# Patient Record
Sex: FEMALE | Race: WHITE | NOT HISPANIC OR LATINO | Employment: OTHER | URBAN - NONMETROPOLITAN AREA
[De-identification: names, ages, dates, MRNs, and addresses within clinical notes are randomized per-mention and may not be internally consistent; named-entity substitution may affect disease eponyms.]

---

## 2017-01-08 ENCOUNTER — APPOINTMENT (OUTPATIENT)
Dept: GENERAL RADIOLOGY | Facility: HOSPITAL | Age: 42
End: 2017-01-08

## 2017-01-08 ENCOUNTER — HOSPITAL ENCOUNTER (EMERGENCY)
Facility: HOSPITAL | Age: 42
Discharge: HOME | End: 2017-01-08
Attending: EMERGENCY MEDICINE | Admitting: EMERGENCY MEDICINE

## 2017-01-08 VITALS
SYSTOLIC BLOOD PRESSURE: 97 MMHG | DIASTOLIC BLOOD PRESSURE: 67 MMHG | RESPIRATION RATE: 16 BRPM | HEIGHT: 67 IN | TEMPERATURE: 98.6 F | HEART RATE: 77 BPM | BODY MASS INDEX: 27.47 KG/M2 | OXYGEN SATURATION: 99 % | WEIGHT: 175 LBS

## 2017-01-08 DIAGNOSIS — E11.621 DIABETIC ULCER OF LEFT FIFTH TOE (HCC): Primary | ICD-10-CM

## 2017-01-08 DIAGNOSIS — L97.529 DIABETIC ULCER OF LEFT FIFTH TOE (HCC): Primary | ICD-10-CM

## 2017-01-08 LAB
ALBUMIN SERPL-MCNC: 3.6 G/DL (ref 3.5–5)
ALBUMIN/GLOB SERPL: 0.9 G/DL (ref 1.5–2.5)
ALP SERPL-CCNC: 100 U/L (ref 46–116)
ALT SERPL W P-5'-P-CCNC: 90 U/L (ref 10–36)
ANION GAP SERPL CALCULATED.3IONS-SCNC: 5.1 MMOL/L (ref 3.6–11.2)
AST SERPL-CCNC: 105 U/L (ref 10–30)
BASOPHILS # BLD AUTO: 0.03 10*3/MM3 (ref 0–0.3)
BASOPHILS NFR BLD AUTO: 0.4 % (ref 0–2)
BILIRUB SERPL-MCNC: 0.2 MG/DL (ref 0.2–1.8)
BUN BLD-MCNC: 8 MG/DL (ref 7–21)
BUN/CREAT SERPL: 8.2 (ref 7–25)
CALCIUM SPEC-SCNC: 9.2 MG/DL (ref 7.7–10)
CHLORIDE SERPL-SCNC: 112 MMOL/L (ref 99–112)
CO2 SERPL-SCNC: 21.9 MMOL/L (ref 24.3–31.9)
CREAT BLD-MCNC: 0.98 MG/DL (ref 0.43–1.29)
CRP SERPL-MCNC: 0.64 MG/DL (ref 0–0.99)
DEPRECATED RDW RBC AUTO: 47 FL (ref 37–54)
EOSINOPHIL # BLD AUTO: 0.6 10*3/MM3 (ref 0–0.7)
EOSINOPHIL NFR BLD AUTO: 8.6 % (ref 0–5)
ERYTHROCYTE [DISTWIDTH] IN BLOOD BY AUTOMATED COUNT: 14.6 % (ref 11.5–14.5)
ERYTHROCYTE [SEDIMENTATION RATE] IN BLOOD: 25 MM/HR (ref 0–20)
GFR SERPL CREATININE-BSD FRML MDRD: 63 ML/MIN/1.73
GLOBULIN UR ELPH-MCNC: 4 GM/DL
GLUCOSE BLD-MCNC: 94 MG/DL (ref 70–110)
HCT VFR BLD AUTO: 38.5 % (ref 37–47)
HGB BLD-MCNC: 12.4 G/DL (ref 12–16)
IMM GRANULOCYTES # BLD: 0.07 10*3/MM3 (ref 0–0.03)
IMM GRANULOCYTES NFR BLD: 1 % (ref 0–0.5)
LYMPHOCYTES # BLD AUTO: 1.97 10*3/MM3 (ref 1–3)
LYMPHOCYTES NFR BLD AUTO: 28.1 % (ref 21–51)
MCH RBC QN AUTO: 29 PG (ref 27–33)
MCHC RBC AUTO-ENTMCNC: 32.2 G/DL (ref 33–37)
MCV RBC AUTO: 90.2 FL (ref 80–94)
MONOCYTES # BLD AUTO: 0.7 10*3/MM3 (ref 0.1–0.9)
MONOCYTES NFR BLD AUTO: 10 % (ref 0–10)
NEUTROPHILS # BLD AUTO: 3.64 10*3/MM3 (ref 1.4–6.5)
NEUTROPHILS NFR BLD AUTO: 51.9 % (ref 30–70)
OSMOLALITY SERPL CALC.SUM OF ELEC: 275.6 MOSM/KG (ref 273–305)
PLATELET # BLD AUTO: 197 10*3/MM3 (ref 130–400)
PMV BLD AUTO: 10.4 FL (ref 6–10)
POTASSIUM BLD-SCNC: 4.1 MMOL/L (ref 3.5–5.3)
PROT SERPL-MCNC: 7.6 G/DL (ref 6–8)
RBC # BLD AUTO: 4.27 10*6/MM3 (ref 4.2–5.4)
SODIUM BLD-SCNC: 139 MMOL/L (ref 135–153)
WBC NRBC COR # BLD: 7.01 10*3/MM3 (ref 4.5–12.5)

## 2017-01-08 PROCEDURE — 63710000001 PROMETHAZINE PER 25 MG: Performed by: EMERGENCY MEDICINE

## 2017-01-08 PROCEDURE — 73620 X-RAY EXAM OF FOOT: CPT

## 2017-01-08 PROCEDURE — 85652 RBC SED RATE AUTOMATED: CPT | Performed by: EMERGENCY MEDICINE

## 2017-01-08 PROCEDURE — 86140 C-REACTIVE PROTEIN: CPT | Performed by: EMERGENCY MEDICINE

## 2017-01-08 PROCEDURE — 73620 X-RAY EXAM OF FOOT: CPT | Performed by: RADIOLOGY

## 2017-01-08 PROCEDURE — 99283 EMERGENCY DEPT VISIT LOW MDM: CPT

## 2017-01-08 PROCEDURE — 85025 COMPLETE CBC W/AUTO DIFF WBC: CPT | Performed by: EMERGENCY MEDICINE

## 2017-01-08 PROCEDURE — 80053 COMPREHEN METABOLIC PANEL: CPT | Performed by: EMERGENCY MEDICINE

## 2017-01-08 RX ORDER — AMOXICILLIN AND CLAVULANATE POTASSIUM 875; 125 MG/1; MG/1
1 TABLET, FILM COATED ORAL 2 TIMES DAILY
Qty: 28 TABLET | Refills: 0 | Status: ON HOLD | OUTPATIENT
Start: 2017-01-08 | End: 2017-01-18

## 2017-01-08 RX ORDER — PROMETHAZINE HYDROCHLORIDE 25 MG/1
25 TABLET ORAL ONCE
Status: COMPLETED | OUTPATIENT
Start: 2017-01-08 | End: 2017-01-08

## 2017-01-08 RX ADMIN — PROMETHAZINE HYDROCHLORIDE 25 MG: 25 TABLET ORAL at 21:20

## 2017-01-09 NOTE — DISCHARGE INSTRUCTIONS

## 2017-01-11 ENCOUNTER — HOSPITAL ENCOUNTER (EMERGENCY)
Facility: HOSPITAL | Age: 42
Discharge: LEFT BEFORE DISCHARGE | End: 2017-01-11
Admitting: EMERGENCY MEDICINE

## 2017-01-11 DIAGNOSIS — L97.529 FOOT ULCER, LEFT, WITH UNSPECIFIED SEVERITY (HCC): Primary | ICD-10-CM

## 2017-01-11 LAB
ALBUMIN SERPL-MCNC: 3.6 G/DL (ref 3.5–5)
ALBUMIN/GLOB SERPL: 1.1 G/DL (ref 1.5–2.5)
ALP SERPL-CCNC: 90 U/L (ref 46–116)
ALT SERPL W P-5'-P-CCNC: 87 U/L (ref 10–36)
ANION GAP SERPL CALCULATED.3IONS-SCNC: 3.4 MMOL/L (ref 3.6–11.2)
AST SERPL-CCNC: 67 U/L (ref 10–30)
BACTERIA UR QL AUTO: ABNORMAL /HPF
BASOPHILS # BLD AUTO: 0.04 10*3/MM3 (ref 0–0.3)
BASOPHILS NFR BLD AUTO: 0.5 % (ref 0–2)
BILIRUB SERPL-MCNC: 0.3 MG/DL (ref 0.2–1.8)
BILIRUB UR QL STRIP: NEGATIVE
BUN BLD-MCNC: 9 MG/DL (ref 7–21)
BUN/CREAT SERPL: 11.4 (ref 7–25)
CALCIUM SPEC-SCNC: 8.5 MG/DL (ref 7.7–10)
CHLORIDE SERPL-SCNC: 111 MMOL/L (ref 99–112)
CLARITY UR: ABNORMAL
CO2 SERPL-SCNC: 25.6 MMOL/L (ref 24.3–31.9)
COLOR UR: YELLOW
CREAT BLD-MCNC: 0.79 MG/DL (ref 0.43–1.29)
DEPRECATED RDW RBC AUTO: 48.9 FL (ref 37–54)
EOSINOPHIL # BLD AUTO: 0.55 10*3/MM3 (ref 0–0.7)
EOSINOPHIL NFR BLD AUTO: 7.3 % (ref 0–5)
ERYTHROCYTE [DISTWIDTH] IN BLOOD BY AUTOMATED COUNT: 15.1 % (ref 11.5–14.5)
ERYTHROCYTE [SEDIMENTATION RATE] IN BLOOD: 26 MM/HR (ref 0–20)
GFR SERPL CREATININE-BSD FRML MDRD: 81 ML/MIN/1.73
GLOBULIN UR ELPH-MCNC: 3.4 GM/DL
GLUCOSE BLD-MCNC: 101 MG/DL (ref 70–110)
GLUCOSE UR STRIP-MCNC: NEGATIVE MG/DL
HCT VFR BLD AUTO: 35.8 % (ref 37–47)
HGB BLD-MCNC: 11.2 G/DL (ref 12–16)
HGB UR QL STRIP.AUTO: ABNORMAL
HYALINE CASTS UR QL AUTO: ABNORMAL /LPF
IMM GRANULOCYTES # BLD: 0.01 10*3/MM3 (ref 0–0.03)
IMM GRANULOCYTES NFR BLD: 0.1 % (ref 0–0.5)
KETONES UR QL STRIP: NEGATIVE
LEUKOCYTE ESTERASE UR QL STRIP.AUTO: ABNORMAL
LYMPHOCYTES # BLD AUTO: 2.45 10*3/MM3 (ref 1–3)
LYMPHOCYTES NFR BLD AUTO: 32.7 % (ref 21–51)
MCH RBC QN AUTO: 28.5 PG (ref 27–33)
MCHC RBC AUTO-ENTMCNC: 31.3 G/DL (ref 33–37)
MCV RBC AUTO: 91.1 FL (ref 80–94)
MONOCYTES # BLD AUTO: 0.58 10*3/MM3 (ref 0.1–0.9)
MONOCYTES NFR BLD AUTO: 7.7 % (ref 0–10)
NEUTROPHILS # BLD AUTO: 3.87 10*3/MM3 (ref 1.4–6.5)
NEUTROPHILS NFR BLD AUTO: 51.7 % (ref 30–70)
NITRITE UR QL STRIP: POSITIVE
OSMOLALITY SERPL CALC.SUM OF ELEC: 278.2 MOSM/KG (ref 273–305)
PH UR STRIP.AUTO: 6 [PH] (ref 5–8)
PLATELET # BLD AUTO: 239 10*3/MM3 (ref 130–400)
PMV BLD AUTO: 10.1 FL (ref 6–10)
POTASSIUM BLD-SCNC: 3.9 MMOL/L (ref 3.5–5.3)
PROT SERPL-MCNC: 7 G/DL (ref 6–8)
PROT UR QL STRIP: NEGATIVE
RBC # BLD AUTO: 3.93 10*6/MM3 (ref 4.2–5.4)
RBC # UR: ABNORMAL /HPF
REF LAB TEST METHOD: ABNORMAL
SODIUM BLD-SCNC: 140 MMOL/L (ref 135–153)
SP GR UR STRIP: 1.02 (ref 1–1.03)
SQUAMOUS #/AREA URNS HPF: ABNORMAL /HPF
UROBILINOGEN UR QL STRIP: ABNORMAL
WBC NRBC COR # BLD: 7.5 10*3/MM3 (ref 4.5–12.5)
WBC UR QL AUTO: ABNORMAL /HPF

## 2017-01-11 PROCEDURE — 81001 URINALYSIS AUTO W/SCOPE: CPT | Performed by: PHYSICIAN ASSISTANT

## 2017-01-11 PROCEDURE — 87186 SC STD MICRODIL/AGAR DIL: CPT | Performed by: PHYSICIAN ASSISTANT

## 2017-01-11 PROCEDURE — 85652 RBC SED RATE AUTOMATED: CPT | Performed by: PHYSICIAN ASSISTANT

## 2017-01-11 PROCEDURE — 87077 CULTURE AEROBIC IDENTIFY: CPT | Performed by: PHYSICIAN ASSISTANT

## 2017-01-11 PROCEDURE — 87088 URINE BACTERIA CULTURE: CPT | Performed by: PHYSICIAN ASSISTANT

## 2017-01-11 PROCEDURE — 80053 COMPREHEN METABOLIC PANEL: CPT | Performed by: PHYSICIAN ASSISTANT

## 2017-01-11 PROCEDURE — 99282 EMERGENCY DEPT VISIT SF MDM: CPT

## 2017-01-11 PROCEDURE — 87086 URINE CULTURE/COLONY COUNT: CPT | Performed by: PHYSICIAN ASSISTANT

## 2017-01-11 PROCEDURE — 36415 COLL VENOUS BLD VENIPUNCTURE: CPT

## 2017-01-11 PROCEDURE — 87040 BLOOD CULTURE FOR BACTERIA: CPT | Performed by: PHYSICIAN ASSISTANT

## 2017-01-11 PROCEDURE — 86140 C-REACTIVE PROTEIN: CPT | Performed by: PHYSICIAN ASSISTANT

## 2017-01-11 PROCEDURE — 85025 COMPLETE CBC W/AUTO DIFF WBC: CPT | Performed by: PHYSICIAN ASSISTANT

## 2017-01-12 VITALS
RESPIRATION RATE: 18 BRPM | HEART RATE: 76 BPM | SYSTOLIC BLOOD PRESSURE: 109 MMHG | DIASTOLIC BLOOD PRESSURE: 66 MMHG | OXYGEN SATURATION: 99 % | TEMPERATURE: 100.4 F | WEIGHT: 180 LBS | BODY MASS INDEX: 27.28 KG/M2 | HEIGHT: 68 IN

## 2017-01-12 LAB — CRP SERPL-MCNC: <0.5 MG/DL (ref 0–0.99)

## 2017-01-12 NOTE — ED PROVIDER NOTES
"Subjective   HPI Comments: Patient presents to the ED with complaints of abscess to left foot. Patient states this has been there for months.  Reports she has lost 3 toes regarding this abscess. Patient states that urgent care sent her up here.  She reports fever, diarrhea, vomiting and states that she is currently taking Augmentin and Bactrim. Patient reports she was seen in the ED 3 days ago and discharged home.     Patient is a 41 y.o. female presenting with lower extremity pain.   History provided by:  Patient   used: No    Lower Extremity Issue   Location:  Foot  Time since incident: \"months\"  Injury: no    Foot location:  L foot  Chronicity:  Chronic  Foreign body present:  No foreign bodies  Tetanus status:  Up to date  Relieved by:  Nothing  Worsened by:  Nothing  Ineffective treatments:  None tried  Associated symptoms: decreased ROM and swelling        Review of Systems   Constitutional: Negative.    HENT: Negative.    Eyes: Negative.    Respiratory: Negative.    Cardiovascular: Negative.    Gastrointestinal: Negative.    Endocrine: Negative.    Genitourinary: Negative.    Musculoskeletal: Negative.    Skin: Positive for wound.   Allergic/Immunologic: Negative.    Neurological: Negative.    Hematological: Negative.    Psychiatric/Behavioral: Negative.    All other systems reviewed and are negative.      Past Medical History   Diagnosis Date   • Seizures        Allergies   Allergen Reactions   • Contrast Dye    • Toradol [Ketorolac Tromethamine]    • Ultram [Tramadol Hcl]    • Zofran [Ondansetron Hcl]        Past Surgical History   Procedure Laterality Date   • Arm amputation at shoulder Left        History reviewed. No pertinent family history.    Social History     Social History   • Marital status:      Spouse name: N/A   • Number of children: N/A   • Years of education: N/A     Social History Main Topics   • Smoking status: Heavy Tobacco Smoker     Packs/day: 0.50     Types: " Cigarettes   • Smokeless tobacco: None   • Alcohol use No   • Drug use: No   • Sexual activity: Not Asked     Other Topics Concern   • None     Social History Narrative           Objective   Physical Exam   Constitutional: She is oriented to person, place, and time. She appears well-developed and well-nourished.   HENT:   Head: Normocephalic and atraumatic.   Right Ear: External ear normal.   Left Ear: External ear normal.   Nose: Nose normal.   Mouth/Throat: Oropharynx is clear and moist. No oropharyngeal exudate.   Eyes: EOM are normal. Pupils are equal, round, and reactive to light. Right eye exhibits no discharge. Left eye exhibits no discharge. No scleral icterus.   Neck: Normal range of motion. Neck supple. No JVD present. No tracheal deviation present. No thyromegaly present.   Cardiovascular: Normal rate, regular rhythm, normal heart sounds and intact distal pulses.  Exam reveals no gallop and no friction rub.    No murmur heard.  Pulmonary/Chest: Effort normal and breath sounds normal. No stridor. No respiratory distress. She has no wheezes. She has no rales. She exhibits no tenderness.   Abdominal: Soft. Bowel sounds are normal. She exhibits no distension and no mass. There is no tenderness. There is no guarding. No hernia.   Musculoskeletal: Normal range of motion.   Patient has a 2 cm ulceration to the lateral foot.  Patient has amputation of 3rd, 4th, and 5th toes.  There is no drainage noted from the ulceration.  Minimal bleeding noted on bandage, ulceration not actively bleeding. There was no warmth or swelling not        Lymphadenopathy:     She has no cervical adenopathy.   Neurological: She is alert and oriented to person, place, and time. No cranial nerve deficit. Coordination normal.   Skin: Skin is warm and dry. No rash noted. No erythema. No pallor.   Psychiatric: She has a normal mood and affect. Her behavior is normal. Judgment and thought content normal.   Nursing note and vitals  reviewed.      Procedures         ED Course  ED Course   Comment By Time   Patient very rude and impatient on initial exam. Patient telling me multiple times that she needs to be admitted for IV antibiotics. I explained to patient that we needed to get blood work and evaluate that before contacting the hospitalist for possible admission if warranted at this time.  NIRAV Mallory 01/12 0019   Patient upset as she was not being directly admitted and left AMA.  Patient alert and oriented x 4.  She voiced understanding with AMA discharge.  NIRAV Mallory 01/12 0020                  [facility]    Final diagnoses:   Foot ulcer, left, with unspecified severity            NIRAV Mallory  01/12/17 0020

## 2017-01-14 LAB — BACTERIA SPEC AEROBE CULT: ABNORMAL

## 2017-01-17 LAB — BACTERIA SPEC AEROBE CULT: NORMAL

## 2017-01-17 NOTE — ED PROVIDER NOTES
Subjective   Patient is a 41 y.o. female presenting with lower extremity pain.   Lower Extremity Issue   Location:  Foot  Injury: no    Foot location:  L foot  Pain details:     Quality:  Aching    Severity:  Moderate    Onset quality:  Gradual    Timing:  Constant    Progression:  Worsening  Chronicity:  Chronic  Dislocation: no    Foreign body present:  No foreign bodies  Tetanus status:  Up to date  Prior injury to area:  No  Relieved by:  Nothing  Worsened by:  Activity and bearing weight  Ineffective treatments: Pateint currently being followed and treated by Dr. Reynoso.  Associated symptoms: no back pain, no fatigue, no fever and no neck pain        Review of Systems   Constitutional: Negative for activity change, appetite change, chills, diaphoresis, fatigue and fever.   HENT: Negative for congestion, ear pain and sore throat.    Eyes: Negative for redness.   Respiratory: Negative for cough, chest tightness, shortness of breath and wheezing.    Cardiovascular: Negative for chest pain, palpitations and leg swelling.   Gastrointestinal: Negative for abdominal pain, diarrhea, nausea and vomiting.   Genitourinary: Negative for dysuria and urgency.   Musculoskeletal: Negative for arthralgias, back pain, myalgias and neck pain.   Skin: Negative for pallor, rash and wound.   Neurological: Negative for dizziness, speech difficulty, weakness and headaches.   Psychiatric/Behavioral: Negative for agitation, behavioral problems, confusion and decreased concentration.       Past Medical History   Diagnosis Date   • Seizures        Allergies   Allergen Reactions   • Contrast Dye    • Toradol [Ketorolac Tromethamine]    • Ultram [Tramadol Hcl]    • Zofran [Ondansetron Hcl]        Past Surgical History   Procedure Laterality Date   • Arm amputation at shoulder Left        History reviewed. No pertinent family history.    Social History     Social History   • Marital status:      Spouse name: N/A   • Number of children:  N/A   • Years of education: N/A     Social History Main Topics   • Smoking status: Heavy Tobacco Smoker     Packs/day: 0.50     Types: Cigarettes   • Smokeless tobacco: None   • Alcohol use No   • Drug use: No   • Sexual activity: Not Asked     Other Topics Concern   • None     Social History Narrative           Objective   Physical Exam   Constitutional: She is oriented to person, place, and time. She appears well-developed and well-nourished.  Non-toxic appearance. No distress.   HENT:   Head: Normocephalic and atraumatic.   Right Ear: External ear normal.   Left Ear: External ear normal.   Nose: Nose normal.   Mouth/Throat: Oropharynx is clear and moist and mucous membranes are normal. No oropharyngeal exudate. No tonsillar exudate.   Eyes: Conjunctivae, EOM and lids are normal. Pupils are equal, round, and reactive to light.   Neck: Normal range of motion and full passive range of motion without pain. Neck supple. No thyromegaly present.   Cardiovascular: Normal rate, regular rhythm, S1 normal, S2 normal, normal heart sounds, intact distal pulses and normal pulses.    Pulmonary/Chest: Effort normal and breath sounds normal. No tachypnea. No respiratory distress. She has no decreased breath sounds. She has no wheezes. She has no rales. She exhibits no tenderness.   Abdominal: Soft. Normal appearance and bowel sounds are normal. She exhibits no distension. There is no tenderness. There is no rebound and no guarding.   Musculoskeletal: Normal range of motion. She exhibits edema and tenderness. She exhibits no deformity.        Left foot: There is tenderness and swelling.   Lymphadenopathy:     She has no cervical adenopathy.   Neurological: She is alert and oriented to person, place, and time. She has normal strength. No cranial nerve deficit or sensory deficit. GCS eye subscore is 4. GCS verbal subscore is 5. GCS motor subscore is 6.   Skin: Skin is warm, dry and intact. No rash noted. She is not diaphoretic. No  erythema. No pallor.   Psychiatric: She has a normal mood and affect. Her speech is normal and behavior is normal. Judgment and thought content normal. Cognition and memory are normal.   Nursing note and vitals reviewed.      Procedures         ED Course  ED Course   Value Comment By Time   XR Foot 2 View Left IMPRESSION:   Little change from the prior study.  Nathan Velazquez MD 01/17 0301    Patient will continue Abx prescribed by Dr. Reynoso, and has follow up tomorrow.  Nathan Velazquez MD 01/17 0307                  MDM  Number of Diagnoses or Management Options  Diabetic ulcer of left fifth toe: established and worsening     Amount and/or Complexity of Data Reviewed  Clinical lab tests: ordered and reviewed  Tests in the radiology section of CPT®: ordered and reviewed  Tests in the medicine section of CPT®: ordered and reviewed  Discussion of test results with the performing providers: yes  Independent visualization of images, tracings, or specimens: yes    Risk of Complications, Morbidity, and/or Mortality  Presenting problems: moderate  Diagnostic procedures: moderate  Management options: moderate    Patient Progress  Patient progress: stable      Final diagnoses:   Diabetic ulcer of left fifth toe            Nathan Velazquez MD  01/17/17 4002

## 2017-01-18 ENCOUNTER — HOSPITAL ENCOUNTER (INPATIENT)
Facility: HOSPITAL | Age: 42
LOS: 2 days | Discharge: HOME | End: 2017-01-20
Attending: PODIATRIST | Admitting: PODIATRIST

## 2017-01-18 DIAGNOSIS — M86.672 CHRONIC OSTEOMYELITIS OF LEFT FOOT (HCC): Primary | ICD-10-CM

## 2017-01-18 PROBLEM — M86.9 OSTEOMYELITIS OF FOOT (HCC): Status: ACTIVE | Noted: 2017-01-18

## 2017-01-18 LAB
ANION GAP SERPL CALCULATED.3IONS-SCNC: 5.3 MMOL/L (ref 3.6–11.2)
ANION GAP SERPL CALCULATED.3IONS-SCNC: 6.5 MMOL/L (ref 3.6–11.2)
BASOPHILS # BLD AUTO: 0.04 10*3/MM3 (ref 0–0.3)
BASOPHILS NFR BLD AUTO: 0.6 % (ref 0–2)
BUN BLD-MCNC: 12 MG/DL (ref 7–21)
BUN BLD-MCNC: 13 MG/DL (ref 7–21)
BUN/CREAT SERPL: 12.1 (ref 7–25)
BUN/CREAT SERPL: 14.1 (ref 7–25)
CALCIUM SPEC-SCNC: 8.9 MG/DL (ref 7.7–10)
CALCIUM SPEC-SCNC: 9.5 MG/DL (ref 7.7–10)
CHLORIDE SERPL-SCNC: 107 MMOL/L (ref 99–112)
CHLORIDE SERPL-SCNC: 107 MMOL/L (ref 99–112)
CO2 SERPL-SCNC: 30.5 MMOL/L (ref 24.3–31.9)
CO2 SERPL-SCNC: 31.7 MMOL/L (ref 24.3–31.9)
CREAT BLD-MCNC: 0.92 MG/DL (ref 0.43–1.29)
CREAT BLD-MCNC: 0.99 MG/DL (ref 0.43–1.29)
CRP SERPL-MCNC: <0.5 MG/DL (ref 0–0.99)
DEPRECATED RDW RBC AUTO: 50.5 FL (ref 37–54)
EOSINOPHIL # BLD AUTO: 0.35 10*3/MM3 (ref 0–0.7)
EOSINOPHIL NFR BLD AUTO: 5.5 % (ref 0–5)
ERYTHROCYTE [DISTWIDTH] IN BLOOD BY AUTOMATED COUNT: 15.1 % (ref 11.5–14.5)
GFR SERPL CREATININE-BSD FRML MDRD: 62 ML/MIN/1.73
GFR SERPL CREATININE-BSD FRML MDRD: 68 ML/MIN/1.73
GLUCOSE BLD-MCNC: 87 MG/DL (ref 70–110)
GLUCOSE BLD-MCNC: 98 MG/DL (ref 70–110)
HCT VFR BLD AUTO: 36.9 % (ref 37–47)
HGB BLD-MCNC: 12.2 G/DL (ref 12–16)
IMM GRANULOCYTES # BLD: 0.01 10*3/MM3 (ref 0–0.03)
IMM GRANULOCYTES NFR BLD: 0.2 % (ref 0–0.5)
LYMPHOCYTES # BLD AUTO: 1.7 10*3/MM3 (ref 1–3)
LYMPHOCYTES NFR BLD AUTO: 26.5 % (ref 21–51)
MCH RBC QN AUTO: 30.4 PG (ref 27–33)
MCHC RBC AUTO-ENTMCNC: 33.1 G/DL (ref 33–37)
MCV RBC AUTO: 92 FL (ref 80–94)
MONOCYTES # BLD AUTO: 0.49 10*3/MM3 (ref 0.1–0.9)
MONOCYTES NFR BLD AUTO: 7.6 % (ref 0–10)
MRSA DNA SPEC QL NAA+PROBE: NEGATIVE
NEUTROPHILS # BLD AUTO: 3.83 10*3/MM3 (ref 1.4–6.5)
NEUTROPHILS NFR BLD AUTO: 59.6 % (ref 30–70)
OSMOLALITY SERPL CALC.SUM OF ELEC: 286.3 MOSM/KG (ref 273–305)
OSMOLALITY SERPL CALC.SUM OF ELEC: 286.6 MOSM/KG (ref 273–305)
PLATELET # BLD AUTO: 254 10*3/MM3 (ref 130–400)
PMV BLD AUTO: 10.4 FL (ref 6–10)
POTASSIUM BLD-SCNC: 3.7 MMOL/L (ref 3.5–5.3)
POTASSIUM BLD-SCNC: 4.3 MMOL/L (ref 3.5–5.3)
RBC # BLD AUTO: 4.01 10*6/MM3 (ref 4.2–5.4)
S AUREUS DNA SPEC QL NAA+PROBE: NEGATIVE
SODIUM BLD-SCNC: 144 MMOL/L (ref 135–153)
SODIUM BLD-SCNC: 144 MMOL/L (ref 135–153)
WBC NRBC COR # BLD: 6.42 10*3/MM3 (ref 4.5–12.5)

## 2017-01-18 PROCEDURE — 63710000001 PROMETHAZINE PER 25 MG: Performed by: PODIATRIST

## 2017-01-18 PROCEDURE — 80048 BASIC METABOLIC PNL TOTAL CA: CPT | Performed by: PODIATRIST

## 2017-01-18 PROCEDURE — 25010000002 VANCOMYCIN PER 500 MG

## 2017-01-18 PROCEDURE — 87641 MR-STAPH DNA AMP PROBE: CPT | Performed by: PODIATRIST

## 2017-01-18 PROCEDURE — C1751 CATH, INF, PER/CENT/MIDLINE: HCPCS

## 2017-01-18 PROCEDURE — 87040 BLOOD CULTURE FOR BACTERIA: CPT | Performed by: PODIATRIST

## 2017-01-18 PROCEDURE — 25010000002 MORPHINE SULFATE (PF) 2 MG/ML SOLUTION: Performed by: PODIATRIST

## 2017-01-18 PROCEDURE — 87640 STAPH A DNA AMP PROBE: CPT | Performed by: PODIATRIST

## 2017-01-18 PROCEDURE — 86140 C-REACTIVE PROTEIN: CPT | Performed by: PODIATRIST

## 2017-01-18 PROCEDURE — 85025 COMPLETE CBC W/AUTO DIFF WBC: CPT | Performed by: PODIATRIST

## 2017-01-18 RX ORDER — ALPRAZOLAM 0.5 MG/1
0.5 TABLET ORAL 3 TIMES DAILY PRN
Status: DISCONTINUED | OUTPATIENT
Start: 2017-01-18 | End: 2017-01-19

## 2017-01-18 RX ORDER — GABAPENTIN 400 MG/1
800 CAPSULE ORAL EVERY 8 HOURS SCHEDULED
Status: DISCONTINUED | OUTPATIENT
Start: 2017-01-18 | End: 2017-01-19

## 2017-01-18 RX ORDER — MORPHINE SULFATE 2 MG/ML
1 INJECTION, SOLUTION INTRAMUSCULAR; INTRAVENOUS
Status: DISCONTINUED | OUTPATIENT
Start: 2017-01-18 | End: 2017-01-19

## 2017-01-18 RX ORDER — SODIUM CHLORIDE 0.9 % (FLUSH) 0.9 %
10 SYRINGE (ML) INJECTION AS NEEDED
Status: DISCONTINUED | OUTPATIENT
Start: 2017-01-18 | End: 2017-01-19

## 2017-01-18 RX ORDER — PREGABALIN 100 MG/1
300 CAPSULE ORAL 2 TIMES DAILY
COMMUNITY
End: 2017-05-13

## 2017-01-18 RX ORDER — DEXTROSE, SODIUM CHLORIDE, SODIUM LACTATE, POTASSIUM CHLORIDE, AND CALCIUM CHLORIDE 5; .6; .31; .03; .02 G/100ML; G/100ML; G/100ML; G/100ML; G/100ML
125 INJECTION, SOLUTION INTRAVENOUS CONTINUOUS
Status: DISCONTINUED | OUTPATIENT
Start: 2017-01-18 | End: 2017-01-19

## 2017-01-18 RX ORDER — LIDOCAINE HYDROCHLORIDE 10 MG/ML
20 INJECTION, SOLUTION INFILTRATION; PERINEURAL ONCE
Status: COMPLETED | OUTPATIENT
Start: 2017-01-18 | End: 2017-01-18

## 2017-01-18 RX ORDER — SODIUM CHLORIDE 0.9 % (FLUSH) 0.9 %
10 SYRINGE (ML) INJECTION EVERY 12 HOURS SCHEDULED
Status: DISCONTINUED | OUTPATIENT
Start: 2017-01-18 | End: 2017-01-19

## 2017-01-18 RX ORDER — PREGABALIN 100 MG/1
300 CAPSULE ORAL EVERY 12 HOURS SCHEDULED
Status: DISCONTINUED | OUTPATIENT
Start: 2017-01-18 | End: 2017-01-19

## 2017-01-18 RX ORDER — HYDROCODONE BITARTRATE AND ACETAMINOPHEN 5; 325 MG/1; MG/1
1 TABLET ORAL EVERY 8 HOURS PRN
Status: DISCONTINUED | OUTPATIENT
Start: 2017-01-18 | End: 2017-01-19

## 2017-01-18 RX ORDER — PROMETHAZINE HYDROCHLORIDE 25 MG/1
25 TABLET ORAL EVERY 6 HOURS PRN
Status: DISCONTINUED | OUTPATIENT
Start: 2017-01-18 | End: 2017-01-19

## 2017-01-18 RX ORDER — PROMETHAZINE HYDROCHLORIDE 25 MG/1
25 TABLET ORAL EVERY 6 HOURS PRN
COMMUNITY
End: 2019-05-05

## 2017-01-18 RX ORDER — HYDROCODONE BITARTRATE AND ACETAMINOPHEN 5; 325 MG/1; MG/1
1 TABLET ORAL EVERY 8 HOURS PRN
COMMUNITY
End: 2017-05-13

## 2017-01-18 RX ORDER — LEVETIRACETAM 500 MG/1
1000 TABLET ORAL EVERY 12 HOURS SCHEDULED
Status: DISCONTINUED | OUTPATIENT
Start: 2017-01-18 | End: 2017-01-19

## 2017-01-18 RX ADMIN — PREGABALIN 300 MG: 100 CAPSULE ORAL at 16:18

## 2017-01-18 RX ADMIN — GABAPENTIN 800 MG: 400 CAPSULE ORAL at 16:17

## 2017-01-18 RX ADMIN — MORPHINE SULFATE 1 MG: 2 INJECTION, SOLUTION INTRAMUSCULAR; INTRAVENOUS at 20:18

## 2017-01-18 RX ADMIN — ALPRAZOLAM 0.5 MG: 0.5 TABLET ORAL at 18:56

## 2017-01-18 RX ADMIN — GABAPENTIN 800 MG: 400 CAPSULE ORAL at 21:27

## 2017-01-18 RX ADMIN — PROMETHAZINE HYDROCHLORIDE 25 MG: 25 TABLET ORAL at 22:57

## 2017-01-18 RX ADMIN — VANCOMYCIN HYDROCHLORIDE 1750 MG: 5 INJECTION, POWDER, LYOPHILIZED, FOR SOLUTION INTRAVENOUS at 16:18

## 2017-01-18 RX ADMIN — HYDROCODONE BITARTRATE AND ACETAMINOPHEN 1 TABLET: 5; 325 TABLET ORAL at 22:56

## 2017-01-18 RX ADMIN — PROMETHAZINE HYDROCHLORIDE 25 MG: 25 TABLET ORAL at 17:16

## 2017-01-18 RX ADMIN — HYDROCODONE BITARTRATE AND ACETAMINOPHEN 1 TABLET: 5; 325 TABLET ORAL at 18:56

## 2017-01-18 RX ADMIN — SODIUM CHLORIDE, SODIUM LACTATE, POTASSIUM CHLORIDE, CALCIUM CHLORIDE AND DEXTROSE MONOHYDRATE 125 ML/HR: 5; 600; 310; 30; 20 INJECTION, SOLUTION INTRAVENOUS at 18:17

## 2017-01-18 RX ADMIN — MORPHINE SULFATE 1 MG: 2 INJECTION, SOLUTION INTRAMUSCULAR; INTRAVENOUS at 17:16

## 2017-01-18 RX ADMIN — SODIUM CHLORIDE, PRESERVATIVE FREE 10 ML: 5 INJECTION INTRAVENOUS at 20:18

## 2017-01-18 RX ADMIN — LEVETIRACETAM 1000 MG: 500 TABLET, FILM COATED ORAL at 20:36

## 2017-01-18 RX ADMIN — LIDOCAINE HYDROCHLORIDE 20 ML: 10 INJECTION, SOLUTION INFILTRATION; PERINEURAL at 16:24

## 2017-01-18 NOTE — H&P
History and Physical    Principal problem: Osteomyelitis Left foot with Neuropathic Ulcer, Failure Long Term Antibiotic Therapy, wound care.     Subjective .     History of present illness:     Ms. Alisa Holland is a 41 y.o. years old female with past medical history significant for Peripheral neuropathy, bilateral extremity deformities with history of surgical correction. She has a history of recurrent abscess/ulcer formations bilateral over the years.She had left foot ulcer formation on 10/25/17 shortly after her right total hip replacement on 8/26/16. She had been followed by me with aseptic wound care, offloading, antibiotics including clindamycin and bactrim with progressive depth of wound on follow up with order of bone scan on 12/14/17 showing findings suspicious for osteomyelitis fifth metatarsal adjacent to neuropathic ulcer. The patient over past two weeks has had progressive seropurulent drainage from the left foot ulcer with necrotic tissue formation, local redness, swelling and pain. She was scheduled at the [facility] this morning for debridement osteomyelitis/wound but was unable to gain IV access after multiple attempts (She has history of severely difficult IV access requiring Port/Central line placement in past) She was recommended for hospital evaluation due to failure of antibiotic therapy with progression of redness, swelling, drainage infection.She admits chills, diarrhea. No vomiting.  History taken from: patient. Case was discussed with patient    Review of Systems    Constitutional: Admits chills and fatigue. No night sweats.   Eyes: no eye drainage, itching or redness.  HEENT: no mouth sores, dysphagia or nose bleed.  Respiratory: no for shortness of breath, cough or production of sputum.  Cardiovascular: no chest pain, no palpitations, no orthopnea.  Gastrointestinal: no nausea, vomiting or diarrhea. No abdominal pain, hematemesis or rectal bleeding.  Genitourinary: no dysuria or  "polyuria.  Hematologic/lymphatic: no lymph node abnormalities, no easy bruising or easy bleeding.  Musculoskeletal:  Left foot pain   Skin: Neuropathic Ulcer left foot.   Neurological: Peripheral neuropathy. History Seizure last 2014  Behavioral/Psych: no depression or suicidal ideation.  Endocrine: no hot flashes.  Immunologic: negative.    Past Medical History    Past Medical History   Diagnosis Date   • Amputation of fifth toe, left, traumatic      third, fourth, fifth toes   • Arthritis    • History of transfusion      2007, 2016   • Seizures      last seizure 2014       Past Surgical History    Past Surgical History   Procedure Laterality Date   • Arm amputation at shoulder Left    • Ankle fusion Bilateral    • Appendectomy     • Total hip arthroplasty Right 08/2016       Family History    Family History   Problem Relation Age of Onset   • Arthritis Mother    • Asthma Mother    • COPD Mother    • Diabetes Mother    • Heart disease Mother    • Hypertension Mother    • Hyperlipidemia Mother    • Cancer Paternal Grandfather        Social History    Social History   Substance Use Topics   • Smoking status: Heavy Tobacco Smoker     Packs/day: 0.50     Years: 2.00     Types: Cigarettes   • Smokeless tobacco: Not on file   • Alcohol use No       Allergies    Contrast dye; Toradol [ketorolac tromethamine]; Ultram [tramadol hcl]; and Zofran [ondansetron hcl]    Objective     Visit Vitals   • /63 (BP Location: Right arm, Patient Position: Lying)   • Pulse 79   • Temp 98.8 °F (37.1 °C) (Oral)   • Resp 18   • Ht 67\" (170.2 cm)   • Wt 186 lb (84.4 kg)   • LMP 12/19/2016   • SpO2 94%   • BMI 29.13 kg/m2       Temp:  [98.8 °F (37.1 °C)] 98.8 °F (37.1 °C)        Intake/Output Summary (Last 24 hours) at 01/18/17 1714  Last data filed at 01/18/17 1643   Gross per 24 hour   Intake    360 ml   Output      0 ml   Net    360 ml         Physical Exam:     General Appearance:    Alert, cooperative, in no acute distress   Head:   "  Normocephalic, without obvious abnormality, atraumatic   Eyes:            Lids and lashes normal, conjunctivae and sclerae normal, no   icterus, no pallor, corneas clear, PERRLA   Ears:    Ears appear intact with no abnormalities noted   Throat:   No oral lesions, no thrush, oral mucosa moist   Neck:   No adenopathy, supple, trachea midline, no thyromegaly, no   carotid bruit, no JVD   Back:     No tenderness to percussion or palpation, range of motion   normal   Lungs:     Clear to auscultation,respirations regular, even and unlabored.     Heart:    Regular rhythm and normal rate   Chest Wall:    No abnormalities observed   Abdomen:     Normal bowel sounds, no masses, no organomegaly, soft        non-tender, non-distended   Rectal:     Deferred   Extremities: Left Arm Amputated.    Pulses:   Pulses Difficult palpable left secondary edema, mildly palpable right.   Skin:   Necrotic ulceration left foot with erythema, edema, pain palpation. Serous exudate.    Lymph nodes:   No palpable adenopathy popliteal exam.    Neurologic:   Awake, alert and oriented x 3. Following commands.       Results from last 7 days  Lab Units 01/18/17  1514 01/11/17  2317   WBC 10*3/mm3 6.42 7.50     Lab Results   Component Value Date    NEUTROABS 3.83 01/18/2017         Results from last 7 days  Lab Units 01/18/17  1514   CREATININE mg/dL 0.92         Results from last 7 days  Lab Units 01/18/17  1514 01/11/17  2317   CRP mg/dL <0.50 <0.50       Imaging Results (last 24 hours)     ** No results found for the last 24 hours. **        **Bone Scan left foot 12./14/16 [facility] showing probable osteomyelitis distal fifth metatarsal.     Results Review:    I have personally reviewed laboratory data, culture results, radiology studies and antimicrobial therapy.    Hospital Medications (active)       Dose Frequency Start End    ALPRAZolam (XANAX) tablet 0.5 mg 0.5 mg 3 Times Daily PRN 1/18/2017     Sig - Route: Take 1 tablet by mouth 3  (Three) Times a Day As Needed for anxiety. - Oral    gabapentin (NEURONTIN) capsule 800 mg 800 mg Every 8 Hours Scheduled 1/18/2017     Sig - Route: Take 2 capsules by mouth Every 8 (Eight) Hours. - Oral    HYDROcodone-acetaminophen (NORCO) 5-325 MG per tablet 1 tablet 1 tablet Every 8 Hours PRN 1/18/2017     Sig - Route: Take 1 tablet by mouth Every 8 (Eight) Hours As Needed for moderate pain (4-6). - Oral    levETIRAcetam (KEPPRA) tablet 1,000 mg 1,000 mg Every 12 Hours Scheduled 1/18/2017     Sig - Route: Take 2 tablets by mouth Every 12 (Twelve) Hours. - Oral    lidocaine (XYLOCAINE) 1 % injection 20 mL 20 mL Once 1/18/2017 1/18/2017    Sig - Route: Inject 20 mL into the skin 1 (One) Time. - Intradermal    Cosign for Ordering: Required by Simón Reynoso MD    Morphine sulfate (PF) injection 1 mg 1 mg Every 3 Hours PRN 1/18/2017 1/28/2017    Sig - Route: Infuse 0.5 mL into a venous catheter Every 3 (Three) Hours As Needed for severe pain (7-10). - Intravenous    pregabalin (LYRICA) capsule 300 mg 300 mg Every 12 Hours Scheduled 1/18/2017     Sig - Route: Take 3 capsules by mouth Every 12 (Twelve) Hours. - Oral    promethazine (PHENERGAN) tablet 25 mg 25 mg Every 6 Hours PRN 1/18/2017     Sig - Route: Take 1 tablet by mouth Every 6 (Six) Hours As Needed for nausea or vomiting. - Oral    sodium chloride 0.9 % flush 10 mL 10 mL Every 12 Hours Scheduled 1/18/2017     Sig - Route: 10 mL by Intracatheter route Every 12 (Twelve) Hours. - Intracatheter    Cosign for Ordering: Required by Simón Reynoso MD    sodium chloride 0.9 % flush 10 mL 10 mL As Needed 1/18/2017     Sig - Route: 10 mL by Intracatheter route As Needed for line care (After Medication Administration). - Intracatheter    Cosign for Ordering: Required by Simón Reynoso MD    vancomycin (VANCOCIN) 1,500 mg in sodium chloride 0.9 % 500 mL IVPB 1,500 mg Every 12 Hours 1/19/2017     Sig - Route: Infuse 1,500 mg into a venous catheter Every 12 (Twelve)  "Hours. - Intravenous    vancomycin (VANCOCIN) 1,750 mg in sodium chloride 0.9 % 500 mL IVPB 1,750 mg Once 1/18/2017     Sig - Route: Infuse 1,750 mg into a venous catheter 1 (One) Time. - Intravenous    Pharmacy to dose vancomycin (Discontinued)  Continuous PRN 1/18/2017 1/18/2017    Sig - Route: Continuous As Needed for consult. - Does not apply    Reason for Discontinue: Duplicate order            PROBLEM LIST:    Patient Active Problem List   Diagnosis   • Osteomyelitis of foot       Assessment/Plan   Neuropathy Bilateral  Neuropathic Ulcer, Left Foot.   Chronic \"Burnt Out\" Osteomyelitis, Fifth [place] Residual Segment.  History Multiresistant Infections/MRSA  Pain Syndrome.     PLAN:  1. Consult for IV Line Access, type based on assessment. (History Noted)   2. Vancomycin IV per pharmacy.  History + MRSA. Consult ID.   3. Plan for Excisional Debridement Phalangeal base/Metatarsal Osteomyelitis  contiguous to chronic open wound with deep culture.   3. Rx: Arterial Doppler for non healing status and chronic tobacco abuse.  4. Discussed with Dr. Boyer (Hospitalist), advised only need for Medical consult if medical issues arise during admission based on medical profile.   5. Nasal Screen PCR, CBC,BMP, Blood cultures, CRP noted.   6. DVT Prophylaxis.     Patients findings and recommendations were discussed with patient    Code Status: No Order    Simón Reynoso MD  01/18/17  5:14 PM      "

## 2017-01-18 NOTE — CONSULTS
Pharmacy was consulted for vancomycin dosing for osteo of the foot.  Based on pt history will initiate vancomycin at 1750 mg x 1 loading dose followed by 1500 mg iv q12hrs to target trough levels of 15-20 mg/L.  Pharmacy will continue to follow and obtain trough level once steady state is achieved.  Thank you.

## 2017-01-18 NOTE — PLAN OF CARE
Problem: Patient Care Overview (Adult)  Goal: Plan of Care Review  Outcome: Ongoing (interventions implemented as appropriate)    Problem: Cellulitis (Adult)  Goal: Signs and Symptoms of Listed Potential Problems Will be Absent or Manageable (Cellulitis)  Outcome: Ongoing (interventions implemented as appropriate)    Problem: Fall Risk (Adult)  Goal: Identify Related Risk Factors and Signs and Symptoms  Outcome: Ongoing (interventions implemented as appropriate)  Goal: Absence of Falls  Outcome: Ongoing (interventions implemented as appropriate)    Problem: Infection, Risk/Actual (Adult)  Goal: Identify Related Risk Factors and Signs and Symptoms  Outcome: Ongoing (interventions implemented as appropriate)  Goal: Infection Prevention/Resolution  Outcome: Ongoing (interventions implemented as appropriate)    Problem: Skin Integrity Impairment, Risk/Actual (Adult)  Goal: Identify Related Risk Factors and Signs and Symptoms  Outcome: Ongoing (interventions implemented as appropriate)  Goal: Skin Integrity/Wound Healing  Outcome: Ongoing (interventions implemented as appropriate)

## 2017-01-18 NOTE — IP AVS SNAPSHOT
AFTER VISIT SUMMARY             Alisa Holland           About your hospitalization     You were admitted on:  January 18, 2017 You last received care in the:  [address]       Procedures & Surgeries      Procedure(s) (LRB):  LOWER EXTREMITY DEBRIDEMENT OSTEOMYELITIS, WOUND REPAIR FLAP (Left)     1/18/2017 - 1/19/2017     Surgeon(s):  Simón Reynoso MD  -------------------      Medications    If you or your caregiver advised us that you are currently taking a medication and that medication is marked below as “Resume”, this simply indicates that we have reviewed those medications to make sure our new therapy recommendations do not interfere.  If you have concerns about medications other than those new ones which we are prescribing today, please consult the physician who prescribed them (or your primary physician).  Our review of your home medications is not meant to indicate that we are directing their use.             Your Medications      START taking these medications     ASCRIPTIN 325 MG tablet   Take 1 tablet by mouth Daily.           clindamycin 300 MG capsule   Take 1 capsule by mouth 3 (Three) Times a Day.   Commonly known as:  CLEOCIN           clindamycin 300 MG capsule   Take 1 capsule by mouth 3 (Three) Times a Day.   Commonly known as:  CLEOCIN           ibuprofen 800 MG tablet   Take 1 tablet by mouth Every 6 (Six) Hours As Needed for mild pain (1-3).   Commonly known as:  ADVIL,MOTRIN           sulfamethoxazole-trimethoprim 800-160 MG per tablet   Take 1 tablet by mouth 2 (Two) Times a Day.   Commonly known as:  BACTRIM DS           sulfamethoxazole-trimethoprim 800-160 MG per tablet   Take 1 tablet by mouth 2 (Two) Times a Day.   Commonly known as:  BACTRIM DS           vitamin C 500 MG tablet   Take 1 tablet by mouth Daily.   Commonly known as:  ASCORBIC ACID           vitamin C 500 MG tablet   Take 1 tablet by mouth Daily.   Commonly known as:  ASCORBIC ACID             CHANGE  how you take these medications     HYDROcodone-acetaminophen  MG per tablet   Take 1 tablet by mouth Every 6 (Six) Hours As Needed for moderate pain (4-6).   Commonly known as:  LUCA   What changed:  You were already taking a medication with the same name, and this prescription was added. Make sure you understand how and when to take each.           HYDROcodone-acetaminophen  MG per tablet   Take 1 tablet by mouth Every 6 (Six) Hours As Needed for moderate pain (4-6).   Commonly known as:  NORCO   What changed:  You were already taking a medication with the same name, and this prescription was added. Make sure you understand how and when to take each.           HYDROcodone-acetaminophen  MG per tablet   Take 1 tablet by mouth Every 6 (Six) Hours As Needed for moderate pain (4-6).   Commonly known as:  NORCO   What changed:  You were already taking a medication with the same name, and this prescription was added. Make sure you understand how and when to take each.           HYDROcodone-acetaminophen 5-325 MG per tablet   Take 1 tablet by mouth Every 8 (Eight) Hours As Needed for moderate pain (4-6).   Last time this was given:  1/18/2017 10:56 PM   Commonly known as:  LUCA   What changed:  Another medication with the same name was added. Make sure you understand how and when to take each.           promethazine 12.5 MG tablet   Take 1 tablet by mouth Every 6 (Six) Hours As Needed for nausea or vomiting (before pain medication) for up to 30 days.   Last time this was given:  1/19/2017  9:34 AM   Commonly known as:  PHENERGAN   What changed:  You were already taking a medication with the same name, and this prescription was added. Make sure you understand how and when to take each.           promethazine 12.5 MG tablet   Take 1 tablet by mouth Every 6 (Six) Hours As Needed for nausea or vomiting (before pain medication) for up to 30 days.   Last time this was given:  1/19/2017  9:34 AM   Commonly  known as:  PHENERGAN   What changed:  You were already taking a medication with the same name, and this prescription was added. Make sure you understand how and when to take each.           promethazine 25 MG tablet   Take 25 mg by mouth Every 6 (Six) Hours As Needed for nausea or vomiting.   Last time this was given:  1/19/2017  9:34 AM   Commonly known as:  PHENERGAN   What changed:  Another medication with the same name was added. Make sure you understand how and when to take each.             CONTINUE taking these medications     ALPRAZolam 0.5 MG tablet   Take 0.5 mg by mouth 3 (Three) Times a Day As Needed for anxiety.   Last time this was given:  1/20/2017 11:53 AM   Commonly known as:  XANAX           gabapentin 800 MG tablet   Take 800 mg by mouth Every 8 (Eight) Hours.   Commonly known as:  NEURONTIN           levETIRAcetam 500 MG tablet   Take 1,000 mg by mouth 2 (Two) Times a Day.   Last time this was given:  1/20/2017  8:20 AM   Commonly known as:  KEPPRA           pregabalin 100 MG capsule   Take 300 mg by mouth 2 (Two) Times a Day.   Last time this was given:  1/20/2017  8:20 AM   Commonly known as:  LYRICA                Where to Get Your Medications      You can get these medications from any pharmacy     Bring a paper prescription for each of these medications     ASCRIPTIN 325 MG tablet    clindamycin 300 MG capsule    clindamycin 300 MG capsule    HYDROcodone-acetaminophen  MG per tablet    HYDROcodone-acetaminophen  MG per tablet    HYDROcodone-acetaminophen  MG per tablet    ibuprofen 800 MG tablet    promethazine 12.5 MG tablet    promethazine 12.5 MG tablet    sulfamethoxazole-trimethoprim 800-160 MG per tablet    sulfamethoxazole-trimethoprim 800-160 MG per tablet    vitamin C 500 MG tablet    vitamin C 500 MG tablet                  Your Medications      Your Medication List           Morning Noon Evening Bedtime As Needed    ALPRAZolam 0.5 MG tablet   Take 0.5 mg by mouth  3 (Three) Times a Day As Needed for anxiety.   Commonly known as:  XANAX                                ASCRIPTIN 325 MG tablet   Take 1 tablet by mouth Daily.                                * clindamycin 300 MG capsule   Take 1 capsule by mouth 3 (Three) Times a Day.   Commonly known as:  CLEOCIN                                * clindamycin 300 MG capsule   Take 1 capsule by mouth 3 (Three) Times a Day.   Commonly known as:  CLEOCIN                                gabapentin 800 MG tablet   Take 800 mg by mouth Every 8 (Eight) Hours.   Commonly known as:  NEURONTIN                                * HYDROcodone-acetaminophen  MG per tablet   Take 1 tablet by mouth Every 6 (Six) Hours As Needed for moderate pain (4-6).   Commonly known as:  NORCO                                * HYDROcodone-acetaminophen  MG per tablet   Take 1 tablet by mouth Every 6 (Six) Hours As Needed for moderate pain (4-6).   Commonly known as:  NORCO                                * HYDROcodone-acetaminophen  MG per tablet   Take 1 tablet by mouth Every 6 (Six) Hours As Needed for moderate pain (4-6).   Commonly known as:  NORCO                                * HYDROcodone-acetaminophen 5-325 MG per tablet   Take 1 tablet by mouth Every 8 (Eight) Hours As Needed for moderate pain (4-6).   Commonly known as:  NORCO                                ibuprofen 800 MG tablet   Take 1 tablet by mouth Every 6 (Six) Hours As Needed for mild pain (1-3).   Commonly known as:  ADVIL,MOTRIN                                levETIRAcetam 500 MG tablet   Take 1,000 mg by mouth 2 (Two) Times a Day.   Commonly known as:  KEPPRA                                pregabalin 100 MG capsule   Take 300 mg by mouth 2 (Two) Times a Day.   Commonly known as:  LYRICA                                * promethazine 12.5 MG tablet   Take 1 tablet by mouth Every 6 (Six) Hours As Needed for nausea or vomiting (before pain medication) for up to 30 days.   Commonly  known as:  PHENERGAN                                * promethazine 12.5 MG tablet   Take 1 tablet by mouth Every 6 (Six) Hours As Needed for nausea or vomiting (before pain medication) for up to 30 days.   Commonly known as:  PHENERGAN                                * promethazine 25 MG tablet   Take 25 mg by mouth Every 6 (Six) Hours As Needed for nausea or vomiting.   Commonly known as:  PHENERGAN                                * sulfamethoxazole-trimethoprim 800-160 MG per tablet   Take 1 tablet by mouth 2 (Two) Times a Day.   Commonly known as:  BACTRIM DS                                * sulfamethoxazole-trimethoprim 800-160 MG per tablet   Take 1 tablet by mouth 2 (Two) Times a Day.   Commonly known as:  BACTRIM DS                                * vitamin C 500 MG tablet   Take 1 tablet by mouth Daily.   Commonly known as:  ASCORBIC ACID                                * vitamin C 500 MG tablet   Take 1 tablet by mouth Daily.   Commonly known as:  ASCORBIC ACID                                * Notice:  This list has 13 medication(s) that are the same as other medications prescribed for you. Read the directions carefully, and ask your doctor or other care provider to review them with you.             Instructions for After Discharge        Activity Instructions     Heel weight bearing left foot with walker.           Diet Instructions     Diet as tolerated.           Other Instructions     Walker       Equipment:   Standard Walker Folding   Length of Need (99 Months = Lifetime):  6 Months       Walker       Equipment:   Standard Walker Folding   Length of Need (99 Months = Lifetime):  6 Months               Discharge Instructions       Heel wb with walker Left foot     Follow up Dr. Reynoso this Tuesday or Wednesday.     Discharge References/Attachments     BONE AND JOINT INFECTIONS (ENGLISH)    CELLULITIS, EASY-TO-READ (ENGLISH)       Follow-ups for After Discharge        Referrals and Follow-ups to  Schedule     Please call as soon as possible to schedule an appointment  to see Dr. Reynoso on 2016 or 2016.  You can reach the office at .    Heel weight bearing with walker, left foot.    Wound care as instructed by Dr. Reynoso - paint wound- left foot- with betadine daily and cover with dry dressing.           [employer] Signup     [facility] [employer] allows you to send messages to your doctor, view your test results, renew your prescriptions, schedule appointments, and more. To sign up, go to [employer] and click on the Sign Up Now link in the New User? box. Enter your [employer] Activation Code exactly as it appears below along with the last four digits of your Social Security Number and your Date of Birth () to complete the sign-up process. If you do not sign up before the expiration date, you must request a new code.    [employer] Activation Code: [email or web address]  Expires: 2017  8:55 PM    If you have questions, you can email [email or web address] or call [phone] to talk to our [employer] staff. Remember, [employer] is NOT to be used for urgent needs. For medical emergencies, dial 911.           Summary of Your Hospitalization        Reason for Hospitalization     Your primary diagnosis was:  Not on File    Your diagnoses also included:  Bone Infection, Leg      Care Providers     Provider Service Role Specialty    Simón Reynoso MD -- Attending Provider Podiatry      Your Allergies  Date Reviewed: 2017    Allergen Reactions    Contrast Dye Not Noted         Toradol (Ketorolac Tromethamine) Not Noted         Ultram (Tramadol Hcl) Not Noted         Zofran (Ondansetron Hcl) Not Noted      Pending Labs     Order Current Status    Tissue Exam In process    Blood Culture Preliminary result    Blood Culture Preliminary result    Culture, Routine Preliminary result    Wound Culture Preliminary result      Patient Belongings Returned      Document Return of Belongings Flowsheet     Were the patient bedside belongings sent home?   --   Belongings Retrieved from Security & Sent Home   --    Belongings Sent to Safe   --   Medications Retrieved from Pharmacy & Sent Home   --              More Information      Bone and Joint Infections, Adult  Bone infections (osteomyelitis) and joint infections (septic arthritis) occur when bacteria or other germs get inside a bone or a joint. This can happen if you have an infection in another part of your body that spreads through your blood. Germs from your skin or from outside of your body can also cause this type of infection if you have a wound or a broken bone (fracture) that breaks the skin.  Anyone can get a bone infection or joint infection. You may be more likely to get this type of infection if you have a condition, such as diabetes, that lowers your ability to fight infection or increases your chances of getting an infection. Bone and joint infections can cause damage, and they can spread to other areas of your body. They need to be treated quickly.  CAUSES  Most bone and joint infections are caused by bacteria. They can also be caused by other germs, such as viruses and funguses.  RISK FACTORS  This condition is more likely to develop in:  · People who recently had surgery, especially bone or joint surgery.  · People who have a long-term (chronic) disease, such as:    HIV (human immunodeficiency virus).    Diabetes.    Rheumatoid arthritis.    Sickle cell anemia.  · Elderly people.  · People who take medicines that block or weaken the body's defense system (immune system).  · People who have a condition that reduces their blood flow.  · People who are on kidney dialysis.  · People who have an artificial joint.  · People who have had a joint or bone repaired with plates or screws (surgical hardware).  · People who use or abuse IV drugs.  · People who have had trauma, such as stepping on a  nail.  SYMPTOMS  Symptoms vary depending on the type and location of your infection. Common symptoms of bone and joint infections include:  · Fever and chills.  · Redness and warmth.  · Swelling.  · Pain and stiffness.  · Drainage of fluid or pus near the infection.  · Weight loss and fatigue.  · Decreased ability to use a hand or foot.  DIAGNOSIS  This condition may be diagnosed based on symptoms, medical history, a physical exam, and diagnostic tests. Tests can help to identify the cause of the infection. You may have various tests, such as:  · A sample of tissue, fluid, or blood taken to be examined under a microscope.  · A procedure to remove fluid from the infected joint with a needle (joint aspiration) for testing in a lab.  · Pus or discharge swabbed from a wound for testing to identify germs and to determine what type of medicine will kill them (culture and sensitivity).  · Blood tests to look for evidence of infection and inflammation (biomarkers).  · Imaging studies to determine how severe the bone or joint infection is. These may include:    X-rays.    CT scan.    MRI.    Bone scan.  TREATMENT  Treatment depends on the cause and type of infection. Antibiotic medicines are usually the first treatment for a bone or joint infection. Treatment with antibiotics may include:  · Getting IV antibiotics. This may be done in a hospital at first. You may have to continue IV antibiotics at home for several weeks. You may also have to take antibiotics by mouth for several weeks after that.  · Taking more than one kind of antibiotic. Treatment may start with a type of antibiotic that works against many different bacteria (broad spectrum antibiotics). IV antibiotics may be changed if tests show that another type may work better.  Other treatments may include:  · Draining fluid from the joint by placing a needle into it (aspiration).  · Surgery to remove:    Dead or dying tissue from a bone or joint.    An infected  artificial joint.    Infected plates or screws that were used to repair a broken bone.  HOME CARE INSTRUCTIONS  · Take medicines only as directed by your health care provider.  · Take your antibiotic medicine as directed by your health care provider. Finish the antibiotic even if you start to feel better.  · Follow instructions from your health care provider about how to take IV antibiotics at home.  · Ask your health care provider if you have any restrictions on your activities.  · Keep all follow-up visits as directed by your health care provider. This is important.  SEEK MEDICAL CARE IF:  · You have a fever or chills.  · You have redness, warmth, pain, or swelling that returns after treatment.  SEEK IMMEDIATE MEDICAL CARE IF:  · You have rapid breathing or you have trouble breathing.  · You have chest pain.  · You cannot drink fluids or make urine.  · The affected arm or leg swells, changes color, or turns blue.     This information is not intended to replace advice given to you by your health care provider. Make sure you discuss any questions you have with your health care provider.     Document Released: 12/18/2006 Document Revised: 05/03/2016 Document Reviewed: 12/16/2015  [employer] Interactive Patient Education ©2016 [employer]          Cellulitis  Cellulitis is an infection of the skin and the tissue under the skin. The infected area is usually red and tender. This happens most often in the arms and lower legs.  HOME CARE   · Take your antibiotic medicine as told. Finish the medicine even if you start to feel better.  · Keep the infected arm or leg raised (elevated).  · Put a warm cloth on the area up to 4 times per day.  · Only take medicines as told by your doctor.  · Keep all doctor visits as told.  GET HELP IF:  · You see red streaks on the skin coming from the infected area.  · Your red area gets bigger or turns a dark color.  · Your bone or joint under the infected area is painful after the skin  heals.  · Your infection comes back in the same area or different area.  · You have a puffy (swollen) bump in the infected area.  · You have new symptoms.  · You have a fever.  GET HELP RIGHT AWAY IF:   · You feel very sleepy.  · You throw up (vomit) or have watery poop (diarrhea).  · You feel sick and have muscle aches and pains.     This information is not intended to replace advice given to you by your health care provider. Make sure you discuss any questions you have with your health care provider.     Document Released: 06/05/2009 Document Revised: 09/07/2016 Document Reviewed: 03/04/2013  [employer] Interactive Patient Education ©2016 [employer]         PREVENTING SURGICAL SITE INFECTIONS     Surgical Site Infections FAQs  What is a Surgical Site Infection (SSI)?  A surgical site infection is an infection that occurs after surgery in the part of the body where the surgery took place. Most patients who have surgery do not develop an infection. However, infections develop in about 1 to 3 out of every 100 patients who have surgery.  Some of the common symptoms of a surgical site infection are:  · Redness and pain around the area where you had surgery  · Drainage of cloudy fluid from your surgical wound  · Fever  Can SSIs be treated?  Yes. Most surgical site infections can be treated with antibiotics. The antibiotic given to you depends on the bacteria (germs) causing the infection. Sometimes patients with SSIs also need another surgery to treat the infection.  What are some of the things that hospitals are doing to prevent SSIs?  To prevent SSIs, doctors, nurses, and other healthcare providers:  · Clean their hands and arms up to their elbows with an antiseptic agent just before the surgery.  · Clean their hands with soap and water or an alcohol-based hand rub before and after caring for each patient.  · May remove some of your hair immediately before your surgery using electric clippers if the hair is in the  same area where the procedure will occur. They should not shave you with a razor.  · Wear special hair covers, masks, gowns, and gloves during surgery to keep the surgery area clean.  · Give you antibiotics before your surgery starts. In most cases, you should get antibiotics within 60 minutes before the surgery starts and the antibiotics should be stopped within 24 hours after surgery.  · Clean the skin at the site of your surgery with a special soap that kills germs.  What can I do to help prevent SSIs?  Before your surgery:  · Tell your doctor about other medical problems you may have. Health problems such as allergies, diabetes, and obesity could affect your surgery and your treatment.  · Quit smoking. Patients who smoke get more infections. Talk to your doctor about how you can quit before your surgery.  · Do not shave near where you will have surgery. Shaving with a razor can irritate your skin and make it easier to develop an infection.  At the time of your surgery:  · Speak up if someone tries to shave you with a razor before surgery. Ask why you need to be shaved and talk with your surgeon if you have any concerns.  · Ask if you will get antibiotics before surgery.  After your surgery:  · Make sure that your healthcare providers clean their hands before examining you, either with soap and water or an alcohol-based hand rub.    If you do not see your providers clean their hands, please ask them to do so.  · Family and friends who visit you should not touch the surgical wound or dressings.  · Family and friends should clean their hands with soap and water or an alcohol-based hand rub before and after visiting you. If you do not see them clean their hands, ask them to clean their hands.  What do I need to do when I go home from the hospital?  · Before you go home, your doctor or nurse should explain everything you need to know about taking care of your wound. Make sure you understand how to care for your wound  before you leave the hospital.  · Always clean your hands before and after caring for your wound.  · Before you go home, make sure you know who to contact if you have questions or problems after you get home.  · If you have any symptoms of an infection, such as redness and pain at the surgery site, drainage, or fever, call your doctor immediately.  If you have additional questions, please ask your doctor or nurse.  Developed and co-sponsored by The Society for Healthcare Epidemiology of [place] (SHEA); Infectious Diseases Society of [place] (IDSA); American Hospital Association; Association for Professionals in Infection Control and Epidemiology (APIC); Centers for Disease Control and Prevention (CDC); and The Joint Commission.     This information is not intended to replace advice given to you by your health care provider. Make sure you discuss any questions you have with your health care provider.     Document Released: 12/23/2014 Document Revised: 01/08/2016 Document Reviewed: 03/02/2016  [employer] Interactive Patient Education ©2016 [employer] Inc.             SYMPTOMS OF A STROKE    Call 911 or have someone take you to the Emergency Department if you have any of the following:    · Sudden numbness or weakness of your face, arm or leg especially on one side of the body  · Sudden confusion, diffiiculty speaking or trouble understanding   · Changes in your vision or loss of sight in one eye  · Sudden severe headache with no known cause  · sudden dizziness, trouble walking, loss of balance or coordination    It is important to seek emergency care right away if you have further stroke symptoms. If you get emergency help quickly, the powerful clot-dissolving medicines can reduce the disabilities caused by a stroke.     For more information:    American Stroke Association  4-396-1-STROKE  www.strokeassociation.org           IF YOU SMOKE OR USE TOBACCO PLEASE READ THE FOLLOWING:    Why is smoking bad for me?  Smoking  increases the risk of heart disease, lung disease, vascular disease, stroke, and cancer.     If you smoke, STOP!    If you would like more information on quitting smoking, please visit the [employer] website: [email or web address]   This link will provide additional resources including the QUIT line and the Beat the Pack support groups.     For more information:    American Cancer Society  (515) 452-9558    American Heart Association  1-600.837.8989               YOU ARE THE MOST IMPORTANT FACTOR IN YOUR RECOVERY.     Follow all instructions carefully.     I have reviewed my discharge instructions with my nurse, including the following information, if applicable:     Information about my illness and diagnosis   Follow up appointments (including lab draws)   Wound Care   Equipment Needs   Medications (new and continuing) along with side effects   Preventative information such as vaccines and smoking cessations   Diet   Pain   I know when to contact my Doctor's office or seek emergency care      I want my nurse to describe the side effects of my medications: YES NO   If the answer is no, I understand the side effects of my medications: YES NO   My nurse described the side effects of my medications in a way that I could understand: YES NO   I have taken my personal belongings and my own medications with me at discharge: YES NO            I have received this information and my questions have been answered. I have discussed any concerns I see with this plan with the nurse or physician. I understand these instructions.    Signature of Patient or Responsible Person: _____________________________________    Date: _________________  Time: __________________    Signature of Healthcare Provider: _______________________________________  Date: _________________  Time: __________________

## 2017-01-19 ENCOUNTER — APPOINTMENT (OUTPATIENT)
Dept: ULTRASOUND IMAGING | Facility: HOSPITAL | Age: 42
End: 2017-01-19
Attending: PODIATRIST

## 2017-01-19 ENCOUNTER — ANESTHESIA EVENT (OUTPATIENT)
Dept: PERIOP | Facility: HOSPITAL | Age: 42
End: 2017-01-19

## 2017-01-19 ENCOUNTER — APPOINTMENT (OUTPATIENT)
Dept: GENERAL RADIOLOGY | Facility: HOSPITAL | Age: 42
End: 2017-01-19

## 2017-01-19 ENCOUNTER — ANESTHESIA (OUTPATIENT)
Dept: PERIOP | Facility: HOSPITAL | Age: 42
End: 2017-01-19

## 2017-01-19 LAB
B-HCG UR QL: NEGATIVE
BASOPHILS # BLD AUTO: 0.02 10*3/MM3 (ref 0–0.3)
BASOPHILS NFR BLD AUTO: 0.3 % (ref 0–2)
DEPRECATED RDW RBC AUTO: 49.3 FL (ref 37–54)
EOSINOPHIL # BLD AUTO: 0.54 10*3/MM3 (ref 0–0.7)
EOSINOPHIL NFR BLD AUTO: 7.6 % (ref 0–5)
ERYTHROCYTE [DISTWIDTH] IN BLOOD BY AUTOMATED COUNT: 15.1 % (ref 11.5–14.5)
HBA1C MFR BLD: 5.2 % (ref 4.5–5.7)
HCT VFR BLD AUTO: 34.8 % (ref 37–47)
HGB BLD-MCNC: 10.7 G/DL (ref 12–16)
IMM GRANULOCYTES # BLD: 0.01 10*3/MM3 (ref 0–0.03)
IMM GRANULOCYTES NFR BLD: 0.1 % (ref 0–0.5)
LYMPHOCYTES # BLD AUTO: 1.8 10*3/MM3 (ref 1–3)
LYMPHOCYTES NFR BLD AUTO: 25.2 % (ref 21–51)
MCH RBC QN AUTO: 28.8 PG (ref 27–33)
MCHC RBC AUTO-ENTMCNC: 30.7 G/DL (ref 33–37)
MCV RBC AUTO: 93.8 FL (ref 80–94)
MONOCYTES # BLD AUTO: 0.64 10*3/MM3 (ref 0.1–0.9)
MONOCYTES NFR BLD AUTO: 9 % (ref 0–10)
NEUTROPHILS # BLD AUTO: 4.13 10*3/MM3 (ref 1.4–6.5)
NEUTROPHILS NFR BLD AUTO: 57.8 % (ref 30–70)
PLATELET # BLD AUTO: 256 10*3/MM3 (ref 130–400)
PMV BLD AUTO: 10.3 FL (ref 6–10)
RBC # BLD AUTO: 3.71 10*6/MM3 (ref 4.2–5.4)
WBC NRBC COR # BLD: 7.14 10*3/MM3 (ref 4.5–12.5)

## 2017-01-19 PROCEDURE — 25010000002 MORPHINE SULFATE (PF) 2 MG/ML SOLUTION: Performed by: PODIATRIST

## 2017-01-19 PROCEDURE — 87147 CULTURE TYPE IMMUNOLOGIC: CPT | Performed by: PODIATRIST

## 2017-01-19 PROCEDURE — 88311 DECALCIFY TISSUE: CPT | Performed by: PODIATRIST

## 2017-01-19 PROCEDURE — 87070 CULTURE OTHR SPECIMN AEROBIC: CPT | Performed by: PODIATRIST

## 2017-01-19 PROCEDURE — 0HXNXZZ TRANSFER LEFT FOOT SKIN, EXTERNAL APPROACH: ICD-10-PCS | Performed by: PODIATRIST

## 2017-01-19 PROCEDURE — 25010000002 PROPOFOL 10 MG/ML EMULSION: Performed by: NURSE ANESTHETIST, CERTIFIED REGISTERED

## 2017-01-19 PROCEDURE — 25010000002 MORPHINE SULFATE (PF) 2 MG/ML SOLUTION: Performed by: NURSE PRACTITIONER

## 2017-01-19 PROCEDURE — 87077 CULTURE AEROBIC IDENTIFY: CPT | Performed by: PODIATRIST

## 2017-01-19 PROCEDURE — 83036 HEMOGLOBIN GLYCOSYLATED A1C: CPT | Performed by: PODIATRIST

## 2017-01-19 PROCEDURE — 25010000002 VANCOMYCIN PER 500 MG: Performed by: NURSE PRACTITIONER

## 2017-01-19 PROCEDURE — 25010000002 VANCOMYCIN PER 500 MG: Performed by: PODIATRIST

## 2017-01-19 PROCEDURE — 25010000002 FENTANYL CITRATE (PF) 100 MCG/2ML SOLUTION: Performed by: ANESTHESIOLOGY

## 2017-01-19 PROCEDURE — 88307 TISSUE EXAM BY PATHOLOGIST: CPT | Performed by: PODIATRIST

## 2017-01-19 PROCEDURE — 87186 SC STD MICRODIL/AGAR DIL: CPT | Performed by: PODIATRIST

## 2017-01-19 PROCEDURE — 73630 X-RAY EXAM OF FOOT: CPT

## 2017-01-19 PROCEDURE — 85025 COMPLETE CBC W/AUTO DIFF WBC: CPT | Performed by: PODIATRIST

## 2017-01-19 PROCEDURE — 93925 LOWER EXTREMITY STUDY: CPT | Performed by: RADIOLOGY

## 2017-01-19 PROCEDURE — 81025 URINE PREGNANCY TEST: CPT | Performed by: PODIATRIST

## 2017-01-19 PROCEDURE — 0QBR0ZZ EXCISION OF LEFT TOE PHALANX, OPEN APPROACH: ICD-10-PCS | Performed by: PODIATRIST

## 2017-01-19 PROCEDURE — 63710000001 PROMETHAZINE PER 25 MG: Performed by: PODIATRIST

## 2017-01-19 PROCEDURE — 93925 LOWER EXTREMITY STUDY: CPT

## 2017-01-19 PROCEDURE — 25010000002 VANCOMYCIN PER 500 MG

## 2017-01-19 PROCEDURE — 25010000002 MIDAZOLAM PER 1 MG: Performed by: NURSE ANESTHETIST, CERTIFIED REGISTERED

## 2017-01-19 PROCEDURE — 25010000002 DEXAMETHASONE PER 1 MG: Performed by: NURSE ANESTHETIST, CERTIFIED REGISTERED

## 2017-01-19 PROCEDURE — 87205 SMEAR GRAM STAIN: CPT | Performed by: PODIATRIST

## 2017-01-19 PROCEDURE — 73630 X-RAY EXAM OF FOOT: CPT | Performed by: RADIOLOGY

## 2017-01-19 PROCEDURE — 94799 UNLISTED PULMONARY SVC/PX: CPT

## 2017-01-19 PROCEDURE — 25010000002 HYDROMORPHONE PER 4 MG: Performed by: NURSE ANESTHETIST, CERTIFIED REGISTERED

## 2017-01-19 RX ORDER — GLYCOPYRROLATE 0.2 MG/ML
INJECTION INTRAMUSCULAR; INTRAVENOUS AS NEEDED
Status: DISCONTINUED | OUTPATIENT
Start: 2017-01-19 | End: 2017-01-19 | Stop reason: SURG

## 2017-01-19 RX ORDER — FENTANYL CITRATE 50 UG/ML
100 INJECTION, SOLUTION INTRAMUSCULAR; INTRAVENOUS
Status: COMPLETED | OUTPATIENT
Start: 2017-01-19 | End: 2017-01-19

## 2017-01-19 RX ORDER — PROMETHAZINE HYDROCHLORIDE 25 MG/ML
12.5 INJECTION, SOLUTION INTRAMUSCULAR; INTRAVENOUS EVERY 4 HOURS PRN
Status: DISCONTINUED | OUTPATIENT
Start: 2017-01-19 | End: 2017-01-20 | Stop reason: HOSPADM

## 2017-01-19 RX ORDER — NALOXONE HCL 0.4 MG/ML
0.4 VIAL (ML) INJECTION
Status: DISCONTINUED | OUTPATIENT
Start: 2017-01-19 | End: 2017-01-20 | Stop reason: HOSPADM

## 2017-01-19 RX ORDER — SODIUM CHLORIDE 0.9 % (FLUSH) 0.9 %
10 SYRINGE (ML) INJECTION EVERY 12 HOURS SCHEDULED
Status: DISCONTINUED | OUTPATIENT
Start: 2017-01-19 | End: 2017-01-20 | Stop reason: HOSPADM

## 2017-01-19 RX ORDER — OXYCODONE HYDROCHLORIDE AND ACETAMINOPHEN 5; 325 MG/1; MG/1
1 TABLET ORAL ONCE AS NEEDED
Status: DISCONTINUED | OUTPATIENT
Start: 2017-01-19 | End: 2017-01-19 | Stop reason: HOSPADM

## 2017-01-19 RX ORDER — ALPRAZOLAM 0.5 MG/1
0.5 TABLET ORAL 3 TIMES DAILY PRN
Status: DISCONTINUED | OUTPATIENT
Start: 2017-01-19 | End: 2017-01-20 | Stop reason: HOSPADM

## 2017-01-19 RX ORDER — BUPIVACAINE HYDROCHLORIDE 5 MG/ML
INJECTION, SOLUTION PERINEURAL AS NEEDED
Status: DISCONTINUED | OUTPATIENT
Start: 2017-01-19 | End: 2017-01-19 | Stop reason: HOSPADM

## 2017-01-19 RX ORDER — LEVETIRACETAM 500 MG/1
1000 TABLET ORAL EVERY 12 HOURS SCHEDULED
Status: DISCONTINUED | OUTPATIENT
Start: 2017-01-19 | End: 2017-01-20 | Stop reason: HOSPADM

## 2017-01-19 RX ORDER — HYDROMORPHONE HCL 110MG/55ML
PATIENT CONTROLLED ANALGESIA SYRINGE INTRAVENOUS AS NEEDED
Status: DISCONTINUED | OUTPATIENT
Start: 2017-01-19 | End: 2017-01-19 | Stop reason: SURG

## 2017-01-19 RX ORDER — POVIDONE-IODINE 10 MG/G
OINTMENT TOPICAL AS NEEDED
Status: DISCONTINUED | OUTPATIENT
Start: 2017-01-19 | End: 2017-01-19 | Stop reason: HOSPADM

## 2017-01-19 RX ORDER — SODIUM CHLORIDE 0.9 % (FLUSH) 0.9 %
1-10 SYRINGE (ML) INJECTION AS NEEDED
Status: DISCONTINUED | OUTPATIENT
Start: 2017-01-19 | End: 2017-01-19 | Stop reason: HOSPADM

## 2017-01-19 RX ORDER — PROPOFOL 10 MG/ML
VIAL (ML) INTRAVENOUS AS NEEDED
Status: DISCONTINUED | OUTPATIENT
Start: 2017-01-19 | End: 2017-01-19 | Stop reason: SURG

## 2017-01-19 RX ORDER — LIDOCAINE HYDROCHLORIDE 20 MG/ML
INJECTION, SOLUTION INFILTRATION; PERINEURAL AS NEEDED
Status: DISCONTINUED | OUTPATIENT
Start: 2017-01-19 | End: 2017-01-19 | Stop reason: SURG

## 2017-01-19 RX ORDER — MORPHINE SULFATE 2 MG/ML
1 INJECTION, SOLUTION INTRAMUSCULAR; INTRAVENOUS
Status: DISCONTINUED | OUTPATIENT
Start: 2017-01-19 | End: 2017-01-20 | Stop reason: HOSPADM

## 2017-01-19 RX ORDER — PREGABALIN 100 MG/1
300 CAPSULE ORAL EVERY 12 HOURS SCHEDULED
Status: DISCONTINUED | OUTPATIENT
Start: 2017-01-19 | End: 2017-01-20 | Stop reason: HOSPADM

## 2017-01-19 RX ORDER — IBUPROFEN 800 MG/1
800 TABLET ORAL EVERY 6 HOURS PRN
Status: DISCONTINUED | OUTPATIENT
Start: 2017-01-19 | End: 2017-01-20 | Stop reason: HOSPADM

## 2017-01-19 RX ORDER — FAMOTIDINE 10 MG/ML
INJECTION, SOLUTION INTRAVENOUS AS NEEDED
Status: DISCONTINUED | OUTPATIENT
Start: 2017-01-19 | End: 2017-01-19 | Stop reason: SURG

## 2017-01-19 RX ORDER — HYDROCODONE BITARTRATE AND ACETAMINOPHEN 7.5; 325 MG/1; MG/1
1 TABLET ORAL EVERY 4 HOURS PRN
Status: DISCONTINUED | OUTPATIENT
Start: 2017-01-19 | End: 2017-01-20 | Stop reason: HOSPADM

## 2017-01-19 RX ORDER — SODIUM CHLORIDE, SODIUM LACTATE, POTASSIUM CHLORIDE, CALCIUM CHLORIDE 600; 310; 30; 20 MG/100ML; MG/100ML; MG/100ML; MG/100ML
125 INJECTION, SOLUTION INTRAVENOUS CONTINUOUS
Status: DISCONTINUED | OUTPATIENT
Start: 2017-01-19 | End: 2017-01-19

## 2017-01-19 RX ORDER — MEPERIDINE HYDROCHLORIDE 25 MG/ML
12.5 INJECTION INTRAMUSCULAR; INTRAVENOUS; SUBCUTANEOUS
Status: DISCONTINUED | OUTPATIENT
Start: 2017-01-19 | End: 2017-01-19 | Stop reason: HOSPADM

## 2017-01-19 RX ORDER — SODIUM CHLORIDE 0.9 % (FLUSH) 0.9 %
10 SYRINGE (ML) INJECTION AS NEEDED
Status: DISCONTINUED | OUTPATIENT
Start: 2017-01-19 | End: 2017-01-20 | Stop reason: HOSPADM

## 2017-01-19 RX ORDER — MIDAZOLAM HYDROCHLORIDE 1 MG/ML
INJECTION INTRAMUSCULAR; INTRAVENOUS AS NEEDED
Status: DISCONTINUED | OUTPATIENT
Start: 2017-01-19 | End: 2017-01-19 | Stop reason: SURG

## 2017-01-19 RX ORDER — SODIUM CHLORIDE 9 MG/ML
100 INJECTION, SOLUTION INTRAVENOUS CONTINUOUS
Status: DISCONTINUED | OUTPATIENT
Start: 2017-01-19 | End: 2017-01-20 | Stop reason: HOSPADM

## 2017-01-19 RX ORDER — MORPHINE SULFATE 2 MG/ML
1 INJECTION, SOLUTION INTRAMUSCULAR; INTRAVENOUS EVERY 4 HOURS PRN
Status: DISCONTINUED | OUTPATIENT
Start: 2017-01-19 | End: 2017-01-20 | Stop reason: HOSPADM

## 2017-01-19 RX ORDER — DEXAMETHASONE SODIUM PHOSPHATE 10 MG/ML
INJECTION INTRAMUSCULAR; INTRAVENOUS AS NEEDED
Status: DISCONTINUED | OUTPATIENT
Start: 2017-01-19 | End: 2017-01-19 | Stop reason: SURG

## 2017-01-19 RX ORDER — GABAPENTIN 400 MG/1
800 CAPSULE ORAL EVERY 8 HOURS SCHEDULED
Status: DISCONTINUED | OUTPATIENT
Start: 2017-01-19 | End: 2017-01-20 | Stop reason: HOSPADM

## 2017-01-19 RX ORDER — PROMETHAZINE HYDROCHLORIDE 25 MG/1
25 TABLET ORAL EVERY 6 HOURS PRN
Status: DISCONTINUED | OUTPATIENT
Start: 2017-01-19 | End: 2017-01-20 | Stop reason: HOSPADM

## 2017-01-19 RX ORDER — SODIUM CHLORIDE 9 MG/ML
INJECTION, SOLUTION INTRAVENOUS AS NEEDED
Status: DISCONTINUED | OUTPATIENT
Start: 2017-01-19 | End: 2017-01-19 | Stop reason: HOSPADM

## 2017-01-19 RX ORDER — FENTANYL CITRATE 50 UG/ML
50 INJECTION, SOLUTION INTRAMUSCULAR; INTRAVENOUS
Status: DISCONTINUED | OUTPATIENT
Start: 2017-01-19 | End: 2017-01-19 | Stop reason: HOSPADM

## 2017-01-19 RX ORDER — IPRATROPIUM BROMIDE AND ALBUTEROL SULFATE 2.5; .5 MG/3ML; MG/3ML
3 SOLUTION RESPIRATORY (INHALATION) ONCE AS NEEDED
Status: DISCONTINUED | OUTPATIENT
Start: 2017-01-19 | End: 2017-01-19 | Stop reason: HOSPADM

## 2017-01-19 RX ADMIN — GABAPENTIN 800 MG: 400 CAPSULE ORAL at 15:05

## 2017-01-19 RX ADMIN — HYDROMORPHONE HYDROCHLORIDE 1 MG: 2 INJECTION, SOLUTION INTRAMUSCULAR; INTRAVENOUS; SUBCUTANEOUS at 13:25

## 2017-01-19 RX ADMIN — SODIUM CHLORIDE, POTASSIUM CHLORIDE, SODIUM LACTATE AND CALCIUM CHLORIDE 125 ML/HR: 600; 310; 30; 20 INJECTION, SOLUTION INTRAVENOUS at 11:05

## 2017-01-19 RX ADMIN — FAMOTIDINE 20 MG: 10 INJECTION, SOLUTION INTRAVENOUS at 12:35

## 2017-01-19 RX ADMIN — MORPHINE SULFATE 1 MG: 2 INJECTION, SOLUTION INTRAMUSCULAR; INTRAVENOUS at 20:13

## 2017-01-19 RX ADMIN — HYDROMORPHONE HYDROCHLORIDE 1 MG: 2 INJECTION, SOLUTION INTRAMUSCULAR; INTRAVENOUS; SUBCUTANEOUS at 13:22

## 2017-01-19 RX ADMIN — PREGABALIN 300 MG: 100 CAPSULE ORAL at 21:18

## 2017-01-19 RX ADMIN — FENTANYL CITRATE 100 MCG: 50 INJECTION, SOLUTION INTRAMUSCULAR; INTRAVENOUS at 12:24

## 2017-01-19 RX ADMIN — MIDAZOLAM HYDROCHLORIDE 2 MG: 1 INJECTION, SOLUTION INTRAMUSCULAR; INTRAVENOUS at 12:18

## 2017-01-19 RX ADMIN — GABAPENTIN 800 MG: 400 CAPSULE ORAL at 05:24

## 2017-01-19 RX ADMIN — SODIUM CHLORIDE, SODIUM LACTATE, POTASSIUM CHLORIDE, CALCIUM CHLORIDE AND DEXTROSE MONOHYDRATE 125 ML/HR: 5; 600; 310; 30; 20 INJECTION, SOLUTION INTRAVENOUS at 05:24

## 2017-01-19 RX ADMIN — MORPHINE SULFATE 1 MG: 2 INJECTION, SOLUTION INTRAMUSCULAR; INTRAVENOUS at 09:34

## 2017-01-19 RX ADMIN — SODIUM CHLORIDE, PRESERVATIVE FREE 10 ML: 5 INJECTION INTRAVENOUS at 09:40

## 2017-01-19 RX ADMIN — GLYCOPYRROLATE 0.4 MG: 0.2 INJECTION, SOLUTION INTRAMUSCULAR; INTRAVENOUS at 12:24

## 2017-01-19 RX ADMIN — ALPRAZOLAM 0.5 MG: 0.5 TABLET ORAL at 15:51

## 2017-01-19 RX ADMIN — VANCOMYCIN HYDROCHLORIDE 1500 MG: 5 INJECTION, POWDER, LYOPHILIZED, FOR SOLUTION INTRAVENOUS at 17:34

## 2017-01-19 RX ADMIN — VANCOMYCIN HYDROCHLORIDE 1500 MG: 5 INJECTION, POWDER, LYOPHILIZED, FOR SOLUTION INTRAVENOUS at 05:25

## 2017-01-19 RX ADMIN — SODIUM CHLORIDE 100 ML/HR: 9 INJECTION, SOLUTION INTRAVENOUS at 15:56

## 2017-01-19 RX ADMIN — PROMETHAZINE HYDROCHLORIDE 25 MG: 25 TABLET ORAL at 09:34

## 2017-01-19 RX ADMIN — MORPHINE SULFATE 1 MG: 2 INJECTION, SOLUTION INTRAMUSCULAR; INTRAVENOUS at 03:15

## 2017-01-19 RX ADMIN — GABAPENTIN 800 MG: 400 CAPSULE ORAL at 21:18

## 2017-01-19 RX ADMIN — FENTANYL CITRATE 100 MCG: 50 INJECTION, SOLUTION INTRAMUSCULAR; INTRAVENOUS at 11:50

## 2017-01-19 RX ADMIN — MORPHINE SULFATE 1 MG: 2 INJECTION, SOLUTION INTRAMUSCULAR; INTRAVENOUS at 15:06

## 2017-01-19 RX ADMIN — LEVETIRACETAM 1000 MG: 500 TABLET, FILM COATED ORAL at 21:18

## 2017-01-19 RX ADMIN — DEXAMETHASONE SODIUM PHOSPHATE 4 MG: 10 INJECTION INTRAMUSCULAR; INTRAVENOUS at 12:35

## 2017-01-19 RX ADMIN — LIDOCAINE HYDROCHLORIDE 100 ML: 20 INJECTION, SOLUTION INFILTRATION; PERINEURAL at 12:24

## 2017-01-19 RX ADMIN — MORPHINE SULFATE 1 MG: 2 INJECTION, SOLUTION INTRAMUSCULAR; INTRAVENOUS at 20:11

## 2017-01-19 RX ADMIN — SODIUM CHLORIDE, PRESERVATIVE FREE 10 ML: 5 INJECTION INTRAVENOUS at 20:13

## 2017-01-19 RX ADMIN — HYDROCODONE BITARTRATE AND ACETAMINOPHEN 1 TABLET: 7.5; 325 TABLET ORAL at 17:33

## 2017-01-19 RX ADMIN — SODIUM CHLORIDE, PRESERVATIVE FREE 10 ML: 5 INJECTION INTRAVENOUS at 03:15

## 2017-01-19 RX ADMIN — LEVETIRACETAM 1000 MG: 500 TABLET, FILM COATED ORAL at 09:33

## 2017-01-19 RX ADMIN — SODIUM CHLORIDE, PRESERVATIVE FREE 10 ML: 5 INJECTION INTRAVENOUS at 21:20

## 2017-01-19 RX ADMIN — HYDROCODONE BITARTRATE AND ACETAMINOPHEN 1 TABLET: 7.5; 325 TABLET ORAL at 23:12

## 2017-01-19 RX ADMIN — MORPHINE SULFATE 1 MG: 2 INJECTION, SOLUTION INTRAMUSCULAR; INTRAVENOUS at 06:42

## 2017-01-19 RX ADMIN — PROPOFOL 150 MG: 10 INJECTION, EMULSION INTRAVENOUS at 12:24

## 2017-01-19 RX ADMIN — SODIUM CHLORIDE, PRESERVATIVE FREE 10 ML: 5 INJECTION INTRAVENOUS at 06:41

## 2017-01-19 NOTE — PLAN OF CARE
Problem: Patient Care Overview (Adult)  Goal: Plan of Care Review  Outcome: Ongoing (interventions implemented as appropriate)    01/18/17 1417 01/18/17 2018   Coping/Psychosocial Response Interventions   Plan Of Care Reviewed With --  patient   Patient Care Overview   Progress no change --        Goal: Adult Individualization and Mutuality  Outcome: Ongoing (interventions implemented as appropriate)    Problem: Cellulitis (Adult)  Goal: Signs and Symptoms of Listed Potential Problems Will be Absent or Manageable (Cellulitis)  Outcome: Ongoing (interventions implemented as appropriate)    01/18/17 1417   Cellulitis   Problems Assessed (Cellulitis) pain;infection   Problems Present (Cellulitis) pain;infection         Problem: Fall Risk (Adult)  Goal: Identify Related Risk Factors and Signs and Symptoms  Outcome: Ongoing (interventions implemented as appropriate)    01/18/17 1417   Fall Risk   Fall Risk: Related Risk Factors gait/mobility problems;sensory deficits   Fall Risk: Signs and Symptoms presence of risk factors       Goal: Absence of Falls  Outcome: Ongoing (interventions implemented as appropriate)    01/18/17 1417   Fall Risk (Adult)   Absence of Falls making progress toward outcome         Problem: Infection, Risk/Actual (Adult)  Goal: Identify Related Risk Factors and Signs and Symptoms  Outcome: Ongoing (interventions implemented as appropriate)    01/18/17 1417   Infection, Risk/Actual   Infection, Risk/Actual: Related Risk Factors skin integrity impairment   Signs and Symptoms (Infection, Risk/Actual) pain       Goal: Infection Prevention/Resolution  Outcome: Ongoing (interventions implemented as appropriate)    01/18/17 1417   Infection, Risk/Actual (Adult)   Infection Prevention/Resolution making progress toward outcome         Problem: Skin Integrity Impairment, Risk/Actual (Adult)  Goal: Identify Related Risk Factors and Signs and Symptoms  Outcome: Ongoing (interventions implemented as  appropriate)    01/18/17 1417   Skin Integrity Impairment, Risk/Actual   Skin Integrity Impairment, Risk/Actual: Related Risk Factors infection/disease process   Signs and Symptoms (Skin Integrity Impairment) necrotic tissue       Goal: Skin Integrity/Wound Healing  Outcome: Ongoing (interventions implemented as appropriate)    01/18/17 1417   Skin Integrity Impairment, Risk/Actual (Adult)   Skin Integrity/Wound Healing making progress toward outcome

## 2017-01-19 NOTE — PROGRESS NOTES
Discharge Planning Assessment   [place]     Patient Name: Alisa Holland  MRN: [id number]  Today's Date: 1/19/2017    Admit Date: 1/18/2017          Discharge Needs Assessment       01/19/17 2593    Living Environment    Lives With --   Pt lives with significant other.     Quality Of Family Relationships supportive    Able to Return to Prior Living Arrangements yes    Discharge Needs Assessment    Equipment Currently Used at Home walker, rolling;shower chair   Pt has a rolling walker from [facility] and shower chair from family.     Equipment Needed After Discharge none    Discharge Facility/Level Of Care Needs --   Pt does not utilize home health services. Pt has received [facility] and [facility] in the past.     Transportation Available family or friend will provide   Sign. other to provide transportation at discharge.    Discharge Disposition home or self-care            Discharge Plan       01/19/17 2068    Case Management/Social Work Plan    Plan Pt lives at home with sign. other and plans to return home at discharge. Pt does not utilize home health services. Pt has utilized [facility] and [facility]. Pt has a rolling walker and shower chair. Pt request a wheelchair and bedside commode. W/C and BSC to be arranged with MD orders. SS to follow and assist as needed with discharge planning.     Patient/Family In Agreement With Plan yes                  Demographic Summary       01/19/17 1532    Referral Information    Referral Source nursing    Reason For Consult --   SS received consult patient has equipment at home. SS spoke to pt and significant other.     Primary Care Physician Information    Name Pt doesn't have a PCP.             Legal       01/19/17 1530    Legal    Legal Comments Pt does not have a POA or advance directive.           Siri Donovan

## 2017-01-19 NOTE — PLAN OF CARE
Problem: Fall Risk (Adult)  Goal: Identify Related Risk Factors and Signs and Symptoms  Outcome: Ongoing (interventions implemented as appropriate)  Goal: Absence of Falls  Outcome: Ongoing (interventions implemented as appropriate)

## 2017-01-19 NOTE — ANESTHESIA PREPROCEDURE EVALUATION
Anesthesia Evaluation     Patient summary reviewed and Nursing notes reviewed    Airway   Mallampati: I  TM distance: <3 FB  Neck ROM: full  no difficulty expected  Dental - normal exam   (+) poor dentation    Pulmonary - negative pulmonary ROS and normal exam   Cardiovascular - negative cardio ROS and normal exam    Neuro/Psych  (+) seizures,    GI/Hepatic/Renal/Endo - negative ROS     Musculoskeletal     Abdominal  - normal exam    Bowel sounds: normal.   Substance History - negative use     OB/GYN negative ob/gyn ROS         Other   (+) arthritis        Phys Exam Other: Most Teeth broken off                    Anesthesia Plan    ASA 3     general     intravenous induction   Anesthetic plan and risks discussed with patient.  Use of blood products discussed with patient .   Plan discussed with CRNA.

## 2017-01-19 NOTE — ANESTHESIA POSTPROCEDURE EVALUATION
Patient: Alisa Holland    Procedure Summary     Date Anesthesia Start Anesthesia Stop Room / Location    01/19/17 [id number]  [facility] OR 03 / [facility]       Procedure Diagnosis Surgeon Provider    LOWER EXTREMITY DEBRIDEMENT OSTEOMYELITIS, WOUND REPAIR FLAP (Left ) Chronic osteomyelitis of left foot  (Chronic osteomyelitis of left foot [M86.672]) MD Zia Anglin MD          Anesthesia Type: general  Last vitals  BP 90/53 (01/19/17 1354)    Temp 97.4 °F (36.3 °C) (01/19/17 1354)    Pulse 50 (01/19/17 1354)   Resp 11 (01/19/17 1354)    SpO2 98 % (01/19/17 1354)      Post Anesthesia Care and Evaluation    Patient location during evaluation: bedside  Patient participation: complete - patient participated  Level of consciousness: awake and alert  Pain score: 1  Pain management: adequate  Airway patency: patent  Anesthetic complications: No anesthetic complications  PONV Status: none  Cardiovascular status: acceptable  Respiratory status: acceptable  Hydration status: acceptable

## 2017-01-19 NOTE — ANESTHESIA POSTPROCEDURE EVALUATION
Patient: Alisa Holland    Procedure Summary     Date Anesthesia Start Anesthesia Stop Room / Location    01/19/17 [id number]  [facility] 03 / [facility]       Procedure Diagnosis Surgeon Provider    LOWER EXTREMITY DEBRIDEMENT OSTEOMYELITIS, WOUND REPAIR FLAP (Left ) Chronic osteomyelitis of left foot  (Chronic osteomyelitis of left foot [M86.672]) MD Zia Anglin MD          Anesthesia Type: general  Last vitals  BP 90/53 (01/19/17 1354)    Temp 97.4 °F (36.3 °C) (01/19/17 1354)    Pulse 50 (01/19/17 1354)   Resp 11 (01/19/17 1354)    SpO2 98 % (01/19/17 1354)      Post Anesthesia Care and Evaluation    Patient location during evaluation: bedside  Patient participation: complete - patient participated  Level of consciousness: awake and alert  Pain score: 1  Pain management: adequate  Airway patency: patent  Anesthetic complications: No anesthetic complications  PONV Status: none  Cardiovascular status: acceptable  Respiratory status: acceptable  Hydration status: acceptable

## 2017-01-19 NOTE — SIGNIFICANT NOTE
NO FAMILY IN  WAITING AREA OF SURGERY. PUNEET RING IN SURGERY WALKED OUT THERE TO UPDATE. NO ONE AVAILABLE

## 2017-01-19 NOTE — PLAN OF CARE
Problem: Skin Integrity Impairment, Risk/Actual (Adult)  Goal: Identify Related Risk Factors and Signs and Symptoms  Outcome: Ongoing (interventions implemented as appropriate)  Goal: Skin Integrity/Wound Healing  Outcome: Ongoing (interventions implemented as appropriate)

## 2017-01-19 NOTE — PLAN OF CARE
Problem: Infection, Risk/Actual (Adult)  Goal: Identify Related Risk Factors and Signs and Symptoms  Outcome: Ongoing (interventions implemented as appropriate)  Goal: Infection Prevention/Resolution  Outcome: Ongoing (interventions implemented as appropriate)

## 2017-01-19 NOTE — ANESTHESIA PROCEDURE NOTES
Airway    General Information and Staff    Patient location during procedure: OR  CRNA: ROOPA GUAN    Indications and Patient Condition  Indications for airway management: airway protection    Preoxygenated: yes  MILS maintained throughout  Mask difficulty assessment: 0 - not attempted    Final Airway Details  Final airway type: mask        Number of attempts at approach: 1

## 2017-01-19 NOTE — PLAN OF CARE
Problem: Cellulitis (Adult)  Goal: Signs and Symptoms of Listed Potential Problems Will be Absent or Manageable (Cellulitis)  Outcome: Ongoing (interventions implemented as appropriate)

## 2017-01-20 VITALS
OXYGEN SATURATION: 95 % | HEART RATE: 66 BPM | WEIGHT: 186 LBS | DIASTOLIC BLOOD PRESSURE: 57 MMHG | RESPIRATION RATE: 18 BRPM | TEMPERATURE: 97.3 F | HEIGHT: 67 IN | SYSTOLIC BLOOD PRESSURE: 122 MMHG | BODY MASS INDEX: 29.19 KG/M2

## 2017-01-20 PROBLEM — M86.9 OSTEOMYELITIS OF FOOT (HCC): Status: RESOLVED | Noted: 2017-01-18 | Resolved: 2017-01-20

## 2017-01-20 LAB
ANION GAP SERPL CALCULATED.3IONS-SCNC: 5.1 MMOL/L (ref 3.6–11.2)
BASOPHILS # BLD AUTO: 0.01 10*3/MM3 (ref 0–0.3)
BASOPHILS NFR BLD AUTO: 0.1 % (ref 0–2)
BUN BLD-MCNC: 10 MG/DL (ref 7–21)
BUN/CREAT SERPL: 11.5 (ref 7–25)
CALCIUM SPEC-SCNC: 8.7 MG/DL (ref 7.7–10)
CHLORIDE SERPL-SCNC: 107 MMOL/L (ref 99–112)
CO2 SERPL-SCNC: 25.9 MMOL/L (ref 24.3–31.9)
CREAT BLD-MCNC: 0.87 MG/DL (ref 0.43–1.29)
DEPRECATED RDW RBC AUTO: 51.5 FL (ref 37–54)
EOSINOPHIL # BLD AUTO: 0.04 10*3/MM3 (ref 0–0.7)
EOSINOPHIL NFR BLD AUTO: 0.6 % (ref 0–5)
ERYTHROCYTE [DISTWIDTH] IN BLOOD BY AUTOMATED COUNT: 15.1 % (ref 11.5–14.5)
GFR SERPL CREATININE-BSD FRML MDRD: 72 ML/MIN/1.73
GLUCOSE BLD-MCNC: 135 MG/DL (ref 70–110)
HCT VFR BLD AUTO: 35.1 % (ref 37–47)
HGB BLD-MCNC: 11.3 G/DL (ref 12–16)
IMM GRANULOCYTES # BLD: 0.01 10*3/MM3 (ref 0–0.03)
IMM GRANULOCYTES NFR BLD: 0.1 % (ref 0–0.5)
LYMPHOCYTES # BLD AUTO: 1.05 10*3/MM3 (ref 1–3)
LYMPHOCYTES NFR BLD AUTO: 15.5 % (ref 21–51)
MCH RBC QN AUTO: 30.2 PG (ref 27–33)
MCHC RBC AUTO-ENTMCNC: 32.2 G/DL (ref 33–37)
MCV RBC AUTO: 93.9 FL (ref 80–94)
MONOCYTES # BLD AUTO: 0.38 10*3/MM3 (ref 0.1–0.9)
MONOCYTES NFR BLD AUTO: 5.6 % (ref 0–10)
NEUTROPHILS # BLD AUTO: 5.3 10*3/MM3 (ref 1.4–6.5)
NEUTROPHILS NFR BLD AUTO: 78.1 % (ref 30–70)
OSMOLALITY SERPL CALC.SUM OF ELEC: 276.8 MOSM/KG (ref 273–305)
PLATELET # BLD AUTO: 252 10*3/MM3 (ref 130–400)
PMV BLD AUTO: 10.6 FL (ref 6–10)
POTASSIUM BLD-SCNC: 3.6 MMOL/L (ref 3.5–5.3)
RBC # BLD AUTO: 3.74 10*6/MM3 (ref 4.2–5.4)
SODIUM BLD-SCNC: 138 MMOL/L (ref 135–153)
VANCOMYCIN TROUGH SERPL-MCNC: 23.8 MCG/ML (ref 5–15)
WBC NRBC COR # BLD: 6.79 10*3/MM3 (ref 4.5–12.5)

## 2017-01-20 PROCEDURE — 25010000002 MORPHINE SULFATE (PF) 2 MG/ML SOLUTION: Performed by: NURSE PRACTITIONER

## 2017-01-20 PROCEDURE — 80048 BASIC METABOLIC PNL TOTAL CA: CPT | Performed by: PODIATRIST

## 2017-01-20 PROCEDURE — 25010000002 VANCOMYCIN PER 500 MG: Performed by: PODIATRIST

## 2017-01-20 PROCEDURE — 80202 ASSAY OF VANCOMYCIN: CPT | Performed by: PODIATRIST

## 2017-01-20 PROCEDURE — 25010000002 VANCOMYCIN PER 500 MG: Performed by: NURSE PRACTITIONER

## 2017-01-20 PROCEDURE — 85025 COMPLETE CBC W/AUTO DIFF WBC: CPT | Performed by: PODIATRIST

## 2017-01-20 RX ORDER — ASCORBIC ACID 500 MG
500 TABLET ORAL DAILY
Qty: 60 TABLET | Refills: 1 | Status: SHIPPED | OUTPATIENT
Start: 2017-01-20 | End: 2017-05-13

## 2017-01-20 RX ORDER — HYDROCODONE BITARTRATE AND ACETAMINOPHEN 10; 325 MG/1; MG/1
1 TABLET ORAL EVERY 6 HOURS PRN
Qty: 50 TABLET | Refills: 0 | Status: SHIPPED | OUTPATIENT
Start: 2017-01-20 | End: 2017-05-13

## 2017-01-20 RX ORDER — SULFAMETHOXAZOLE AND TRIMETHOPRIM 800; 160 MG/1; MG/1
1 TABLET ORAL 2 TIMES DAILY
Qty: 20 TABLET | Refills: 0 | Status: SHIPPED | OUTPATIENT
Start: 2017-01-20 | End: 2017-05-13

## 2017-01-20 RX ORDER — IBUPROFEN 800 MG/1
800 TABLET ORAL EVERY 6 HOURS PRN
Qty: 60 TABLET | Refills: 0 | Status: SHIPPED | OUTPATIENT
Start: 2017-01-20 | End: 2017-05-13

## 2017-01-20 RX ORDER — ASPIRIN/CALCIUM/MAG/ALUMINUM 325 MG
1 TABLET ORAL DAILY
Qty: 360 EACH | Refills: 0 | Status: SHIPPED | OUTPATIENT
Start: 2017-01-20 | End: 2017-05-13

## 2017-01-20 RX ORDER — PROMETHAZINE HYDROCHLORIDE 12.5 MG/1
12.5 TABLET ORAL EVERY 6 HOURS PRN
Qty: 30 TABLET | Refills: 2 | Status: SHIPPED | OUTPATIENT
Start: 2017-01-20 | End: 2017-02-19

## 2017-01-20 RX ORDER — CLINDAMYCIN HYDROCHLORIDE 300 MG/1
300 CAPSULE ORAL 3 TIMES DAILY
Qty: 30 CAPSULE | Refills: 0 | Status: SHIPPED | OUTPATIENT
Start: 2017-01-20 | End: 2017-05-13

## 2017-01-20 RX ADMIN — LEVETIRACETAM 1000 MG: 500 TABLET, FILM COATED ORAL at 08:20

## 2017-01-20 RX ADMIN — MORPHINE SULFATE 1 MG: 2 INJECTION, SOLUTION INTRAMUSCULAR; INTRAVENOUS at 00:43

## 2017-01-20 RX ADMIN — MORPHINE SULFATE 1 MG: 2 INJECTION, SOLUTION INTRAMUSCULAR; INTRAVENOUS at 06:00

## 2017-01-20 RX ADMIN — MORPHINE SULFATE 1 MG: 2 INJECTION, SOLUTION INTRAMUSCULAR; INTRAVENOUS at 17:58

## 2017-01-20 RX ADMIN — ALPRAZOLAM 0.5 MG: 0.5 TABLET ORAL at 00:44

## 2017-01-20 RX ADMIN — SODIUM CHLORIDE 100 ML/HR: 9 INJECTION, SOLUTION INTRAVENOUS at 05:50

## 2017-01-20 RX ADMIN — PREGABALIN 300 MG: 100 CAPSULE ORAL at 08:20

## 2017-01-20 RX ADMIN — MORPHINE SULFATE 1 MG: 2 INJECTION, SOLUTION INTRAMUSCULAR; INTRAVENOUS at 19:58

## 2017-01-20 RX ADMIN — VANCOMYCIN HYDROCHLORIDE 1500 MG: 5 INJECTION, POWDER, LYOPHILIZED, FOR SOLUTION INTRAVENOUS at 05:50

## 2017-01-20 RX ADMIN — VANCOMYCIN HYDROCHLORIDE 1250 MG: 5 INJECTION, POWDER, LYOPHILIZED, FOR SOLUTION INTRAVENOUS at 17:58

## 2017-01-20 RX ADMIN — ALPRAZOLAM 0.5 MG: 0.5 TABLET ORAL at 11:53

## 2017-01-20 RX ADMIN — GABAPENTIN 800 MG: 400 CAPSULE ORAL at 05:50

## 2017-01-20 RX ADMIN — HYDROCODONE BITARTRATE AND ACETAMINOPHEN 1 TABLET: 7.5; 325 TABLET ORAL at 15:57

## 2017-01-20 RX ADMIN — MORPHINE SULFATE 1 MG: 2 INJECTION, SOLUTION INTRAMUSCULAR; INTRAVENOUS at 13:58

## 2017-01-20 RX ADMIN — MORPHINE SULFATE 1 MG: 2 INJECTION, SOLUTION INTRAMUSCULAR; INTRAVENOUS at 10:04

## 2017-01-20 RX ADMIN — SODIUM CHLORIDE, PRESERVATIVE FREE 10 ML: 5 INJECTION INTRAVENOUS at 08:21

## 2017-01-20 RX ADMIN — SODIUM CHLORIDE, PRESERVATIVE FREE 10 ML: 5 INJECTION INTRAVENOUS at 00:43

## 2017-01-20 NOTE — PROGRESS NOTES
PODIATRIC SURGERY PROGRESS NOTE     LOS: 2 days     Subjective     Interval History:     Patient Complaints: pain Left foot   History taken from: patient     Objective     Vital Signs  Temp:  [97.2 °F (36.2 °C)-98.7 °F (37.1 °C)] 98 °F (36.7 °C)  Heart Rate:  [49-80] 80  Resp:  [18-20] 18  BP: ()/(49-84) 121/71    Labs & Rads    Imaging Results (last 72 hours)     Procedure Component Value Units Date/Time    XR Foot 3+ View Left [[id number]] Collected:  01/19/17 1539     Updated:  01/19/17 1545    Narrative:       XR FOOT 3+ VW LEFT-     REASON FOR EXAM:  Postop; M86.672-Other chronic osteomyelitis, left  ankle and foot.     Today's study is compared with a previous study done on the 8th of January.     A cast has been applied. Extensive postoperative changes are noted in  the foot. The patient has undergone fusion of the ankle joint and  throughout the tarsal bones. The alignment is stable in comparing with  the earlier exam on the 8th. The third, fourth and fifth toes have been  amputated at the metatarsophalangeal joint level.       Impression:       No change in the alignment of the foot.     This report was finalized on 1/19/2017 3:43 PM by Dr. Alfred Christensen II, MD.             Results from last 7 days  Lab Units 01/20/17  0429 01/19/17  0112 01/18/17  1514   WBC 10*3/mm3 6.79 7.14 6.42   CRP mg/dL  --   --  <0.50     Lab Results   Component Value Date    NEUTROABS 5.30 01/20/2017       Results from last 7 days  Lab Units 01/20/17  0429   CREATININE mg/dL 0.87       BLOOD CULTURE   Date Value Ref Range Status   01/18/2017 No growth at 24 hours  Preliminary           Results Review:    I have personally reviewed laboratory data, culture results, radiology studies and antimicrobial therapy.    Physical Exam:   Physical Exam: Cast Clean, Dry. Window Dressing removed reveal sutures clean, dry and intact. No erythema or edema.      Results Review:     I reviewed the patient's new clinical  results.      Assessment:  Active Problems:    Contiguous Osteomyelitis Proximal Phalynx Base, Left    Non Healing Neuropathic Wound, Left foot     Periphreal Neuropathy     Tobacco Abuse.       Plan:Plan Discharge with oral broad spectrum Clindamycin and Bactrim po with history of MRSA. IV access will not be left in with history noted. Daily Iodine Wound care Left foot with offloading walker. Will follow final cultures and modify antibiotics as appropriate. No smoking advise.         Simón Reynoso MD  01/20/17  5:53 PM

## 2017-01-20 NOTE — PLAN OF CARE
Problem: Patient Care Overview (Adult)  Goal: Plan of Care Review  Outcome: Ongoing (interventions implemented as appropriate)    01/18/17 1417 01/19/17 2013   Coping/Psychosocial Response Interventions   Plan Of Care Reviewed With --  patient   Patient Care Overview   Progress no change --        Goal: Adult Individualization and Mutuality  Outcome: Ongoing (interventions implemented as appropriate)  Goal: Discharge Needs Assessment  Outcome: Ongoing (interventions implemented as appropriate)    01/19/17 1535   Discharge Needs Assessment   Equipment Needed After Discharge none   Discharge Facility/Level Of Care Needs (Pt does not utilize home health services. Pt has received [facility] and [facility] in the past. )   Discharge Disposition home or self-care   Living Environment   Transportation Available family or friend will provide  (Sign. other to provide transportation at discharge.)   Self-Care   Equipment Currently Used at Home walker, rolling;shower chair  (Pt has a rolling walker from [facility] and shower chair from family. )         Problem: Cellulitis (Adult)  Goal: Signs and Symptoms of Listed Potential Problems Will be Absent or Manageable (Cellulitis)  Outcome: Ongoing (interventions implemented as appropriate)    01/18/17 1417   Cellulitis   Problems Assessed (Cellulitis) pain;infection   Problems Present (Cellulitis) pain;infection         Problem: Fall Risk (Adult)  Goal: Identify Related Risk Factors and Signs and Symptoms  Outcome: Ongoing (interventions implemented as appropriate)    01/18/17 1417   Fall Risk   Fall Risk: Related Risk Factors gait/mobility problems;sensory deficits   Fall Risk: Signs and Symptoms presence of risk factors       Goal: Absence of Falls  Outcome: Ongoing (interventions implemented as appropriate)    01/18/17 1417   Fall Risk (Adult)   Absence of Falls making progress toward outcome         Problem: Infection, Risk/Actual (Adult)  Goal: Identify Related Risk  Factors and Signs and Symptoms  Outcome: Ongoing (interventions implemented as appropriate)    01/18/17 1417   Infection, Risk/Actual   Infection, Risk/Actual: Related Risk Factors skin integrity impairment   Signs and Symptoms (Infection, Risk/Actual) pain       Goal: Infection Prevention/Resolution  Outcome: Ongoing (interventions implemented as appropriate)    01/18/17 1417   Infection, Risk/Actual (Adult)   Infection Prevention/Resolution making progress toward outcome         Problem: Skin Integrity Impairment, Risk/Actual (Adult)  Goal: Identify Related Risk Factors and Signs and Symptoms  Outcome: Ongoing (interventions implemented as appropriate)    01/18/17 1417   Skin Integrity Impairment, Risk/Actual   Skin Integrity Impairment, Risk/Actual: Related Risk Factors infection/disease process   Signs and Symptoms (Skin Integrity Impairment) necrotic tissue       Goal: Skin Integrity/Wound Healing  Outcome: Ongoing (interventions implemented as appropriate)    01/18/17 1417   Skin Integrity Impairment, Risk/Actual (Adult)   Skin Integrity/Wound Healing making progress toward outcome

## 2017-01-20 NOTE — PROGRESS NOTES
Discharge Planning Assessment   [place]     Patient Name: Alisa Holland  MRN: [id number]  Today's Date: 1/20/2017    Admit Date: 1/18/2017       Discharge Plan       01/20/17 1208    Case Management/Social Work Plan    Plan SS spoke to pt and sign. other on Thursday, 1-19-17. Pt lives at home with sign. other and plans to return home at discharge. Pt has utilized [facility] and [facility] the past, however is on the do not accept back list at both companies (SS contacted both companies to comfirm). Pt has a rolling walker and shower chair. Pt request a wheelchair and bedside commode. W/C and BSC to be arranged with MD orders. SS to follow.             Siri Donovan

## 2017-01-20 NOTE — PROGRESS NOTES
Patient continues on day 3 vancomycin. Vancomycin trough level was reported as 23.8 mg/L today. Based on this level, will decrease vancomycin dose to 1250 mg q12 hrs. Will continue to follow and recheck a trough level when appropriate.    Thank you,    Sangita Jama RP

## 2017-01-20 NOTE — DISCHARGE SUMMARY
Date of Admission 1/18/2017  Date of Discharge:  1/20/2017    Discharge Diagnosis: S/P Debridement Base Proximal Phalynx Osteomyeltis Left    Presenting Problem/History of Present Illness  Osteomyelitis of foot [M86.9]  Chronic osteomyelitis of left foot [M86.672]     History of Present Illness    Hospital Course  Patient is a 41 y.o. female presented with worsening chronic osteomyelitis with neuropathic wound admitted for surgical evaluation and treatment.     Procedures Performed  Procedure(s):  LOWER EXTREMITY DEBRIDEMENT OSTEOMYELITIS, WOUND REPAIR FLAP       Consults:   Consults     No orders found from 12/20/2016 to 1/19/2017.          Pertinent Test Results:  Lab Results (last 72 hours)     Procedure Component Value Units Date/Time    CBC & Differential [[id number]] Collected:  01/18/17 1514    Specimen:  Blood Updated:  01/18/17 1524    Narrative:       The following orders were created for panel order CBC & Differential.  Procedure                               Abnormality         Status                     ---------                               -----------         ------                     CBC Auto Differential[[id number]]         Abnormal            Final result                 Please view results for these tests on the individual orders.    CBC Auto Differential [[id number]]  (Abnormal) Collected:  01/18/17 1514    Specimen:  Blood Updated:  01/18/17 1524     WBC 6.42 10*3/mm3      RBC 4.01 (L) 10*6/mm3      Hemoglobin 12.2 g/dL      Hematocrit 36.9 (L) %      MCV 92.0 fL      MCH 30.4 pg      MCHC 33.1 g/dL      RDW 15.1 (H) %      RDW-SD 50.5 fl      MPV 10.4 (H) fL      Platelets 254 10*3/mm3      Neutrophil % 59.6 %      Lymphocyte % 26.5 %      Monocyte % 7.6 %      Eosinophil % 5.5 (H) %      Basophil % 0.6 %      Immature Grans % 0.2 %      Neutrophils, Absolute 3.83 10*3/mm3      Lymphocytes, Absolute 1.70 10*3/mm3      Monocytes, Absolute 0.49 10*3/mm3      Eosinophils, Absolute 0.35 10*3/mm3       Basophils, Absolute 0.04 10*3/mm3      Immature Grans, Absolute 0.01 10*3/mm3     Basic Metabolic Panel [[id number]]  (Normal) Collected:  01/18/17 1514    Specimen:  Blood Updated:  01/18/17 1547     Glucose 87 mg/dL      BUN 13 mg/dL      Creatinine 0.92 mg/dL      Sodium 144 mmol/L      Potassium 4.3 mmol/L      Chloride 107 mmol/L      CO2 30.5 mmol/L      Calcium 8.9 mg/dL      eGFR Non African Amer 68 mL/min/1.73      BUN/Creatinine Ratio 14.1      Anion Gap 6.5 mmol/L     Narrative:       GFR Normal >60  Chronic Kidney Disease <60  Kidney Failure <15    Osmolality, Calculated [[id number]]  (Normal) Collected:  01/18/17 1514    Specimen:  Blood Updated:  01/18/17 1547     Osmolality Calc 286.3 mOsm/kg     C-reactive Protein [[id number]]  (Normal) Collected:  01/18/17 1514    Specimen:  Blood Updated:  01/18/17 1549     C-Reactive Protein <0.50 mg/dL     Basic Metabolic Panel [[id number]]  (Normal) Collected:  01/18/17 1817    Specimen:  Blood Updated:  01/18/17 1852     Glucose 98 mg/dL      BUN 12 mg/dL      Creatinine 0.99 mg/dL      Sodium 144 mmol/L      Potassium 3.7 mmol/L      Chloride 107 mmol/L      CO2 31.7 mmol/L      Calcium 9.5 mg/dL      eGFR Non African Amer 62 mL/min/1.73      BUN/Creatinine Ratio 12.1      Anion Gap 5.3 mmol/L     Narrative:       GFR Normal >60  Chronic Kidney Disease <60  Kidney Failure <15    Osmolality, Calculated [[id number]]  (Normal) Collected:  01/18/17 1817    Specimen:  Blood Updated:  01/18/17 1852     Osmolality Calc 286.6 mOsm/kg     MRSA Screen, PCR [[id number]]  (Normal) Collected:  01/18/17 1828    Specimen:  Swab from Nares Updated:  01/18/17 1955     MRSA, PCR Negative      Staph aureus by PCR Negative     CBC & Differential [[id number]] Collected:  01/19/17 0112    Specimen:  Blood Updated:  01/19/17 0158    Narrative:       The following orders were created for panel order CBC & Differential.  Procedure                               Abnormality         Status                      ---------                               -----------         ------                     CBC Auto Differential[[id number]]         Abnormal            Final result                 Please view results for these tests on the individual orders.    CBC Auto Differential [[id number]]  (Abnormal) Collected:  01/19/17 0112    Specimen:  Blood Updated:  01/19/17 0158     WBC 7.14 10*3/mm3      RBC 3.71 (L) 10*6/mm3      Hemoglobin 10.7 (L) g/dL      Hematocrit 34.8 (L) %      MCV 93.8 fL      MCH 28.8 pg      MCHC 30.7 (L) g/dL      RDW 15.1 (H) %      RDW-SD 49.3 fl      MPV 10.3 (H) fL      Platelets 256 10*3/mm3      Neutrophil % 57.8 %      Lymphocyte % 25.2 %      Monocyte % 9.0 %      Eosinophil % 7.6 (H) %      Basophil % 0.3 %      Immature Grans % 0.1 %      Neutrophils, Absolute 4.13 10*3/mm3      Lymphocytes, Absolute 1.80 10*3/mm3      Monocytes, Absolute 0.64 10*3/mm3      Eosinophils, Absolute 0.54 10*3/mm3      Basophils, Absolute 0.02 10*3/mm3      Immature Grans, Absolute 0.01 10*3/mm3     Hemoglobin A1c [[id number]]  (Normal) Collected:  01/19/17 0112    Specimen:  Blood Updated:  01/19/17 0241     Hemoglobin A1C 5.20 %     Pregnancy, Urine [[id number]]  (Normal) Collected:  01/19/17 1018    Specimen:  Urine Updated:  01/19/17 1036     HCG, Urine QL Negative     Narrative:       Diluted specimens may cause false negative results.    Tissue Exam [[id number]] Collected:  01/19/17 1247    Specimen:  Tissue from Foot, Left Updated:  01/19/17 1516    Blood Culture [[id number]]  (Normal) Collected:  01/18/17 1817    Specimen:  Blood from Arm, Right Updated:  01/19/17 1901     Blood Culture No growth at 24 hours     Blood Culture [[id number]]  (Normal) Collected:  01/18/17 1915    Specimen:  Blood from Hand, Right Updated:  01/19/17 2001     Blood Culture No growth at 24 hours     Wound Culture [[id number]] Collected:  01/19/17 1243    Specimen:  Wound from Foot, Left Updated:  01/19/17 2122     Gram Stain Result No  organisms seen     Culture, Routine [[id number]] Collected:  01/19/17 1246    Specimen:  Wound from Foot, Left Updated:  01/19/17 2342     Gram Stain Result No organisms seen     CBC & Differential [[id number]] Collected:  01/20/17 0429    Specimen:  Blood Updated:  01/20/17 0442    Narrative:       The following orders were created for panel order CBC & Differential.  Procedure                               Abnormality         Status                     ---------                               -----------         ------                     CBC Auto Differential[[id number]]         Abnormal            Final result                 Please view results for these tests on the individual orders.    CBC Auto Differential [[id number]]  (Abnormal) Collected:  01/20/17 0429    Specimen:  Blood Updated:  01/20/17 0442     WBC 6.79 10*3/mm3      RBC 3.74 (L) 10*6/mm3      Hemoglobin 11.3 (L) g/dL      Hematocrit 35.1 (L) %      MCV 93.9 fL      MCH 30.2 pg      MCHC 32.2 (L) g/dL      RDW 15.1 (H) %      RDW-SD 51.5 fl      MPV 10.6 (H) fL      Platelets 252 10*3/mm3      Neutrophil % 78.1 (H) %      Lymphocyte % 15.5 (L) %      Monocyte % 5.6 %      Eosinophil % 0.6 %      Basophil % 0.1 %      Immature Grans % 0.1 %      Neutrophils, Absolute 5.30 10*3/mm3      Lymphocytes, Absolute 1.05 10*3/mm3      Monocytes, Absolute 0.38 10*3/mm3      Eosinophils, Absolute 0.04 10*3/mm3      Basophils, Absolute 0.01 10*3/mm3      Immature Grans, Absolute 0.01 10*3/mm3     Basic Metabolic Panel [[id number]]  (Abnormal) Collected:  01/20/17 0429    Specimen:  Blood Updated:  01/20/17 0503     Glucose 135 (H) mg/dL      BUN 10 mg/dL      Creatinine 0.87 mg/dL      Sodium 138 mmol/L      Potassium 3.6 mmol/L      Chloride 107 mmol/L      CO2 25.9 mmol/L      Calcium 8.7 mg/dL      eGFR Non African Amer 72 mL/min/1.73      BUN/Creatinine Ratio 11.5      Anion Gap 5.1 mmol/L     Narrative:       GFR Normal >60  Chronic Kidney Disease <60  Kidney Failure  <15    Osmolality, Calculated [[id number]]  (Normal) Collected:  01/20/17 0429    Specimen:  Blood Updated:  01/20/17 0503     Osmolality Calc 276.8 mOsm/kg     Vancomycin, Trough [[id number]]  (Abnormal) Collected:  01/20/17 0429    Specimen:  Blood Updated:  01/20/17 0511     Vancomycin Trough 23.80 (H) mcg/mL         Imaging Results (last 72 hours)     Procedure Component Value Units Date/Time    XR Foot 3+ View Left [[id number]] Collected:  01/19/17 1539     Updated:  01/19/17 1545    Narrative:       XR FOOT 3+ VW LEFT-     REASON FOR EXAM:  Postop; M86.672-Other chronic osteomyelitis, left  ankle and foot.     Today's study is compared with a previous study done on the 8th of January.     A cast has been applied. Extensive postoperative changes are noted in  the foot. The patient has undergone fusion of the ankle joint and  throughout the tarsal bones. The alignment is stable in comparing with  the earlier exam on the 8th. The third, fourth and fifth toes have been  amputated at the metatarsophalangeal joint level.       Impression:       No change in the alignment of the foot.     This report was finalized on 1/19/2017 3:43 PM by Dr. Alfred Christensen II, MD.           Condition on Discharge:  Stable     Vital Signs  Temp:  [97.2 °F (36.2 °C)-98.7 °F (37.1 °C)] 98 °F (36.7 °C)  Heart Rate:  [49-80] 80  Resp:  [18-20] 18  BP: ()/(49-84) 121/71    Discharge Disposition      Discharge Medications   Alisa Holland   [place] Medication Instructions ANGELINA:[id number]    Printed on:01/20/17 0380   Medication Information                      ALPRAZolam (XANAX) 0.5 MG tablet  Take 0.5 mg by mouth 3 (Three) Times a Day As Needed for anxiety.             gabapentin (NEURONTIN) 800 MG tablet  Take 800 mg by mouth Every 8 (Eight) Hours.             HYDROcodone-acetaminophen (NORCO) 5-325 MG per tablet  Take 1 tablet by mouth Every 8 (Eight) Hours As Needed for moderate pain (4-6).             levETIRAcetam (KEPPRA) 500 MG  tablet  Take 1,000 mg by mouth 2 (Two) Times a Day.             pregabalin (LYRICA) 100 MG capsule  Take 300 mg by mouth 2 (Two) Times a Day.             promethazine (PHENERGAN) 25 MG tablet  Take 25 mg by mouth Every 6 (Six) Hours As Needed for nausea or vomiting.                   Activity at Discharge: Strict no wb with cast, walker, dressing changes daily through cast window.     Follow-up Appointments  No future appointments.      Test Results Pending at Discharge   Order Current Status    Tissue Exam In process    Blood Culture Preliminary result    Blood Culture Preliminary result    Culture, Routine Preliminary result    Wound Culture Preliminary result           Simón Reynoso MD  01/20/17

## 2017-01-21 NOTE — OP NOTE
Alisa Holland  1/18/2017 - 1/19/2017    Pre-op Diagnosis:   Chronic osteomyelitis of left foot [M86.672]  Non Healing Wound Left foot     Post-op Diagnosis:     Post-Op Diagnosis Codes:     * Chronic osteomyelitis of left foot [M86.672]      Procedure(s):  LOWER EXTREMITY DEBRIDEMENT OSTEOMYELITIS, WOUND REPAIR FLAP    Surgeon(s):  Simón Reynoso MD    Procedure: 1. Debridement Phalynx Osteomyeltis, Left Foot                       2. Rotational Skin Flap Closure, Left foot.       Anesthesia: General with Local Block   Staff:   Circulator: Anabelle Mathews RN  Scrub Person: Jodee SMITH [facility]  [place]: Dwain Nagy RN    HEMOSTASIS:Pneumatic Ankle Tourniquet left ankle     Estimated Blood Loss: Less 10cc   MATERIALS: None     Specimens:                  Order Name Source Comment Collection Info Order Time   WOUND CULTURE Foot, Left  Collected By: Simón Reynoso MD 1/19/2017 12:44 PM   CULTURE, ROUTINE Foot, Left  Collected By: Simón Reynoso MD 1/19/2017 12:46 PM   TISSUE EXAM Foot, Left  Collected By: Simón Reynoso MD 1/19/2017 12:48 PM       Injectables:15cc .5% Marcaine Plaine         Drains: None           Findings: OM proximal Phalynx Base.     Complications: None     Procedure:   1. Debridement Phalynx Osteomyeltis, Left Foot  Under IV sedation the patient was brought to the OR placed in operative table in the supine position.  The extremity was scrubbed prepped and draped in the usual sterile manner.  The limb was elevated disseminated pneumatic tourniquet for approximately 250 mmHg pressure ankle level.  Attention was then directed to the lateral aspect the left foot distally where the open wound was incised dorsally for dissection of the underlying osteotic bone fragment of the base of the proximal phalanx.  This portion of bone was debrided swab culture for C&S and Gram stain and local tissue bony segment was sent for culture.  Lavage irrigation with curette with 2 L of vancomycin  "filtered saline attention was then directed to the left foot wound  2. Rotational Skin Flap Closure, Left foot.   A semicircular incision was made dorsally at 10 o'clock in frontal plane, half [place] type incision full thickness. The Full thickness wound was excised of non viable tissue and excised wound edges to lightly bleeding. The Rotational skin flap was undermined at the subq level leaving vascular channels intact. The skin flap was rotated inferiorly on its pivot point. A back cut released tension for closure of the flap over the primary defect in an \"arch semi [place]\" fashion directly over the wound deficit for full coverage of the defect. Dog ear was excised/trimmed. The flap edges were repaired deep with 2.0 monocryl simple sutures. The skin edges repaired peripherally using 3 and 4.0 nylon sutures, simple sutures. Mastizol and steristips were applied, Bulky dressing and tourniquet released, vascular response noted to the extremity. The patient applied a short leg cast with cutout window over the flap dressing. The patient tolerated procedure well, transferred OR to PACU with Vital Signs Stable.               Simón Reynoso MD     Date: 1/20/2017  Time: 7:41 PM    "

## 2017-01-21 NOTE — DISCHARGE INSTR - LAB
Please call as soon as possible to schedule an appointment  to see Dr. Reynoso on Tuesday, January 24, 2016 or Wednesday, January 25, 2016.  You can reach the office at .    Heel weight bearing with walker, left foot.    Wound care as instructed by Dr. Reynoso - paint wound- left foot- with betadine daily and cover with dry dressing.

## 2017-01-23 LAB
BACTERIA SPEC AEROBE CULT: ABNORMAL
BACTERIA SPEC AEROBE CULT: NORMAL
BACTERIA SPEC AEROBE CULT: NORMAL
GRAM STN SPEC: ABNORMAL

## 2017-01-24 LAB
LAB AP CASE REPORT: NORMAL
Lab: NORMAL
PATH REPORT.FINAL DX SPEC: NORMAL

## 2017-01-27 LAB
BACTERIA SPEC AEROBE CULT: ABNORMAL
BACTERIA SPEC AEROBE CULT: ABNORMAL
GRAM STN SPEC: ABNORMAL

## 2017-03-02 ENCOUNTER — HOSPITAL ENCOUNTER (OUTPATIENT)
Dept: HOSPITAL 79 - LAB | Age: 42
End: 2017-03-02
Attending: PSYCHIATRY & NEUROLOGY
Payer: MEDICARE

## 2017-03-02 DIAGNOSIS — D51.9: ICD-10-CM

## 2017-03-02 DIAGNOSIS — G60.9: ICD-10-CM

## 2017-03-02 DIAGNOSIS — M51.36: ICD-10-CM

## 2017-03-02 DIAGNOSIS — E55.9: Primary | ICD-10-CM

## 2017-03-02 LAB
HGB BLD-MCNC: 13.1 GM/DL (ref 12.3–15.3)
RED BLOOD COUNT: 4.26 M/UL (ref 4–5.1)
WHITE BLOOD COUNT: 6.1 K/UL (ref 4.5–11)

## 2017-03-06 ENCOUNTER — HOSPITAL ENCOUNTER (OUTPATIENT)
Dept: HOSPITAL 79 - OPSV | Age: 42
End: 2017-03-06
Attending: PODIATRIST
Payer: MEDICARE

## 2017-03-06 VITALS — BODY MASS INDEX: 29.03 KG/M2 | HEIGHT: 67 IN | WEIGHT: 185 LBS

## 2017-03-06 DIAGNOSIS — Z88.6: ICD-10-CM

## 2017-03-06 DIAGNOSIS — Z91.041: ICD-10-CM

## 2017-03-06 DIAGNOSIS — B95.62: ICD-10-CM

## 2017-03-06 DIAGNOSIS — L03.119: Primary | ICD-10-CM

## 2017-03-06 DIAGNOSIS — Z88.2: ICD-10-CM

## 2017-04-12 ENCOUNTER — HOSPITAL ENCOUNTER (OUTPATIENT)
Dept: HOSPITAL 79 - OPSV | Age: 42
End: 2017-04-12
Attending: PODIATRIST
Payer: MEDICARE

## 2017-04-12 VITALS — WEIGHT: 183 LBS | HEIGHT: 67 IN | BODY MASS INDEX: 28.72 KG/M2

## 2017-04-12 DIAGNOSIS — Z88.8: ICD-10-CM

## 2017-04-12 DIAGNOSIS — Z91.041: ICD-10-CM

## 2017-04-12 DIAGNOSIS — L03.119: ICD-10-CM

## 2017-04-12 DIAGNOSIS — L97.529: Primary | ICD-10-CM

## 2017-04-12 DIAGNOSIS — Z88.1: ICD-10-CM

## 2017-04-12 LAB
HGB BLD-MCNC: 13.1 GM/DL (ref 12.3–15.3)
RED BLOOD COUNT: 4.14 M/UL (ref 4–5.1)
WHITE BLOOD COUNT: 6.1 K/UL (ref 4.5–11)

## 2017-04-24 ENCOUNTER — HOSPITAL ENCOUNTER (OUTPATIENT)
Dept: HOSPITAL 79 - NM | Age: 42
End: 2017-04-24
Attending: PODIATRIST
Payer: MEDICARE

## 2017-04-24 ENCOUNTER — HOSPITAL ENCOUNTER (EMERGENCY)
Dept: HOSPITAL 79 - ER1 | Age: 42
Discharge: HOME | End: 2017-04-24
Payer: MEDICARE

## 2017-04-24 DIAGNOSIS — Z88.5: ICD-10-CM

## 2017-04-24 DIAGNOSIS — L89.899: Primary | ICD-10-CM

## 2017-04-24 DIAGNOSIS — M86.072: Primary | ICD-10-CM

## 2017-04-24 DIAGNOSIS — Z98.1: ICD-10-CM

## 2017-04-24 DIAGNOSIS — Z91.041: ICD-10-CM

## 2017-04-24 DIAGNOSIS — F17.200: ICD-10-CM

## 2017-04-24 DIAGNOSIS — M86.072: ICD-10-CM

## 2017-04-24 PROCEDURE — A9503 TC99M MEDRONATE: HCPCS

## 2017-04-26 ENCOUNTER — HOSPITAL ENCOUNTER (OUTPATIENT)
Dept: HOSPITAL 79 - LAB | Age: 42
End: 2017-04-26
Attending: PODIATRIST
Payer: MEDICARE

## 2017-04-26 DIAGNOSIS — L97.521: Primary | ICD-10-CM

## 2017-04-26 LAB
HGB BLD-MCNC: 13.6 GM/DL (ref 12.3–15.3)
RED BLOOD COUNT: 4.32 M/UL (ref 4–5.1)
WHITE BLOOD COUNT: 6.2 K/UL (ref 4.5–11)

## 2017-05-13 ENCOUNTER — APPOINTMENT (OUTPATIENT)
Dept: CT IMAGING | Facility: HOSPITAL | Age: 42
End: 2017-05-13

## 2017-05-13 ENCOUNTER — HOSPITAL ENCOUNTER (INPATIENT)
Facility: HOSPITAL | Age: 42
LOS: 3 days | Discharge: HOME | End: 2017-05-16
Attending: EMERGENCY MEDICINE | Admitting: PODIATRIST

## 2017-05-13 DIAGNOSIS — M86.672 CHRONIC OSTEOMYELITIS OF LEFT FOOT (HCC): Primary | ICD-10-CM

## 2017-05-13 PROBLEM — M86.679 CHRONIC OSTEOMYELITIS OF FOOT (HCC): Status: ACTIVE | Noted: 2017-05-13

## 2017-05-13 LAB
6-ACETYL MORPHINE: NEGATIVE
ALBUMIN SERPL-MCNC: 3.9 G/DL (ref 3.5–5)
ALBUMIN/GLOB SERPL: 1 G/DL (ref 1.5–2.5)
ALP SERPL-CCNC: 84 U/L (ref 35–104)
ALT SERPL W P-5'-P-CCNC: 38 U/L (ref 10–36)
AMPHET+METHAMPHET UR QL: NEGATIVE
ANION GAP SERPL CALCULATED.3IONS-SCNC: 1 MMOL/L (ref 3.6–11.2)
AST SERPL-CCNC: 41 U/L (ref 10–30)
B-HCG UR QL: NEGATIVE
BACTERIA UR QL AUTO: ABNORMAL /HPF
BARBITURATES UR QL SCN: NEGATIVE
BASOPHILS # BLD AUTO: 0.03 10*3/MM3 (ref 0–0.3)
BASOPHILS # BLD AUTO: 0.04 10*3/MM3 (ref 0–0.3)
BASOPHILS NFR BLD AUTO: 0.4 % (ref 0–2)
BASOPHILS NFR BLD AUTO: 0.5 % (ref 0–2)
BENZODIAZ UR QL SCN: POSITIVE
BILIRUB SERPL-MCNC: 0.2 MG/DL (ref 0.2–1.8)
BILIRUB UR QL STRIP: NEGATIVE
BUN BLD-MCNC: 11 MG/DL (ref 7–21)
BUN/CREAT SERPL: 13.1 (ref 7–25)
BUPRENORPHINE SERPL-MCNC: NEGATIVE NG/ML
CALCIUM SPEC-SCNC: 9.5 MG/DL (ref 7.7–10)
CANNABINOIDS SERPL QL: NEGATIVE
CHLORIDE SERPL-SCNC: 115 MMOL/L (ref 99–112)
CLARITY UR: CLEAR
CO2 SERPL-SCNC: 28 MMOL/L (ref 24.3–31.9)
COCAINE UR QL: NEGATIVE
COLOR UR: YELLOW
CREAT BLD-MCNC: 0.84 MG/DL (ref 0.43–1.29)
CRP SERPL-MCNC: 1.37 MG/DL (ref 0–0.99)
D-LACTATE SERPL-SCNC: 0.5 MMOL/L (ref 0.5–2)
DEPRECATED RDW RBC AUTO: 42.8 FL (ref 37–54)
DEPRECATED RDW RBC AUTO: 46.3 FL (ref 37–54)
EOSINOPHIL # BLD AUTO: 0.35 10*3/MM3 (ref 0–0.7)
EOSINOPHIL # BLD AUTO: 0.61 10*3/MM3 (ref 0–0.7)
EOSINOPHIL NFR BLD AUTO: 4.8 % (ref 0–5)
EOSINOPHIL NFR BLD AUTO: 7.9 % (ref 0–5)
ERYTHROCYTE [DISTWIDTH] IN BLOOD BY AUTOMATED COUNT: 12.9 % (ref 11.5–14.5)
ERYTHROCYTE [DISTWIDTH] IN BLOOD BY AUTOMATED COUNT: 13.1 % (ref 11.5–14.5)
ERYTHROCYTE [SEDIMENTATION RATE] IN BLOOD: 30 MM/HR (ref 0–20)
GFR SERPL CREATININE-BSD FRML MDRD: 75 ML/MIN/1.73
GLOBULIN UR ELPH-MCNC: 3.8 GM/DL
GLUCOSE BLD-MCNC: 106 MG/DL (ref 70–110)
GLUCOSE UR STRIP-MCNC: NEGATIVE MG/DL
HBA1C MFR BLD: 5.3 % (ref 4.5–5.7)
HCT VFR BLD AUTO: 39.5 % (ref 37–47)
HCT VFR BLD AUTO: 40.7 % (ref 37–47)
HGB BLD-MCNC: 12.6 G/DL (ref 12–16)
HGB BLD-MCNC: 13 G/DL (ref 12–16)
HGB UR QL STRIP.AUTO: ABNORMAL
HYALINE CASTS UR QL AUTO: ABNORMAL /LPF
IMM GRANULOCYTES # BLD: 0.02 10*3/MM3 (ref 0–0.03)
IMM GRANULOCYTES # BLD: 0.02 10*3/MM3 (ref 0–0.03)
IMM GRANULOCYTES NFR BLD: 0.3 % (ref 0–0.5)
IMM GRANULOCYTES NFR BLD: 0.3 % (ref 0–0.5)
KETONES UR QL STRIP: NEGATIVE
LEUKOCYTE ESTERASE UR QL STRIP.AUTO: NEGATIVE
LYMPHOCYTES # BLD AUTO: 0.98 10*3/MM3 (ref 1–3)
LYMPHOCYTES # BLD AUTO: 2.24 10*3/MM3 (ref 1–3)
LYMPHOCYTES NFR BLD AUTO: 13.4 % (ref 21–51)
LYMPHOCYTES NFR BLD AUTO: 29 % (ref 21–51)
MCH RBC QN AUTO: 30.4 PG (ref 27–33)
MCH RBC QN AUTO: 31 PG (ref 27–33)
MCHC RBC AUTO-ENTMCNC: 31.9 G/DL (ref 33–37)
MCHC RBC AUTO-ENTMCNC: 31.9 G/DL (ref 33–37)
MCV RBC AUTO: 95.2 FL (ref 80–94)
MCV RBC AUTO: 97.1 FL (ref 80–94)
MDMA UR QL SCN: NEGATIVE
METHADONE UR QL SCN: NEGATIVE
MONOCYTES # BLD AUTO: 0.22 10*3/MM3 (ref 0.1–0.9)
MONOCYTES # BLD AUTO: 0.85 10*3/MM3 (ref 0.1–0.9)
MONOCYTES NFR BLD AUTO: 11 % (ref 0–10)
MONOCYTES NFR BLD AUTO: 3 % (ref 0–10)
MRSA DNA SPEC QL NAA+PROBE: NEGATIVE
NEUTROPHILS # BLD AUTO: 3.96 10*3/MM3 (ref 1.4–6.5)
NEUTROPHILS # BLD AUTO: 5.72 10*3/MM3 (ref 1.4–6.5)
NEUTROPHILS NFR BLD AUTO: 51.3 % (ref 30–70)
NEUTROPHILS NFR BLD AUTO: 78.1 % (ref 30–70)
NITRITE UR QL STRIP: NEGATIVE
OPIATES UR QL: POSITIVE
OSMOLALITY SERPL CALC.SUM OF ELEC: 286.7 MOSM/KG (ref 273–305)
OXYCODONE UR QL SCN: NEGATIVE
PCP UR QL SCN: NEGATIVE
PH UR STRIP.AUTO: 5.5 [PH] (ref 5–8)
PLATELET # BLD AUTO: 296 10*3/MM3 (ref 130–400)
PLATELET # BLD AUTO: 335 10*3/MM3 (ref 130–400)
PMV BLD AUTO: 9.6 FL (ref 6–10)
PMV BLD AUTO: 9.9 FL (ref 6–10)
POTASSIUM BLD-SCNC: 3.7 MMOL/L (ref 3.5–5.3)
PROT SERPL-MCNC: 7.7 G/DL (ref 6–8)
PROT UR QL STRIP: NEGATIVE
RBC # BLD AUTO: 4.15 10*6/MM3 (ref 4.2–5.4)
RBC # BLD AUTO: 4.19 10*6/MM3 (ref 4.2–5.4)
RBC # UR: ABNORMAL /HPF
REF LAB TEST METHOD: ABNORMAL
S AUREUS DNA SPEC QL NAA+PROBE: NEGATIVE
SODIUM BLD-SCNC: 144 MMOL/L (ref 135–153)
SP GR UR STRIP: 1.02 (ref 1–1.03)
SQUAMOUS #/AREA URNS HPF: ABNORMAL /HPF
UROBILINOGEN UR QL STRIP: ABNORMAL
WBC NRBC COR # BLD: 7.32 10*3/MM3 (ref 4.5–12.5)
WBC NRBC COR # BLD: 7.72 10*3/MM3 (ref 4.5–12.5)
WBC UR QL AUTO: ABNORMAL /HPF

## 2017-05-13 PROCEDURE — 81025 URINE PREGNANCY TEST: CPT | Performed by: PHYSICIAN ASSISTANT

## 2017-05-13 PROCEDURE — 87641 MR-STAPH DNA AMP PROBE: CPT | Performed by: PODIATRIST

## 2017-05-13 PROCEDURE — 25010000002 PIPERACILLIN-TAZOBACTAM: Performed by: PHYSICIAN ASSISTANT

## 2017-05-13 PROCEDURE — G0480 DRUG TEST DEF 1-7 CLASSES: HCPCS | Performed by: PODIATRIST

## 2017-05-13 PROCEDURE — 25010000002 METHYLPREDNISOLONE PER 125 MG: Performed by: PHYSICIAN ASSISTANT

## 2017-05-13 PROCEDURE — 80307 DRUG TEST PRSMV CHEM ANLYZR: CPT | Performed by: PHYSICIAN ASSISTANT

## 2017-05-13 PROCEDURE — 80299 QUANTITATIVE ASSAY DRUG: CPT | Performed by: PODIATRIST

## 2017-05-13 PROCEDURE — 36415 COLL VENOUS BLD VENIPUNCTURE: CPT

## 2017-05-13 PROCEDURE — 87040 BLOOD CULTURE FOR BACTERIA: CPT | Performed by: PHYSICIAN ASSISTANT

## 2017-05-13 PROCEDURE — 85025 COMPLETE CBC W/AUTO DIFF WBC: CPT | Performed by: PHYSICIAN ASSISTANT

## 2017-05-13 PROCEDURE — 85652 RBC SED RATE AUTOMATED: CPT | Performed by: PODIATRIST

## 2017-05-13 PROCEDURE — 85025 COMPLETE CBC W/AUTO DIFF WBC: CPT | Performed by: PODIATRIST

## 2017-05-13 PROCEDURE — 86140 C-REACTIVE PROTEIN: CPT | Performed by: PHYSICIAN ASSISTANT

## 2017-05-13 PROCEDURE — 63710000001 PROMETHAZINE PER 25 MG: Performed by: PODIATRIST

## 2017-05-13 PROCEDURE — 87640 STAPH A DNA AMP PROBE: CPT | Performed by: PODIATRIST

## 2017-05-13 PROCEDURE — 99285 EMERGENCY DEPT VISIT HI MDM: CPT

## 2017-05-13 PROCEDURE — 25010000002 PIPERACILLIN-TAZOBACTAM

## 2017-05-13 PROCEDURE — 80053 COMPREHEN METABOLIC PANEL: CPT | Performed by: PHYSICIAN ASSISTANT

## 2017-05-13 PROCEDURE — 25010000002 METOCLOPRAMIDE PER 10 MG: Performed by: PHYSICIAN ASSISTANT

## 2017-05-13 PROCEDURE — 25010000002 MORPHINE PER 10 MG: Performed by: PODIATRIST

## 2017-05-13 PROCEDURE — 83036 HEMOGLOBIN GLYCOSYLATED A1C: CPT | Performed by: PODIATRIST

## 2017-05-13 PROCEDURE — 81001 URINALYSIS AUTO W/SCOPE: CPT | Performed by: PHYSICIAN ASSISTANT

## 2017-05-13 PROCEDURE — 83605 ASSAY OF LACTIC ACID: CPT | Performed by: PHYSICIAN ASSISTANT

## 2017-05-13 PROCEDURE — 80184 ASSAY OF PHENOBARBITAL: CPT | Performed by: PODIATRIST

## 2017-05-13 PROCEDURE — 73700 CT LOWER EXTREMITY W/O DYE: CPT | Performed by: RADIOLOGY

## 2017-05-13 PROCEDURE — 25010000002 MEROPENEM

## 2017-05-13 PROCEDURE — 87040 BLOOD CULTURE FOR BACTERIA: CPT | Performed by: PODIATRIST

## 2017-05-13 PROCEDURE — 73700 CT LOWER EXTREMITY W/O DYE: CPT

## 2017-05-13 RX ORDER — MORPHINE SULFATE 15 MG/1
15 TABLET, FILM COATED, EXTENDED RELEASE ORAL 2 TIMES DAILY
Status: DISCONTINUED | OUTPATIENT
Start: 2017-05-13 | End: 2017-05-16 | Stop reason: HOSPADM

## 2017-05-13 RX ORDER — PREGABALIN 100 MG/1
300 CAPSULE ORAL 2 TIMES DAILY
Status: CANCELLED | OUTPATIENT
Start: 2017-05-13

## 2017-05-13 RX ORDER — CLINDAMYCIN PHOSPHATE 600 MG/50ML
600 INJECTION INTRAVENOUS ONCE
Status: DISCONTINUED | OUTPATIENT
Start: 2017-05-13 | End: 2017-05-13

## 2017-05-13 RX ORDER — MORPHINE SULFATE 15 MG/1
15 TABLET, FILM COATED, EXTENDED RELEASE ORAL 2 TIMES DAILY
COMMUNITY
End: 2019-05-05

## 2017-05-13 RX ORDER — PREGABALIN 300 MG/1
300 CAPSULE ORAL 2 TIMES DAILY
COMMUNITY

## 2017-05-13 RX ORDER — SODIUM CHLORIDE, SODIUM LACTATE, POTASSIUM CHLORIDE, CALCIUM CHLORIDE 600; 310; 30; 20 MG/100ML; MG/100ML; MG/100ML; MG/100ML
80 INJECTION, SOLUTION INTRAVENOUS CONTINUOUS
Status: DISCONTINUED | OUTPATIENT
Start: 2017-05-13 | End: 2017-05-16 | Stop reason: HOSPADM

## 2017-05-13 RX ORDER — PROMETHAZINE HYDROCHLORIDE 25 MG/1
25 TABLET ORAL EVERY 6 HOURS PRN
Status: CANCELLED | OUTPATIENT
Start: 2017-05-13

## 2017-05-13 RX ORDER — MORPHINE SULFATE 2 MG/ML
2 INJECTION, SOLUTION INTRAMUSCULAR; INTRAVENOUS EVERY 4 HOURS PRN
Status: DISCONTINUED | OUTPATIENT
Start: 2017-05-13 | End: 2017-05-16 | Stop reason: HOSPADM

## 2017-05-13 RX ORDER — METHYLPREDNISOLONE SODIUM SUCCINATE 125 MG/2ML
60 INJECTION, POWDER, LYOPHILIZED, FOR SOLUTION INTRAMUSCULAR; INTRAVENOUS ONCE
Status: COMPLETED | OUTPATIENT
Start: 2017-05-13 | End: 2017-05-13

## 2017-05-13 RX ORDER — LEVETIRACETAM 500 MG/1
1000 TABLET ORAL 2 TIMES DAILY
COMMUNITY
End: 2019-05-05 | Stop reason: DRUGHIGH

## 2017-05-13 RX ORDER — GABAPENTIN 400 MG/1
800 CAPSULE ORAL EVERY 8 HOURS SCHEDULED
Status: DISCONTINUED | OUTPATIENT
Start: 2017-05-13 | End: 2017-05-16 | Stop reason: HOSPADM

## 2017-05-13 RX ORDER — ALPRAZOLAM 0.5 MG/1
0.5 TABLET ORAL 2 TIMES DAILY PRN
Status: DISCONTINUED | OUTPATIENT
Start: 2017-05-13 | End: 2017-05-16 | Stop reason: HOSPADM

## 2017-05-13 RX ORDER — SODIUM CHLORIDE, SODIUM LACTATE, POTASSIUM CHLORIDE, CALCIUM CHLORIDE 600; 310; 30; 20 MG/100ML; MG/100ML; MG/100ML; MG/100ML
100 INJECTION, SOLUTION INTRAVENOUS CONTINUOUS
Status: DISPENSED | OUTPATIENT
Start: 2017-05-13 | End: 2017-05-13

## 2017-05-13 RX ORDER — GABAPENTIN 400 MG/1
800 CAPSULE ORAL EVERY 8 HOURS SCHEDULED
Status: CANCELLED | OUTPATIENT
Start: 2017-05-13

## 2017-05-13 RX ORDER — LEVETIRACETAM 500 MG/1
1000 TABLET ORAL 2 TIMES DAILY
Status: CANCELLED | OUTPATIENT
Start: 2017-05-13

## 2017-05-13 RX ORDER — OXYCODONE AND ACETAMINOPHEN 10; 325 MG/1; MG/1
1 TABLET ORAL 3 TIMES DAILY
Status: DISCONTINUED | OUTPATIENT
Start: 2017-05-13 | End: 2017-05-16 | Stop reason: HOSPADM

## 2017-05-13 RX ORDER — LEVETIRACETAM 500 MG/1
1000 TABLET ORAL 2 TIMES DAILY
Status: DISCONTINUED | OUTPATIENT
Start: 2017-05-13 | End: 2017-05-16 | Stop reason: HOSPADM

## 2017-05-13 RX ORDER — PREGABALIN 100 MG/1
300 CAPSULE ORAL 2 TIMES DAILY
Status: DISCONTINUED | OUTPATIENT
Start: 2017-05-13 | End: 2017-05-16 | Stop reason: HOSPADM

## 2017-05-13 RX ORDER — METOCLOPRAMIDE HYDROCHLORIDE 5 MG/ML
10 INJECTION INTRAMUSCULAR; INTRAVENOUS ONCE
Status: COMPLETED | OUTPATIENT
Start: 2017-05-13 | End: 2017-05-13

## 2017-05-13 RX ORDER — OXYCODONE AND ACETAMINOPHEN 10; 325 MG/1; MG/1
1 TABLET ORAL 3 TIMES DAILY
Status: CANCELLED | OUTPATIENT
Start: 2017-05-13

## 2017-05-13 RX ORDER — ALPRAZOLAM 0.5 MG/1
0.5 TABLET ORAL 3 TIMES DAILY PRN
Status: CANCELLED | OUTPATIENT
Start: 2017-05-13

## 2017-05-13 RX ORDER — ALPRAZOLAM 0.5 MG/1
0.5 TABLET ORAL 2 TIMES DAILY PRN
Status: CANCELLED | OUTPATIENT
Start: 2017-05-13

## 2017-05-13 RX ORDER — SODIUM CHLORIDE 0.9 % (FLUSH) 0.9 %
10 SYRINGE (ML) INJECTION AS NEEDED
Status: DISCONTINUED | OUTPATIENT
Start: 2017-05-13 | End: 2017-05-16 | Stop reason: HOSPADM

## 2017-05-13 RX ORDER — OXYCODONE AND ACETAMINOPHEN 10; 325 MG/1; MG/1
1 TABLET ORAL 3 TIMES DAILY
COMMUNITY
End: 2019-05-05

## 2017-05-13 RX ORDER — PROMETHAZINE HYDROCHLORIDE 25 MG/1
25 TABLET ORAL EVERY 6 HOURS PRN
Status: DISCONTINUED | OUTPATIENT
Start: 2017-05-13 | End: 2017-05-16 | Stop reason: HOSPADM

## 2017-05-13 RX ORDER — MORPHINE SULFATE 15 MG/1
15 TABLET, FILM COATED, EXTENDED RELEASE ORAL 2 TIMES DAILY
Status: CANCELLED | OUTPATIENT
Start: 2017-05-13

## 2017-05-13 RX ADMIN — MORPHINE SULFATE 2 MG: 2 INJECTION, SOLUTION INTRAMUSCULAR; INTRAVENOUS at 18:37

## 2017-05-13 RX ADMIN — PIPERACILLIN SODIUM,TAZOBACTAM SODIUM 3.38 G: 3; .375 INJECTION, POWDER, FOR SOLUTION INTRAVENOUS at 09:23

## 2017-05-13 RX ADMIN — OXYCODONE HYDROCHLORIDE AND ACETAMINOPHEN 1 TABLET: 10; 325 TABLET ORAL at 16:58

## 2017-05-13 RX ADMIN — GABAPENTIN 800 MG: 400 CAPSULE ORAL at 20:36

## 2017-05-13 RX ADMIN — METHYLPREDNISOLONE SODIUM SUCCINATE 60 MG: 125 INJECTION, POWDER, FOR SOLUTION INTRAMUSCULAR; INTRAVENOUS at 03:18

## 2017-05-13 RX ADMIN — PREGABALIN 300 MG: 100 CAPSULE ORAL at 12:16

## 2017-05-13 RX ADMIN — ALPRAZOLAM 0.5 MG: 0.5 TABLET ORAL at 22:48

## 2017-05-13 RX ADMIN — SODIUM CHLORIDE, POTASSIUM CHLORIDE, SODIUM LACTATE AND CALCIUM CHLORIDE 100 ML/HR: 600; 310; 30; 20 INJECTION, SOLUTION INTRAVENOUS at 05:40

## 2017-05-13 RX ADMIN — TAZOBACTAM SODIUM AND PIPERACILLIN SODIUM 4.5 G: .5; 4 INJECTION, POWDER, LYOPHILIZED, FOR SOLUTION INTRAVENOUS at 03:35

## 2017-05-13 RX ADMIN — MEROPENEM 1 G: 1 INJECTION, POWDER, FOR SOLUTION INTRAVENOUS at 05:41

## 2017-05-13 RX ADMIN — METOCLOPRAMIDE 10 MG: 5 INJECTION, SOLUTION INTRAMUSCULAR; INTRAVENOUS at 03:28

## 2017-05-13 RX ADMIN — OXYCODONE HYDROCHLORIDE AND ACETAMINOPHEN 1 TABLET: 10; 325 TABLET ORAL at 20:36

## 2017-05-13 RX ADMIN — PREGABALIN 300 MG: 100 CAPSULE ORAL at 17:03

## 2017-05-13 RX ADMIN — MEROPENEM 1 G: 1 INJECTION, POWDER, FOR SOLUTION INTRAVENOUS at 14:21

## 2017-05-13 RX ADMIN — PROMETHAZINE HYDROCHLORIDE 25 MG: 25 TABLET ORAL at 22:43

## 2017-05-13 RX ADMIN — OXYCODONE HYDROCHLORIDE AND ACETAMINOPHEN 1 TABLET: 10; 325 TABLET ORAL at 12:16

## 2017-05-13 RX ADMIN — LEVETIRACETAM 1000 MG: 500 TABLET, FILM COATED ORAL at 17:03

## 2017-05-13 RX ADMIN — PIPERACILLIN SODIUM,TAZOBACTAM SODIUM 3.38 G: 3; .375 INJECTION, POWDER, FOR SOLUTION INTRAVENOUS at 20:37

## 2017-05-13 RX ADMIN — MORPHINE SULFATE 15 MG: 15 TABLET, EXTENDED RELEASE ORAL at 12:16

## 2017-05-13 RX ADMIN — MORPHINE SULFATE 2 MG: 2 INJECTION, SOLUTION INTRAMUSCULAR; INTRAVENOUS at 14:21

## 2017-05-13 RX ADMIN — SODIUM CHLORIDE, POTASSIUM CHLORIDE, SODIUM LACTATE AND CALCIUM CHLORIDE 80 ML/HR: 600; 310; 30; 20 INJECTION, SOLUTION INTRAVENOUS at 17:00

## 2017-05-13 RX ADMIN — PIPERACILLIN SODIUM,TAZOBACTAM SODIUM 3.38 G: 3; .375 INJECTION, POWDER, FOR SOLUTION INTRAVENOUS at 16:58

## 2017-05-13 RX ADMIN — MORPHINE SULFATE 15 MG: 15 TABLET, EXTENDED RELEASE ORAL at 17:03

## 2017-05-13 RX ADMIN — LEVETIRACETAM 1000 MG: 500 TABLET, FILM COATED ORAL at 12:15

## 2017-05-13 RX ADMIN — ALPRAZOLAM 0.5 MG: 0.5 TABLET ORAL at 10:57

## 2017-05-13 RX ADMIN — MORPHINE SULFATE 2 MG: 2 INJECTION, SOLUTION INTRAMUSCULAR; INTRAVENOUS at 09:23

## 2017-05-13 RX ADMIN — MEROPENEM 1 G: 1 INJECTION, POWDER, FOR SOLUTION INTRAVENOUS at 20:37

## 2017-05-13 RX ADMIN — GABAPENTIN 800 MG: 400 CAPSULE ORAL at 14:21

## 2017-05-14 PROCEDURE — 25010000002 MORPHINE PER 10 MG: Performed by: PODIATRIST

## 2017-05-14 PROCEDURE — 86140 C-REACTIVE PROTEIN: CPT | Performed by: PODIATRIST

## 2017-05-14 PROCEDURE — 25010000002 MEROPENEM

## 2017-05-14 PROCEDURE — 85025 COMPLETE CBC W/AUTO DIFF WBC: CPT | Performed by: PODIATRIST

## 2017-05-14 PROCEDURE — 25010000002 PIPERACILLIN-TAZOBACTAM

## 2017-05-14 RX ORDER — SODIUM HYPOCHLORITE 1.25 MG/ML
SOLUTION TOPICAL 3 TIMES DAILY
Status: DISCONTINUED | OUTPATIENT
Start: 2017-05-14 | End: 2017-05-16 | Stop reason: HOSPADM

## 2017-05-14 RX ADMIN — SODIUM CHLORIDE, POTASSIUM CHLORIDE, SODIUM LACTATE AND CALCIUM CHLORIDE 80 ML/HR: 600; 310; 30; 20 INJECTION, SOLUTION INTRAVENOUS at 17:35

## 2017-05-14 RX ADMIN — MEROPENEM 1 G: 1 INJECTION, POWDER, FOR SOLUTION INTRAVENOUS at 15:15

## 2017-05-14 RX ADMIN — MORPHINE SULFATE 15 MG: 15 TABLET, EXTENDED RELEASE ORAL at 09:17

## 2017-05-14 RX ADMIN — MEROPENEM 1 G: 1 INJECTION, POWDER, FOR SOLUTION INTRAVENOUS at 06:32

## 2017-05-14 RX ADMIN — PIPERACILLIN SODIUM,TAZOBACTAM SODIUM 3.38 G: 3; .375 INJECTION, POWDER, FOR SOLUTION INTRAVENOUS at 03:29

## 2017-05-14 RX ADMIN — GABAPENTIN 800 MG: 400 CAPSULE ORAL at 05:16

## 2017-05-14 RX ADMIN — MORPHINE SULFATE 15 MG: 15 TABLET, EXTENDED RELEASE ORAL at 17:35

## 2017-05-14 RX ADMIN — PREGABALIN 300 MG: 100 CAPSULE ORAL at 17:34

## 2017-05-14 RX ADMIN — OXYCODONE HYDROCHLORIDE AND ACETAMINOPHEN 1 TABLET: 10; 325 TABLET ORAL at 16:45

## 2017-05-14 RX ADMIN — PIPERACILLIN SODIUM,TAZOBACTAM SODIUM 3.38 G: 3; .375 INJECTION, POWDER, FOR SOLUTION INTRAVENOUS at 16:45

## 2017-05-14 RX ADMIN — ALPRAZOLAM 0.5 MG: 0.5 TABLET ORAL at 11:26

## 2017-05-14 RX ADMIN — MORPHINE SULFATE 2 MG: 2 INJECTION, SOLUTION INTRAMUSCULAR; INTRAVENOUS at 18:36

## 2017-05-14 RX ADMIN — MORPHINE SULFATE 2 MG: 2 INJECTION, SOLUTION INTRAMUSCULAR; INTRAVENOUS at 12:49

## 2017-05-14 RX ADMIN — PIPERACILLIN SODIUM,TAZOBACTAM SODIUM 3.38 G: 3; .375 INJECTION, POWDER, FOR SOLUTION INTRAVENOUS at 20:25

## 2017-05-14 RX ADMIN — PREGABALIN 300 MG: 100 CAPSULE ORAL at 09:17

## 2017-05-14 RX ADMIN — LEVETIRACETAM 1000 MG: 500 TABLET, FILM COATED ORAL at 09:18

## 2017-05-14 RX ADMIN — PIPERACILLIN SODIUM,TAZOBACTAM SODIUM 3.38 G: 3; .375 INJECTION, POWDER, FOR SOLUTION INTRAVENOUS at 09:17

## 2017-05-14 RX ADMIN — OXYCODONE HYDROCHLORIDE AND ACETAMINOPHEN 1 TABLET: 10; 325 TABLET ORAL at 20:24

## 2017-05-14 RX ADMIN — GABAPENTIN 800 MG: 400 CAPSULE ORAL at 20:25

## 2017-05-14 RX ADMIN — LEVETIRACETAM 1000 MG: 500 TABLET, FILM COATED ORAL at 17:44

## 2017-05-14 RX ADMIN — MORPHINE SULFATE 2 MG: 2 INJECTION, SOLUTION INTRAMUSCULAR; INTRAVENOUS at 05:51

## 2017-05-14 RX ADMIN — SODIUM CHLORIDE, POTASSIUM CHLORIDE, SODIUM LACTATE AND CALCIUM CHLORIDE 80 ML/HR: 600; 310; 30; 20 INJECTION, SOLUTION INTRAVENOUS at 05:16

## 2017-05-14 RX ADMIN — MEROPENEM 1 G: 1 INJECTION, POWDER, FOR SOLUTION INTRAVENOUS at 20:24

## 2017-05-14 RX ADMIN — OXYCODONE HYDROCHLORIDE AND ACETAMINOPHEN 1 TABLET: 10; 325 TABLET ORAL at 09:17

## 2017-05-14 RX ADMIN — MORPHINE SULFATE 2 MG: 2 INJECTION, SOLUTION INTRAMUSCULAR; INTRAVENOUS at 22:39

## 2017-05-14 RX ADMIN — GABAPENTIN 800 MG: 400 CAPSULE ORAL at 15:15

## 2017-05-14 RX ADMIN — MORPHINE SULFATE 2 MG: 2 INJECTION, SOLUTION INTRAMUSCULAR; INTRAVENOUS at 00:53

## 2017-05-15 LAB
BASOPHILS # BLD AUTO: 0.04 10*3/MM3 (ref 0–0.3)
BASOPHILS NFR BLD AUTO: 0.5 % (ref 0–2)
CRP SERPL-MCNC: <0.5 MG/DL (ref 0–0.99)
DEPRECATED RDW RBC AUTO: 45.2 FL (ref 37–54)
EOSINOPHIL # BLD AUTO: 0.22 10*3/MM3 (ref 0–0.7)
EOSINOPHIL NFR BLD AUTO: 2.7 % (ref 0–5)
ERYTHROCYTE [DISTWIDTH] IN BLOOD BY AUTOMATED COUNT: 13.2 % (ref 11.5–14.5)
HCT VFR BLD AUTO: 35.2 % (ref 37–47)
HGB BLD-MCNC: 10.9 G/DL (ref 12–16)
IMM GRANULOCYTES # BLD: 0.01 10*3/MM3 (ref 0–0.03)
IMM GRANULOCYTES NFR BLD: 0.1 % (ref 0–0.5)
LYMPHOCYTES # BLD AUTO: 2.46 10*3/MM3 (ref 1–3)
LYMPHOCYTES NFR BLD AUTO: 30.3 % (ref 21–51)
MCH RBC QN AUTO: 30.7 PG (ref 27–33)
MCHC RBC AUTO-ENTMCNC: 31 G/DL (ref 33–37)
MCV RBC AUTO: 99.2 FL (ref 80–94)
MONOCYTES # BLD AUTO: 0.74 10*3/MM3 (ref 0.1–0.9)
MONOCYTES NFR BLD AUTO: 9.1 % (ref 0–10)
NEUTROPHILS # BLD AUTO: 4.64 10*3/MM3 (ref 1.4–6.5)
NEUTROPHILS NFR BLD AUTO: 57.3 % (ref 30–70)
PLATELET # BLD AUTO: 248 10*3/MM3 (ref 130–400)
PMV BLD AUTO: 9.7 FL (ref 6–10)
RBC # BLD AUTO: 3.55 10*6/MM3 (ref 4.2–5.4)
WBC NRBC COR # BLD: 8.11 10*3/MM3 (ref 4.5–12.5)

## 2017-05-15 PROCEDURE — 87205 SMEAR GRAM STAIN: CPT | Performed by: PHYSICIAN ASSISTANT

## 2017-05-15 PROCEDURE — 25010000002 MEROPENEM

## 2017-05-15 PROCEDURE — 25010000002 VANCOMYCIN PER 500 MG

## 2017-05-15 PROCEDURE — 87070 CULTURE OTHR SPECIMN AEROBIC: CPT | Performed by: PHYSICIAN ASSISTANT

## 2017-05-15 PROCEDURE — 25010000002 MORPHINE PER 10 MG: Performed by: PODIATRIST

## 2017-05-15 PROCEDURE — 25010000002 PIPERACILLIN-TAZOBACTAM

## 2017-05-15 RX ORDER — SODIUM HYPOCHLORITE 1.25 MG/ML
SOLUTION TOPICAL
Qty: 1 BOTTLE | Refills: 3 | Status: SHIPPED | OUTPATIENT
Start: 2017-05-15 | End: 2019-05-05

## 2017-05-15 RX ADMIN — MORPHINE SULFATE 2 MG: 2 INJECTION, SOLUTION INTRAMUSCULAR; INTRAVENOUS at 03:17

## 2017-05-15 RX ADMIN — ALPRAZOLAM 0.5 MG: 0.5 TABLET ORAL at 00:07

## 2017-05-15 RX ADMIN — SODIUM HYPOCHLORITE 1 ML: 1.25 SOLUTION TOPICAL at 08:09

## 2017-05-15 RX ADMIN — PREGABALIN 300 MG: 100 CAPSULE ORAL at 08:10

## 2017-05-15 RX ADMIN — SODIUM HYPOCHLORITE: 1.25 SOLUTION TOPICAL at 04:46

## 2017-05-15 RX ADMIN — LEVETIRACETAM 1000 MG: 500 TABLET, FILM COATED ORAL at 17:47

## 2017-05-15 RX ADMIN — GABAPENTIN 800 MG: 400 CAPSULE ORAL at 04:46

## 2017-05-15 RX ADMIN — GABAPENTIN 800 MG: 400 CAPSULE ORAL at 13:43

## 2017-05-15 RX ADMIN — PIPERACILLIN SODIUM,TAZOBACTAM SODIUM 3.38 G: 3; .375 INJECTION, POWDER, FOR SOLUTION INTRAVENOUS at 03:17

## 2017-05-15 RX ADMIN — ALPRAZOLAM 0.5 MG: 0.5 TABLET ORAL at 13:43

## 2017-05-15 RX ADMIN — MEROPENEM 1 G: 1 INJECTION, POWDER, FOR SOLUTION INTRAVENOUS at 04:46

## 2017-05-15 RX ADMIN — PREGABALIN 300 MG: 100 CAPSULE ORAL at 17:47

## 2017-05-15 RX ADMIN — MEROPENEM 1 G: 1 INJECTION, POWDER, FOR SOLUTION INTRAVENOUS at 13:43

## 2017-05-15 RX ADMIN — OXYCODONE HYDROCHLORIDE AND ACETAMINOPHEN 1 TABLET: 10; 325 TABLET ORAL at 15:32

## 2017-05-15 RX ADMIN — MEROPENEM 1 G: 1 INJECTION, POWDER, FOR SOLUTION INTRAVENOUS at 21:50

## 2017-05-15 RX ADMIN — OXYCODONE HYDROCHLORIDE AND ACETAMINOPHEN 1 TABLET: 10; 325 TABLET ORAL at 21:50

## 2017-05-15 RX ADMIN — GABAPENTIN 800 MG: 400 CAPSULE ORAL at 21:50

## 2017-05-15 RX ADMIN — PIPERACILLIN SODIUM,TAZOBACTAM SODIUM 3.38 G: 3; .375 INJECTION, POWDER, FOR SOLUTION INTRAVENOUS at 09:32

## 2017-05-15 RX ADMIN — MORPHINE SULFATE 2 MG: 2 INJECTION, SOLUTION INTRAMUSCULAR; INTRAVENOUS at 11:49

## 2017-05-15 RX ADMIN — MORPHINE SULFATE 15 MG: 15 TABLET, EXTENDED RELEASE ORAL at 17:47

## 2017-05-15 RX ADMIN — MORPHINE SULFATE 15 MG: 15 TABLET, EXTENDED RELEASE ORAL at 08:10

## 2017-05-15 RX ADMIN — SODIUM CHLORIDE, POTASSIUM CHLORIDE, SODIUM LACTATE AND CALCIUM CHLORIDE 80 ML/HR: 600; 310; 30; 20 INJECTION, SOLUTION INTRAVENOUS at 08:14

## 2017-05-15 RX ADMIN — VANCOMYCIN HYDROCHLORIDE 1500 MG: 5 INJECTION, POWDER, LYOPHILIZED, FOR SOLUTION INTRAVENOUS at 15:32

## 2017-05-15 RX ADMIN — OXYCODONE HYDROCHLORIDE AND ACETAMINOPHEN 1 TABLET: 10; 325 TABLET ORAL at 09:32

## 2017-05-15 RX ADMIN — MORPHINE SULFATE 2 MG: 2 INJECTION, SOLUTION INTRAMUSCULAR; INTRAVENOUS at 16:32

## 2017-05-15 RX ADMIN — SODIUM HYPOCHLORITE: 1.25 SOLUTION TOPICAL at 21:50

## 2017-05-15 RX ADMIN — SODIUM HYPOCHLORITE 1 ML: 1.25 SOLUTION TOPICAL at 17:47

## 2017-05-15 RX ADMIN — LEVETIRACETAM 1000 MG: 500 TABLET, FILM COATED ORAL at 08:10

## 2017-05-16 VITALS
HEART RATE: 74 BPM | OXYGEN SATURATION: 98 % | SYSTOLIC BLOOD PRESSURE: 101 MMHG | DIASTOLIC BLOOD PRESSURE: 64 MMHG | TEMPERATURE: 98.6 F | RESPIRATION RATE: 18 BRPM | WEIGHT: 181.6 LBS | HEIGHT: 67 IN | BODY MASS INDEX: 28.5 KG/M2

## 2017-05-16 LAB
ACETONE SERPL-MCNC: NEGATIVE % (ref 0–0.01)
BASOPHILS # BLD AUTO: 0.03 10*3/MM3 (ref 0–0.3)
BASOPHILS NFR BLD AUTO: 0.5 % (ref 0–2)
BUTALBITAL SERPL-MCNC: NORMAL UG/ML (ref 1–10)
CHLORDIAZEP SERPL-MCNC: NORMAL UG/ML (ref 0.1–0.9)
CRP SERPL-MCNC: <0.5 MG/DL (ref 0–0.99)
DEPRECATED RDW RBC AUTO: 43.1 FL (ref 37–54)
DIAZEPAM SERPL-MCNC: NORMAL UG/ML (ref 0.1–0.9)
EOSINOPHIL # BLD AUTO: 0.36 10*3/MM3 (ref 0–0.7)
EOSINOPHIL NFR BLD AUTO: 6.3 % (ref 0–5)
ERYTHROCYTE [DISTWIDTH] IN BLOOD BY AUTOMATED COUNT: 12.7 % (ref 11.5–14.5)
ETHANOL SPEC-SCNC: NEGATIVE % (ref 0–0.01)
HCT VFR BLD AUTO: 34.1 % (ref 37–47)
HGB BLD-MCNC: 10.6 G/DL (ref 12–16)
IMM GRANULOCYTES # BLD: 0.01 10*3/MM3 (ref 0–0.03)
IMM GRANULOCYTES NFR BLD: 0.2 % (ref 0–0.5)
ISOPROPANOL SERPL-MCNC: NEGATIVE % (ref 0–0.01)
LYMPHOCYTES # BLD AUTO: 1.53 10*3/MM3 (ref 1–3)
LYMPHOCYTES NFR BLD AUTO: 26.7 % (ref 21–51)
Lab: NORMAL
MCH RBC QN AUTO: 30.4 PG (ref 27–33)
MCHC RBC AUTO-ENTMCNC: 31.1 G/DL (ref 33–37)
MCV RBC AUTO: 97.7 FL (ref 80–94)
METHANOL SERPL-MCNC: NEGATIVE % (ref 0–0.01)
MONOCYTES # BLD AUTO: 0.64 10*3/MM3 (ref 0.1–0.9)
MONOCYTES NFR BLD AUTO: 11.1 % (ref 0–10)
NEUTROPHILS # BLD AUTO: 3.17 10*3/MM3 (ref 1.4–6.5)
NEUTROPHILS NFR BLD AUTO: 55.2 % (ref 30–70)
NORCHLORDIAZEP SERPL-MCNC: NORMAL UG/ML (ref 0.1–0.6)
NORDIAZEPAM SERPL-MCNC: NORMAL UG/ML (ref 0.1–1.4)
PENTOBARB SERPL-MCNC: NORMAL UG/ML (ref 1–5)
PHENOBARB SERPL-MCNC: NORMAL UG/ML (ref 15–40)
PLATELET # BLD AUTO: 228 10*3/MM3 (ref 130–400)
PMV BLD AUTO: 9.9 FL (ref 6–10)
RBC # BLD AUTO: 3.49 10*6/MM3 (ref 4.2–5.4)
SALICYLATES SERPL-MCNC: NORMAL UG/ML (ref 30–250)
WBC NRBC COR # BLD: 5.74 10*3/MM3 (ref 4.5–12.5)

## 2017-05-16 PROCEDURE — C1751 CATH, INF, PER/CENT/MIDLINE: HCPCS

## 2017-05-16 PROCEDURE — 25010000002 MORPHINE PER 10 MG: Performed by: PODIATRIST

## 2017-05-16 PROCEDURE — 85025 COMPLETE CBC W/AUTO DIFF WBC: CPT | Performed by: PHYSICIAN ASSISTANT

## 2017-05-16 PROCEDURE — 25010000002 MEROPENEM

## 2017-05-16 PROCEDURE — 25010000002 VANCOMYCIN PER 500 MG

## 2017-05-16 PROCEDURE — 86140 C-REACTIVE PROTEIN: CPT | Performed by: PHYSICIAN ASSISTANT

## 2017-05-16 PROCEDURE — 05HY33Z INSERTION OF INFUSION DEVICE INTO UPPER VEIN, PERCUTANEOUS APPROACH: ICD-10-PCS | Performed by: PODIATRIST

## 2017-05-16 RX ORDER — SODIUM CHLORIDE 0.9 % (FLUSH) 0.9 %
10 SYRINGE (ML) INJECTION AS NEEDED
Status: DISCONTINUED | OUTPATIENT
Start: 2017-05-16 | End: 2017-05-16 | Stop reason: HOSPADM

## 2017-05-16 RX ORDER — SODIUM CHLORIDE 0.9 % (FLUSH) 0.9 %
10 SYRINGE (ML) INJECTION EVERY 12 HOURS SCHEDULED
Status: DISCONTINUED | OUTPATIENT
Start: 2017-05-16 | End: 2017-05-16 | Stop reason: HOSPADM

## 2017-05-16 RX ADMIN — GABAPENTIN 800 MG: 400 CAPSULE ORAL at 13:22

## 2017-05-16 RX ADMIN — ALPRAZOLAM 0.5 MG: 0.5 TABLET ORAL at 02:36

## 2017-05-16 RX ADMIN — SODIUM HYPOCHLORITE: 1.25 SOLUTION TOPICAL at 16:22

## 2017-05-16 RX ADMIN — MORPHINE SULFATE 15 MG: 15 TABLET, EXTENDED RELEASE ORAL at 08:43

## 2017-05-16 RX ADMIN — SODIUM HYPOCHLORITE: 1.25 SOLUTION TOPICAL at 08:43

## 2017-05-16 RX ADMIN — MORPHINE SULFATE 2 MG: 2 INJECTION, SOLUTION INTRAMUSCULAR; INTRAVENOUS at 00:36

## 2017-05-16 RX ADMIN — GABAPENTIN 800 MG: 400 CAPSULE ORAL at 05:39

## 2017-05-16 RX ADMIN — Medication 10 ML: at 13:37

## 2017-05-16 RX ADMIN — OXYCODONE HYDROCHLORIDE AND ACETAMINOPHEN 1 TABLET: 10; 325 TABLET ORAL at 08:44

## 2017-05-16 RX ADMIN — OXYCODONE HYDROCHLORIDE AND ACETAMINOPHEN 1 TABLET: 10; 325 TABLET ORAL at 16:22

## 2017-05-16 RX ADMIN — MEROPENEM 1 G: 1 INJECTION, POWDER, FOR SOLUTION INTRAVENOUS at 05:39

## 2017-05-16 RX ADMIN — SODIUM CHLORIDE, POTASSIUM CHLORIDE, SODIUM LACTATE AND CALCIUM CHLORIDE 80 ML/HR: 600; 310; 30; 20 INJECTION, SOLUTION INTRAVENOUS at 02:31

## 2017-05-16 RX ADMIN — MORPHINE SULFATE 2 MG: 2 INJECTION, SOLUTION INTRAMUSCULAR; INTRAVENOUS at 05:39

## 2017-05-16 RX ADMIN — VANCOMYCIN HYDROCHLORIDE 1500 MG: 5 INJECTION, POWDER, LYOPHILIZED, FOR SOLUTION INTRAVENOUS at 14:36

## 2017-05-16 RX ADMIN — LEVETIRACETAM 1000 MG: 500 TABLET, FILM COATED ORAL at 08:43

## 2017-05-16 RX ADMIN — ALPRAZOLAM 0.5 MG: 0.5 TABLET ORAL at 14:36

## 2017-05-16 RX ADMIN — MORPHINE SULFATE 2 MG: 2 INJECTION, SOLUTION INTRAMUSCULAR; INTRAVENOUS at 13:37

## 2017-05-16 RX ADMIN — PREGABALIN 300 MG: 100 CAPSULE ORAL at 08:43

## 2017-05-16 RX ADMIN — VANCOMYCIN HYDROCHLORIDE 1500 MG: 5 INJECTION, POWDER, LYOPHILIZED, FOR SOLUTION INTRAVENOUS at 02:32

## 2017-05-17 ENCOUNTER — HOSPITAL ENCOUNTER (OUTPATIENT)
Dept: HOSPITAL 79 - OPSV | Age: 42
End: 2017-05-17
Attending: PODIATRIST
Payer: MEDICARE

## 2017-05-17 VITALS — HEIGHT: 66 IN | WEIGHT: 198 LBS | BODY MASS INDEX: 31.82 KG/M2

## 2017-05-17 DIAGNOSIS — M86.8X7: Primary | ICD-10-CM

## 2017-05-18 ENCOUNTER — HOSPITAL ENCOUNTER (OUTPATIENT)
Dept: HOSPITAL 79 - OPSV | Age: 42
End: 2017-05-18
Attending: PODIATRIST
Payer: MEDICARE

## 2017-05-18 DIAGNOSIS — M86.9: Primary | ICD-10-CM

## 2017-05-18 LAB
BACTERIA SPEC AEROBE CULT: NO GROWTH
BACTERIA SPEC AEROBE CULT: NORMAL
GRAM STN SPEC: NORMAL

## 2017-05-19 ENCOUNTER — HOSPITAL ENCOUNTER (OUTPATIENT)
Dept: HOSPITAL 79 - OPSV | Age: 42
End: 2017-05-19
Attending: PODIATRIST
Payer: MEDICARE

## 2017-05-19 VITALS — HEIGHT: 66 IN | BODY MASS INDEX: 30.53 KG/M2 | WEIGHT: 190 LBS

## 2017-05-19 DIAGNOSIS — M86.8X7: ICD-10-CM

## 2017-05-19 DIAGNOSIS — Z48.00: Primary | ICD-10-CM

## 2017-05-20 ENCOUNTER — HOSPITAL ENCOUNTER (OUTPATIENT)
Dept: HOSPITAL 79 - OPSV | Age: 42
End: 2017-05-20
Attending: PODIATRIST
Payer: MEDICARE

## 2017-05-20 VITALS — BODY MASS INDEX: 31.82 KG/M2 | WEIGHT: 198 LBS | HEIGHT: 66 IN

## 2017-05-20 DIAGNOSIS — M86.8X7: Primary | ICD-10-CM

## 2017-05-22 ENCOUNTER — HOSPITAL ENCOUNTER (OUTPATIENT)
Dept: HOSPITAL 79 - OPSV | Age: 42
End: 2017-05-22
Attending: PODIATRIST
Payer: MEDICARE

## 2017-05-22 VITALS — BODY MASS INDEX: 31.82 KG/M2 | HEIGHT: 66 IN | WEIGHT: 198 LBS

## 2017-05-22 DIAGNOSIS — M86.8X7: Primary | ICD-10-CM

## 2017-05-22 LAB — BUN/CREATININE RATIO: 11 (ref 0–10)

## 2017-05-23 ENCOUNTER — HOSPITAL ENCOUNTER (OUTPATIENT)
Dept: HOSPITAL 79 - OPSV | Age: 42
End: 2017-05-23
Attending: PODIATRIST
Payer: MEDICARE

## 2017-05-23 VITALS — HEIGHT: 66 IN | WEIGHT: 198 LBS | BODY MASS INDEX: 31.82 KG/M2

## 2017-05-23 DIAGNOSIS — M86.9: Primary | ICD-10-CM

## 2017-05-24 ENCOUNTER — HOSPITAL ENCOUNTER (OUTPATIENT)
Dept: HOSPITAL 79 - OPSV | Age: 42
End: 2017-05-24
Attending: PODIATRIST
Payer: MEDICARE

## 2017-05-24 VITALS — BODY MASS INDEX: 31.82 KG/M2 | HEIGHT: 66 IN | WEIGHT: 198 LBS

## 2017-05-24 DIAGNOSIS — M86.9: Primary | ICD-10-CM

## 2017-05-25 ENCOUNTER — HOSPITAL ENCOUNTER (OUTPATIENT)
Dept: HOSPITAL 79 - OPSV | Age: 42
End: 2017-05-25
Attending: PODIATRIST
Payer: MEDICARE

## 2017-05-25 VITALS — HEIGHT: 66 IN | BODY MASS INDEX: 30.53 KG/M2 | WEIGHT: 190 LBS

## 2017-05-25 DIAGNOSIS — M86.9: Primary | ICD-10-CM

## 2017-05-26 ENCOUNTER — HOSPITAL ENCOUNTER (OUTPATIENT)
Dept: HOSPITAL 79 - OPSV | Age: 42
End: 2017-05-26
Attending: PODIATRIST
Payer: MEDICARE

## 2017-05-26 VITALS — HEIGHT: 66 IN | BODY MASS INDEX: 31.82 KG/M2 | WEIGHT: 198 LBS

## 2017-05-26 DIAGNOSIS — M86.8X7: Primary | ICD-10-CM

## 2017-05-28 ENCOUNTER — HOSPITAL ENCOUNTER (OUTPATIENT)
Dept: HOSPITAL 79 - OPSV | Age: 42
End: 2017-05-28
Attending: PODIATRIST
Payer: MEDICARE

## 2017-05-28 VITALS — WEIGHT: 198 LBS | HEIGHT: 66 IN | BODY MASS INDEX: 31.82 KG/M2

## 2017-05-28 DIAGNOSIS — M86.9: Primary | ICD-10-CM

## 2017-05-29 ENCOUNTER — HOSPITAL ENCOUNTER (OUTPATIENT)
Dept: HOSPITAL 79 - OPSV | Age: 42
End: 2017-05-29
Attending: PODIATRIST
Payer: MEDICARE

## 2017-05-29 VITALS — HEIGHT: 66 IN | BODY MASS INDEX: 31.82 KG/M2 | WEIGHT: 198 LBS

## 2017-05-29 DIAGNOSIS — M86.9: Primary | ICD-10-CM

## 2017-05-29 LAB — BUN/CREATININE RATIO: 11 (ref 0–10)

## 2017-05-31 ENCOUNTER — HOSPITAL ENCOUNTER (OUTPATIENT)
Dept: HOSPITAL 79 - OPSV | Age: 42
End: 2017-05-31
Attending: PODIATRIST
Payer: MEDICARE

## 2017-05-31 DIAGNOSIS — M86.9: Primary | ICD-10-CM

## 2017-06-01 ENCOUNTER — TRANSCRIBE ORDERS (OUTPATIENT)
Dept: ADMINISTRATIVE | Facility: HOSPITAL | Age: 42
End: 2017-06-01

## 2017-06-01 DIAGNOSIS — M54.5 LOW BACK PAIN, UNSPECIFIED BACK PAIN LATERALITY, UNSPECIFIED CHRONICITY, WITH SCIATICA PRESENCE UNSPECIFIED: Primary | ICD-10-CM

## 2017-06-03 ENCOUNTER — HOSPITAL ENCOUNTER (OUTPATIENT)
Dept: HOSPITAL 79 - OPSV | Age: 42
End: 2017-06-03
Attending: PODIATRIST
Payer: MEDICARE

## 2017-06-03 VITALS — BODY MASS INDEX: 31.82 KG/M2 | WEIGHT: 198 LBS | HEIGHT: 66 IN

## 2017-06-03 DIAGNOSIS — M86.8X7: Primary | ICD-10-CM

## 2017-06-05 ENCOUNTER — HOSPITAL ENCOUNTER (OUTPATIENT)
Dept: HOSPITAL 79 - OPSV | Age: 42
End: 2017-06-05
Attending: PODIATRIST
Payer: MEDICARE

## 2017-06-05 VITALS — WEIGHT: 198 LBS | HEIGHT: 66 IN | BODY MASS INDEX: 31.82 KG/M2

## 2017-06-05 DIAGNOSIS — M86.9: Primary | ICD-10-CM

## 2017-06-06 ENCOUNTER — HOSPITAL ENCOUNTER (OUTPATIENT)
Dept: HOSPITAL 79 - OPSV | Age: 42
End: 2017-06-06
Attending: PODIATRIST
Payer: MEDICARE

## 2017-06-06 VITALS — BODY MASS INDEX: 31.82 KG/M2 | HEIGHT: 66 IN | WEIGHT: 198 LBS

## 2017-06-06 DIAGNOSIS — M86.9: Primary | ICD-10-CM

## 2017-06-06 LAB — BUN/CREATININE RATIO: 11 (ref 0–10)

## 2017-06-07 ENCOUNTER — HOSPITAL ENCOUNTER (OUTPATIENT)
Dept: HOSPITAL 79 - OPSV | Age: 42
End: 2017-06-07
Attending: PODIATRIST
Payer: MEDICARE

## 2017-06-07 VITALS — WEIGHT: 198 LBS | HEIGHT: 66 IN | BODY MASS INDEX: 31.82 KG/M2

## 2017-06-07 DIAGNOSIS — M86.9: Primary | ICD-10-CM

## 2017-06-08 ENCOUNTER — HOSPITAL ENCOUNTER (OUTPATIENT)
Dept: HOSPITAL 79 - OPSV | Age: 42
End: 2017-06-08
Attending: PODIATRIST
Payer: MEDICARE

## 2017-06-08 VITALS — WEIGHT: 198 LBS | BODY MASS INDEX: 31.82 KG/M2 | HEIGHT: 66 IN

## 2017-06-08 DIAGNOSIS — M86.9: Primary | ICD-10-CM

## 2017-06-09 ENCOUNTER — HOSPITAL ENCOUNTER (OUTPATIENT)
Dept: HOSPITAL 79 - OPSV | Age: 42
End: 2017-06-09
Attending: PODIATRIST
Payer: MEDICARE

## 2017-06-09 VITALS — BODY MASS INDEX: 31.82 KG/M2 | WEIGHT: 198 LBS | HEIGHT: 66 IN

## 2017-06-09 DIAGNOSIS — Z48.00: Primary | ICD-10-CM

## 2017-06-09 DIAGNOSIS — M86.8X7: ICD-10-CM

## 2017-06-12 ENCOUNTER — HOSPITAL ENCOUNTER (OUTPATIENT)
Dept: HOSPITAL 79 - OPSV | Age: 42
End: 2017-06-12
Attending: PODIATRIST
Payer: MEDICARE

## 2017-06-12 VITALS — HEIGHT: 66 IN | BODY MASS INDEX: 31.82 KG/M2 | WEIGHT: 198 LBS

## 2017-06-12 DIAGNOSIS — M86.8X7: Primary | ICD-10-CM

## 2017-06-13 ENCOUNTER — HOSPITAL ENCOUNTER (OUTPATIENT)
Dept: HOSPITAL 79 - OPSV | Age: 42
End: 2017-06-13
Attending: PODIATRIST
Payer: MEDICARE

## 2017-06-13 VITALS — WEIGHT: 198 LBS | BODY MASS INDEX: 31.82 KG/M2 | HEIGHT: 66 IN

## 2017-06-13 DIAGNOSIS — M86.9: Primary | ICD-10-CM

## 2017-06-15 ENCOUNTER — HOSPITAL ENCOUNTER (OUTPATIENT)
Dept: HOSPITAL 79 - OPSV | Age: 42
End: 2017-06-15
Attending: PODIATRIST
Payer: MEDICARE

## 2017-06-15 VITALS — WEIGHT: 198 LBS | HEIGHT: 66 IN | BODY MASS INDEX: 31.82 KG/M2

## 2017-06-15 DIAGNOSIS — M86.9: Primary | ICD-10-CM

## 2017-06-16 ENCOUNTER — HOSPITAL ENCOUNTER (OUTPATIENT)
Dept: HOSPITAL 79 - OPSV | Age: 42
End: 2017-06-16
Attending: PODIATRIST
Payer: MEDICARE

## 2017-06-16 VITALS — WEIGHT: 198 LBS | HEIGHT: 66 IN | BODY MASS INDEX: 31.82 KG/M2

## 2017-06-16 DIAGNOSIS — M86.9: Primary | ICD-10-CM

## 2017-06-19 ENCOUNTER — HOSPITAL ENCOUNTER (OUTPATIENT)
Dept: HOSPITAL 79 - OPSV | Age: 42
End: 2017-06-19
Attending: PODIATRIST
Payer: MEDICARE

## 2017-06-19 VITALS — HEIGHT: 66 IN | BODY MASS INDEX: 31.82 KG/M2 | WEIGHT: 198 LBS

## 2017-06-19 DIAGNOSIS — Z48.00: Primary | ICD-10-CM

## 2017-06-19 DIAGNOSIS — M86.8X7: ICD-10-CM

## 2017-07-06 ENCOUNTER — HOSPITAL ENCOUNTER (OUTPATIENT)
Facility: HOSPITAL | Age: 42
Discharge: LEFT BEFORE DISCHARGE | End: 2017-07-14
Attending: INTERNAL MEDICINE | Admitting: INTERNAL MEDICINE

## 2017-07-06 LAB
ALBUMIN SERPL-MCNC: 3.5 G/DL (ref 3.5–5)
ALBUMIN/GLOB SERPL: 1 G/DL (ref 1.5–2.5)
ALP SERPL-CCNC: 92 U/L (ref 35–104)
ALT SERPL W P-5'-P-CCNC: 91 U/L (ref 10–36)
ANION GAP SERPL CALCULATED.3IONS-SCNC: 2.2 MMOL/L (ref 3.6–11.2)
AST SERPL-CCNC: 105 U/L (ref 10–30)
BASOPHILS # BLD AUTO: 0.02 10*3/MM3 (ref 0–0.3)
BASOPHILS NFR BLD AUTO: 0.4 % (ref 0–2)
BILIRUB SERPL-MCNC: 0.3 MG/DL (ref 0.2–1.8)
BUN BLD-MCNC: 14 MG/DL (ref 7–21)
BUN/CREAT SERPL: 18.4 (ref 7–25)
CALCIUM SPEC-SCNC: 9.2 MG/DL (ref 7.7–10)
CHLORIDE SERPL-SCNC: 110 MMOL/L (ref 99–112)
CO2 SERPL-SCNC: 31.8 MMOL/L (ref 24.3–31.9)
CREAT BLD-MCNC: 0.76 MG/DL (ref 0.43–1.29)
DEPRECATED RDW RBC AUTO: 49.8 FL (ref 37–54)
EOSINOPHIL # BLD AUTO: 0.49 10*3/MM3 (ref 0–0.7)
EOSINOPHIL NFR BLD AUTO: 10.3 % (ref 0–5)
ERYTHROCYTE [DISTWIDTH] IN BLOOD BY AUTOMATED COUNT: 15 % (ref 11.5–14.5)
GFR SERPL CREATININE-BSD FRML MDRD: 84 ML/MIN/1.73
GLOBULIN UR ELPH-MCNC: 3.6 GM/DL
GLUCOSE BLD-MCNC: 106 MG/DL (ref 70–110)
HCT VFR BLD AUTO: 32.3 % (ref 37–47)
HGB BLD-MCNC: 10 G/DL (ref 12–16)
IMM GRANULOCYTES # BLD: 0.01 10*3/MM3 (ref 0–0.03)
IMM GRANULOCYTES NFR BLD: 0.2 % (ref 0–0.5)
LYMPHOCYTES # BLD AUTO: 1.59 10*3/MM3 (ref 1–3)
LYMPHOCYTES NFR BLD AUTO: 33.5 % (ref 21–51)
MCH RBC QN AUTO: 29.8 PG (ref 27–33)
MCHC RBC AUTO-ENTMCNC: 31 G/DL (ref 33–37)
MCV RBC AUTO: 96.1 FL (ref 80–94)
MONOCYTES # BLD AUTO: 0.45 10*3/MM3 (ref 0.1–0.9)
MONOCYTES NFR BLD AUTO: 9.5 % (ref 0–10)
NEUTROPHILS # BLD AUTO: 2.18 10*3/MM3 (ref 1.4–6.5)
NEUTROPHILS NFR BLD AUTO: 46.1 % (ref 30–70)
OSMOLALITY SERPL CALC.SUM OF ELEC: 287.7 MOSM/KG (ref 273–305)
PLATELET # BLD AUTO: 205 10*3/MM3 (ref 130–400)
PMV BLD AUTO: 10.6 FL (ref 6–10)
POTASSIUM BLD-SCNC: 3.8 MMOL/L (ref 3.5–5.3)
PROT SERPL-MCNC: 7.1 G/DL (ref 6–8)
RBC # BLD AUTO: 3.36 10*6/MM3 (ref 4.2–5.4)
SODIUM BLD-SCNC: 144 MMOL/L (ref 135–153)
WBC NRBC COR # BLD: 4.74 10*3/MM3 (ref 4.5–12.5)

## 2017-07-06 PROCEDURE — 87205 SMEAR GRAM STAIN: CPT | Performed by: INTERNAL MEDICINE

## 2017-07-06 PROCEDURE — 87070 CULTURE OTHR SPECIMN AEROBIC: CPT | Performed by: INTERNAL MEDICINE

## 2017-07-06 PROCEDURE — 80053 COMPREHEN METABOLIC PANEL: CPT | Performed by: INTERNAL MEDICINE

## 2017-07-06 PROCEDURE — 93010 ELECTROCARDIOGRAM REPORT: CPT | Performed by: INTERNAL MEDICINE

## 2017-07-06 PROCEDURE — 85025 COMPLETE CBC W/AUTO DIFF WBC: CPT | Performed by: INTERNAL MEDICINE

## 2017-07-06 PROCEDURE — 84134 ASSAY OF PREALBUMIN: CPT | Performed by: INTERNAL MEDICINE

## 2017-07-06 PROCEDURE — 93005 ELECTROCARDIOGRAM TRACING: CPT | Performed by: INTERNAL MEDICINE

## 2017-07-08 LAB
CRP SERPL-MCNC: <0.5 MG/DL (ref 0–0.99)
DEPRECATED RDW RBC AUTO: 49.1 FL (ref 37–54)
ERYTHROCYTE [DISTWIDTH] IN BLOOD BY AUTOMATED COUNT: 14.8 % (ref 11.5–14.5)
HCT VFR BLD AUTO: 31.3 % (ref 37–47)
HGB BLD-MCNC: 9.8 G/DL (ref 12–16)
MCH RBC QN AUTO: 30.2 PG (ref 27–33)
MCHC RBC AUTO-ENTMCNC: 31.3 G/DL (ref 33–37)
MCV RBC AUTO: 96.3 FL (ref 80–94)
PLATELET # BLD AUTO: 216 10*3/MM3 (ref 130–400)
PMV BLD AUTO: 10.5 FL (ref 6–10)
PREALB SERPL-MCNC: 21 MG/DL (ref 12–34)
RBC # BLD AUTO: 3.25 10*6/MM3 (ref 4.2–5.4)
WBC NRBC COR # BLD: 4.54 10*3/MM3 (ref 4.5–12.5)

## 2017-07-08 PROCEDURE — 85027 COMPLETE CBC AUTOMATED: CPT | Performed by: INTERNAL MEDICINE

## 2017-07-08 PROCEDURE — 86140 C-REACTIVE PROTEIN: CPT | Performed by: INTERNAL MEDICINE

## 2017-07-10 LAB
BACTERIA SPEC AEROBE CULT: NO GROWTH
GRAM STN SPEC: NORMAL

## 2017-07-11 LAB
BACTERIA UR QL AUTO: ABNORMAL /HPF
BILIRUB UR QL STRIP: NEGATIVE
CLARITY UR: CLEAR
COLOR UR: YELLOW
GLUCOSE UR STRIP-MCNC: NEGATIVE MG/DL
HGB UR QL STRIP.AUTO: NEGATIVE
HYALINE CASTS UR QL AUTO: ABNORMAL /LPF
KETONES UR QL STRIP: NEGATIVE
LEUKOCYTE ESTERASE UR QL STRIP.AUTO: NEGATIVE
NITRITE UR QL STRIP: NEGATIVE
PH UR STRIP.AUTO: 6.5 [PH] (ref 5–8)
PROT UR QL STRIP: NEGATIVE
RBC # UR: ABNORMAL /HPF
REF LAB TEST METHOD: ABNORMAL
SP GR UR STRIP: 1.02 (ref 1–1.03)
SQUAMOUS #/AREA URNS HPF: ABNORMAL /HPF
UROBILINOGEN UR QL STRIP: NORMAL
WBC UR QL AUTO: ABNORMAL /HPF

## 2017-07-11 PROCEDURE — 87086 URINE CULTURE/COLONY COUNT: CPT | Performed by: INTERNAL MEDICINE

## 2017-07-11 PROCEDURE — 81001 URINALYSIS AUTO W/SCOPE: CPT | Performed by: INTERNAL MEDICINE

## 2017-07-13 ENCOUNTER — APPOINTMENT (OUTPATIENT)
Dept: GENERAL RADIOLOGY | Facility: HOSPITAL | Age: 42
End: 2017-07-13

## 2017-07-13 LAB — CRP SERPL-MCNC: <0.5 MG/DL (ref 0–0.99)

## 2017-07-13 PROCEDURE — 73630 X-RAY EXAM OF FOOT: CPT | Performed by: RADIOLOGY

## 2017-07-13 PROCEDURE — 86140 C-REACTIVE PROTEIN: CPT | Performed by: PODIATRIST

## 2017-07-13 PROCEDURE — 73610 X-RAY EXAM OF ANKLE: CPT | Performed by: RADIOLOGY

## 2017-07-13 PROCEDURE — 73610 X-RAY EXAM OF ANKLE: CPT

## 2017-07-13 PROCEDURE — 73630 X-RAY EXAM OF FOOT: CPT

## 2017-09-16 ENCOUNTER — HOSPITAL ENCOUNTER (EMERGENCY)
Dept: HOSPITAL 79 - ER1 | Age: 42
Discharge: HOME | End: 2017-09-16
Payer: MEDICARE

## 2017-09-16 DIAGNOSIS — Z88.8: ICD-10-CM

## 2017-09-16 DIAGNOSIS — Z79.899: ICD-10-CM

## 2017-09-16 DIAGNOSIS — F17.210: ICD-10-CM

## 2017-09-16 DIAGNOSIS — G40.909: ICD-10-CM

## 2017-09-16 DIAGNOSIS — Z88.6: ICD-10-CM

## 2017-09-16 DIAGNOSIS — M79.652: Primary | ICD-10-CM

## 2017-09-16 DIAGNOSIS — M79.651: ICD-10-CM

## 2017-09-16 DIAGNOSIS — Z88.5: ICD-10-CM

## 2017-09-16 DIAGNOSIS — F41.9: ICD-10-CM

## 2017-09-16 DIAGNOSIS — Z91.041: ICD-10-CM

## 2017-10-17 ENCOUNTER — TRANSCRIBE ORDERS (OUTPATIENT)
Dept: ADMINISTRATIVE | Facility: HOSPITAL | Age: 42
End: 2017-10-17

## 2017-10-17 DIAGNOSIS — M54.16 LUMBAR RADICULOPATHY: Primary | ICD-10-CM

## 2017-10-19 ENCOUNTER — HOSPITAL ENCOUNTER (OUTPATIENT)
Dept: GENERAL RADIOLOGY | Facility: HOSPITAL | Age: 42
Discharge: HOME | End: 2017-10-19
Attending: PODIATRIST

## 2017-10-19 ENCOUNTER — TRANSCRIBE ORDERS (OUTPATIENT)
Dept: GENERAL RADIOLOGY | Facility: HOSPITAL | Age: 42
End: 2017-10-19

## 2017-10-19 ENCOUNTER — HOSPITAL ENCOUNTER (OUTPATIENT)
Dept: MRI IMAGING | Facility: HOSPITAL | Age: 42
Discharge: HOME | End: 2017-10-19
Attending: PODIATRIST | Admitting: PODIATRIST

## 2017-10-19 DIAGNOSIS — M54.10 RADICULOPATHY WITH LOWER EXTREMITY SYMPTOMS: Primary | ICD-10-CM

## 2017-10-19 DIAGNOSIS — M54.10 RADICULOPATHY WITH LOWER EXTREMITY SYMPTOMS: ICD-10-CM

## 2017-10-19 DIAGNOSIS — M54.16 LUMBAR RADICULOPATHY: ICD-10-CM

## 2017-10-19 PROCEDURE — 72148 MRI LUMBAR SPINE W/O DYE: CPT | Performed by: RADIOLOGY

## 2017-10-19 PROCEDURE — 72110 X-RAY EXAM L-2 SPINE 4/>VWS: CPT | Performed by: RADIOLOGY

## 2017-10-19 PROCEDURE — 72148 MRI LUMBAR SPINE W/O DYE: CPT

## 2017-10-19 PROCEDURE — 72110 X-RAY EXAM L-2 SPINE 4/>VWS: CPT

## 2017-10-20 LAB
B-HCG UR QL: NEGATIVE
INTERNAL NEGATIVE CONTROL: NEGATIVE
INTERNAL POSITIVE CONTROL: POSITIVE
Lab: NORMAL

## 2018-01-02 ENCOUNTER — TRANSCRIBE ORDERS (OUTPATIENT)
Dept: ADMINISTRATIVE | Facility: HOSPITAL | Age: 43
End: 2018-01-02

## 2018-01-02 DIAGNOSIS — M25.571 RIGHT ANKLE PAIN, UNSPECIFIED CHRONICITY: Primary | ICD-10-CM

## 2018-05-30 ENCOUNTER — HOSPITAL ENCOUNTER (EMERGENCY)
Facility: HOSPITAL | Age: 43
Discharge: HOME | End: 2018-05-30
Attending: FAMILY MEDICINE | Admitting: EMERGENCY MEDICINE

## 2018-05-30 VITALS
RESPIRATION RATE: 18 BRPM | HEART RATE: 102 BPM | BODY MASS INDEX: 29.82 KG/M2 | SYSTOLIC BLOOD PRESSURE: 120 MMHG | WEIGHT: 190 LBS | DIASTOLIC BLOOD PRESSURE: 70 MMHG | OXYGEN SATURATION: 97 % | TEMPERATURE: 98.2 F | HEIGHT: 67 IN

## 2018-05-30 DIAGNOSIS — T80.212A PORT OR RESERVOIR INFECTION, INITIAL ENCOUNTER: Primary | ICD-10-CM

## 2018-05-30 LAB
ALBUMIN SERPL-MCNC: 4.1 G/DL (ref 3.5–5)
ALBUMIN/GLOB SERPL: 1.2 G/DL (ref 1.5–2.5)
ALP SERPL-CCNC: 83 U/L (ref 35–104)
ALT SERPL W P-5'-P-CCNC: 35 U/L (ref 10–36)
ANION GAP SERPL CALCULATED.3IONS-SCNC: 2.8 MMOL/L (ref 3.6–11.2)
AST SERPL-CCNC: 34 U/L (ref 10–30)
BASOPHILS # BLD AUTO: 0.04 10*3/MM3 (ref 0–0.3)
BASOPHILS NFR BLD AUTO: 0.5 % (ref 0–2)
BILIRUB SERPL-MCNC: 0.4 MG/DL (ref 0.2–1.8)
BILIRUB UR QL STRIP: NEGATIVE
BUN BLD-MCNC: 13 MG/DL (ref 7–21)
BUN/CREAT SERPL: 13.5 (ref 7–25)
CALCIUM SPEC-SCNC: 9 MG/DL (ref 7.7–10)
CHLORIDE SERPL-SCNC: 109 MMOL/L (ref 99–112)
CLARITY UR: ABNORMAL
CO2 SERPL-SCNC: 26.2 MMOL/L (ref 24.3–31.9)
COLOR UR: ABNORMAL
CREAT BLD-MCNC: 0.96 MG/DL (ref 0.43–1.29)
CRP SERPL-MCNC: <0.5 MG/DL (ref 0–0.99)
D-LACTATE SERPL-SCNC: 0.7 MMOL/L (ref 0.5–2)
DEPRECATED RDW RBC AUTO: 43.5 FL (ref 37–54)
EOSINOPHIL # BLD AUTO: 0.46 10*3/MM3 (ref 0–0.7)
EOSINOPHIL NFR BLD AUTO: 6.1 % (ref 0–5)
ERYTHROCYTE [DISTWIDTH] IN BLOOD BY AUTOMATED COUNT: 12.8 % (ref 11.5–14.5)
GFR SERPL CREATININE-BSD FRML MDRD: 64 ML/MIN/1.73
GLOBULIN UR ELPH-MCNC: 3.5 GM/DL
GLUCOSE BLD-MCNC: 94 MG/DL (ref 70–110)
GLUCOSE UR STRIP-MCNC: NEGATIVE MG/DL
HCT VFR BLD AUTO: 41 % (ref 37–47)
HGB BLD-MCNC: 14 G/DL (ref 12–16)
HGB UR QL STRIP.AUTO: NEGATIVE
HOLD SPECIMEN: NORMAL
HOLD SPECIMEN: NORMAL
IMM GRANULOCYTES # BLD: 0.01 10*3/MM3 (ref 0–0.03)
IMM GRANULOCYTES NFR BLD: 0.1 % (ref 0–0.5)
KETONES UR QL STRIP: NEGATIVE
LEUKOCYTE ESTERASE UR QL STRIP.AUTO: NEGATIVE
LYMPHOCYTES # BLD AUTO: 2.5 10*3/MM3 (ref 1–3)
LYMPHOCYTES NFR BLD AUTO: 33.3 % (ref 21–51)
MCH RBC QN AUTO: 32.9 PG (ref 27–33)
MCHC RBC AUTO-ENTMCNC: 34.1 G/DL (ref 33–37)
MCV RBC AUTO: 96.5 FL (ref 80–94)
MONOCYTES # BLD AUTO: 0.73 10*3/MM3 (ref 0.1–0.9)
MONOCYTES NFR BLD AUTO: 9.7 % (ref 0–10)
NEUTROPHILS # BLD AUTO: 3.77 10*3/MM3 (ref 1.4–6.5)
NEUTROPHILS NFR BLD AUTO: 50.3 % (ref 30–70)
NITRITE UR QL STRIP: NEGATIVE
OSMOLALITY SERPL CALC.SUM OF ELEC: 275.5 MOSM/KG (ref 273–305)
PH UR STRIP.AUTO: 6 [PH] (ref 5–8)
PLATELET # BLD AUTO: 235 10*3/MM3 (ref 130–400)
PMV BLD AUTO: 10.6 FL (ref 6–10)
POTASSIUM BLD-SCNC: 3.6 MMOL/L (ref 3.5–5.3)
PROT SERPL-MCNC: 7.6 G/DL (ref 6–8)
PROT UR QL STRIP: NEGATIVE
RBC # BLD AUTO: 4.25 10*6/MM3 (ref 4.2–5.4)
SODIUM BLD-SCNC: 138 MMOL/L (ref 135–153)
SP GR UR STRIP: 1.02 (ref 1–1.03)
UROBILINOGEN UR QL STRIP: ABNORMAL
WBC NRBC COR # BLD: 7.51 10*3/MM3 (ref 4.5–12.5)
WHOLE BLOOD HOLD SPECIMEN: NORMAL
WHOLE BLOOD HOLD SPECIMEN: NORMAL

## 2018-05-30 PROCEDURE — 80053 COMPREHEN METABOLIC PANEL: CPT | Performed by: PHYSICIAN ASSISTANT

## 2018-05-30 PROCEDURE — 25010000002 CEFTRIAXONE: Performed by: PHYSICIAN ASSISTANT

## 2018-05-30 PROCEDURE — 99283 EMERGENCY DEPT VISIT LOW MDM: CPT

## 2018-05-30 PROCEDURE — 96365 THER/PROPH/DIAG IV INF INIT: CPT

## 2018-05-30 PROCEDURE — 96374 THER/PROPH/DIAG INJ IV PUSH: CPT

## 2018-05-30 PROCEDURE — 25010000002 METOCLOPRAMIDE PER 10 MG: Performed by: EMERGENCY MEDICINE

## 2018-05-30 PROCEDURE — 81003 URINALYSIS AUTO W/O SCOPE: CPT | Performed by: PHYSICIAN ASSISTANT

## 2018-05-30 PROCEDURE — 86140 C-REACTIVE PROTEIN: CPT | Performed by: PHYSICIAN ASSISTANT

## 2018-05-30 PROCEDURE — 83605 ASSAY OF LACTIC ACID: CPT | Performed by: PHYSICIAN ASSISTANT

## 2018-05-30 PROCEDURE — 87040 BLOOD CULTURE FOR BACTERIA: CPT | Performed by: PHYSICIAN ASSISTANT

## 2018-05-30 PROCEDURE — C1751 CATH, INF, PER/CENT/MIDLINE: HCPCS

## 2018-05-30 PROCEDURE — 25010000002 MORPHINE PER 10 MG: Performed by: EMERGENCY MEDICINE

## 2018-05-30 PROCEDURE — 96375 TX/PRO/DX INJ NEW DRUG ADDON: CPT

## 2018-05-30 PROCEDURE — 85025 COMPLETE CBC W/AUTO DIFF WBC: CPT | Performed by: PHYSICIAN ASSISTANT

## 2018-05-30 RX ORDER — SULFAMETHOXAZOLE AND TRIMETHOPRIM 800; 160 MG/1; MG/1
1 TABLET ORAL 2 TIMES DAILY
COMMUNITY
End: 2018-12-13 | Stop reason: HOSPADM

## 2018-05-30 RX ORDER — SODIUM CHLORIDE 0.9 % (FLUSH) 0.9 %
10 SYRINGE (ML) INJECTION AS NEEDED
Status: DISCONTINUED | OUTPATIENT
Start: 2018-05-30 | End: 2018-05-30 | Stop reason: HOSPADM

## 2018-05-30 RX ORDER — METOCLOPRAMIDE HYDROCHLORIDE 5 MG/ML
10 INJECTION INTRAMUSCULAR; INTRAVENOUS ONCE
Status: COMPLETED | OUTPATIENT
Start: 2018-05-30 | End: 2018-05-30

## 2018-05-30 RX ADMIN — METOCLOPRAMIDE 10 MG: 5 INJECTION, SOLUTION INTRAMUSCULAR; INTRAVENOUS at 18:57

## 2018-05-30 RX ADMIN — CEFTRIAXONE 1 G: 1 INJECTION, POWDER, FOR SOLUTION INTRAMUSCULAR; INTRAVENOUS at 18:51

## 2018-05-30 RX ADMIN — MORPHINE SULFATE 4 MG: 4 INJECTION, SOLUTION INTRAMUSCULAR; INTRAVENOUS at 18:56

## 2018-06-04 LAB
BACTERIA SPEC AEROBE CULT: NORMAL
BACTERIA SPEC AEROBE CULT: NORMAL

## 2018-09-03 ENCOUNTER — APPOINTMENT (OUTPATIENT)
Dept: GENERAL RADIOLOGY | Facility: HOSPITAL | Age: 43
End: 2018-09-03

## 2018-09-03 ENCOUNTER — HOSPITAL ENCOUNTER (EMERGENCY)
Facility: HOSPITAL | Age: 43
Discharge: HOME | End: 2018-09-03
Attending: INTERNAL MEDICINE | Admitting: NURSE PRACTITIONER

## 2018-09-03 VITALS
HEART RATE: 73 BPM | TEMPERATURE: 98.2 F | SYSTOLIC BLOOD PRESSURE: 124 MMHG | OXYGEN SATURATION: 99 % | DIASTOLIC BLOOD PRESSURE: 71 MMHG | BODY MASS INDEX: 29.03 KG/M2 | WEIGHT: 185 LBS | RESPIRATION RATE: 19 BRPM | HEIGHT: 67 IN

## 2018-09-03 DIAGNOSIS — N39.0 ACUTE UTI: Primary | ICD-10-CM

## 2018-09-03 LAB
6-ACETYL MORPHINE: NEGATIVE
ALBUMIN SERPL-MCNC: 3.7 G/DL (ref 3.5–5)
ALBUMIN/GLOB SERPL: 1 G/DL (ref 1.5–2.5)
ALP SERPL-CCNC: 77 U/L (ref 35–104)
ALT SERPL W P-5'-P-CCNC: 21 U/L (ref 10–36)
AMPHET+METHAMPHET UR QL: NEGATIVE
ANION GAP SERPL CALCULATED.3IONS-SCNC: 2.7 MMOL/L (ref 3.6–11.2)
AST SERPL-CCNC: 24 U/L (ref 10–30)
BACTERIA UR QL AUTO: ABNORMAL /HPF
BARBITURATES UR QL SCN: NEGATIVE
BASOPHILS # BLD AUTO: 0.03 10*3/MM3 (ref 0–0.3)
BASOPHILS NFR BLD AUTO: 0.4 % (ref 0–2)
BENZODIAZ UR QL SCN: POSITIVE
BILIRUB SERPL-MCNC: 0.2 MG/DL (ref 0.2–1.8)
BILIRUB UR QL STRIP: NEGATIVE
BUN BLD-MCNC: 8 MG/DL (ref 7–21)
BUN/CREAT SERPL: 7.8 (ref 7–25)
BUPRENORPHINE SERPL-MCNC: POSITIVE NG/ML
CALCIUM SPEC-SCNC: 9.1 MG/DL (ref 7.7–10)
CANNABINOIDS SERPL QL: NEGATIVE
CHLORIDE SERPL-SCNC: 111 MMOL/L (ref 99–112)
CLARITY UR: ABNORMAL
CO2 SERPL-SCNC: 23.3 MMOL/L (ref 24.3–31.9)
COCAINE UR QL: NEGATIVE
COD CRY URNS QL: ABNORMAL /HPF
COLOR UR: ABNORMAL
CREAT BLD-MCNC: 1.03 MG/DL (ref 0.43–1.29)
DEPRECATED RDW RBC AUTO: 43.4 FL (ref 37–54)
EOSINOPHIL # BLD AUTO: 0.41 10*3/MM3 (ref 0–0.7)
EOSINOPHIL NFR BLD AUTO: 5.5 % (ref 0–5)
ERYTHROCYTE [DISTWIDTH] IN BLOOD BY AUTOMATED COUNT: 12.4 % (ref 11.5–14.5)
GFR SERPL CREATININE-BSD FRML MDRD: 59 ML/MIN/1.73
GLOBULIN UR ELPH-MCNC: 3.7 GM/DL
GLUCOSE BLD-MCNC: 101 MG/DL (ref 70–110)
GLUCOSE UR STRIP-MCNC: NEGATIVE MG/DL
HCT VFR BLD AUTO: 38.5 % (ref 37–47)
HGB BLD-MCNC: 12.7 G/DL (ref 12–16)
HGB UR QL STRIP.AUTO: ABNORMAL
HYALINE CASTS UR QL AUTO: ABNORMAL /LPF
IMM GRANULOCYTES # BLD: 0.01 10*3/MM3 (ref 0–0.03)
IMM GRANULOCYTES NFR BLD: 0.1 % (ref 0–0.5)
KETONES UR QL STRIP: NEGATIVE
LEUKOCYTE ESTERASE UR QL STRIP.AUTO: ABNORMAL
LYMPHOCYTES # BLD AUTO: 2.01 10*3/MM3 (ref 1–3)
LYMPHOCYTES NFR BLD AUTO: 27 % (ref 21–51)
MCH RBC QN AUTO: 32.4 PG (ref 27–33)
MCHC RBC AUTO-ENTMCNC: 33 G/DL (ref 33–37)
MCV RBC AUTO: 98.2 FL (ref 80–94)
METHADONE UR QL SCN: NEGATIVE
MONOCYTES # BLD AUTO: 0.54 10*3/MM3 (ref 0.1–0.9)
MONOCYTES NFR BLD AUTO: 7.2 % (ref 0–10)
NEUTROPHILS # BLD AUTO: 4.45 10*3/MM3 (ref 1.4–6.5)
NEUTROPHILS NFR BLD AUTO: 59.8 % (ref 30–70)
NITRITE UR QL STRIP: POSITIVE
OPIATES UR QL: POSITIVE
OSMOLALITY SERPL CALC.SUM OF ELEC: 272.3 MOSM/KG (ref 273–305)
OXYCODONE UR QL SCN: POSITIVE
PCP UR QL SCN: NEGATIVE
PH UR STRIP.AUTO: <=5 [PH] (ref 5–8)
PLATELET # BLD AUTO: 289 10*3/MM3 (ref 130–400)
PMV BLD AUTO: 9.9 FL (ref 6–10)
POTASSIUM BLD-SCNC: 3.5 MMOL/L (ref 3.5–5.3)
PROT SERPL-MCNC: 7.4 G/DL (ref 6–8)
PROT UR QL STRIP: NEGATIVE
RBC # BLD AUTO: 3.92 10*6/MM3 (ref 4.2–5.4)
RBC # UR: ABNORMAL /HPF
REF LAB TEST METHOD: ABNORMAL
SODIUM BLD-SCNC: 137 MMOL/L (ref 135–153)
SP GR UR STRIP: 1.02 (ref 1–1.03)
SQUAMOUS #/AREA URNS HPF: ABNORMAL /HPF
UROBILINOGEN UR QL STRIP: ABNORMAL
WBC NRBC COR # BLD: 7.45 10*3/MM3 (ref 4.5–12.5)
WBC UR QL AUTO: ABNORMAL /HPF

## 2018-09-03 PROCEDURE — 71046 X-RAY EXAM CHEST 2 VIEWS: CPT | Performed by: RADIOLOGY

## 2018-09-03 PROCEDURE — 99283 EMERGENCY DEPT VISIT LOW MDM: CPT

## 2018-09-03 PROCEDURE — 80307 DRUG TEST PRSMV CHEM ANLYZR: CPT | Performed by: NURSE PRACTITIONER

## 2018-09-03 PROCEDURE — 81001 URINALYSIS AUTO W/SCOPE: CPT | Performed by: NURSE PRACTITIONER

## 2018-09-03 PROCEDURE — 85025 COMPLETE CBC W/AUTO DIFF WBC: CPT | Performed by: NURSE PRACTITIONER

## 2018-09-03 PROCEDURE — 71046 X-RAY EXAM CHEST 2 VIEWS: CPT

## 2018-09-03 PROCEDURE — 96372 THER/PROPH/DIAG INJ SC/IM: CPT

## 2018-09-03 PROCEDURE — 25010000002 CEFTRIAXONE PER 250 MG: Performed by: NURSE PRACTITIONER

## 2018-09-03 PROCEDURE — 80053 COMPREHEN METABOLIC PANEL: CPT | Performed by: NURSE PRACTITIONER

## 2018-09-03 RX ORDER — NITROFURANTOIN 25; 75 MG/1; MG/1
100 CAPSULE ORAL 2 TIMES DAILY
Qty: 14 CAPSULE | Refills: 0 | Status: SHIPPED | OUTPATIENT
Start: 2018-09-03 | End: 2018-09-10

## 2018-09-03 RX ORDER — CEFTRIAXONE 1 G/1
1000 INJECTION, POWDER, FOR SOLUTION INTRAMUSCULAR; INTRAVENOUS ONCE
Status: COMPLETED | OUTPATIENT
Start: 2018-09-03 | End: 2018-09-03

## 2018-09-03 RX ORDER — LIDOCAINE HYDROCHLORIDE 10 MG/ML
2.1 INJECTION, SOLUTION EPIDURAL; INFILTRATION; INTRACAUDAL; PERINEURAL ONCE
Status: COMPLETED | OUTPATIENT
Start: 2018-09-03 | End: 2018-09-03

## 2018-09-03 RX ADMIN — LIDOCAINE HYDROCHLORIDE 2.1 ML: 10 INJECTION, SOLUTION EPIDURAL; INFILTRATION; INTRACAUDAL; PERINEURAL at 21:03

## 2018-09-03 RX ADMIN — CEFTRIAXONE 1000 MG: 1 INJECTION, POWDER, FOR SOLUTION INTRAMUSCULAR; INTRAVENOUS at 21:03

## 2018-09-04 NOTE — ED PROVIDER NOTES
Subjective     History provided by:  Patient  Cough   Cough characteristics:  Non-productive  Sputum characteristics:  Nondescript  Severity:  Moderate  Onset quality:  Gradual  Timing:  Intermittent  Progression:  Waxing and waning  Chronicity:  New  Smoker: yes    Relieved by:  None tried  Worsened by:  Nothing  Ineffective treatments:  None tried  Associated symptoms: myalgias    Associated symptoms: no chest pain and no fever        Review of Systems   Constitutional: Negative.  Negative for fever.   HENT: Negative.    Respiratory: Positive for cough.    Cardiovascular: Negative.  Negative for chest pain.   Gastrointestinal: Negative.  Negative for abdominal pain.   Endocrine: Negative.    Genitourinary: Negative.  Negative for dysuria.   Musculoskeletal: Positive for myalgias.   Skin: Negative.    Neurological: Negative.    Psychiatric/Behavioral: Negative.    All other systems reviewed and are negative.      Past Medical History:   Diagnosis Date   • Amputation of fifth toe, left, traumatic (CMS/HCC)     third, fourth, fifth toes   • Arthritis    • History of transfusion     2007, 2016   • Seizures (CMS/HCC)     last seizure 2014       Allergies   Allergen Reactions   • Contrast Dye    • Toradol [Ketorolac Tromethamine]    • Ultram [Tramadol Hcl]    • Zofran [Ondansetron Hcl]        Past Surgical History:   Procedure Laterality Date   • ANKLE FUSION Bilateral    • APPENDECTOMY     • ARM AMPUTATION AT SHOULDER Left    • MD DRAIN LOWER LEG DEEP ABSC/HEMATOMA Left 1/19/2017    Procedure: LOWER EXTREMITY DEBRIDEMENT OSTEOMYELITIS, WOUND REPAIR FLAP;  Surgeon: Simón Reynoso MD;  Location: [facility];  Service: Plastics   • TOTAL HIP ARTHROPLASTY Right 08/2016       Family History   Problem Relation Age of Onset   • Arthritis Mother    • Asthma Mother    • COPD Mother    • Diabetes Mother    • Heart disease Mother    • Hypertension Mother    • Hyperlipidemia Mother    • Cancer Paternal Grandfather        Social  History     Social History   • Marital status:      Social History Main Topics   • Smoking status: Heavy Tobacco Smoker     Packs/day: 0.50     Years: 2.00     Types: Cigarettes   • Alcohol use No   • Drug use: No   • Sexual activity: Defer     Other Topics Concern   • Not on file           Objective   Physical Exam   Constitutional: She is oriented to person, place, and time. She appears well-developed and well-nourished. No distress.   HENT:   Head: Normocephalic and atraumatic.   Right Ear: External ear normal.   Left Ear: External ear normal.   Nose: Nose normal.   Eyes: Pupils are equal, round, and reactive to light. Conjunctivae and EOM are normal.   Neck: Normal range of motion. Neck supple. No JVD present. No tracheal deviation present.   Cardiovascular: Normal rate, regular rhythm and normal heart sounds.    No murmur heard.  Pulmonary/Chest: Effort normal and breath sounds normal. No respiratory distress. She has no wheezes.   Abdominal: Soft. Bowel sounds are normal. There is no tenderness.   Musculoskeletal: Normal range of motion. She exhibits no edema or deformity.   Neurological: She is alert and oriented to person, place, and time. No cranial nerve deficit.   Skin: Skin is warm and dry. No rash noted. She is not diaphoretic. No erythema. No pallor.   Psychiatric: She has a normal mood and affect. Her behavior is normal. Thought content normal.   Nursing note and vitals reviewed.      Procedures           ED Course                  MDM  Number of Diagnoses or Management Options  Acute UTI: new and does not require workup     Amount and/or Complexity of Data Reviewed  Clinical lab tests: reviewed  Tests in the radiology section of CPT®: reviewed    Risk of Complications, Morbidity, and/or Mortality  Presenting problems: low  Diagnostic procedures: low  Management options: low    Patient Progress  Patient progress: stable        Final diagnoses:   Acute UTI            Lupe Wilson,  APRN  09/04/18 0016

## 2018-10-13 PROCEDURE — 99283 EMERGENCY DEPT VISIT LOW MDM: CPT

## 2018-10-14 ENCOUNTER — APPOINTMENT (OUTPATIENT)
Dept: GENERAL RADIOLOGY | Facility: HOSPITAL | Age: 43
End: 2018-10-14

## 2018-10-14 ENCOUNTER — HOSPITAL ENCOUNTER (EMERGENCY)
Facility: HOSPITAL | Age: 43
Discharge: HOME | End: 2018-10-14
Attending: FAMILY MEDICINE | Admitting: FAMILY MEDICINE

## 2018-10-14 VITALS
OXYGEN SATURATION: 97 % | RESPIRATION RATE: 18 BRPM | HEART RATE: 107 BPM | BODY MASS INDEX: 28.25 KG/M2 | WEIGHT: 180 LBS | DIASTOLIC BLOOD PRESSURE: 76 MMHG | TEMPERATURE: 98.2 F | SYSTOLIC BLOOD PRESSURE: 148 MMHG | HEIGHT: 67 IN

## 2018-10-14 DIAGNOSIS — J06.9 UPPER RESPIRATORY TRACT INFECTION, UNSPECIFIED TYPE: Primary | ICD-10-CM

## 2018-10-14 LAB — B-HCG UR QL: NEGATIVE

## 2018-10-14 PROCEDURE — 25010000002 DEXAMETHASONE PER 1 MG: Performed by: PHYSICIAN ASSISTANT

## 2018-10-14 PROCEDURE — 87070 CULTURE OTHR SPECIMN AEROBIC: CPT | Performed by: PHYSICIAN ASSISTANT

## 2018-10-14 PROCEDURE — 81025 URINE PREGNANCY TEST: CPT | Performed by: PHYSICIAN ASSISTANT

## 2018-10-14 PROCEDURE — 87205 SMEAR GRAM STAIN: CPT | Performed by: PHYSICIAN ASSISTANT

## 2018-10-14 PROCEDURE — 71046 X-RAY EXAM CHEST 2 VIEWS: CPT | Performed by: RADIOLOGY

## 2018-10-14 PROCEDURE — 94640 AIRWAY INHALATION TREATMENT: CPT

## 2018-10-14 PROCEDURE — 94799 UNLISTED PULMONARY SVC/PX: CPT

## 2018-10-14 PROCEDURE — 71046 X-RAY EXAM CHEST 2 VIEWS: CPT

## 2018-10-14 PROCEDURE — 96372 THER/PROPH/DIAG INJ SC/IM: CPT

## 2018-10-14 RX ORDER — METHYLPREDNISOLONE 4 MG/1
TABLET ORAL
Qty: 21 TABLET | Refills: 0 | Status: SHIPPED | OUTPATIENT
Start: 2018-10-14 | End: 2019-05-05

## 2018-10-14 RX ORDER — DEXAMETHASONE SODIUM PHOSPHATE 4 MG/ML
8 INJECTION, SOLUTION INTRA-ARTICULAR; INTRALESIONAL; INTRAMUSCULAR; INTRAVENOUS; SOFT TISSUE ONCE
Status: COMPLETED | OUTPATIENT
Start: 2018-10-14 | End: 2018-10-14

## 2018-10-14 RX ORDER — ALBUTEROL SULFATE 90 UG/1
2 AEROSOL, METERED RESPIRATORY (INHALATION) ONCE
Status: COMPLETED | OUTPATIENT
Start: 2018-10-14 | End: 2018-10-14

## 2018-10-14 RX ORDER — BENZONATATE 200 MG/1
200 CAPSULE ORAL 3 TIMES DAILY PRN
Qty: 30 CAPSULE | Refills: 0 | Status: SHIPPED | OUTPATIENT
Start: 2018-10-14 | End: 2019-05-05

## 2018-10-14 RX ORDER — ALBUTEROL SULFATE 2.5 MG/3ML
2.5 SOLUTION RESPIRATORY (INHALATION) ONCE
Status: COMPLETED | OUTPATIENT
Start: 2018-10-14 | End: 2018-10-14

## 2018-10-14 RX ADMIN — ALBUTEROL SULFATE 2 PUFF: 90 AEROSOL, METERED RESPIRATORY (INHALATION) at 01:49

## 2018-10-14 RX ADMIN — ALBUTEROL SULFATE 2.5 MG: 2.5 SOLUTION RESPIRATORY (INHALATION) at 00:25

## 2018-10-14 RX ADMIN — DEXAMETHASONE SODIUM PHOSPHATE 8 MG: 4 INJECTION, SOLUTION INTRAMUSCULAR; INTRAVENOUS at 00:46

## 2018-10-14 NOTE — ED PROVIDER NOTES
Subjective     History provided by:  Patient  URI   Presenting symptoms: congestion and cough    Presenting symptoms: no fever    Severity:  Moderate  Onset quality:  Gradual  Timing:  Constant  Progression:  Worsening  Chronicity:  New  Relieved by:  Nothing  Risk factors comment:  Smoking.       Review of Systems   Constitutional: Negative.  Negative for fever.   HENT: Positive for congestion.    Respiratory: Positive for cough.    Cardiovascular: Negative.  Negative for chest pain.   Gastrointestinal: Negative.  Negative for abdominal pain.   Endocrine: Negative.    Genitourinary: Negative.  Negative for dysuria.   Skin: Negative.    Neurological: Negative.    Psychiatric/Behavioral: Negative.    All other systems reviewed and are negative.      Past Medical History:   Diagnosis Date   • Amputation of fifth toe, left, traumatic (CMS/[facility])     third, fourth, fifth toes   • Arthritis    • History of transfusion     2007, 2016   • Seizures (CMS/[facility])     last seizure 2014       Allergies   Allergen Reactions   • Contrast Dye Anaphylaxis   • Toradol [Ketorolac Tromethamine] Hives   • Ultram [Tramadol Hcl] Hives   • Zofran [Ondansetron Hcl] Hives       Past Surgical History:   Procedure Laterality Date   • ANKLE FUSION Bilateral    • APPENDECTOMY     • ARM AMPUTATION AT SHOULDER Left    • NE DRAIN LOWER LEG DEEP ABSC/HEMATOMA Left 1/19/2017    Procedure: LOWER EXTREMITY DEBRIDEMENT OSTEOMYELITIS, WOUND REPAIR FLAP;  Surgeon: Simón Reynoso MD;  Location: [facility];  Service: Plastics   • TOTAL HIP ARTHROPLASTY Right 08/2016       Family History   Problem Relation Age of Onset   • Arthritis Mother    • Asthma Mother    • COPD Mother    • Diabetes Mother    • Heart disease Mother    • Hypertension Mother    • Hyperlipidemia Mother    • Cancer Paternal Grandfather        Social History     Social History   • Marital status:      Social History Main Topics   • Smoking status: Heavy Tobacco Smoker     Packs/day:  0.50     Years: 2.00     Types: Cigarettes   • Alcohol use No   • Drug use: No   • Sexual activity: Defer     Other Topics Concern   • Not on file           Objective   Physical Exam   Constitutional: She is oriented to person, place, and time. She appears well-developed and well-nourished. No distress.   HENT:   Head: Normocephalic and atraumatic.   Right Ear: External ear normal.   Left Ear: External ear normal.   Nose: Nose normal.   Eyes: Conjunctivae are normal.   Neck: Normal range of motion. Neck supple. No JVD present. No tracheal deviation present.   Cardiovascular: Normal rate, regular rhythm and normal heart sounds.    No murmur heard.  Pulmonary/Chest: Effort normal and breath sounds normal. No respiratory distress. She has no wheezes.   Cough with sputum production.    Abdominal: Soft. Bowel sounds are normal. There is no tenderness.   Musculoskeletal: She exhibits no edema or deformity.   Left upper extremity amputee.    Neurological: She is alert and oriented to person, place, and time. No cranial nerve deficit.   Skin: Skin is warm and dry. No rash noted. She is not diaphoretic. No erythema. No pallor.   Psychiatric: She has a normal mood and affect. Her behavior is normal. Thought content normal.   Nursing note and vitals reviewed.      Procedures           ED Course  ED Course as of Oct 14 0141   Sun Oct 14, 2018   0140 XR reviewed by Dr. Wharton:  No apparent acute cardio pulmonary disease.   [RB]      ED Course User Index  [RB] Anam Thorne PA                  MDM  Number of Diagnoses or Management Options  Upper respiratory tract infection, unspecified type: new and requires workup     Amount and/or Complexity of Data Reviewed  Tests in the radiology section of CPT®: reviewed    Risk of Complications, Morbidity, and/or Mortality  Presenting problems: low  Diagnostic procedures: low  Management options: low    Patient Progress  Patient progress: stable        Final diagnoses:   Upper respiratory  tract infection, unspecified type            Anam Thorne PA  10/14/18 0143

## 2018-10-16 LAB
BACTERIA SPEC RESP CULT: NORMAL
GRAM STN SPEC: NORMAL

## 2018-10-30 ENCOUNTER — HOSPITAL ENCOUNTER (EMERGENCY)
Facility: HOSPITAL | Age: 43
Discharge: HOME | End: 2018-10-31
Attending: FAMILY MEDICINE | Admitting: FAMILY MEDICINE

## 2018-10-30 ENCOUNTER — APPOINTMENT (OUTPATIENT)
Dept: CT IMAGING | Facility: HOSPITAL | Age: 43
End: 2018-10-30

## 2018-10-30 DIAGNOSIS — K80.20 CALCULUS OF GALLBLADDER WITHOUT CHOLECYSTITIS WITHOUT OBSTRUCTION: ICD-10-CM

## 2018-10-30 DIAGNOSIS — J18.9 PNEUMONIA OF RIGHT LOWER LOBE DUE TO INFECTIOUS ORGANISM: Primary | ICD-10-CM

## 2018-10-30 DIAGNOSIS — K02.9 PAIN DUE TO DENTAL CARIES: ICD-10-CM

## 2018-10-30 LAB
6-ACETYL MORPHINE: NEGATIVE
ALBUMIN SERPL-MCNC: 4.4 G/DL (ref 3.5–5)
ALBUMIN/GLOB SERPL: 1.2 G/DL (ref 1.5–2.5)
ALP SERPL-CCNC: 95 U/L (ref 35–104)
ALT SERPL W P-5'-P-CCNC: 72 U/L (ref 10–36)
AMORPH URATE CRY URNS QL MICRO: ABNORMAL /HPF
AMPHET+METHAMPHET UR QL: NEGATIVE
AMYLASE SERPL-CCNC: 16 U/L (ref 28–100)
ANION GAP SERPL CALCULATED.3IONS-SCNC: 4.3 MMOL/L (ref 3.6–11.2)
AST SERPL-CCNC: 58 U/L (ref 10–30)
BACTERIA UR QL AUTO: ABNORMAL /HPF
BARBITURATES UR QL SCN: NEGATIVE
BASOPHILS # BLD AUTO: 0.03 10*3/MM3 (ref 0–0.3)
BASOPHILS NFR BLD AUTO: 0.3 % (ref 0–2)
BENZODIAZ UR QL SCN: POSITIVE
BILIRUB SERPL-MCNC: 0.4 MG/DL (ref 0.2–1.8)
BILIRUB UR QL STRIP: NEGATIVE
BUN BLD-MCNC: 7 MG/DL (ref 7–21)
BUN/CREAT SERPL: 6 (ref 7–25)
BUPRENORPHINE SERPL-MCNC: NEGATIVE NG/ML
CALCIUM SPEC-SCNC: 9.7 MG/DL (ref 7.7–10)
CANNABINOIDS SERPL QL: NEGATIVE
CHLORIDE SERPL-SCNC: 103 MMOL/L (ref 99–112)
CLARITY UR: ABNORMAL
CO2 SERPL-SCNC: 31.7 MMOL/L (ref 24.3–31.9)
COCAINE UR QL: NEGATIVE
COD CRY URNS QL: ABNORMAL /HPF
COLOR UR: ABNORMAL
CREAT BLD-MCNC: 1.16 MG/DL (ref 0.43–1.29)
DEPRECATED RDW RBC AUTO: 43.3 FL (ref 37–54)
EOSINOPHIL # BLD AUTO: 0.36 10*3/MM3 (ref 0–0.7)
EOSINOPHIL NFR BLD AUTO: 4.1 % (ref 0–5)
ERYTHROCYTE [DISTWIDTH] IN BLOOD BY AUTOMATED COUNT: 12.7 % (ref 11.5–14.5)
GFR SERPL CREATININE-BSD FRML MDRD: 51 ML/MIN/1.73
GLOBULIN UR ELPH-MCNC: 3.8 GM/DL
GLUCOSE BLD-MCNC: 103 MG/DL (ref 70–110)
GLUCOSE UR STRIP-MCNC: NEGATIVE MG/DL
HCT VFR BLD AUTO: 40 % (ref 37–47)
HGB BLD-MCNC: 13.1 G/DL (ref 12–16)
HGB UR QL STRIP.AUTO: NEGATIVE
HYALINE CASTS UR QL AUTO: ABNORMAL /LPF
IMM GRANULOCYTES # BLD: 0.01 10*3/MM3 (ref 0–0.03)
IMM GRANULOCYTES NFR BLD: 0.1 % (ref 0–0.5)
KETONES UR QL STRIP: ABNORMAL
LEUKOCYTE ESTERASE UR QL STRIP.AUTO: NEGATIVE
LIPASE SERPL-CCNC: 22 U/L (ref 13–60)
LYMPHOCYTES # BLD AUTO: 2.17 10*3/MM3 (ref 1–3)
LYMPHOCYTES NFR BLD AUTO: 24.8 % (ref 21–51)
MCH RBC QN AUTO: 31.8 PG (ref 27–33)
MCHC RBC AUTO-ENTMCNC: 32.8 G/DL (ref 33–37)
MCV RBC AUTO: 97.1 FL (ref 80–94)
METHADONE UR QL SCN: NEGATIVE
MONOCYTES # BLD AUTO: 0.57 10*3/MM3 (ref 0.1–0.9)
MONOCYTES NFR BLD AUTO: 6.5 % (ref 0–10)
NEUTROPHILS # BLD AUTO: 5.61 10*3/MM3 (ref 1.4–6.5)
NEUTROPHILS NFR BLD AUTO: 64.2 % (ref 30–70)
NITRITE UR QL STRIP: NEGATIVE
OPIATES UR QL: POSITIVE
OSMOLALITY SERPL CALC.SUM OF ELEC: 275.8 MOSM/KG (ref 273–305)
OXYCODONE UR QL SCN: POSITIVE
PCP UR QL SCN: NEGATIVE
PH UR STRIP.AUTO: 6 [PH] (ref 5–8)
PLATELET # BLD AUTO: 251 10*3/MM3 (ref 130–400)
PMV BLD AUTO: 9.8 FL (ref 6–10)
POTASSIUM BLD-SCNC: 3 MMOL/L (ref 3.5–5.3)
PROT SERPL-MCNC: 8.2 G/DL (ref 6–8)
PROT UR QL STRIP: ABNORMAL
RBC # BLD AUTO: 4.12 10*6/MM3 (ref 4.2–5.4)
RBC # UR: ABNORMAL /HPF
REF LAB TEST METHOD: ABNORMAL
SODIUM BLD-SCNC: 139 MMOL/L (ref 135–153)
SP GR UR STRIP: >1.03 (ref 1–1.03)
SQUAMOUS #/AREA URNS HPF: ABNORMAL /HPF
UROBILINOGEN UR QL STRIP: ABNORMAL
WBC NRBC COR # BLD: 8.75 10*3/MM3 (ref 4.5–12.5)
WBC UR QL AUTO: ABNORMAL /HPF

## 2018-10-30 PROCEDURE — 80307 DRUG TEST PRSMV CHEM ANLYZR: CPT | Performed by: PHYSICIAN ASSISTANT

## 2018-10-30 PROCEDURE — 82150 ASSAY OF AMYLASE: CPT | Performed by: PHYSICIAN ASSISTANT

## 2018-10-30 PROCEDURE — 25010000002 PROMETHAZINE PER 50 MG: Performed by: PHYSICIAN ASSISTANT

## 2018-10-30 PROCEDURE — 99285 EMERGENCY DEPT VISIT HI MDM: CPT

## 2018-10-30 PROCEDURE — 85025 COMPLETE CBC W/AUTO DIFF WBC: CPT | Performed by: PHYSICIAN ASSISTANT

## 2018-10-30 PROCEDURE — 81001 URINALYSIS AUTO W/SCOPE: CPT | Performed by: PHYSICIAN ASSISTANT

## 2018-10-30 PROCEDURE — 80053 COMPREHEN METABOLIC PANEL: CPT | Performed by: PHYSICIAN ASSISTANT

## 2018-10-30 PROCEDURE — 74176 CT ABD & PELVIS W/O CONTRAST: CPT

## 2018-10-30 PROCEDURE — 96361 HYDRATE IV INFUSION ADD-ON: CPT

## 2018-10-30 PROCEDURE — 74176 CT ABD & PELVIS W/O CONTRAST: CPT | Performed by: RADIOLOGY

## 2018-10-30 PROCEDURE — 96374 THER/PROPH/DIAG INJ IV PUSH: CPT

## 2018-10-30 PROCEDURE — 83690 ASSAY OF LIPASE: CPT | Performed by: PHYSICIAN ASSISTANT

## 2018-10-30 RX ORDER — SODIUM CHLORIDE 0.9 % (FLUSH) 0.9 %
10 SYRINGE (ML) INJECTION AS NEEDED
Status: DISCONTINUED | OUTPATIENT
Start: 2018-10-30 | End: 2018-10-31 | Stop reason: HOSPADM

## 2018-10-30 RX ORDER — PROMETHAZINE HYDROCHLORIDE 25 MG/ML
12.5 INJECTION, SOLUTION INTRAMUSCULAR; INTRAVENOUS ONCE
Status: COMPLETED | OUTPATIENT
Start: 2018-10-30 | End: 2018-10-30

## 2018-10-30 RX ADMIN — BENZOCAINE: 200 LIQUID DENTAL; ORAL; PERIODONTAL at 22:56

## 2018-10-30 RX ADMIN — SODIUM CHLORIDE 1000 ML: 9 INJECTION, SOLUTION INTRAVENOUS at 20:57

## 2018-10-30 RX ADMIN — PROMETHAZINE HYDROCHLORIDE 12.5 MG: 25 INJECTION, SOLUTION INTRAMUSCULAR; INTRAVENOUS at 21:12

## 2018-10-31 ENCOUNTER — APPOINTMENT (OUTPATIENT)
Dept: GENERAL RADIOLOGY | Facility: HOSPITAL | Age: 43
End: 2018-10-31

## 2018-10-31 VITALS
WEIGHT: 183 LBS | DIASTOLIC BLOOD PRESSURE: 84 MMHG | TEMPERATURE: 97.5 F | SYSTOLIC BLOOD PRESSURE: 116 MMHG | OXYGEN SATURATION: 97 % | RESPIRATION RATE: 16 BRPM | BODY MASS INDEX: 27.74 KG/M2 | HEIGHT: 68 IN | HEART RATE: 94 BPM

## 2018-10-31 PROCEDURE — 71046 X-RAY EXAM CHEST 2 VIEWS: CPT | Performed by: RADIOLOGY

## 2018-10-31 PROCEDURE — 71046 X-RAY EXAM CHEST 2 VIEWS: CPT

## 2018-10-31 RX ORDER — POTASSIUM CHLORIDE 20 MEQ/1
40 TABLET, EXTENDED RELEASE ORAL ONCE
Status: COMPLETED | OUTPATIENT
Start: 2018-10-31 | End: 2018-10-31

## 2018-10-31 RX ORDER — AMOXICILLIN AND CLAVULANATE POTASSIUM 875; 125 MG/1; MG/1
1 TABLET, FILM COATED ORAL EVERY 12 HOURS
Qty: 20 TABLET | Refills: 0 | Status: SHIPPED | OUTPATIENT
Start: 2018-10-31 | End: 2018-11-10

## 2018-10-31 RX ADMIN — POTASSIUM CHLORIDE 40 MEQ: 1500 TABLET, EXTENDED RELEASE ORAL at 00:55

## 2018-11-04 ENCOUNTER — HOSPITAL ENCOUNTER (EMERGENCY)
Facility: HOSPITAL | Age: 43
Discharge: HOME | End: 2018-11-04
Attending: EMERGENCY MEDICINE | Admitting: EMERGENCY MEDICINE

## 2018-11-04 VITALS
OXYGEN SATURATION: 99 % | DIASTOLIC BLOOD PRESSURE: 76 MMHG | HEART RATE: 109 BPM | WEIGHT: 180 LBS | TEMPERATURE: 98.2 F | BODY MASS INDEX: 28.25 KG/M2 | SYSTOLIC BLOOD PRESSURE: 118 MMHG | RESPIRATION RATE: 16 BRPM | HEIGHT: 67 IN

## 2018-11-04 DIAGNOSIS — Z51.89 VISIT FOR WOUND CHECK: Primary | ICD-10-CM

## 2018-11-04 PROCEDURE — 99283 EMERGENCY DEPT VISIT LOW MDM: CPT

## 2018-11-04 RX ORDER — AMOXICILLIN AND CLAVULANATE POTASSIUM 875; 125 MG/1; MG/1
1 TABLET, FILM COATED ORAL ONCE
Status: DISCONTINUED | OUTPATIENT
Start: 2018-11-04 | End: 2018-11-04 | Stop reason: HOSPADM

## 2018-11-04 NOTE — ED PROVIDER NOTES
Subjective   Paper chart during Epic downtime, see template.            Review of Systems    Past Medical History:   Diagnosis Date   • Amputation of fifth toe, left, traumatic (CMS/HCC)     third, fourth, fifth toes   • Arthritis    • History of transfusion     2007, 2016   • Seizures (CMS/HCC)     last seizure 2014       Allergies   Allergen Reactions   • Contrast Dye Anaphylaxis   • Toradol [Ketorolac Tromethamine] Hives   • Ultram [Tramadol Hcl] Hives   • Zofran [Ondansetron Hcl] Hives       Past Surgical History:   Procedure Laterality Date   • ANKLE FUSION Bilateral    • APPENDECTOMY     • ARM AMPUTATION AT SHOULDER Left    • DC DRAIN LOWER LEG DEEP ABSC/HEMATOMA Left 1/19/2017    Procedure: LOWER EXTREMITY DEBRIDEMENT OSTEOMYELITIS, WOUND REPAIR FLAP;  Surgeon: Simón Reynoso MD;  Location: [facility];  Service: Plastics   • TOTAL HIP ARTHROPLASTY Right 08/2016       Family History   Problem Relation Age of Onset   • Arthritis Mother    • Asthma Mother    • COPD Mother    • Diabetes Mother    • Heart disease Mother    • Hypertension Mother    • Hyperlipidemia Mother    • Cancer Paternal Grandfather        Social History     Social History   • Marital status:      Social History Main Topics   • Smoking status: Heavy Tobacco Smoker     Packs/day: 0.50     Years: 2.00     Types: Cigarettes   • Alcohol use No   • Drug use: No   • Sexual activity: Defer     Other Topics Concern   • Not on file           Objective   Physical Exam    Procedures           ED Course                  MDM      Final diagnoses:   Visit for wound check            Vignesh Barajas MD  11/04/18 9484

## 2018-11-18 ENCOUNTER — HOSPITAL ENCOUNTER (EMERGENCY)
Facility: HOSPITAL | Age: 43
Discharge: HOME | End: 2018-11-18
Attending: GENERAL ACUTE CARE HOSPITAL | Admitting: GENERAL ACUTE CARE HOSPITAL

## 2018-11-18 VITALS
OXYGEN SATURATION: 97 % | BODY MASS INDEX: 27.28 KG/M2 | WEIGHT: 180 LBS | TEMPERATURE: 98.4 F | RESPIRATION RATE: 20 BRPM | HEIGHT: 68 IN | DIASTOLIC BLOOD PRESSURE: 81 MMHG | HEART RATE: 74 BPM | SYSTOLIC BLOOD PRESSURE: 126 MMHG

## 2018-11-18 DIAGNOSIS — R19.7 DIARRHEA, UNSPECIFIED TYPE: ICD-10-CM

## 2018-11-18 DIAGNOSIS — A49.8 CLOSTRIDIUM DIFFICILE INFECTION: Primary | ICD-10-CM

## 2018-11-18 LAB
027 TOXIN: ABNORMAL
ALBUMIN SERPL-MCNC: 3.9 G/DL (ref 3.5–5)
ALBUMIN/GLOB SERPL: 1 G/DL (ref 1.5–2.5)
ALP SERPL-CCNC: 82 U/L (ref 35–104)
ALT SERPL W P-5'-P-CCNC: 60 U/L (ref 10–36)
ANION GAP SERPL CALCULATED.3IONS-SCNC: 7.8 MMOL/L (ref 3.6–11.2)
AST SERPL-CCNC: 63 U/L (ref 10–30)
BASOPHILS # BLD AUTO: 0.05 10*3/MM3 (ref 0–0.3)
BASOPHILS NFR BLD AUTO: 0.8 % (ref 0–2)
BILIRUB SERPL-MCNC: 0.2 MG/DL (ref 0.2–1.8)
BILIRUB UR QL STRIP: NEGATIVE
BUN BLD-MCNC: 8 MG/DL (ref 7–21)
BUN/CREAT SERPL: 9.2 (ref 7–25)
C DIFF TOX GENS STL QL NAA+PROBE: POSITIVE
CALCIUM SPEC-SCNC: 9.5 MG/DL (ref 7.7–10)
CHLORIDE SERPL-SCNC: 111 MMOL/L (ref 99–112)
CLARITY UR: CLEAR
CO2 SERPL-SCNC: 22.2 MMOL/L (ref 24.3–31.9)
COLOR UR: YELLOW
CREAT BLD-MCNC: 0.87 MG/DL (ref 0.43–1.29)
D-LACTATE SERPL-SCNC: 1.1 MMOL/L (ref 0.5–2)
DEPRECATED RDW RBC AUTO: 48.1 FL (ref 37–54)
EOSINOPHIL # BLD AUTO: 0.31 10*3/MM3 (ref 0–0.7)
EOSINOPHIL NFR BLD AUTO: 5.2 % (ref 0–5)
ERYTHROCYTE [DISTWIDTH] IN BLOOD BY AUTOMATED COUNT: 13.3 % (ref 11.5–14.5)
GFR SERPL CREATININE-BSD FRML MDRD: 71 ML/MIN/1.73
GLOBULIN UR ELPH-MCNC: 3.8 GM/DL
GLUCOSE BLD-MCNC: 119 MG/DL (ref 70–110)
GLUCOSE UR STRIP-MCNC: NEGATIVE MG/DL
HCT VFR BLD AUTO: 40.4 % (ref 37–47)
HEMOCCULT STL QL: NEGATIVE
HGB BLD-MCNC: 12.9 G/DL (ref 12–16)
HGB UR QL STRIP.AUTO: NEGATIVE
IMM GRANULOCYTES # BLD: 0.01 10*3/MM3 (ref 0–0.03)
IMM GRANULOCYTES NFR BLD: 0.2 % (ref 0–0.5)
KETONES UR QL STRIP: NEGATIVE
LEUKOCYTE ESTERASE UR QL STRIP.AUTO: NEGATIVE
LYMPHOCYTES # BLD AUTO: 1.07 10*3/MM3 (ref 1–3)
LYMPHOCYTES NFR BLD AUTO: 17.9 % (ref 21–51)
MCH RBC QN AUTO: 31.9 PG (ref 27–33)
MCHC RBC AUTO-ENTMCNC: 31.9 G/DL (ref 33–37)
MCV RBC AUTO: 100 FL (ref 80–94)
MONOCYTES # BLD AUTO: 0.57 10*3/MM3 (ref 0.1–0.9)
MONOCYTES NFR BLD AUTO: 9.5 % (ref 0–10)
NEUTROPHILS # BLD AUTO: 3.96 10*3/MM3 (ref 1.4–6.5)
NEUTROPHILS NFR BLD AUTO: 66.4 % (ref 30–70)
NITRITE UR QL STRIP: NEGATIVE
OSMOLALITY SERPL CALC.SUM OF ELEC: 280.7 MOSM/KG (ref 273–305)
PH UR STRIP.AUTO: <=5 [PH] (ref 5–8)
PLATELET # BLD AUTO: 158 10*3/MM3 (ref 130–400)
PMV BLD AUTO: 11.1 FL (ref 6–10)
POTASSIUM BLD-SCNC: 4 MMOL/L (ref 3.5–5.3)
PROT SERPL-MCNC: 7.7 G/DL (ref 6–8)
PROT UR QL STRIP: NEGATIVE
RBC # BLD AUTO: 4.04 10*6/MM3 (ref 4.2–5.4)
SODIUM BLD-SCNC: 141 MMOL/L (ref 135–153)
SP GR UR STRIP: 1.02 (ref 1–1.03)
UROBILINOGEN UR QL STRIP: NORMAL
WBC NRBC COR # BLD: 5.97 10*3/MM3 (ref 4.5–12.5)

## 2018-11-18 PROCEDURE — 87493 C DIFF AMPLIFIED PROBE: CPT | Performed by: PHYSICIAN ASSISTANT

## 2018-11-18 PROCEDURE — 99284 EMERGENCY DEPT VISIT MOD MDM: CPT

## 2018-11-18 PROCEDURE — 81003 URINALYSIS AUTO W/O SCOPE: CPT | Performed by: PHYSICIAN ASSISTANT

## 2018-11-18 PROCEDURE — 36415 COLL VENOUS BLD VENIPUNCTURE: CPT

## 2018-11-18 PROCEDURE — 80053 COMPREHEN METABOLIC PANEL: CPT | Performed by: PHYSICIAN ASSISTANT

## 2018-11-18 PROCEDURE — 85025 COMPLETE CBC W/AUTO DIFF WBC: CPT | Performed by: PHYSICIAN ASSISTANT

## 2018-11-18 PROCEDURE — 83605 ASSAY OF LACTIC ACID: CPT | Performed by: PHYSICIAN ASSISTANT

## 2018-11-18 PROCEDURE — 82272 OCCULT BLD FECES 1-3 TESTS: CPT | Performed by: PHYSICIAN ASSISTANT

## 2018-11-18 PROCEDURE — 87040 BLOOD CULTURE FOR BACTERIA: CPT | Performed by: PHYSICIAN ASSISTANT

## 2018-11-18 RX ORDER — SODIUM CHLORIDE 0.9 % (FLUSH) 0.9 %
10 SYRINGE (ML) INJECTION AS NEEDED
Status: DISCONTINUED | OUTPATIENT
Start: 2018-11-18 | End: 2018-11-18 | Stop reason: HOSPADM

## 2018-11-18 RX ORDER — CHOLESTYRAMINE 4 G/9G
1 POWDER, FOR SUSPENSION ORAL DAILY
Qty: 20 PACKET | Refills: 0 | Status: SHIPPED | OUTPATIENT
Start: 2018-11-18 | End: 2019-05-05

## 2018-11-18 RX ORDER — METRONIDAZOLE 500 MG/1
500 TABLET ORAL 2 TIMES DAILY
Qty: 20 TABLET | Refills: 0 | Status: SHIPPED | OUTPATIENT
Start: 2018-11-18 | End: 2019-05-05

## 2018-11-18 NOTE — ED NOTES
Discharge instructions reviewed with patient, patient instructed to return to ED if symptoms worsen or if any new problems arise. Patient verbalizes understanding of discharge instructions, patient ambulatory out of ED. No acute distress noted.       Jill Redding RN  11/18/18 4658

## 2018-11-18 NOTE — ED PROVIDER NOTES
Subjective   Patient was discharged from [facility] yesterday after undergoing a left foot 2nd toe amputation on Wednesday.        Diarrhea   The primary symptoms include fever, weight loss, nausea, vomiting and diarrhea. Primary symptoms do not include fatigue, abdominal pain, melena, jaundice, hematochezia, dysuria, myalgias, arthralgias or rash. The illness began 3 to 5 days ago. The onset was gradual. The problem has been gradually worsening.   The illness does not include chills, anorexia, dysphagia, odynophagia, bloating, constipation, tenesmus or back pain. Associated medical issues do not include inflammatory bowel disease, GERD, gallstones, liver disease, alcohol abuse, PUD, bowel resection, irritable bowel syndrome, hemorrhoids or diverticulitis.       Review of Systems   Constitutional: Positive for fever and weight loss. Negative for activity change, appetite change, chills, diaphoresis, fatigue and unexpected weight change.   HENT: Negative.    Respiratory: Negative.    Cardiovascular: Negative.  Negative for chest pain.   Gastrointestinal: Positive for diarrhea, nausea and vomiting. Negative for abdominal pain, anal bleeding, anorexia, bloating, blood in stool, constipation, dysphagia, hematochezia, jaundice, melena and rectal pain.   Endocrine: Negative.    Genitourinary: Negative.  Negative for dysuria.   Musculoskeletal: Negative for arthralgias, back pain and myalgias.   Skin: Negative.  Negative for rash.   Neurological: Negative.    Psychiatric/Behavioral: Negative.    All other systems reviewed and are negative.      Past Medical History:   Diagnosis Date   • Amputation of fifth toe, left, traumatic (CMS/HCC)     third, fourth, fifth toes   • Arthritis    • History of transfusion     2007, 2016   • Seizures (CMS/HCC)     last seizure 2014       Allergies   Allergen Reactions   • Contrast Dye Anaphylaxis   • Meropenem Other (See Comments)     CRAMPING   • Toradol [Ketorolac Tromethamine] Hives   •  Ultram [Tramadol Hcl] Hives   • Zofran [Ondansetron Hcl] Hives   • Zyvox [Linezolid] Rash       Past Surgical History:   Procedure Laterality Date   • ANKLE FUSION Bilateral    • APPENDECTOMY     • ARM AMPUTATION AT SHOULDER Left    • TOTAL HIP ARTHROPLASTY Right 08/2016       Family History   Problem Relation Age of Onset   • Arthritis Mother    • Asthma Mother    • COPD Mother    • Diabetes Mother    • Heart disease Mother    • Hypertension Mother    • Hyperlipidemia Mother    • Cancer Paternal Grandfather        Social History     Socioeconomic History   • Marital status:      Spouse name: Not on file   • Number of children: Not on file   • Years of education: Not on file   • Highest education level: Not on file   Tobacco Use   • Smoking status: Heavy Tobacco Smoker     Packs/day: 0.50     Years: 2.00     Pack years: 1.00     Types: Cigarettes   Substance and Sexual Activity   • Alcohol use: No   • Drug use: No   • Sexual activity: Defer           Objective   Physical Exam   Constitutional: She is oriented to person, place, and time. She appears well-developed and well-nourished. No distress.   HENT:   Head: Normocephalic and atraumatic.   Right Ear: External ear normal.   Left Ear: External ear normal.   Nose: Nose normal.   Eyes: Conjunctivae and EOM are normal. Pupils are equal, round, and reactive to light.   Neck: Normal range of motion. Neck supple. No JVD present. No tracheal deviation present.   Cardiovascular: Normal rate, regular rhythm and normal heart sounds. Exam reveals no gallop and no friction rub.   No murmur heard.  Pulmonary/Chest: Effort normal and breath sounds normal. No stridor. No respiratory distress. She has no wheezes. She has no rales. She exhibits no tenderness.   Abdominal: Soft. Bowel sounds are normal. She exhibits no distension. There is no tenderness. There is no rebound and no guarding. No hernia.   Musculoskeletal: Normal range of motion. She exhibits no edema,  tenderness or deformity.   Left foot 2nd toe amputation wound is well closed with sutures. No signs of infection. No bleeding.    Neurological: She is alert and oriented to person, place, and time. No cranial nerve deficit.   Skin: Skin is warm and dry. No rash noted. She is not diaphoretic. No erythema. No pallor.   Psychiatric: She has a normal mood and affect. Her behavior is normal. Thought content normal.   Nursing note and vitals reviewed.      Procedures           ED Course  ED Course as of Nov 18 0844   Sun Nov 18, 2018   0755 Signed out to Harvey Thorne NP   [MM]      ED Course User Index  [MM] Rachel Charlton PA                  [facility]      Final diagnoses:   Clostridium difficile infection   Diarrhea, unspecified type            Lester Thorne P, APRN  11/18/18 0893

## 2018-11-23 LAB
BACTERIA SPEC AEROBE CULT: NORMAL
BACTERIA SPEC AEROBE CULT: NORMAL

## 2018-12-13 ENCOUNTER — HOSPITAL ENCOUNTER (EMERGENCY)
Facility: HOSPITAL | Age: 43
Discharge: HOME | End: 2018-12-13
Attending: EMERGENCY MEDICINE | Admitting: NURSE PRACTITIONER

## 2018-12-13 ENCOUNTER — APPOINTMENT (OUTPATIENT)
Dept: GENERAL RADIOLOGY | Facility: HOSPITAL | Age: 43
End: 2018-12-13

## 2018-12-13 VITALS
RESPIRATION RATE: 16 BRPM | OXYGEN SATURATION: 98 % | WEIGHT: 197 LBS | BODY MASS INDEX: 30.92 KG/M2 | HEART RATE: 102 BPM | SYSTOLIC BLOOD PRESSURE: 148 MMHG | DIASTOLIC BLOOD PRESSURE: 90 MMHG | TEMPERATURE: 97.8 F | HEIGHT: 67 IN

## 2018-12-13 DIAGNOSIS — M25.551 RIGHT HIP PAIN: Primary | ICD-10-CM

## 2018-12-13 LAB — B-HCG UR QL: NEGATIVE

## 2018-12-13 PROCEDURE — 99283 EMERGENCY DEPT VISIT LOW MDM: CPT

## 2018-12-13 PROCEDURE — 81025 URINE PREGNANCY TEST: CPT | Performed by: NURSE PRACTITIONER

## 2018-12-13 PROCEDURE — 73502 X-RAY EXAM HIP UNI 2-3 VIEWS: CPT | Performed by: RADIOLOGY

## 2018-12-13 PROCEDURE — 73502 X-RAY EXAM HIP UNI 2-3 VIEWS: CPT

## 2018-12-13 NOTE — ED PROVIDER NOTES
Subjective     History provided by:  Patient  Fall   Mechanism of injury: fall    Injury location:  Pelvis  Pelvic injury location:  R hip  Arrived directly from scene: yes    Fall:     Fall occurred:  Standing    Impact surface:  Hard floor    Point of impact:  Unable to specify  Suspicion of alcohol use: no    Suspicion of drug use: no    Tetanus status:  Up to date  Associated symptoms: no abdominal pain and no chest pain        Review of Systems   Constitutional: Negative.  Negative for fever.   HENT: Negative.    Respiratory: Negative.    Cardiovascular: Negative.  Negative for chest pain.   Gastrointestinal: Negative.  Negative for abdominal pain.   Endocrine: Negative.    Genitourinary: Negative.  Negative for dysuria.   Skin: Negative.    Neurological: Negative.    Psychiatric/Behavioral: Negative.    All other systems reviewed and are negative.      Past Medical History:   Diagnosis Date   • Amputation of fifth toe, left, traumatic (CMS/[facility])     third, fourth, fifth toes   • Arthritis    • History of transfusion     2007, 2016   • Seizures (CMS/[facility])     last seizure 2014       Allergies   Allergen Reactions   • Contrast Dye Anaphylaxis   • Meropenem Other (See Comments)     CRAMPING   • Toradol [Ketorolac Tromethamine] Hives   • Ultram [Tramadol Hcl] Hives   • Zofran [Ondansetron Hcl] Hives   • Zyvox [Linezolid] Rash       Past Surgical History:   Procedure Laterality Date   • ANKLE FUSION Bilateral    • APPENDECTOMY     • ARM AMPUTATION AT SHOULDER Left    • TOTAL HIP ARTHROPLASTY Right 08/2016       Family History   Problem Relation Age of Onset   • Arthritis Mother    • Asthma Mother    • COPD Mother    • Diabetes Mother    • Heart disease Mother    • Hypertension Mother    • Hyperlipidemia Mother    • Cancer Paternal Grandfather        Social History     Socioeconomic History   • Marital status:      Spouse name: Not on file   • Number of children: Not on file   • Years of education: Not on file    • Highest education level: Not on file   Tobacco Use   • Smoking status: Heavy Tobacco Smoker     Packs/day: 0.50     Years: 2.00     Pack years: 1.00     Types: Cigarettes   Substance and Sexual Activity   • Alcohol use: No   • Drug use: No   • Sexual activity: Defer           Objective   Physical Exam   Constitutional: She is oriented to person, place, and time. She appears well-developed and well-nourished. No distress.   HENT:   Head: Normocephalic and atraumatic.   Right Ear: External ear normal.   Left Ear: External ear normal.   Nose: Nose normal.   Eyes: Conjunctivae and EOM are normal. Pupils are equal, round, and reactive to light.   Neck: Normal range of motion. Neck supple. No JVD present. No tracheal deviation present.   Cardiovascular: Normal rate, regular rhythm and normal heart sounds.   No murmur heard.  Pulmonary/Chest: Effort normal and breath sounds normal. No respiratory distress. She has no wheezes.   Abdominal: Soft. Bowel sounds are normal. There is no tenderness.   Musculoskeletal: Normal range of motion. She exhibits no edema or deformity.   Neurological: She is alert and oriented to person, place, and time. No cranial nerve deficit.   Skin: Skin is warm and dry. No rash noted. She is not diaphoretic. No erythema. No pallor.   Psychiatric: She has a normal mood and affect. Her behavior is normal. Thought content normal.   Nursing note and vitals reviewed.      Procedures           ED Course                  MDM      Final diagnoses:   Right hip pain            Lupe Wilson, APRN  12/13/18 0226

## 2018-12-13 NOTE — ED NOTES
Report received from BIBI Louis RN at bedside at this time.      Lu Fry, MARÍA ELENA  12/13/18 0122

## 2019-02-12 ENCOUNTER — HOSPITAL ENCOUNTER (EMERGENCY)
Facility: HOSPITAL | Age: 44
Discharge: HOME | End: 2019-02-13
Attending: FAMILY MEDICINE | Admitting: FAMILY MEDICINE

## 2019-02-12 ENCOUNTER — APPOINTMENT (OUTPATIENT)
Dept: GENERAL RADIOLOGY | Facility: HOSPITAL | Age: 44
End: 2019-02-12

## 2019-02-12 DIAGNOSIS — K80.50 BILIARY COLIC: Primary | ICD-10-CM

## 2019-02-12 DIAGNOSIS — K80.20 CALCULUS OF GALLBLADDER WITHOUT CHOLECYSTITIS WITHOUT OBSTRUCTION: ICD-10-CM

## 2019-02-12 LAB
ALBUMIN SERPL-MCNC: 3.7 G/DL (ref 3.5–5)
ALBUMIN/GLOB SERPL: 1.2 G/DL (ref 1.5–2.5)
ALP SERPL-CCNC: 68 U/L (ref 35–104)
ALT SERPL W P-5'-P-CCNC: 48 U/L (ref 10–36)
ANION GAP SERPL CALCULATED.3IONS-SCNC: 7.5 MMOL/L (ref 3.6–11.2)
AST SERPL-CCNC: 45 U/L (ref 10–30)
B-HCG UR QL: NEGATIVE
BACTERIA UR QL AUTO: ABNORMAL /HPF
BASOPHILS # BLD AUTO: 0.02 10*3/MM3 (ref 0–0.3)
BASOPHILS NFR BLD AUTO: 0.3 % (ref 0–2)
BILIRUB SERPL-MCNC: 0.1 MG/DL (ref 0.2–1.8)
BILIRUB UR QL STRIP: NEGATIVE
BUN BLD-MCNC: 10 MG/DL (ref 7–21)
BUN/CREAT SERPL: 11.2 (ref 7–25)
CALCIUM SPEC-SCNC: 9 MG/DL (ref 7.7–10)
CHLORIDE SERPL-SCNC: 108 MMOL/L (ref 99–112)
CLARITY UR: CLEAR
CO2 SERPL-SCNC: 26.5 MMOL/L (ref 24.3–31.9)
COLOR UR: YELLOW
CREAT BLD-MCNC: 0.89 MG/DL (ref 0.43–1.29)
DEPRECATED RDW RBC AUTO: 48.2 FL (ref 37–54)
EOSINOPHIL # BLD AUTO: 0.4 10*3/MM3 (ref 0–0.7)
EOSINOPHIL NFR BLD AUTO: 6.1 % (ref 0–5)
ERYTHROCYTE [DISTWIDTH] IN BLOOD BY AUTOMATED COUNT: 13.3 % (ref 11.5–14.5)
GFR SERPL CREATININE-BSD FRML MDRD: 70 ML/MIN/1.73
GLOBULIN UR ELPH-MCNC: 3.1 GM/DL
GLUCOSE BLD-MCNC: 102 MG/DL (ref 70–110)
GLUCOSE UR STRIP-MCNC: NEGATIVE MG/DL
HCT VFR BLD AUTO: 38.4 % (ref 37–47)
HGB BLD-MCNC: 12.7 G/DL (ref 12–16)
HGB UR QL STRIP.AUTO: ABNORMAL
HOLD SPECIMEN: NORMAL
HOLD SPECIMEN: NORMAL
HYALINE CASTS UR QL AUTO: ABNORMAL /LPF
IMM GRANULOCYTES # BLD AUTO: 0.02 10*3/MM3 (ref 0–0.03)
IMM GRANULOCYTES NFR BLD AUTO: 0.3 % (ref 0–0.5)
KETONES UR QL STRIP: NEGATIVE
LEUKOCYTE ESTERASE UR QL STRIP.AUTO: NEGATIVE
LIPASE SERPL-CCNC: 29 U/L (ref 13–60)
LYMPHOCYTES # BLD AUTO: 2.08 10*3/MM3 (ref 1–3)
LYMPHOCYTES NFR BLD AUTO: 31.9 % (ref 21–51)
MCH RBC QN AUTO: 32.5 PG (ref 27–33)
MCHC RBC AUTO-ENTMCNC: 33.1 G/DL (ref 33–37)
MCV RBC AUTO: 98.2 FL (ref 80–94)
MONOCYTES # BLD AUTO: 0.54 10*3/MM3 (ref 0.1–0.9)
MONOCYTES NFR BLD AUTO: 8.3 % (ref 0–10)
NEUTROPHILS # BLD AUTO: 3.47 10*3/MM3 (ref 1.4–6.5)
NEUTROPHILS NFR BLD AUTO: 53.1 % (ref 30–70)
NITRITE UR QL STRIP: NEGATIVE
OSMOLALITY SERPL CALC.SUM OF ELEC: 282.4 MOSM/KG (ref 273–305)
PH UR STRIP.AUTO: 8 [PH] (ref 5–8)
PLATELET # BLD AUTO: 213 10*3/MM3 (ref 130–400)
PMV BLD AUTO: 10.8 FL (ref 6–10)
POTASSIUM BLD-SCNC: 3.5 MMOL/L (ref 3.5–5.3)
PROT SERPL-MCNC: 6.8 G/DL (ref 6–8)
PROT UR QL STRIP: NEGATIVE
RBC # BLD AUTO: 3.91 10*6/MM3 (ref 4.2–5.4)
RBC # UR: ABNORMAL /HPF
REF LAB TEST METHOD: ABNORMAL
SODIUM BLD-SCNC: 142 MMOL/L (ref 135–153)
SP GR UR STRIP: 1.02 (ref 1–1.03)
SQUAMOUS #/AREA URNS HPF: ABNORMAL /HPF
TROPONIN I SERPL-MCNC: <0.006 NG/ML
UROBILINOGEN UR QL STRIP: ABNORMAL
WBC NRBC COR # BLD: 6.53 10*3/MM3 (ref 4.5–12.5)
WBC UR QL AUTO: ABNORMAL /HPF
WHOLE BLOOD HOLD SPECIMEN: NORMAL
WHOLE BLOOD HOLD SPECIMEN: NORMAL

## 2019-02-12 PROCEDURE — 81001 URINALYSIS AUTO W/SCOPE: CPT | Performed by: FAMILY MEDICINE

## 2019-02-12 PROCEDURE — 83690 ASSAY OF LIPASE: CPT | Performed by: FAMILY MEDICINE

## 2019-02-12 PROCEDURE — 99284 EMERGENCY DEPT VISIT MOD MDM: CPT

## 2019-02-12 PROCEDURE — 93010 ELECTROCARDIOGRAM REPORT: CPT | Performed by: INTERNAL MEDICINE

## 2019-02-12 PROCEDURE — 85025 COMPLETE CBC W/AUTO DIFF WBC: CPT | Performed by: FAMILY MEDICINE

## 2019-02-12 PROCEDURE — 84484 ASSAY OF TROPONIN QUANT: CPT | Performed by: FAMILY MEDICINE

## 2019-02-12 PROCEDURE — 93005 ELECTROCARDIOGRAM TRACING: CPT | Performed by: FAMILY MEDICINE

## 2019-02-12 PROCEDURE — 81025 URINE PREGNANCY TEST: CPT | Performed by: FAMILY MEDICINE

## 2019-02-12 PROCEDURE — 36415 COLL VENOUS BLD VENIPUNCTURE: CPT

## 2019-02-12 PROCEDURE — 80053 COMPREHEN METABOLIC PANEL: CPT | Performed by: FAMILY MEDICINE

## 2019-02-13 ENCOUNTER — APPOINTMENT (OUTPATIENT)
Dept: ULTRASOUND IMAGING | Facility: HOSPITAL | Age: 44
End: 2019-02-13

## 2019-02-13 VITALS
SYSTOLIC BLOOD PRESSURE: 118 MMHG | WEIGHT: 201 LBS | HEIGHT: 67 IN | DIASTOLIC BLOOD PRESSURE: 75 MMHG | TEMPERATURE: 98.4 F | RESPIRATION RATE: 16 BRPM | BODY MASS INDEX: 31.55 KG/M2 | HEART RATE: 82 BPM | OXYGEN SATURATION: 97 %

## 2019-02-13 PROCEDURE — 76705 ECHO EXAM OF ABDOMEN: CPT | Performed by: RADIOLOGY

## 2019-02-13 PROCEDURE — 25010000002 MORPHINE PER 10 MG: Performed by: FAMILY MEDICINE

## 2019-02-13 PROCEDURE — 76705 ECHO EXAM OF ABDOMEN: CPT

## 2019-02-13 PROCEDURE — 25010000002 PROCHLORPERAZINE EDISYLATE PER 10 MG: Performed by: FAMILY MEDICINE

## 2019-02-13 PROCEDURE — 96374 THER/PROPH/DIAG INJ IV PUSH: CPT

## 2019-02-13 PROCEDURE — 96375 TX/PRO/DX INJ NEW DRUG ADDON: CPT

## 2019-02-13 RX ORDER — SODIUM CHLORIDE 0.9 % (FLUSH) 0.9 %
10 SYRINGE (ML) INJECTION AS NEEDED
Status: DISCONTINUED | OUTPATIENT
Start: 2019-02-13 | End: 2019-02-13 | Stop reason: HOSPADM

## 2019-02-13 RX ORDER — SODIUM CHLORIDE 9 MG/ML
125 INJECTION, SOLUTION INTRAVENOUS CONTINUOUS
Status: DISCONTINUED | OUTPATIENT
Start: 2019-02-13 | End: 2019-02-13

## 2019-02-13 RX ORDER — HYDROCODONE BITARTRATE AND ACETAMINOPHEN 5; 325 MG/1; MG/1
1 TABLET ORAL EVERY 6 HOURS PRN
Status: DISCONTINUED | OUTPATIENT
Start: 2019-02-13 | End: 2019-02-13 | Stop reason: HOSPADM

## 2019-02-13 RX ORDER — HYDROCODONE BITARTRATE AND ACETAMINOPHEN 5; 325 MG/1; MG/1
1 TABLET ORAL EVERY 6 HOURS PRN
Qty: 12 TABLET | Refills: 0 | Status: SHIPPED | OUTPATIENT
Start: 2019-02-13 | End: 2019-02-13

## 2019-02-13 RX ORDER — ONDANSETRON 4 MG/1
4 TABLET, FILM COATED ORAL EVERY 6 HOURS
Qty: 10 TABLET | Refills: 0 | Status: SHIPPED | OUTPATIENT
Start: 2019-02-13 | End: 2019-02-13

## 2019-02-13 RX ORDER — GLYCOPYRROLATE 1 MG/1
1 TABLET ORAL ONCE
Status: COMPLETED | OUTPATIENT
Start: 2019-02-13 | End: 2019-02-13

## 2019-02-13 RX ORDER — PROCHLORPERAZINE MALEATE 10 MG
10 TABLET ORAL EVERY 6 HOURS PRN
Qty: 10 TABLET | Refills: 0 | Status: SHIPPED | OUTPATIENT
Start: 2019-02-13 | End: 2019-05-05

## 2019-02-13 RX ORDER — ONDANSETRON 2 MG/ML
4 INJECTION INTRAMUSCULAR; INTRAVENOUS ONCE
Status: DISCONTINUED | OUTPATIENT
Start: 2019-02-13 | End: 2019-02-13 | Stop reason: HOSPADM

## 2019-02-13 RX ADMIN — SODIUM CHLORIDE 1000 ML: 9 INJECTION, SOLUTION INTRAVENOUS at 02:04

## 2019-02-13 RX ADMIN — HYDROCODONE BITARTRATE AND ACETAMINOPHEN 1 TABLET: 5; 325 TABLET ORAL at 03:42

## 2019-02-13 RX ADMIN — MORPHINE SULFATE 2 MG: 4 INJECTION INTRAVENOUS at 01:32

## 2019-02-13 RX ADMIN — GLYCOPYRROLATE 1 MG: 1 TABLET ORAL at 03:08

## 2019-02-13 RX ADMIN — PROCHLORPERAZINE EDISYLATE 10 MG: 5 INJECTION INTRAMUSCULAR; INTRAVENOUS at 02:06

## 2019-02-13 NOTE — ED PROVIDER NOTES
Subjective   83-year-old white female presents emergency department complaining of right upper quadrant pain it's been intermittent for several months she's had approximately a month ago she was here and had a workup done and they said that they thought she had gallbladder dysfunction and told her to follow-up with the surgeon that she's had multiple deaths in her family so she has been delayed - says over the past 2 weeks she has had episodes of nausea and vomiting.        History provided by:  Patient  Abdominal Pain   Pain location:  RUQ  Pain quality: aching and gnawing    Pain radiates to:  Does not radiate  Pain severity:  Mild  Onset quality:  Gradual  Timing:  Intermittent  Progression:  Waxing and waning  Chronicity:  Chronic  Context: diet changes and eating    Associated symptoms: no chest pain, no dysuria and no fever        Review of Systems   Constitutional: Negative.  Negative for fever.   HENT: Negative.    Respiratory: Negative.    Cardiovascular: Negative.  Negative for chest pain.   Gastrointestinal: Positive for abdominal pain.   Endocrine: Negative.    Genitourinary: Negative.  Negative for dysuria.   Skin: Negative.    Neurological: Negative.    Psychiatric/Behavioral: Negative.    All other systems reviewed and are negative.      Past Medical History:   Diagnosis Date   • Amputation of fifth toe, left, traumatic (CMS/HCC)     third, fourth, fifth toes   • Arthritis    • History of transfusion     2007, 2016   • Seizures (CMS/HCC)     last seizure 2014       Allergies   Allergen Reactions   • Contrast Dye Anaphylaxis   • Meropenem Other (See Comments)     CRAMPING   • Toradol [Ketorolac Tromethamine] Hives   • Ultram [Tramadol Hcl] Hives   • Zofran [Ondansetron Hcl] Hives   • Zyvox [Linezolid] Rash       Past Surgical History:   Procedure Laterality Date   • ANKLE FUSION Bilateral    • APPENDECTOMY     • ARM AMPUTATION AT SHOULDER Left    • MT DRAIN LOWER LEG DEEP ABSC/HEMATOMA Left 1/19/2017     Procedure: LOWER EXTREMITY DEBRIDEMENT OSTEOMYELITIS, WOUND REPAIR FLAP;  Surgeon: Simón Reynoso MD;  Location: [facility];  Service: Plastics   • TOTAL HIP ARTHROPLASTY Right 08/2016       Family History   Problem Relation Age of Onset   • Arthritis Mother    • Asthma Mother    • COPD Mother    • Diabetes Mother    • Heart disease Mother    • Hypertension Mother    • Hyperlipidemia Mother    • Cancer Paternal Grandfather        Social History     Socioeconomic History   • Marital status:      Spouse name: Not on file   • Number of children: Not on file   • Years of education: Not on file   • Highest education level: Not on file   Tobacco Use   • Smoking status: Heavy Tobacco Smoker     Packs/day: 0.50     Years: 2.00     Pack years: 1.00     Types: Cigarettes   Substance and Sexual Activity   • Alcohol use: No   • Drug use: No   • Sexual activity: Defer           Objective   Physical Exam   Constitutional: She is oriented to person, place, and time. She appears well-developed and well-nourished.   HENT:   Head: Normocephalic and atraumatic.   Mouth/Throat: Oropharynx is clear and moist.   Eyes: EOM are normal. Pupils are equal, round, and reactive to light.   Cardiovascular: Normal rate and regular rhythm.   Pulmonary/Chest: Effort normal.   Abdominal: Soft. Normal appearance and bowel sounds are normal. There is tenderness in the right upper quadrant. There is no rigidity and no guarding.   Neurological: She is alert and oriented to person, place, and time.   Skin: Skin is warm. Capillary refill takes less than 2 seconds. She is not diaphoretic.   Psychiatric: She has a normal mood and affect. Her behavior is normal.   Nursing note and vitals reviewed.      Procedures           ED Course                  MDM  Number of Diagnoses or Management Options  Biliary colic: new and requires workup  Calculus of gallbladder without cholecystitis without obstruction: new and requires workup     Amount and/or  Complexity of Data Reviewed  Clinical lab tests: ordered and reviewed  Tests in the radiology section of CPT®: ordered and reviewed  Tests in the medicine section of CPT®: reviewed and ordered  Independent visualization of images, tracings, or specimens: yes    Risk of Complications, Morbidity, and/or Mortality  Presenting problems: high  Diagnostic procedures: moderate  Management options: moderate    Patient Progress  Patient progress: stable        Final diagnoses:   Biliary colic   Calculus of gallbladder without cholecystitis without obstruction            Marion Wharton DO  02/13/19 [id number]

## 2019-02-13 NOTE — ED NOTES
I ran a Ron on the patient per Dr. Wharton.     [facility]  02/13/19 7205

## 2019-02-18 ENCOUNTER — HOSPITAL ENCOUNTER (EMERGENCY)
Facility: HOSPITAL | Age: 44
Discharge: HOME | End: 2019-02-18
Attending: GENERAL ACUTE CARE HOSPITAL | Admitting: GENERAL ACUTE CARE HOSPITAL

## 2019-02-18 VITALS
HEART RATE: 116 BPM | OXYGEN SATURATION: 99 % | DIASTOLIC BLOOD PRESSURE: 72 MMHG | HEIGHT: 67 IN | WEIGHT: 190 LBS | BODY MASS INDEX: 29.82 KG/M2 | RESPIRATION RATE: 18 BRPM | TEMPERATURE: 98 F | SYSTOLIC BLOOD PRESSURE: 122 MMHG

## 2019-02-18 DIAGNOSIS — K04.7 DENTAL ABSCESS: Primary | ICD-10-CM

## 2019-02-18 PROCEDURE — 99283 EMERGENCY DEPT VISIT LOW MDM: CPT

## 2019-02-18 RX ORDER — CLINDAMYCIN HYDROCHLORIDE 150 MG/1
600 CAPSULE ORAL ONCE
Status: COMPLETED | OUTPATIENT
Start: 2019-02-18 | End: 2019-02-18

## 2019-02-18 RX ORDER — CLINDAMYCIN HYDROCHLORIDE 150 MG/1
150 CAPSULE ORAL 3 TIMES DAILY
Qty: 30 CAPSULE | Refills: 0 | Status: SHIPPED | OUTPATIENT
Start: 2019-02-18 | End: 2019-05-05

## 2019-02-18 RX ADMIN — CLINDAMYCIN HYDROCHLORIDE 600 MG: 150 CAPSULE ORAL at 23:08

## 2019-02-19 NOTE — ED PROVIDER NOTES
Subjective     History provided by:  Patient   used: No    Dental Pain   Location:  Lower  Quality:  Aching  Severity:  Moderate  Onset quality:  Gradual  Duration:  3 days  Timing:  Constant  Progression:  Worsening  Context: dental caries, dental fracture and poor dentition    Relieved by:  Nothing  Worsened by:  Cold food/drink, hot food/drink, touching, jaw movement and pressure  Ineffective treatments:  None tried  Associated symptoms: gum swelling    Associated symptoms: no congestion, no difficulty swallowing, no drooling, no facial pain, no facial swelling, no fever, no neck pain, no neck swelling and no oral lesions    Risk factors: smoking        Review of Systems   Constitutional: Negative.  Negative for fever.   HENT: Positive for dental problem. Negative for congestion, drooling, facial swelling, mouth sores and voice change.    Respiratory: Negative.    Cardiovascular: Negative.  Negative for chest pain.   Gastrointestinal: Negative.  Negative for abdominal pain.   Endocrine: Negative.    Genitourinary: Negative.  Negative for dysuria.   Musculoskeletal: Negative for neck pain.   Skin: Negative.    Neurological: Negative.    Psychiatric/Behavioral: Negative.    All other systems reviewed and are negative.      Past Medical History:   Diagnosis Date   • Amputation of fifth toe, left, traumatic (CMS/HCC)     third, fourth, fifth toes   • Arthritis    • History of transfusion     2007, 2016   • Seizures (CMS/[facility])     last seizure 2014       Allergies   Allergen Reactions   • Contrast Dye Anaphylaxis   • Meropenem Other (See Comments)     CRAMPING   • Toradol [Ketorolac Tromethamine] Hives   • Ultram [Tramadol Hcl] Hives   • Zofran [Ondansetron Hcl] Hives   • Zyvox [Linezolid] Rash       Past Surgical History:   Procedure Laterality Date   • ANKLE FUSION Bilateral    • APPENDECTOMY     • ARM AMPUTATION AT SHOULDER Left    • CO DRAIN LOWER LEG DEEP ABSC/HEMATOMA Left 1/19/2017     Procedure: LOWER EXTREMITY DEBRIDEMENT OSTEOMYELITIS, WOUND REPAIR FLAP;  Surgeon: Simón Reynoso MD;  Location: [facility];  Service: Plastics   • TOTAL HIP ARTHROPLASTY Right 08/2016       Family History   Problem Relation Age of Onset   • Arthritis Mother    • Asthma Mother    • COPD Mother    • Diabetes Mother    • Heart disease Mother    • Hypertension Mother    • Hyperlipidemia Mother    • Cancer Paternal Grandfather        Social History     Socioeconomic History   • Marital status:      Spouse name: Not on file   • Number of children: Not on file   • Years of education: Not on file   • Highest education level: Not on file   Tobacco Use   • Smoking status: Heavy Tobacco Smoker     Packs/day: 0.50     Years: 2.00     Pack years: 1.00     Types: Cigarettes   Substance and Sexual Activity   • Alcohol use: No   • Drug use: No   • Sexual activity: Defer           Objective   Physical Exam   Constitutional: She is oriented to person, place, and time. She appears well-developed and well-nourished. No distress.   HENT:   Head: Normocephalic and atraumatic.   Right Ear: Hearing, tympanic membrane, external ear and ear canal normal.   Left Ear: Hearing, tympanic membrane, external ear and ear canal normal.   Nose: Nose normal. Right sinus exhibits no maxillary sinus tenderness and no frontal sinus tenderness. Left sinus exhibits no maxillary sinus tenderness and no frontal sinus tenderness.   Mouth/Throat: Uvula is midline, oropharynx is clear and moist and mucous membranes are normal. Abnormal dentition. Dental abscesses and dental caries present.       Eyes: Conjunctivae and EOM are normal. Pupils are equal, round, and reactive to light.   Neck: Normal range of motion. Neck supple. No JVD present. No tracheal deviation present.   Cardiovascular: Normal rate, regular rhythm and normal heart sounds.   No murmur heard.  Pulmonary/Chest: Effort normal and breath sounds normal. No respiratory distress. She has no  wheezes.   Abdominal: Soft. Bowel sounds are normal. There is no tenderness.   Musculoskeletal: Normal range of motion. She exhibits no edema or deformity.   Neurological: She is alert and oriented to person, place, and time. No cranial nerve deficit.   Skin: Skin is warm and dry. No rash noted. She is not diaphoretic. No erythema. No pallor.   Psychiatric: She has a normal mood and affect. Her behavior is normal. Thought content normal.   Nursing note and vitals reviewed.      Procedures           ED Course                  MDM  Number of Diagnoses or Management Options  Dental abscess: new and does not require workup  Risk of Complications, Morbidity, and/or Mortality  Presenting problems: moderate  Diagnostic procedures: minimal  Management options: moderate    Patient Progress  Patient progress: stable        Final diagnoses:   Dental abscess            Carmel Irene [id number]  02/18/19 [id number]

## 2019-05-04 ENCOUNTER — HOSPITAL ENCOUNTER (INPATIENT)
Facility: HOSPITAL | Age: 44
LOS: 6 days | Discharge: HOME | End: 2019-05-11
Attending: EMERGENCY MEDICINE | Admitting: INTERNAL MEDICINE

## 2019-05-04 ENCOUNTER — APPOINTMENT (OUTPATIENT)
Dept: GENERAL RADIOLOGY | Facility: HOSPITAL | Age: 44
End: 2019-05-04

## 2019-05-04 DIAGNOSIS — J18.9 COMMUNITY ACQUIRED PNEUMONIA OF RIGHT LUNG, UNSPECIFIED PART OF LUNG: ICD-10-CM

## 2019-05-04 DIAGNOSIS — J18.9 COMMUNITY ACQUIRED PNEUMONIA OF LEFT LUNG, UNSPECIFIED PART OF LUNG: Primary | ICD-10-CM

## 2019-05-04 PROCEDURE — 82375 ASSAY CARBOXYHB QUANT: CPT | Performed by: EMERGENCY MEDICINE

## 2019-05-04 PROCEDURE — 83050 HGB METHEMOGLOBIN QUAN: CPT | Performed by: EMERGENCY MEDICINE

## 2019-05-04 PROCEDURE — 36600 WITHDRAWAL OF ARTERIAL BLOOD: CPT | Performed by: EMERGENCY MEDICINE

## 2019-05-04 PROCEDURE — 99285 EMERGENCY DEPT VISIT HI MDM: CPT

## 2019-05-04 PROCEDURE — C1751 CATH, INF, PER/CENT/MIDLINE: HCPCS

## 2019-05-04 PROCEDURE — 82805 BLOOD GASES W/O2 SATURATION: CPT | Performed by: EMERGENCY MEDICINE

## 2019-05-04 PROCEDURE — 94799 UNLISTED PULMONARY SVC/PX: CPT

## 2019-05-04 PROCEDURE — 94640 AIRWAY INHALATION TREATMENT: CPT

## 2019-05-04 RX ORDER — IPRATROPIUM BROMIDE AND ALBUTEROL SULFATE 2.5; .5 MG/3ML; MG/3ML
3 SOLUTION RESPIRATORY (INHALATION) ONCE
Status: COMPLETED | OUTPATIENT
Start: 2019-05-04 | End: 2019-05-04

## 2019-05-04 RX ORDER — SODIUM CHLORIDE 0.9 % (FLUSH) 0.9 %
10 SYRINGE (ML) INJECTION AS NEEDED
Status: DISCONTINUED | OUTPATIENT
Start: 2019-05-04 | End: 2019-05-11 | Stop reason: HOSPADM

## 2019-05-04 RX ADMIN — IPRATROPIUM BROMIDE AND ALBUTEROL SULFATE 3 ML: .5; 3 SOLUTION RESPIRATORY (INHALATION) at 23:54

## 2019-05-05 ENCOUNTER — APPOINTMENT (OUTPATIENT)
Dept: CT IMAGING | Facility: HOSPITAL | Age: 44
End: 2019-05-05

## 2019-05-05 ENCOUNTER — APPOINTMENT (OUTPATIENT)
Dept: GENERAL RADIOLOGY | Facility: HOSPITAL | Age: 44
End: 2019-05-05

## 2019-05-05 ENCOUNTER — APPOINTMENT (OUTPATIENT)
Dept: CARDIOLOGY | Facility: HOSPITAL | Age: 44
End: 2019-05-05

## 2019-05-05 PROBLEM — J18.9 COMMUNITY ACQUIRED PNEUMONIA OF LEFT LUNG: Status: ACTIVE | Noted: 2019-05-05

## 2019-05-05 LAB
6-ACETYL MORPHINE: NEGATIVE
A-A DO2: 42 MMHG (ref 0–300)
ALBUMIN SERPL-MCNC: 2.81 G/DL (ref 3.5–5.2)
ALBUMIN SERPL-MCNC: 3.84 G/DL (ref 3.5–5.2)
ALBUMIN/GLOB SERPL: 0.9 G/DL
ALBUMIN/GLOB SERPL: 0.9 G/DL
ALP SERPL-CCNC: 140 U/L (ref 39–117)
ALP SERPL-CCNC: 97 U/L (ref 39–117)
ALT SERPL W P-5'-P-CCNC: 103 U/L (ref 1–33)
ALT SERPL W P-5'-P-CCNC: 84 U/L (ref 1–33)
AMPHET+METHAMPHET UR QL: NEGATIVE
ANION GAP SERPL CALCULATED.3IONS-SCNC: 10 MMOL/L
ANION GAP SERPL CALCULATED.3IONS-SCNC: 13.9 MMOL/L
APAP SERPL-MCNC: <5 MCG/ML (ref 10–30)
ARTERIAL PATENCY WRIST A: ABNORMAL
AST SERPL-CCNC: 416 U/L (ref 1–32)
AST SERPL-CCNC: 548 U/L (ref 1–32)
ATMOSPHERIC PRESS: 725 MMHG
BARBITURATES UR QL SCN: NEGATIVE
BASE EXCESS BLDA CALC-SCNC: 0.7 MMOL/L
BASOPHILS # BLD AUTO: 0.01 10*3/MM3 (ref 0–0.2)
BASOPHILS # BLD AUTO: 0.01 10*3/MM3 (ref 0–0.2)
BASOPHILS NFR BLD AUTO: 0.1 % (ref 0–1.5)
BASOPHILS NFR BLD AUTO: 0.2 % (ref 0–1.5)
BDY SITE: ABNORMAL
BENZODIAZ UR QL SCN: POSITIVE
BH CV ECHO MEAS - % IVS THICK: 50.3 %
BH CV ECHO MEAS - % LVPW THICK: 62.2 %
BH CV ECHO MEAS - ACS: 1.9 CM
BH CV ECHO MEAS - AO MAX PG: 12.3 MMHG
BH CV ECHO MEAS - AO MEAN PG: 8.4 MMHG
BH CV ECHO MEAS - AO ROOT AREA (BSA CORRECTED): 1.4
BH CV ECHO MEAS - AO ROOT AREA: 6.4 CM^2
BH CV ECHO MEAS - AO ROOT DIAM: 2.9 CM
BH CV ECHO MEAS - AO V2 MAX: 175.1 CM/SEC
BH CV ECHO MEAS - AO V2 MEAN: 139.2 CM/SEC
BH CV ECHO MEAS - AO V2 VTI: 37.7 CM
BH CV ECHO MEAS - BSA(HAYCOCK): 2.1 M^2
BH CV ECHO MEAS - BSA: 2 M^2
BH CV ECHO MEAS - BZI_BMI: 31.2 KILOGRAMS/M^2
BH CV ECHO MEAS - BZI_METRIC_HEIGHT: 170.2 CM
BH CV ECHO MEAS - BZI_METRIC_WEIGHT: 90.3 KG
BH CV ECHO MEAS - EDV(CUBED): 114.6 ML
BH CV ECHO MEAS - EDV(MOD-SP4): 39 ML
BH CV ECHO MEAS - EDV(TEICH): 110.5 ML
BH CV ECHO MEAS - EF(CUBED): 75.3 %
BH CV ECHO MEAS - EF(MOD-BP): 66 %
BH CV ECHO MEAS - EF(MOD-SP4): 66.7 %
BH CV ECHO MEAS - EF(TEICH): 67.1 %
BH CV ECHO MEAS - ESV(CUBED): 28.3 ML
BH CV ECHO MEAS - ESV(MOD-SP4): 13 ML
BH CV ECHO MEAS - ESV(TEICH): 36.4 ML
BH CV ECHO MEAS - FS: 37.3 %
BH CV ECHO MEAS - IVS/LVPW: 0.79
BH CV ECHO MEAS - IVSD: 0.77 CM
BH CV ECHO MEAS - IVSS: 1.2 CM
BH CV ECHO MEAS - LA DIMENSION: 2.9 CM
BH CV ECHO MEAS - LA/AO: 1
BH CV ECHO MEAS - LV DIASTOLIC VOL/BSA (35-75): 19.3 ML/M^2
BH CV ECHO MEAS - LV MASS(C)D: 145.2 GRAMS
BH CV ECHO MEAS - LV MASS(C)DI: 72 GRAMS/M^2
BH CV ECHO MEAS - LV MASS(C)S: 138.9 GRAMS
BH CV ECHO MEAS - LV MASS(C)SI: 68.8 GRAMS/M^2
BH CV ECHO MEAS - LV SYSTOLIC VOL/BSA (12-30): 6.4 ML/M^2
BH CV ECHO MEAS - LVIDD: 4.9 CM
BH CV ECHO MEAS - LVIDS: 3 CM
BH CV ECHO MEAS - LVLD AP4: 7.5 CM
BH CV ECHO MEAS - LVLS AP4: 6.1 CM
BH CV ECHO MEAS - LVOT AREA (M): 2.8 CM^2
BH CV ECHO MEAS - LVOT AREA: 2.9 CM^2
BH CV ECHO MEAS - LVOT DIAM: 1.9 CM
BH CV ECHO MEAS - LVPWD: 0.98 CM
BH CV ECHO MEAS - LVPWS: 1.6 CM
BH CV ECHO MEAS - MV A MAX VEL: 101.2 CM/SEC
BH CV ECHO MEAS - MV E MAX VEL: 104.6 CM/SEC
BH CV ECHO MEAS - MV E/A: 1
BH CV ECHO MEAS - PA ACC SLOPE: 887.8 CM/SEC^2
BH CV ECHO MEAS - PA ACC TIME: 0.12 SEC
BH CV ECHO MEAS - PA PR(ACCEL): 26.7 MMHG
BH CV ECHO MEAS - RAP SYSTOLE: 10 MMHG
BH CV ECHO MEAS - RVSP: 35.2 MMHG
BH CV ECHO MEAS - SI(AO): 120.4 ML/M^2
BH CV ECHO MEAS - SI(CUBED): 42.8 ML/M^2
BH CV ECHO MEAS - SI(MOD-SP4): 12.9 ML/M^2
BH CV ECHO MEAS - SI(TEICH): 36.7 ML/M^2
BH CV ECHO MEAS - SV(AO): 243 ML
BH CV ECHO MEAS - SV(CUBED): 86.3 ML
BH CV ECHO MEAS - SV(MOD-SP4): 26 ML
BH CV ECHO MEAS - SV(TEICH): 74.2 ML
BH CV ECHO MEAS - TR MAX VEL: 251.1 CM/SEC
BILIRUB SERPL-MCNC: 0.2 MG/DL (ref 0.2–1.2)
BILIRUB SERPL-MCNC: <0.2 MG/DL (ref 0.2–1.2)
BILIRUB UR QL STRIP: NEGATIVE
BODY TEMPERATURE: 98.6 C
BUN BLD-MCNC: 12 MG/DL (ref 6–20)
BUN BLD-MCNC: 15 MG/DL (ref 6–20)
BUN/CREAT SERPL: 10.6 (ref 7–25)
BUN/CREAT SERPL: 9.3 (ref 7–25)
BUPRENORPHINE SERPL-MCNC: NEGATIVE NG/ML
CALCIUM SPEC-SCNC: 7.4 MG/DL (ref 8.6–10.5)
CALCIUM SPEC-SCNC: 8.6 MG/DL (ref 8.6–10.5)
CANNABINOIDS SERPL QL: NEGATIVE
CHLORIDE SERPL-SCNC: 105 MMOL/L (ref 98–107)
CHLORIDE SERPL-SCNC: 94 MMOL/L (ref 98–107)
CLARITY UR: ABNORMAL
CO2 SERPL-SCNC: 24 MMOL/L (ref 22–29)
CO2 SERPL-SCNC: 25.1 MMOL/L (ref 22–29)
COCAINE UR QL: NEGATIVE
COHGB MFR BLD: 2.9 % (ref 0–5)
COLOR UR: YELLOW
CREAT BLD-MCNC: 1.13 MG/DL (ref 0.57–1)
CREAT BLD-MCNC: 1.61 MG/DL (ref 0.57–1)
CRP SERPL-MCNC: 25.05 MG/DL (ref 0–0.5)
D-LACTATE SERPL-SCNC: 1.5 MMOL/L (ref 0.5–2)
DEPRECATED RDW RBC AUTO: 42.8 FL (ref 37–54)
DEPRECATED RDW RBC AUTO: 42.8 FL (ref 37–54)
EOSINOPHIL # BLD AUTO: 0.01 10*3/MM3 (ref 0–0.4)
EOSINOPHIL # BLD AUTO: 0.02 10*3/MM3 (ref 0–0.4)
EOSINOPHIL NFR BLD AUTO: 0.1 % (ref 0.3–6.2)
EOSINOPHIL NFR BLD AUTO: 0.3 % (ref 0.3–6.2)
ERYTHROCYTE [DISTWIDTH] IN BLOOD BY AUTOMATED COUNT: 12.7 % (ref 12.3–15.4)
ERYTHROCYTE [DISTWIDTH] IN BLOOD BY AUTOMATED COUNT: 12.8 % (ref 12.3–15.4)
ETHANOL BLD-MCNC: <10 MG/DL (ref 0–10)
ETHANOL UR QL: <0.01 %
GFR SERPL CREATININE-BSD FRML MDRD: 35 ML/MIN/1.73
GFR SERPL CREATININE-BSD FRML MDRD: 53 ML/MIN/1.73
GLOBULIN UR ELPH-MCNC: 3.1 GM/DL
GLOBULIN UR ELPH-MCNC: 4.1 GM/DL
GLUCOSE BLD-MCNC: 109 MG/DL (ref 65–99)
GLUCOSE BLD-MCNC: 117 MG/DL (ref 65–99)
GLUCOSE UR STRIP-MCNC: NEGATIVE MG/DL
HAV IGM SERPL QL IA: ABNORMAL
HBV CORE IGM SERPL QL IA: ABNORMAL
HBV SURFACE AG SERPL QL IA: ABNORMAL
HCO3 BLDA-SCNC: 25.9 MMOL/L (ref 22–26)
HCT VFR BLD AUTO: 31.1 % (ref 34–46.6)
HCT VFR BLD AUTO: 38.9 % (ref 34–46.6)
HCT VFR BLD CALC: 36 % (ref 37–47)
HCV AB SER DONR QL: REACTIVE
HGB BLD-MCNC: 10.3 G/DL (ref 12–15.9)
HGB BLD-MCNC: 13.2 G/DL (ref 12–15.9)
HGB BLDA-MCNC: 12.1 G/DL (ref 12–16)
HGB UR QL STRIP.AUTO: NEGATIVE
HOROWITZ INDEX BLD+IHG-RTO: 21 %
IMM GRANULOCYTES # BLD AUTO: 0 10*3/MM3 (ref 0–0.05)
IMM GRANULOCYTES # BLD AUTO: 0.02 10*3/MM3 (ref 0–0.05)
IMM GRANULOCYTES NFR BLD AUTO: 0 % (ref 0–0.5)
IMM GRANULOCYTES NFR BLD AUTO: 0.2 % (ref 0–0.5)
KETONES UR QL STRIP: NEGATIVE
L PNEUMO1 AG UR QL IA: NEGATIVE
LEUKOCYTE ESTERASE UR QL STRIP.AUTO: NEGATIVE
LV EF 2D ECHO EST: 70 %
LYMPHOCYTES # BLD AUTO: 1.2 10*3/MM3 (ref 0.7–3.1)
LYMPHOCYTES # BLD AUTO: 1.31 10*3/MM3 (ref 0.7–3.1)
LYMPHOCYTES NFR BLD AUTO: 13.7 % (ref 19.6–45.3)
LYMPHOCYTES NFR BLD AUTO: 22.2 % (ref 19.6–45.3)
M PNEUMO IGM SER QL: NEGATIVE
MAGNESIUM SERPL-MCNC: 1.5 MG/DL (ref 1.6–2.6)
MCH RBC QN AUTO: 31.6 PG (ref 26.6–33)
MCH RBC QN AUTO: 32 PG (ref 26.6–33)
MCHC RBC AUTO-ENTMCNC: 33.1 G/DL (ref 31.5–35.7)
MCHC RBC AUTO-ENTMCNC: 33.9 G/DL (ref 31.5–35.7)
MCV RBC AUTO: 94.2 FL (ref 79–97)
MCV RBC AUTO: 95.4 FL (ref 79–97)
METHADONE UR QL SCN: NEGATIVE
METHGB BLD QL: 0.5 % (ref 0–3)
MODALITY: ABNORMAL
MONOCYTES # BLD AUTO: 0.64 10*3/MM3 (ref 0.1–0.9)
MONOCYTES # BLD AUTO: 0.82 10*3/MM3 (ref 0.1–0.9)
MONOCYTES NFR BLD AUTO: 10.8 % (ref 5–12)
MONOCYTES NFR BLD AUTO: 9.4 % (ref 5–12)
NEUTROPHILS # BLD AUTO: 3.92 10*3/MM3 (ref 1.7–7)
NEUTROPHILS # BLD AUTO: 6.69 10*3/MM3 (ref 1.7–7)
NEUTROPHILS NFR BLD AUTO: 66.5 % (ref 42.7–76)
NEUTROPHILS NFR BLD AUTO: 76.5 % (ref 42.7–76)
NITRITE UR QL STRIP: NEGATIVE
NT-PROBNP SERPL-MCNC: 2126 PG/ML (ref 5–450)
OPIATES UR QL: NEGATIVE
OXYCODONE UR QL SCN: POSITIVE
OXYHGB MFR BLDV: 80.4 % (ref 85–100)
PCO2 BLDA: 44.1 MM HG (ref 35–45)
PCP UR QL SCN: NEGATIVE
PH BLDA: 7.39 PH UNITS (ref 7.35–7.45)
PH UR STRIP.AUTO: 6 [PH] (ref 5–8)
PLATELET # BLD AUTO: 120 10*3/MM3 (ref 140–450)
PLATELET # BLD AUTO: 139 10*3/MM3 (ref 140–450)
PMV BLD AUTO: 10.8 FL (ref 6–12)
PMV BLD AUTO: 11 FL (ref 6–12)
PO2 BLDA: 47.6 MM HG (ref 80–100)
POTASSIUM BLD-SCNC: 3.5 MMOL/L (ref 3.5–5.2)
POTASSIUM BLD-SCNC: 3.6 MMOL/L (ref 3.5–5.2)
PROT SERPL-MCNC: 5.9 G/DL (ref 6–8.5)
PROT SERPL-MCNC: 7.9 G/DL (ref 6–8.5)
PROT UR QL STRIP: NEGATIVE
RBC # BLD AUTO: 3.26 10*6/MM3 (ref 3.77–5.28)
RBC # BLD AUTO: 4.13 10*6/MM3 (ref 3.77–5.28)
SAO2 % BLDCOA: 83.2 % (ref 90–100)
SODIUM BLD-SCNC: 133 MMOL/L (ref 136–145)
SODIUM BLD-SCNC: 139 MMOL/L (ref 136–145)
SODIUM BLD-SCNC: 139 MMOL/L (ref 136–145)
SP GR UR STRIP: 1.01 (ref 1–1.03)
TROPONIN T SERPL-MCNC: 0.01 NG/ML (ref 0–0.03)
TROPONIN T SERPL-MCNC: 0.04 NG/ML (ref 0–0.03)
TROPONIN T SERPL-MCNC: <0.01 NG/ML (ref 0–0.03)
UROBILINOGEN UR QL STRIP: ABNORMAL
WBC NRBC COR # BLD: 5.9 10*3/MM3 (ref 3.4–10.8)
WBC NRBC COR # BLD: 8.75 10*3/MM3 (ref 3.4–10.8)

## 2019-05-05 PROCEDURE — 99406 BEHAV CHNG SMOKING 3-10 MIN: CPT | Performed by: HOSPITALIST

## 2019-05-05 PROCEDURE — 94799 UNLISTED PULMONARY SVC/PX: CPT

## 2019-05-05 PROCEDURE — 80307 DRUG TEST PRSMV CHEM ANLYZR: CPT | Performed by: EMERGENCY MEDICINE

## 2019-05-05 PROCEDURE — 93308 TTE F-UP OR LMTD: CPT | Performed by: INTERNAL MEDICINE

## 2019-05-05 PROCEDURE — 25010000002 HEPARIN (PORCINE) PER 1000 UNITS: Performed by: HOSPITALIST

## 2019-05-05 PROCEDURE — 80074 ACUTE HEPATITIS PANEL: CPT | Performed by: EMERGENCY MEDICINE

## 2019-05-05 PROCEDURE — 74176 CT ABD & PELVIS W/O CONTRAST: CPT | Performed by: RADIOLOGY

## 2019-05-05 PROCEDURE — 25010000002 METHYLPREDNISOLONE PER 40 MG: Performed by: HOSPITALIST

## 2019-05-05 PROCEDURE — 87899 AGENT NOS ASSAY W/OPTIC: CPT | Performed by: HOSPITALIST

## 2019-05-05 PROCEDURE — 80053 COMPREHEN METABOLIC PANEL: CPT | Performed by: HOSPITALIST

## 2019-05-05 PROCEDURE — 25010000002 CEFTRIAXONE: Performed by: HOSPITALIST

## 2019-05-05 PROCEDURE — 83735 ASSAY OF MAGNESIUM: CPT | Performed by: HOSPITALIST

## 2019-05-05 PROCEDURE — 87040 BLOOD CULTURE FOR BACTERIA: CPT | Performed by: EMERGENCY MEDICINE

## 2019-05-05 PROCEDURE — 86140 C-REACTIVE PROTEIN: CPT | Performed by: HOSPITALIST

## 2019-05-05 PROCEDURE — 71250 CT THORAX DX C-: CPT | Performed by: RADIOLOGY

## 2019-05-05 PROCEDURE — 85025 COMPLETE CBC W/AUTO DIFF WBC: CPT | Performed by: EMERGENCY MEDICINE

## 2019-05-05 PROCEDURE — 81003 URINALYSIS AUTO W/O SCOPE: CPT | Performed by: EMERGENCY MEDICINE

## 2019-05-05 PROCEDURE — 85025 COMPLETE CBC W/AUTO DIFF WBC: CPT | Performed by: HOSPITALIST

## 2019-05-05 PROCEDURE — 83605 ASSAY OF LACTIC ACID: CPT | Performed by: EMERGENCY MEDICINE

## 2019-05-05 PROCEDURE — 71250 CT THORAX DX C-: CPT

## 2019-05-05 PROCEDURE — 93005 ELECTROCARDIOGRAM TRACING: CPT | Performed by: HOSPITALIST

## 2019-05-05 PROCEDURE — 25010000002 LEVOFLOXACIN PER 250 MG: Performed by: EMERGENCY MEDICINE

## 2019-05-05 PROCEDURE — 84484 ASSAY OF TROPONIN QUANT: CPT | Performed by: HOSPITALIST

## 2019-05-05 PROCEDURE — 84295 ASSAY OF SERUM SODIUM: CPT | Performed by: INTERNAL MEDICINE

## 2019-05-05 PROCEDURE — 71045 X-RAY EXAM CHEST 1 VIEW: CPT

## 2019-05-05 PROCEDURE — 83880 ASSAY OF NATRIURETIC PEPTIDE: CPT | Performed by: HOSPITALIST

## 2019-05-05 PROCEDURE — 86738 MYCOPLASMA ANTIBODY: CPT | Performed by: HOSPITALIST

## 2019-05-05 PROCEDURE — 71045 X-RAY EXAM CHEST 1 VIEW: CPT | Performed by: RADIOLOGY

## 2019-05-05 PROCEDURE — 93010 ELECTROCARDIOGRAM REPORT: CPT | Performed by: INTERNAL MEDICINE

## 2019-05-05 PROCEDURE — 25010000002 MAGNESIUM SULFATE 2 GM/50ML SOLUTION: Performed by: NURSE PRACTITIONER

## 2019-05-05 PROCEDURE — 80053 COMPREHEN METABOLIC PANEL: CPT | Performed by: EMERGENCY MEDICINE

## 2019-05-05 PROCEDURE — 25010000002 MORPHINE PER 10 MG

## 2019-05-05 PROCEDURE — 93306 TTE W/DOPPLER COMPLETE: CPT

## 2019-05-05 PROCEDURE — 74176 CT ABD & PELVIS W/O CONTRAST: CPT

## 2019-05-05 PROCEDURE — 99223 1ST HOSP IP/OBS HIGH 75: CPT | Performed by: HOSPITALIST

## 2019-05-05 RX ORDER — PREGABALIN 100 MG/1
300 CAPSULE ORAL EVERY 12 HOURS SCHEDULED
Status: CANCELLED | OUTPATIENT
Start: 2019-05-05

## 2019-05-05 RX ORDER — POTASSIUM CHLORIDE 1.5 G/1.77G
40 POWDER, FOR SOLUTION ORAL AS NEEDED
Status: DISCONTINUED | OUTPATIENT
Start: 2019-05-05 | End: 2019-05-11 | Stop reason: HOSPADM

## 2019-05-05 RX ORDER — OXYMORPHONE HYDROCHLORIDE 15 MG/1
15 TABLET, FILM COATED, EXTENDED RELEASE ORAL EVERY 12 HOURS
Status: ON HOLD | COMMUNITY
End: 2020-08-04 | Stop reason: DRUGHIGH

## 2019-05-05 RX ORDER — AMITRIPTYLINE HYDROCHLORIDE 75 MG/1
75 TABLET, FILM COATED ORAL NIGHTLY
Status: ON HOLD | COMMUNITY
End: 2020-08-04 | Stop reason: DRUGHIGH

## 2019-05-05 RX ORDER — LEVETIRACETAM 500 MG/1
1000 TABLET ORAL EVERY 12 HOURS SCHEDULED
Status: DISCONTINUED | OUTPATIENT
Start: 2019-05-05 | End: 2019-05-11 | Stop reason: HOSPADM

## 2019-05-05 RX ORDER — OXYCODONE HYDROCHLORIDE 15 MG/1
15 TABLET, FILM COATED, EXTENDED RELEASE ORAL EVERY 6 HOURS PRN
COMMUNITY
End: 2019-05-05

## 2019-05-05 RX ORDER — PANTOPRAZOLE SODIUM 40 MG/1
40 TABLET, DELAYED RELEASE ORAL
Status: DISCONTINUED | OUTPATIENT
Start: 2019-05-05 | End: 2019-05-11 | Stop reason: HOSPADM

## 2019-05-05 RX ORDER — MAGNESIUM SULFATE HEPTAHYDRATE 40 MG/ML
2 INJECTION, SOLUTION INTRAVENOUS AS NEEDED
Status: DISCONTINUED | OUTPATIENT
Start: 2019-05-05 | End: 2019-05-11 | Stop reason: HOSPADM

## 2019-05-05 RX ORDER — MAGNESIUM SULFATE HEPTAHYDRATE 40 MG/ML
2 INJECTION, SOLUTION INTRAVENOUS
Status: COMPLETED | OUTPATIENT
Start: 2019-05-05 | End: 2019-05-05

## 2019-05-05 RX ORDER — GUAIFENESIN 600 MG/1
600 TABLET, EXTENDED RELEASE ORAL EVERY 12 HOURS SCHEDULED
Status: DISCONTINUED | OUTPATIENT
Start: 2019-05-05 | End: 2019-05-11 | Stop reason: HOSPADM

## 2019-05-05 RX ORDER — SODIUM CHLORIDE 9 MG/ML
50 INJECTION, SOLUTION INTRAVENOUS CONTINUOUS
Status: DISCONTINUED | OUTPATIENT
Start: 2019-05-05 | End: 2019-05-06

## 2019-05-05 RX ORDER — SODIUM CHLORIDE 0.9 % (FLUSH) 0.9 %
3 SYRINGE (ML) INJECTION EVERY 12 HOURS SCHEDULED
Status: DISCONTINUED | OUTPATIENT
Start: 2019-05-05 | End: 2019-05-11 | Stop reason: HOSPADM

## 2019-05-05 RX ORDER — OXYCODONE HYDROCHLORIDE 15 MG/1
15 TABLET ORAL EVERY 8 HOURS PRN
COMMUNITY

## 2019-05-05 RX ORDER — NICOTINE 21 MG/24HR
1 PATCH, TRANSDERMAL 24 HOURS TRANSDERMAL
Status: DISCONTINUED | OUTPATIENT
Start: 2019-05-05 | End: 2019-05-11 | Stop reason: HOSPADM

## 2019-05-05 RX ORDER — SODIUM CHLORIDE 0.9 % (FLUSH) 0.9 %
3-10 SYRINGE (ML) INJECTION AS NEEDED
Status: DISCONTINUED | OUTPATIENT
Start: 2019-05-05 | End: 2019-05-11 | Stop reason: HOSPADM

## 2019-05-05 RX ORDER — LEVETIRACETAM 1000 MG/1
1000 TABLET ORAL 2 TIMES DAILY
Status: ON HOLD | COMMUNITY
End: 2020-08-04

## 2019-05-05 RX ORDER — GABAPENTIN 400 MG/1
800 CAPSULE ORAL EVERY 8 HOURS SCHEDULED
Status: DISCONTINUED | OUTPATIENT
Start: 2019-05-05 | End: 2019-05-11 | Stop reason: HOSPADM

## 2019-05-05 RX ORDER — POTASSIUM CHLORIDE 7.45 MG/ML
10 INJECTION INTRAVENOUS
Status: DISCONTINUED | OUTPATIENT
Start: 2019-05-05 | End: 2019-05-11 | Stop reason: HOSPADM

## 2019-05-05 RX ORDER — POTASSIUM CHLORIDE 20 MEQ/1
40 TABLET, EXTENDED RELEASE ORAL EVERY 4 HOURS
Status: COMPLETED | OUTPATIENT
Start: 2019-05-05 | End: 2019-05-05

## 2019-05-05 RX ORDER — ONDANSETRON 2 MG/ML
INJECTION INTRAMUSCULAR; INTRAVENOUS
Status: DISPENSED
Start: 2019-05-05 | End: 2019-05-05

## 2019-05-05 RX ORDER — LEVOFLOXACIN 5 MG/ML
750 INJECTION, SOLUTION INTRAVENOUS ONCE
Status: COMPLETED | OUTPATIENT
Start: 2019-05-05 | End: 2019-05-05

## 2019-05-05 RX ORDER — ALPRAZOLAM 0.5 MG/1
0.5 TABLET ORAL 2 TIMES DAILY PRN
Status: DISCONTINUED | OUTPATIENT
Start: 2019-05-05 | End: 2019-05-11 | Stop reason: HOSPADM

## 2019-05-05 RX ORDER — MAGNESIUM SULFATE HEPTAHYDRATE 40 MG/ML
4 INJECTION, SOLUTION INTRAVENOUS AS NEEDED
Status: DISCONTINUED | OUTPATIENT
Start: 2019-05-05 | End: 2019-05-11 | Stop reason: HOSPADM

## 2019-05-05 RX ORDER — SODIUM CHLORIDE 9 MG/ML
100 INJECTION, SOLUTION INTRAVENOUS CONTINUOUS
Status: DISCONTINUED | OUTPATIENT
Start: 2019-05-05 | End: 2019-05-05

## 2019-05-05 RX ORDER — ONDANSETRON 2 MG/ML
4 INJECTION INTRAMUSCULAR; INTRAVENOUS ONCE
Status: DISCONTINUED | OUTPATIENT
Start: 2019-05-05 | End: 2019-05-05

## 2019-05-05 RX ORDER — IPRATROPIUM BROMIDE AND ALBUTEROL SULFATE 2.5; .5 MG/3ML; MG/3ML
3 SOLUTION RESPIRATORY (INHALATION)
Status: DISCONTINUED | OUTPATIENT
Start: 2019-05-05 | End: 2019-05-07

## 2019-05-05 RX ORDER — POTASSIUM CHLORIDE 750 MG/1
40 TABLET, FILM COATED, EXTENDED RELEASE ORAL AS NEEDED
Status: DISCONTINUED | OUTPATIENT
Start: 2019-05-05 | End: 2019-05-11 | Stop reason: HOSPADM

## 2019-05-05 RX ORDER — ASPIRIN 325 MG
325 TABLET ORAL DAILY
Status: DISCONTINUED | OUTPATIENT
Start: 2019-05-05 | End: 2019-05-11 | Stop reason: HOSPADM

## 2019-05-05 RX ORDER — HEPARIN SODIUM 5000 [USP'U]/ML
5000 INJECTION, SOLUTION INTRAVENOUS; SUBCUTANEOUS EVERY 12 HOURS SCHEDULED
Status: DISCONTINUED | OUTPATIENT
Start: 2019-05-05 | End: 2019-05-11 | Stop reason: HOSPADM

## 2019-05-05 RX ORDER — NITROGLYCERIN 0.4 MG/1
0.4 TABLET SUBLINGUAL
Status: DISCONTINUED | OUTPATIENT
Start: 2019-05-05 | End: 2019-05-11 | Stop reason: HOSPADM

## 2019-05-05 RX ORDER — METHYLPREDNISOLONE SODIUM SUCCINATE 40 MG/ML
40 INJECTION, POWDER, LYOPHILIZED, FOR SOLUTION INTRAMUSCULAR; INTRAVENOUS EVERY 12 HOURS
Status: DISCONTINUED | OUTPATIENT
Start: 2019-05-05 | End: 2019-05-08

## 2019-05-05 RX ADMIN — LEVETIRACETAM 1000 MG: 500 TABLET, FILM COATED ORAL at 22:15

## 2019-05-05 RX ADMIN — SODIUM CHLORIDE, PRESERVATIVE FREE 3 ML: 5 INJECTION INTRAVENOUS at 22:17

## 2019-05-05 RX ADMIN — MORPHINE SULFATE 4 MG: 4 INJECTION INTRAVENOUS at 06:52

## 2019-05-05 RX ADMIN — SODIUM CHLORIDE 2571 ML: 9 INJECTION, SOLUTION INTRAVENOUS at 00:45

## 2019-05-05 RX ADMIN — POTASSIUM CHLORIDE 40 MEQ: 1500 TABLET, EXTENDED RELEASE ORAL at 13:49

## 2019-05-05 RX ADMIN — METHYLPREDNISOLONE SODIUM SUCCINATE 40 MG: 40 INJECTION, POWDER, FOR SOLUTION INTRAMUSCULAR; INTRAVENOUS at 17:37

## 2019-05-05 RX ADMIN — GUAIFENESIN 600 MG: 600 TABLET, EXTENDED RELEASE ORAL at 22:15

## 2019-05-05 RX ADMIN — SODIUM CHLORIDE 100 ML/HR: 9 INJECTION, SOLUTION INTRAVENOUS at 09:51

## 2019-05-05 RX ADMIN — LEVOFLOXACIN 750 MG: 5 INJECTION, SOLUTION INTRAVENOUS at 05:17

## 2019-05-05 RX ADMIN — NICOTINE 1 PATCH: 14 PATCH TRANSDERMAL at 13:49

## 2019-05-05 RX ADMIN — Medication 4 MG: at 06:52

## 2019-05-05 RX ADMIN — MAGNESIUM SULFATE IN WATER 2 G: 40 INJECTION, SOLUTION INTRAVENOUS at 13:49

## 2019-05-05 RX ADMIN — DOXYCYCLINE 100 MG: 100 INJECTION, POWDER, LYOPHILIZED, FOR SOLUTION INTRAVENOUS at 17:36

## 2019-05-05 RX ADMIN — METHYLPREDNISOLONE SODIUM SUCCINATE 40 MG: 40 INJECTION, POWDER, FOR SOLUTION INTRAMUSCULAR; INTRAVENOUS at 09:51

## 2019-05-05 RX ADMIN — IPRATROPIUM BROMIDE 0.5 MG: 0.5 SOLUTION RESPIRATORY (INHALATION) at 06:38

## 2019-05-05 RX ADMIN — GUAIFENESIN 600 MG: 600 TABLET, EXTENDED RELEASE ORAL at 13:49

## 2019-05-05 RX ADMIN — POTASSIUM CHLORIDE 40 MEQ: 1500 TABLET, EXTENDED RELEASE ORAL at 17:37

## 2019-05-05 RX ADMIN — LEVETIRACETAM 1000 MG: 500 TABLET, FILM COATED ORAL at 13:49

## 2019-05-05 RX ADMIN — DOXYCYCLINE 100 MG: 100 INJECTION, POWDER, LYOPHILIZED, FOR SOLUTION INTRAVENOUS at 05:17

## 2019-05-05 RX ADMIN — AMITRIPTYLINE HYDROCHLORIDE 75 MG: 50 TABLET, FILM COATED ORAL at 22:16

## 2019-05-05 RX ADMIN — MAGNESIUM SULFATE IN WATER 2 G: 40 INJECTION, SOLUTION INTRAVENOUS at 17:36

## 2019-05-05 RX ADMIN — MAGNESIUM SULFATE IN WATER 2 G: 40 INJECTION, SOLUTION INTRAVENOUS at 20:06

## 2019-05-05 RX ADMIN — PANTOPRAZOLE SODIUM 40 MG: 40 TABLET, DELAYED RELEASE ORAL at 13:49

## 2019-05-05 RX ADMIN — HEPARIN SODIUM 5000 UNITS: 5000 INJECTION INTRAVENOUS; SUBCUTANEOUS at 09:51

## 2019-05-05 RX ADMIN — CEFTRIAXONE 1 G: 1 INJECTION, POWDER, FOR SOLUTION INTRAMUSCULAR; INTRAVENOUS at 11:00

## 2019-05-05 RX ADMIN — IPRATROPIUM BROMIDE AND ALBUTEROL SULFATE 3 ML: .5; 3 SOLUTION RESPIRATORY (INHALATION) at 13:45

## 2019-05-05 NOTE — PLAN OF CARE
Problem: Skin Injury Risk (Adult)  Goal: Identify Related Risk Factors and Signs and Symptoms  Outcome: Ongoing (interventions implemented as appropriate)    Goal: Skin Health and Integrity  Outcome: Ongoing (interventions implemented as appropriate)      Problem: Fall Risk (Adult)  Goal: Identify Related Risk Factors and Signs and Symptoms  Outcome: Ongoing (interventions implemented as appropriate)    Goal: Absence of Fall  Outcome: Ongoing (interventions implemented as appropriate)      Problem: Sepsis/Septic Shock (Adult)  Goal: Signs and Symptoms of Listed Potential Problems Will be Absent, Minimized or Managed (Sepsis/Septic Shock)  Outcome: Ongoing (interventions implemented as appropriate)      Problem: Wound (Includes Pressure Injury) (Adult)  Goal: Signs and Symptoms of Listed Potential Problems Will be Absent, Minimized or Managed (Wound)  Outcome: Ongoing (interventions implemented as appropriate)      Problem: Patient Care Overview  Goal: Plan of Care Review  Outcome: Ongoing (interventions implemented as appropriate)    Goal: Individualization and Mutuality  Outcome: Ongoing (interventions implemented as appropriate)    Goal: Discharge Needs Assessment  Outcome: Ongoing (interventions implemented as appropriate)    Goal: Interprofessional Rounds/Family Conf  Outcome: Ongoing (interventions implemented as appropriate)

## 2019-05-05 NOTE — H&P
Hospitalist History and Physical        Patient Identification  Name: Alisa Holland  Age/Sex: 43 y.o. female  :  1975        MRN: [id number]  Visit Number: [id number]  PCP: Narcisa Cyr APRN      Chief complaint short of breath, mid-back pain    History of Present Illness:  Patient is a 43 y.o. female who presents with complaints of worsening shortness of breath and productive cough with green sputum of 1 week duration. She has associated mid-back pain when she coughs or tries to take a deep breath. She reports associated fever, chills and nausea as well. She went to [facility]'s ED last week around time of onset of symptoms and was sent home without antibiotics. She saw her PCP a few days later who told her that [facility] records indicated she had a pneumonia, but her PCP did not treat her either. Due to worsening symptoms, she came to our ED for further management. Upon arrival, she was febrile, tachycardic, and hypoxic on room air (she does not use supplemental oxygen at home). O2 sat came up appropriately with 2L NC. BP later dropped to 88/46 but improved with IV fluid bolus, as did heart rate. Initial labs showed acute renal insufficiency and transaminitis. She had no leukocytosis but mildly elevated neutrophils. Lactic acid was normal. UA was unremarkable. Acute hepatitis panel was reactive for hepatitis C. UDS was positive for benzodiazepines and oxycodone. EKG showed sinus tachycardia but no overt ST changes to suggest acute ischemia. CT chest showed left lower lobe infiltrate and less pronounced right patchy infiltrates. Later labs showed elevated CRP, elevated BNP and mildly elevated troponin. Upon further questioning, patient does report increased swelling in both legs over the last week as well. She has been admitted to the telemetry floor for further management.      Review of Systems  Review of Systems   Constitutional: Positive for activity change, chills, fatigue and fever. Negative  for appetite change.   HENT: Negative for congestion, postnasal drip, rhinorrhea, sinus pressure, sinus pain and sore throat.    Eyes: Negative for photophobia, pain, discharge, redness, itching and visual disturbance.   Respiratory: Positive for cough, chest tightness and shortness of breath.    Cardiovascular: Positive for leg swelling. Negative for chest pain and palpitations.   Gastrointestinal: Positive for nausea. Negative for abdominal distention, abdominal pain, constipation, diarrhea and vomiting.   Endocrine: Negative for cold intolerance, heat intolerance, polydipsia, polyphagia and polyuria.   Genitourinary: Negative for dysuria, flank pain and hematuria.   Musculoskeletal: Negative for arthralgias, back pain, joint swelling, myalgias, neck pain and neck stiffness.   Skin: Positive for wound (small ulceration with callus just proximal to pinky toe on right; small abrasion/skin tear on end of left arm below-the-elbow-amputation stump). Negative for color change, pallor and rash.   Allergic/Immunologic: Negative for environmental allergies, food allergies and immunocompromised state.   Neurological: Negative for dizziness, tremors, seizures, syncope, weakness, light-headedness, numbness and headaches.   Hematological: Negative for adenopathy. Does not bruise/bleed easily.   Psychiatric/Behavioral: Negative for agitation, behavioral problems and confusion.       History  Past Medical History:   Diagnosis Date   • Recurrent pneumonia        • Arthritis    • History of transfusion     2007, 2016   • Seizures (CMS/[facility])     last seizure 2014     Past Surgical History:   Procedure Laterality Date   • ANKLE FUSION Bilateral    • APPENDECTOMY     • ARM AMPUTATION AT SHOULDER Left    • NV DRAIN LOWER LEG DEEP ABSC/HEMATOMA Left 1/19/2017    Procedure: LOWER EXTREMITY DEBRIDEMENT OSTEOMYELITIS, WOUND REPAIR FLAP;  Surgeon: Simón Reynoso MD;  Location: [facility];  Service: Plastics   • TOTAL HIP ARTHROPLASTY Right  08/2016     *    Amputation of fifth toe, left, traumatic (CMS/HCC)    third, fourth, fifth toes         Family History   Problem Relation Age of Onset   • Arthritis Mother    • Asthma Mother    • COPD Mother    • Diabetes Mother    • Heart disease Mother    • Hypertension Mother    • Hyperlipidemia Mother    • Cancer Paternal Grandfather      Social History     Tobacco Use   • Smoking status: Heavy Tobacco Smoker     Packs/day: 0.50     Years: 2.00     Pack years: 1.00     Types: Cigarettes   Substance Use Topics   • Alcohol use: No   • Drug use: No       (Not in a hospital admission)  Allergies:  Contrast dye; Meropenem; Toradol [ketorolac tromethamine]; Ultram [tramadol hcl]; Zofran [ondansetron hcl]; and Zyvox [linezolid]    Objective     Vital Signs  Temp:  [101.3 °F (38.5 °C)-101.9 °F (38.8 °C)] 101.3 °F (38.5 °C)  Heart Rate:  [105-122] 106  Resp:  [18] 18  BP: ()/(46-64) 93/53  Body mass index is 29.6 kg/m².    Physical Exam:  Physical Exam   Constitutional:   Acutely ill in appearance. No distress at this time.   HENT:   Head: Normocephalic and atraumatic.   Mouth/Throat: Oropharynx is clear and moist.   Eyes: Conjunctivae and EOM are normal. Pupils are equal, round, and reactive to light.   Neck: Normal range of motion. Neck supple. No JVD present.   Cardiovascular: Normal heart sounds and intact distal pulses.   No murmur heard.  Tachycardic, regular rhythm   Pulmonary/Chest:   Mildly tachycardic, bilateral wheezing worse on left, rhonchi left lower and mid lung zones   Abdominal: Soft. Bowel sounds are normal. She exhibits no distension. There is no tenderness.   Musculoskeletal: Normal range of motion. She exhibits edema (trace to 1+ edema b/l lower extremities) and deformity (scar from fasciotomy left shin; s/p amputation of left 2nd-5th toes; s/p below the elbow amputation).   Lymphadenopathy:     She has no cervical adenopathy.         Results Review:       Lab Results:  Results from last 7  days   Lab Units 05/05/19  0005   WBC 10*3/mm3 8.75   HEMOGLOBIN g/dL 13.2   PLATELETS 10*3/mm3 139*     Results from last 7 days   Lab Units 05/05/19  0130   CRP mg/dL 25.05*     Results from last 7 days   Lab Units 05/05/19  0005   SODIUM mmol/L 133*   POTASSIUM mmol/L 3.5   CHLORIDE mmol/L 94*   CO2 mmol/L 25.1   BUN mg/dL 15   CREATININE mg/dL 1.61*   CALCIUM mg/dL 8.6   GLUCOSE mg/dL 117*         No results found for: HGBA1C  Results from last 7 days   Lab Units 05/05/19  0005   BILIRUBIN mg/dL 0.2   ALK PHOS U/L 140*   AST (SGOT) U/L 548*   ALT (SGPT) U/L 103*     Results from last 7 days   Lab Units 05/05/19  0130   TROPONIN T ng/mL 0.035*             Results from last 7 days   Lab Units 05/04/19  2356   PH, ARTERIAL pH units 7.387   PO2 ART mm Hg 47.6*   PCO2, ARTERIAL mm Hg 44.1   HCO3 ART mmol/L 25.9       I have reviewed the patient's laboratory results.    Imaging:  Imaging Results (last 72 hours)     Procedure Component Value Units Date/Time    XR Chest AP [[id number]] Resulted:  05/05/19 0508     Updated:  05/05/19 0508    CT Abdomen Pelvis Without Contrast [[id number]] Resulted:  05/05/19 0316     Updated:  05/05/19 0317    CT Chest Without Contrast [[id number]] Resulted:  05/05/19 0315     Updated:  05/05/19 0316    XR Chest 1 View [[id number]] Updated:  05/05/19 0058      CT chest: left lower lobe infiltrate, along with less pronounced patchy right sided infiltrates    CT abdomen/pelvis: virtual radiology report pending    I have personally reviewed the patient's radiologic imaging.        EKG: Sinus tachycardia, , QTc 497 (prolonged), no overt ST changes to suggest acute ischemia appreciated    I have personally reviewed the patient's EKG.        Assessment/Plan     - Severe sepsis with fever, tachycardia, tachypnea, CRP elevation, acute renal insufficiency and mild hypotension which responded to IV fluid bolus, secondary to left>right community acquired pneumonia: treat with IV rocephin,  doxycycline. No history of COPD, but given wheezing and hypoxia, will give IV steroids empirically. Atrovent nebs, avoid albuterol given tachycardia. Continue to trend CRP.  - Acute hypoxic respiratory failure secondary to above: continue supplemental oxygen initiated in ED.  - Acute renal insufficiency: continue IV fluid hydration. Repeat labs later this morning.  - Mild troponin elevation, BNP elevation: suspect secondary to hypoxia, severe sepsis. EKG unremarkable, no typical chest pain. Give full dose ASA empirically. Continue to trend troponin. Regarding BNP, also could be related to sepsis, but does have mild lower extremity edema. ECHO ordered for this morning. For now, still needs IV fluid resuscitation for severe sepsis, but monitor for signs/symptoms of fluid overload.  - Transaminitis: acute hepatitis panel positive for hepatitis C which is not listed in medical history.  Continue to monitor.   - Chronic pain: informed patient we would need to hold her home opioids until BP improves and stabilizes. She voiced understanding and was agreeable to this plan.  - tobacco abuse: I advised Alisa of the risks of continuing to use tobacco, and I provided her with tobacco cessation educational materials in the After Visit Summary. During this visit, I spent 3 minutes counseling the patient regarding tobacco cessation.    DVT Prophylaxis: SQ heparin    Estimated Length of Stay >2 midnights    I discussed the patient's findings, assessment and plan with the patient and ED physician Dr Oakes.    * Patient is high risk due to severe sepsis secondary to left sided pneumonia, acute hypoxic respiratory failure, acute renal insufficiency, mild troponin elevation, BNP elevation, transaminitis    Олег Boyer MD  05/05/19  6:42 AM

## 2019-05-05 NOTE — NURSING NOTE
Patient noted to have a dry, scabbed abrasion to her left ankle an left elbow.  Patient states she fell at home and tried to catch herself.  No drainage or odor present.  Patient also noted to have a thickly callused area to her right plantar that resembles a diabetic ulcer.  No drainage or odor present.    Recommend leaving open to air.

## 2019-05-05 NOTE — NURSING NOTE
"RN was called into patient's room by patient's family and asked if she could leave the floor to \"get some fresh air.\" RN informed the patient that [facility] had wandering patient policy, and that leaving the floor was leaving against medical advice. Laisha Duvall NP was called and advised to inform patient that her blood pressure was low and she did not recommend her leaving the floor. RN advised the patient that she was a falls risk, had a low bp, and it was not recommended that she leave the floor. Patient stated \" I have to go out and get some air.\"   "

## 2019-05-05 NOTE — ED PROVIDER NOTES
Subjective   Pt comes in with c/o right flank and back pain.  Generalize malaise and fatigue.  Pt has had SOA.  Pt also c/o spider bite to left side of abdomen.  Pt concerned for an abscess frm spider bite on left side of abdomen        History provided by:  Patient  Back Pain   Location:  Lumbar spine  Quality:  Aching  Radiates to:  Does not radiate  Pain severity:  Severe  Pain is:  Unable to specify  Onset quality:  Gradual  Timing:  Constant  Progression:  Worsening  Chronicity:  New  Context: not emotional stress, not falling, not jumping from heights, not lifting heavy objects, not MCA, not MVA, not occupational injury, not pedestrian accident, not physical stress, not recent illness, not recent injury and not twisting    Relieved by:  Nothing  Worsened by:  Nothing  Ineffective treatments:  None tried  Associated symptoms: abdominal pain, fever and weakness    Associated symptoms: no abdominal swelling, no bladder incontinence, no bowel incontinence, no chest pain, no dysuria, no headaches, no leg pain, no numbness, no paresthesias, no pelvic pain, no perianal numbness, no tingling and no weight loss    Risk factors: lack of exercise and obesity    Risk factors: no hx of cancer, no hx of osteoporosis, no menopause, not pregnant, no recent surgery, no steroid use and no vascular disease        Review of Systems   Constitutional: Positive for activity change, chills, diaphoresis, fatigue and fever. Negative for appetite change and weight loss.   HENT: Negative.    Eyes: Negative.    Respiratory: Positive for cough and shortness of breath.    Cardiovascular: Negative.  Negative for chest pain.   Gastrointestinal: Positive for abdominal pain. Negative for bowel incontinence.   Endocrine: Negative.    Genitourinary: Negative.  Negative for bladder incontinence, difficulty urinating, dysuria and pelvic pain.   Musculoskeletal: Positive for arthralgias, back pain and myalgias.   Skin: Positive for color change.    Allergic/Immunologic: Negative.    Neurological: Positive for weakness. Negative for tingling, numbness, headaches and paresthesias.   Hematological: Negative.    Psychiatric/Behavioral: Negative.    All other systems reviewed and are negative.      Past Medical History:   Diagnosis Date   • Amputation of fifth toe, left, traumatic (CMS/HCC)     third, fourth, fifth toes   • Arthritis    • History of transfusion     2007, 2016   • Seizures (CMS/HCC)     last seizure 2014       Allergies   Allergen Reactions   • Contrast Dye Anaphylaxis   • Meropenem Other (See Comments)     CRAMPING   • Toradol [Ketorolac Tromethamine] Hives   • Ultram [Tramadol Hcl] Hives   • Zofran [Ondansetron Hcl] Hives   • Zyvox [Linezolid] Rash       Past Surgical History:   Procedure Laterality Date   • ANKLE FUSION Bilateral    • APPENDECTOMY     • ARM AMPUTATION AT SHOULDER Left    • CA DRAIN LOWER LEG DEEP ABSC/HEMATOMA Left 1/19/2017    Procedure: LOWER EXTREMITY DEBRIDEMENT OSTEOMYELITIS, WOUND REPAIR FLAP;  Surgeon: Simón Reynoso MD;  Location: [facility];  Service: Plastics   • TOTAL HIP ARTHROPLASTY Right 08/2016       Family History   Problem Relation Age of Onset   • Arthritis Mother    • Asthma Mother    • COPD Mother    • Diabetes Mother    • Heart disease Mother    • Hypertension Mother    • Hyperlipidemia Mother    • Cancer Paternal Grandfather        Social History     Socioeconomic History   • Marital status:      Spouse name: Not on file   • Number of children: Not on file   • Years of education: Not on file   • Highest education level: Not on file   Tobacco Use   • Smoking status: Heavy Tobacco Smoker     Packs/day: 0.50     Years: 2.00     Pack years: 1.00     Types: Cigarettes   Substance and Sexual Activity   • Alcohol use: No   • Drug use: No   • Sexual activity: Defer           Objective   Physical Exam   Constitutional: She is oriented to person, place, and time. She appears well-developed and  well-nourished. No distress.   HENT:   Head: Normocephalic and atraumatic.   Right Ear: External ear normal.   Left Ear: External ear normal.   Mouth/Throat: Oropharynx is clear and moist.   Eyes: Conjunctivae and EOM are normal. Right eye exhibits no discharge. Left eye exhibits no discharge. No scleral icterus.   Neck: Normal range of motion. Neck supple. No tracheal deviation present. No thyromegaly present.   Cardiovascular: Regular rhythm. Exam reveals no gallop and no friction rub.   No murmur heard.  Regular tachycardia   Pulmonary/Chest: Effort normal. No stridor. No respiratory distress. She has wheezes. She has rales.   Coarse wheezing and rales R>L lower fields   Abdominal: Soft. She exhibits no distension and no mass. There is no tenderness. There is no guarding.   Musculoskeletal: Normal range of motion. She exhibits no edema or tenderness.   Neurological: She is alert and oriented to person, place, and time. No sensory deficit. She exhibits normal muscle tone.   Skin: Skin is warm and dry. Capillary refill takes less than 2 seconds. She is not diaphoretic. No pallor.   Psychiatric: She has a normal mood and affect. Her behavior is normal. Judgment and thought content normal.   Nursing note and vitals reviewed.      Central Line At Bedside  Date/Time: 5/5/2019 4:52 AM  Performed by: Ceferino Oakes MD  Authorized by: Ceferino Oakes MD     Consent:     Consent obtained:  Verbal    Consent given by:  Patient    Risks discussed:  Arterial puncture, bleeding, incorrect placement, infection and pneumothorax    Alternatives discussed:  No treatment, delayed treatment, alternative treatment, observation and referral  Pre-procedure details:     Hand hygiene: Hand hygiene performed prior to insertion      Sterile barrier technique: All elements of maximal sterile technique followed      Skin preparation:  ChloraPrep    Skin preparation agent: Skin preparation agent completely dried prior to  procedure    Anesthesia (see MAR for exact dosages):     Anesthesia method:  Local infiltration    Local anesthetic:  Lidocaine 1% w/o epi  Procedure details:     Location:  R internal jugular    Patient position:  Flat    Procedural supplies:  Triple lumen    Catheter size:  7 Fr    Landmarks identified: yes      Ultrasound guidance: no      Number of attempts:  1    Successful placement: yes    Post-procedure details:     Post-procedure:  Dressing applied and line sutured    Assessment:  Blood return through all ports    Patient tolerance of procedure:  Tolerated well, no immediate complications               ED Course  ED Course as of May 12 0501   Sun May 05, 2019   0522 D/W Dr Boyer accepting for admission to telemetry  [TZ]   0558 Troponin 0.035 reported to Dr. Oakes  [KK]      ED Course User Index  [KK] Izzy Botello APRN  [TZ] Ceferino Oakes MD      XR Chest PA & Lateral   Final Result   Overall worsening of pneumonia which is basilar predominant.   Otherwise stable chest.       This report was finalized on 5/12/2019 1:09 AM by Dr. Robin Vincent MD.          XR Chest 1 View   Final Result   Overall increase and development of bilateral airspace   disease.       This report was finalized on 5/9/2019 9:25 AM by Dr. Robin Vincent MD.          US Abdomen Limited   Final Result   1. Cholelithiasis but contracted appearance of gallbladder which in part   accounts for wall thickening.   2. Wall thickening of gallbladder also came be seen in association with   underlying hepatitis.   3. Fatty changes of liver with coarsening of the liver echotexture.   4. Technologist reports positive sonographic Alaniz sign.       This report was finalized on 5/6/2019 9:21 AM by Dr. Robin Vincent MD.          XR Chest 1 View   Final Result   Left basilar pneumonia.       This report was finalized on 5/5/2019 9:02 AM by Dr. Bhavin Li MD.          CT Abdomen Pelvis Without Contrast   ED  Interpretation   CT abdomen and pelvis without contrast   Fax report from virtual radiologic   Impression:   1.  No generalized ileus or bowel obstruction.   2.  Cholelithiasis, otherwise normal gallbladder.   3.  Bilateral ovarian cysts with largest measuring 3.3 x 1.6 cm.   4.  Left lower lobe infiltrate.   Signed Kaden Mcelroy MD      Final Result       1. Left basilar pneumonia.   2. Small bilateral adnexal cysts, the largest measuring 3.3 cm.   3. Cholelithiasis.                   This report was finalized on 5/5/2019 9:01 AM by Dr. Bhavin Li MD.          CT Chest Without Contrast   ED Interpretation   The chest without contrast   Fax report from virtual radiologic   Impression:   1.  Patchy infiltrates in each lung, left much greater than right.   2.  Right middle lobe and lingular linear parenchymal scarring or sub-segmental collapse/atelectasis.   Signed Kaden Mcelroy MD      Final Result   Patchy airspace disease throughout the left lung with dense   consolidation in the left lower lobe.   Mild airspace disease in the right lung. Overall appearance concerning   for diffuse pneumonitis.        This report was finalized on 5/5/2019 8:14 AM by Dr. Bhavin Li MD.          XR Chest AP   ED Interpretation   Single portable CXR   Post R-IJ placement.   No PTX, tip in IVC      Final Result       1. Central line tip in the right atrium. No pneumothorax   2. Left basilar pneumonia       This report was finalized on 5/5/2019 8:04 AM by Dr. Bhavin Li MD.            Labs Reviewed   COMPREHENSIVE METABOLIC PANEL - Abnormal; Notable for the following components:       Result Value    Glucose 117 (*)     Creatinine 1.61 (*)     Sodium 133 (*)     Chloride 94 (*)     ALT (SGPT) 103 (*)     AST (SGOT) 548 (*)     Alkaline Phosphatase 140 (*)     eGFR Non  Amer 35 (*)     All other components within normal limits    Narrative:     GFR Normal >60  Chronic Kidney Disease <60  Kidney Failure <15    URINALYSIS W/ MICROSCOPIC IF INDICATED (NO CULTURE) - Abnormal; Notable for the following components:    Appearance, UA Cloudy (*)     All other components within normal limits    Narrative:     Urine microscopic not indicated.   URINE DRUG SCREEN - Abnormal; Notable for the following components:    Benzodiazepine Screen, Urine Positive (*)     Oxycodone Screen, Urine Positive (*)     All other components within normal limits    Narrative:     Negative Thresholds For Drugs Screened:                  Amphetamines              1000 ng/ml               Barbiturates               200 ng/ml               Benzodiazepines            200 ng/ml              Cocaine                    300 ng/ml              Methadone                  300 ng/ml              Opiates                    300 ng/ml               Phencyclidine               25 ng/ml               THC                         50 ng/ml              6-Acetyl Morphine           10 ng/ml              Buprenorphine                5 ng/ml              Oxycodone                  300 ng/ml    The reference range for all drugs tested is negative. This report includes final unconfirmed qualitative results to be used for medical treatment purposes only. Unconfirmed results must not be used for non-medical purposes such as employment or legal testing. Clinical consideration should be applied to any drug of abuse test, especially when unconfirmed quantitative results are used.     BLOOD GAS, ARTERIAL - Abnormal; Notable for the following components:    pO2, Arterial 47.6 (*)     O2 Saturation, Arterial 83.2 (*)     Hematocrit, Blood Gas 36.0 (*)     Oxyhemoglobin 80.4 (*)     All other components within normal limits   CBC WITH AUTO DIFFERENTIAL - Abnormal; Notable for the following components:    Platelets 139 (*)     Neutrophil % 76.5 (*)     Lymphocyte % 13.7 (*)     Eosinophil % 0.1 (*)     All other components within normal limits   ACETAMINOPHEN LEVEL - Abnormal; Notable  for the following components:    Acetaminophen <5.0 (*)     All other components within normal limits   HEPATITIS PANEL, ACUTE - Abnormal; Notable for the following components:    Hepatitis C Ab Reactive (*)     All other components within normal limits   TROPONIN (IN-HOUSE) - Abnormal; Notable for the following components:    Troponin T 0.035 (*)     All other components within normal limits    Narrative:     Troponin T Reference Range:  <= 0.03 ng/mL-   Negative for AMI  >0.03 ng/mL-     Abnormal for myocardial necrosis.  Clinicians would have to utilize clinical acumen, EKG, Troponin and serial changes to determine if it is an Acute Myocardial Infarction or myocardial injury due to an underlying chronic condition.    BNP (IN-HOUSE) - Abnormal; Notable for the following components:    proBNP 2,126.0 (*)     All other components within normal limits    Narrative:     Among patients with dyspnea, NT-proBNP is highly sensitive for the detection of acute congestive heart failure. In addition NT-proBNP of <300 pg/ml effectively rules out acute congestive heart failure with 99% negative predictive value.   C-REACTIVE PROTEIN - Abnormal; Notable for the following components:    C-Reactive Protein 25.05 (*)     All other components within normal limits   MAGNESIUM - Abnormal; Notable for the following components:    Magnesium 1.5 (*)     All other components within normal limits   CBC WITH AUTO DIFFERENTIAL - Abnormal; Notable for the following components:    RBC 3.26 (*)     Hemoglobin 10.3 (*)     Hematocrit 31.1 (*)     Platelets 120 (*)     All other components within normal limits   COMPREHENSIVE METABOLIC PANEL - Abnormal; Notable for the following components:    Glucose 109 (*)     Creatinine 1.13 (*)     Calcium 7.4 (*)     Total Protein 5.9 (*)     Albumin 2.81 (*)     ALT (SGPT) 84 (*)     AST (SGOT) 416 (*)     Total Bilirubin <0.2 (*)     eGFR Non  Amer 53 (*)     All other components within normal  limits    Narrative:     GFR Normal >60  Chronic Kidney Disease <60  Kidney Failure <15   COMPREHENSIVE METABOLIC PANEL - Abnormal; Notable for the following components:    Glucose 195 (*)     Calcium 7.7 (*)     Albumin 2.86 (*)     ALT (SGPT) 80 (*)     AST (SGOT) 305 (*)     Total Bilirubin <0.2 (*)     All other components within normal limits    Narrative:     GFR Normal >60  Chronic Kidney Disease <60  Kidney Failure <15   C-REACTIVE PROTEIN - Abnormal; Notable for the following components:    C-Reactive Protein 13.04 (*)     All other components within normal limits   CBC WITH AUTO DIFFERENTIAL - Abnormal; Notable for the following components:    WBC 2.62 (*)     RBC 3.32 (*)     Hemoglobin 10.4 (*)     Hematocrit 32.5 (*)     MCV 97.9 (*)     Platelets 104 (*)     Neutrophil % 82.0 (*)     Lymphocyte % 10.3 (*)     Eosinophil % 0.0 (*)     Lymphocytes, Absolute 0.27 (*)     All other components within normal limits   IRON PROFILE - Abnormal; Notable for the following components:    Iron 25 (*)     Iron Saturation 10 (*)     Transferrin 176 (*)     TIBC 262 (*)     All other components within normal limits   BASIC METABOLIC PANEL - Abnormal; Notable for the following components:    Glucose 138 (*)     Chloride 111 (*)     Calcium 8.0 (*)     All other components within normal limits    Narrative:     GFR Normal >60  Chronic Kidney Disease <60  Kidney Failure <15   HEPATIC FUNCTION PANEL - Abnormal; Notable for the following components:    Albumin 3.00 (*)     ALT (SGPT) 71 (*)     AST (SGOT) 171 (*)     Bilirubin, Direct <0.2 (*)     All other components within normal limits   CBC WITH AUTO DIFFERENTIAL - Abnormal; Notable for the following components:    RBC 3.29 (*)     Hemoglobin 10.5 (*)     Hematocrit 31.9 (*)     Platelets 116 (*)     Neutrophil % 89.2 (*)     Lymphocyte % 3.9 (*)     Eosinophil % 0.2 (*)     Lymphocytes, Absolute 0.21 (*)     All other components within normal limits   URINE DRUG  SCREEN - Abnormal; Notable for the following components:    Oxycodone Screen, Urine Positive (*)     All other components within normal limits    Narrative:     Negative Thresholds For Drugs Screened:                  Amphetamines              1000 ng/ml               Barbiturates               200 ng/ml               Benzodiazepines            200 ng/ml              Cocaine                    300 ng/ml              Methadone                  300 ng/ml              Opiates                    300 ng/ml               Phencyclidine               25 ng/ml               THC                         50 ng/ml              6-Acetyl Morphine           10 ng/ml              Buprenorphine                5 ng/ml              Oxycodone                  300 ng/ml    The reference range for all drugs tested is negative. This report includes final unconfirmed qualitative results to be used for medical treatment purposes only. Unconfirmed results must not be used for non-medical purposes such as employment or legal testing. Clinical consideration should be applied to any drug of abuse test, especially when unconfirmed quantitative results are used.     COMPREHENSIVE METABOLIC PANEL - Abnormal; Notable for the following components:    Glucose 118 (*)     Chloride 111 (*)     Calcium 7.7 (*)     Total Protein 5.8 (*)     Albumin 2.72 (*)     ALT (SGPT) 55 (*)     AST (SGOT) 89 (*)     Total Bilirubin <0.2 (*)     All other components within normal limits    Narrative:     GFR Normal >60  Chronic Kidney Disease <60  Kidney Failure <15   C-REACTIVE PROTEIN - Abnormal; Notable for the following components:    C-Reactive Protein 4.21 (*)     All other components within normal limits   MAGNESIUM - Abnormal; Notable for the following components:    Magnesium 1.4 (*)     All other components within normal limits   CBC WITH AUTO DIFFERENTIAL - Abnormal; Notable for the following components:    WBC 3.30 (*)     RBC 3.04 (*)     Hemoglobin 9.4  (*)     Hematocrit 29.7 (*)     MCV 97.7 (*)     Platelets 114 (*)     Lymphocyte % 14.2 (*)     Monocyte % 12.1 (*)     Eosinophil % 0.0 (*)     Immature Grans % 0.9 (*)     Lymphocytes, Absolute 0.47 (*)     All other components within normal limits   COMPREHENSIVE METABOLIC PANEL - Abnormal; Notable for the following components:    Glucose 135 (*)     Potassium 3.3 (*)     Calcium 8.3 (*)     Albumin 2.79 (*)     ALT (SGPT) 47 (*)     AST (SGOT) 57 (*)     Total Bilirubin <0.2 (*)     All other components within normal limits    Narrative:     GFR Normal >60  Chronic Kidney Disease <60  Kidney Failure <15   C-REACTIVE PROTEIN - Abnormal; Notable for the following components:    C-Reactive Protein 4.36 (*)     All other components within normal limits   CBC WITH AUTO DIFFERENTIAL - Abnormal; Notable for the following components:    RBC 3.29 (*)     Hemoglobin 10.2 (*)     Hematocrit 32.4 (*)     MCV 98.5 (*)     Platelets 114 (*)     Lymphocyte % 17.4 (*)     Eosinophil % 0.0 (*)     Immature Grans % 2.6 (*)     Lymphocytes, Absolute 0.66 (*)     Immature Grans, Absolute 0.10 (*)     All other components within normal limits   COMPREHENSIVE METABOLIC PANEL - Abnormal; Notable for the following components:    Glucose 126 (*)     Potassium 2.7 (*)     Calcium 8.4 (*)     Total Protein 5.8 (*)     Albumin 2.71 (*)     ALT (SGPT) 39 (*)     AST (SGOT) 39 (*)     Total Bilirubin <0.2 (*)     All other components within normal limits    Narrative:     GFR Normal >60  Chronic Kidney Disease <60  Kidney Failure <15   C-REACTIVE PROTEIN - Abnormal; Notable for the following components:    C-Reactive Protein 2.87 (*)     All other components within normal limits   MAGNESIUM - Abnormal; Notable for the following components:    Magnesium 1.4 (*)     All other components within normal limits   CBC WITH AUTO DIFFERENTIAL - Abnormal; Notable for the following components:    RBC 3.31 (*)     Hemoglobin 10.2 (*)     Hematocrit  32.5 (*)     MCV 98.2 (*)     MCHC 31.4 (*)     Platelets 133 (*)     Eosinophil % 0.0 (*)     Immature Grans % 2.0 (*)     Immature Grans, Absolute 0.09 (*)     All other components within normal limits   CBC WITH AUTO DIFFERENTIAL - Abnormal; Notable for the following components:    WBC 2.91 (*)     RBC 3.42 (*)     Hemoglobin 10.3 (*)     Hematocrit 33.3 (*)     MCV 97.4 (*)     MCHC 30.9 (*)     Monocyte % 17.9 (*)     Immature Grans % 3.8 (*)     Neutrophils, Absolute 1.25 (*)     Immature Grans, Absolute 0.11 (*)     All other components within normal limits   LEGIONELLA ANTIGEN, URINE - Normal    Narrative:     Presumptive negative for L. pneumophilia serogroup 1 antigen, suggesting no recent or current infection.   LACTIC ACID, PLASMA - Normal   MYCOPLASMA PNEUMONIAE ANTIBODY, IGM - Normal   TROPONIN (IN-HOUSE) - Normal    Narrative:     Troponin T Reference Range:  <= 0.03 ng/mL-   Negative for AMI  >0.03 ng/mL-     Abnormal for myocardial necrosis.  Clinicians would have to utilize clinical acumen, EKG, Troponin and serial changes to determine if it is an Acute Myocardial Infarction or myocardial injury due to an underlying chronic condition.    TROPONIN (IN-HOUSE) - Normal    Narrative:     Troponin T Reference Range:  <= 0.03 ng/mL-   Negative for AMI  >0.03 ng/mL-     Abnormal for myocardial necrosis.  Clinicians would have to utilize clinical acumen, EKG, Troponin and serial changes to determine if it is an Acute Myocardial Infarction or myocardial injury due to an underlying chronic condition.    SODIUM - Normal   MAGNESIUM - Normal   VITAMIN B12 - Normal   FOLATE - Normal   FERRITIN - Normal   PROTIME-INR - Normal    Narrative:     Suggested INR therapeutic range for stable oral anticoagulant therapy:    Low Intensity therapy:   1.5-2.0  Moderate Intensity therapy:   2.0-3.0  High Intensity therapy:   2.5-4.0   SODIUM - Normal   SODIUM - Normal   MAGNESIUM - Normal   PHOSPHORUS - Normal   PHOSPHORUS  - Normal   MAGNESIUM - Normal   POTASSIUM - Normal   TSH - Normal   T4, FREE - Normal   BLOOD CULTURE   BLOOD CULTURE   RESPIRATORY CULTURE   ETHANOL    Narrative:     >/= 80.0 legally intoxicated   CBC AND DIFFERENTIAL    Narrative:     The following orders were created for panel order CBC & Differential.  Procedure                               Abnormality         Status                     ---------                               -----------         ------                     CBC Auto Differential[[id number]]        Abnormal            Final result                 Please view results for these tests on the individual orders.   CBC AND DIFFERENTIAL    Narrative:     The following orders were created for panel order CBC & Differential.  Procedure                               Abnormality         Status                     ---------                               -----------         ------                     CBC Auto Differential[[id number]]        Abnormal            Final result                 Please view results for these tests on the individual orders.   CBC AND DIFFERENTIAL    Narrative:     The following orders were created for panel order CBC & Differential.  Procedure                               Abnormality         Status                     ---------                               -----------         ------                     CBC Auto Differential[[id number]]        Abnormal            Final result                 Please view results for these tests on the individual orders.   CBC AND DIFFERENTIAL    Narrative:     The following orders were created for panel order CBC & Differential.  Procedure                               Abnormality         Status                     ---------                               -----------         ------                     CBC Auto Differential[[id number]]        Abnormal            Final result                 Please view results for these tests on the individual orders.    CBC AND DIFFERENTIAL    Narrative:     The following orders were created for panel order CBC & Differential.  Procedure                               Abnormality         Status                     ---------                               -----------         ------                     CBC Auto Differential[[id number]]        Abnormal            Final result                 Please view results for these tests on the individual orders.   CBC AND DIFFERENTIAL    Narrative:     The following orders were created for panel order CBC & Differential.  Procedure                               Abnormality         Status                     ---------                               -----------         ------                     CBC Auto Differential[[id number]]        Abnormal            Final result                 Please view results for these tests on the individual orders.   CBC AND DIFFERENTIAL    Narrative:     The following orders were created for panel order CBC & Differential.  Procedure                               Abnormality         Status                     ---------                               -----------         ------                     CBC Auto Differential[[id number]]        Abnormal            Final result                 Please view results for these tests on the individual orders.        Medication List      START taking these medications    albuterol sulfate  (90 Base) MCG/ACT inhaler  Commonly known as:  PROVENTIL HFA;VENTOLIN HFA;PROAIR HFA  Inhale 2 puffs Every 4 (Four) Hours As Needed for Wheezing or Shortness of   Air.     azithromycin 500 MG tablet  Commonly known as:  ZITHROMAX  Take 1 tablet by mouth Daily for 2 days.     budesonide-formoterol 160-4.5 MCG/ACT inhaler  Commonly known as:  SYMBICORT  Inhale 2 puffs 2 (Two) Times a Day.     cefdinir 300 MG capsule  Commonly known as:  OMNICEF  Take 1 capsule by mouth 2 (Two) Times a Day for 3 days.     iron polysaccharides 150 MG  capsule  Commonly known as:  NIFEREX  Take 1 capsule by mouth Daily.     nicotine 14 MG/24HR patch  Commonly known as:  NICODERM CQ  Place 1 patch on the skin as directed by provider Daily.     nystatin 745351 UNIT/ML suspension  Commonly known as:  MYCOSTATIN  Swish and swallow 5 mL 4 (Four) Times a Day for 6 days. For oral thrush     predniSONE 20 MG tablet  Commonly known as:  DELTASONE  Take 2 tablets by mouth Daily With Breakfast for 5 doses.        CONTINUE taking these medications    ALPRAZolam 0.5 MG tablet  Commonly known as:  XANAX     amitriptyline 75 MG tablet  Commonly known as:  ELAVIL     gabapentin 800 MG tablet  Commonly known as:  NEURONTIN     levETIRAcetam 1000 MG tablet  Commonly known as:  KEPPRA     LYRICA 300 MG capsule  Generic drug:  pregabalin     oxyCODONE 15 MG immediate release tablet  Commonly known as:  ROXICODONE     oxyMORphone ER 15 MG 12 hr tablet  Commonly known as:  OPANA ER                    MDM  Number of Diagnoses or Management Options  Community acquired pneumonia of left lung, unspecified part of lung: new and requires workup  Community acquired pneumonia of right lung, unspecified part of lung: new and requires workup     Amount and/or Complexity of Data Reviewed  Clinical lab tests: ordered and reviewed  Tests in the radiology section of CPT®: ordered and reviewed  Decide to obtain previous medical records or to obtain history from someone other than the patient: yes  Independent visualization of images, tracings, or specimens: yes    Risk of Complications, Morbidity, and/or Mortality  Presenting problems: high  Diagnostic procedures: high  Management options: high    Patient Progress  Patient progress: (Fair)        Final diagnoses:   Community acquired pneumonia of left lung, unspecified part of lung   Community acquired pneumonia of right lung, unspecified part of lung            Ceferino Oakes MD  05/12/19 8586
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
## 2019-05-06 ENCOUNTER — APPOINTMENT (OUTPATIENT)
Dept: ULTRASOUND IMAGING | Facility: HOSPITAL | Age: 44
End: 2019-05-06

## 2019-05-06 LAB
ALBUMIN SERPL-MCNC: 2.86 G/DL (ref 3.5–5.2)
ALBUMIN/GLOB SERPL: 0.9 G/DL
ALP SERPL-CCNC: 90 U/L (ref 39–117)
ALT SERPL W P-5'-P-CCNC: 80 U/L (ref 1–33)
ANION GAP SERPL CALCULATED.3IONS-SCNC: 9.7 MMOL/L
AST SERPL-CCNC: 305 U/L (ref 1–32)
BASOPHILS # BLD AUTO: 0 10*3/MM3 (ref 0–0.2)
BASOPHILS NFR BLD AUTO: 0 % (ref 0–1.5)
BILIRUB SERPL-MCNC: <0.2 MG/DL (ref 0.2–1.2)
BUN BLD-MCNC: 10 MG/DL (ref 6–20)
BUN/CREAT SERPL: 13.3 (ref 7–25)
CALCIUM SPEC-SCNC: 7.7 MG/DL (ref 8.6–10.5)
CHLORIDE SERPL-SCNC: 106 MMOL/L (ref 98–107)
CO2 SERPL-SCNC: 22.3 MMOL/L (ref 22–29)
CREAT BLD-MCNC: 0.75 MG/DL (ref 0.57–1)
CRP SERPL-MCNC: 13.04 MG/DL (ref 0–0.5)
DEPRECATED RDW RBC AUTO: 44.6 FL (ref 37–54)
EOSINOPHIL # BLD AUTO: 0 10*3/MM3 (ref 0–0.4)
EOSINOPHIL NFR BLD AUTO: 0 % (ref 0.3–6.2)
ERYTHROCYTE [DISTWIDTH] IN BLOOD BY AUTOMATED COUNT: 13.2 % (ref 12.3–15.4)
FOLATE SERPL-MCNC: 5.99 NG/ML (ref 4.78–24.2)
GFR SERPL CREATININE-BSD FRML MDRD: 85 ML/MIN/1.73
GLOBULIN UR ELPH-MCNC: 3.3 GM/DL
GLUCOSE BLD-MCNC: 195 MG/DL (ref 65–99)
HCT VFR BLD AUTO: 32.5 % (ref 34–46.6)
HGB BLD-MCNC: 10.4 G/DL (ref 12–15.9)
IMM GRANULOCYTES # BLD AUTO: 0.01 10*3/MM3 (ref 0–0.05)
IMM GRANULOCYTES NFR BLD AUTO: 0.4 % (ref 0–0.5)
IRON 24H UR-MRATE: 25 MCG/DL (ref 37–145)
IRON SATN MFR SERPL: 10 % (ref 20–50)
LYMPHOCYTES # BLD AUTO: 0.27 10*3/MM3 (ref 0.7–3.1)
LYMPHOCYTES NFR BLD AUTO: 10.3 % (ref 19.6–45.3)
MAGNESIUM SERPL-MCNC: 2.5 MG/DL (ref 1.6–2.6)
MCH RBC QN AUTO: 31.3 PG (ref 26.6–33)
MCHC RBC AUTO-ENTMCNC: 32 G/DL (ref 31.5–35.7)
MCV RBC AUTO: 97.9 FL (ref 79–97)
MONOCYTES # BLD AUTO: 0.19 10*3/MM3 (ref 0.1–0.9)
MONOCYTES NFR BLD AUTO: 7.3 % (ref 5–12)
NEUTROPHILS # BLD AUTO: 2.15 10*3/MM3 (ref 1.7–7)
NEUTROPHILS NFR BLD AUTO: 82 % (ref 42.7–76)
PLATELET # BLD AUTO: 104 10*3/MM3 (ref 140–450)
PMV BLD AUTO: 11.1 FL (ref 6–12)
POTASSIUM BLD-SCNC: 4 MMOL/L (ref 3.5–5.2)
PROT SERPL-MCNC: 6.2 G/DL (ref 6–8.5)
RBC # BLD AUTO: 3.32 10*6/MM3 (ref 3.77–5.28)
SODIUM BLD-SCNC: 138 MMOL/L (ref 136–145)
TIBC SERPL-MCNC: 262 MCG/DL (ref 298–536)
TRANSFERRIN SERPL-MCNC: 176 MG/DL (ref 200–360)
VIT B12 BLD-MCNC: 613 PG/ML (ref 211–946)
WBC NRBC COR # BLD: 2.62 10*3/MM3 (ref 3.4–10.8)

## 2019-05-06 PROCEDURE — 82607 VITAMIN B-12: CPT | Performed by: NURSE PRACTITIONER

## 2019-05-06 PROCEDURE — 80053 COMPREHEN METABOLIC PANEL: CPT | Performed by: NURSE PRACTITIONER

## 2019-05-06 PROCEDURE — 76705 ECHO EXAM OF ABDOMEN: CPT

## 2019-05-06 PROCEDURE — 25010000002 METHYLPREDNISOLONE PER 40 MG: Performed by: HOSPITALIST

## 2019-05-06 PROCEDURE — 94799 UNLISTED PULMONARY SVC/PX: CPT

## 2019-05-06 PROCEDURE — 85025 COMPLETE CBC W/AUTO DIFF WBC: CPT | Performed by: NURSE PRACTITIONER

## 2019-05-06 PROCEDURE — 25010000002 HEPARIN (PORCINE) PER 1000 UNITS: Performed by: HOSPITALIST

## 2019-05-06 PROCEDURE — 84466 ASSAY OF TRANSFERRIN: CPT | Performed by: NURSE PRACTITIONER

## 2019-05-06 PROCEDURE — 76705 ECHO EXAM OF ABDOMEN: CPT | Performed by: RADIOLOGY

## 2019-05-06 PROCEDURE — 82746 ASSAY OF FOLIC ACID SERUM: CPT | Performed by: NURSE PRACTITIONER

## 2019-05-06 PROCEDURE — 83540 ASSAY OF IRON: CPT | Performed by: NURSE PRACTITIONER

## 2019-05-06 PROCEDURE — 25010000002 CEFTRIAXONE: Performed by: HOSPITALIST

## 2019-05-06 PROCEDURE — 99233 SBSQ HOSP IP/OBS HIGH 50: CPT | Performed by: NURSE PRACTITIONER

## 2019-05-06 PROCEDURE — 86140 C-REACTIVE PROTEIN: CPT | Performed by: NURSE PRACTITIONER

## 2019-05-06 PROCEDURE — 83735 ASSAY OF MAGNESIUM: CPT | Performed by: HOSPITALIST

## 2019-05-06 PROCEDURE — 25010000002 PROMETHAZINE PER 50 MG: Performed by: NURSE PRACTITIONER

## 2019-05-06 RX ORDER — OXYCODONE HYDROCHLORIDE 15 MG/1
15 TABLET ORAL EVERY 8 HOURS PRN
Status: DISCONTINUED | OUTPATIENT
Start: 2019-05-06 | End: 2019-05-08

## 2019-05-06 RX ORDER — IRON POLYSACCHARIDE COMPLEX 150 MG
150 CAPSULE ORAL DAILY
Status: DISCONTINUED | OUTPATIENT
Start: 2019-05-06 | End: 2019-05-11 | Stop reason: HOSPADM

## 2019-05-06 RX ADMIN — GUAIFENESIN 600 MG: 600 TABLET, EXTENDED RELEASE ORAL at 09:10

## 2019-05-06 RX ADMIN — Medication 150 MG: at 16:24

## 2019-05-06 RX ADMIN — AMITRIPTYLINE HYDROCHLORIDE 75 MG: 50 TABLET, FILM COATED ORAL at 20:53

## 2019-05-06 RX ADMIN — METRONIDAZOLE 500 MG: 500 INJECTION, SOLUTION INTRAVENOUS at 16:25

## 2019-05-06 RX ADMIN — LEVETIRACETAM 1000 MG: 500 TABLET, FILM COATED ORAL at 20:52

## 2019-05-06 RX ADMIN — GUAIFENESIN 600 MG: 600 TABLET, EXTENDED RELEASE ORAL at 20:52

## 2019-05-06 RX ADMIN — ASPIRIN 325 MG: 325 TABLET ORAL at 09:10

## 2019-05-06 RX ADMIN — SODIUM CHLORIDE, PRESERVATIVE FREE 3 ML: 5 INJECTION INTRAVENOUS at 20:52

## 2019-05-06 RX ADMIN — CEFTRIAXONE 1 G: 1 INJECTION, POWDER, FOR SOLUTION INTRAMUSCULAR; INTRAVENOUS at 05:39

## 2019-05-06 RX ADMIN — LEVETIRACETAM 1000 MG: 500 TABLET, FILM COATED ORAL at 09:11

## 2019-05-06 RX ADMIN — GABAPENTIN 800 MG: 400 CAPSULE ORAL at 14:26

## 2019-05-06 RX ADMIN — PROMETHAZINE HYDROCHLORIDE 6.25 MG: 25 INJECTION INTRAMUSCULAR; INTRAVENOUS at 21:10

## 2019-05-06 RX ADMIN — DOXYCYCLINE 100 MG: 100 INJECTION, POWDER, LYOPHILIZED, FOR SOLUTION INTRAVENOUS at 05:38

## 2019-05-06 RX ADMIN — OXYCODONE HYDROCHLORIDE 15 MG: 15 TABLET ORAL at 16:24

## 2019-05-06 RX ADMIN — SODIUM CHLORIDE 100 ML/HR: 9 INJECTION, SOLUTION INTRAVENOUS at 14:26

## 2019-05-06 RX ADMIN — GABAPENTIN 800 MG: 400 CAPSULE ORAL at 20:52

## 2019-05-06 RX ADMIN — ALPRAZOLAM 0.5 MG: 0.5 TABLET ORAL at 14:26

## 2019-05-06 RX ADMIN — HEPARIN SODIUM 5000 UNITS: 5000 INJECTION INTRAVENOUS; SUBCUTANEOUS at 20:52

## 2019-05-06 RX ADMIN — PANTOPRAZOLE SODIUM 40 MG: 40 TABLET, DELAYED RELEASE ORAL at 06:23

## 2019-05-06 RX ADMIN — SODIUM CHLORIDE 100 ML/HR: 9 INJECTION, SOLUTION INTRAVENOUS at 03:24

## 2019-05-06 RX ADMIN — HEPARIN SODIUM 5000 UNITS: 5000 INJECTION INTRAVENOUS; SUBCUTANEOUS at 09:11

## 2019-05-06 RX ADMIN — METHYLPREDNISOLONE SODIUM SUCCINATE 40 MG: 40 INJECTION, POWDER, FOR SOLUTION INTRAMUSCULAR; INTRAVENOUS at 06:23

## 2019-05-06 RX ADMIN — METHYLPREDNISOLONE SODIUM SUCCINATE 40 MG: 40 INJECTION, POWDER, FOR SOLUTION INTRAMUSCULAR; INTRAVENOUS at 20:51

## 2019-05-06 NOTE — PROGRESS NOTES
Discharge Planning Assessment   [facility]     Patient Name: Alisa Holland  MRN: [id number]  Today's Date: 5/6/2019    Admit Date: 5/4/2019    Discharge Needs Assessment     Row Name 05/06/19 1744       Living Environment    Lives With  significant other    Current Living Arrangements  home/apartment/condo    Family Caregiver if Needed  significant other    Quality of Family Relationships  helpful;involved;supportive        Discharge Plan     Row Name 05/06/19 1744       Plan    Plan  Pt admitted on 5/4/19.  SS received consult per [facility] for discharge planning.  Pt lives at home with significant other and plans to return home at discharge.  Pt currently does not utilize home health services or DME.  Pt's PCP is Narcisa Cyr with [facility].  SS will follow and assist with discharge needs.                 Elisha Jaimes

## 2019-05-06 NOTE — PLAN OF CARE
Problem: Skin Injury Risk (Adult)  Goal: Identify Related Risk Factors and Signs and Symptoms  Outcome: Ongoing (interventions implemented as appropriate)    Goal: Skin Health and Integrity  Outcome: Ongoing (interventions implemented as appropriate)      Problem: Fall Risk (Adult)  Goal: Identify Related Risk Factors and Signs and Symptoms  Outcome: Ongoing (interventions implemented as appropriate)    Goal: Absence of Fall  Outcome: Ongoing (interventions implemented as appropriate)      Problem: Sepsis/Septic Shock (Adult)  Goal: Signs and Symptoms of Listed Potential Problems Will be Absent, Minimized or Managed (Sepsis/Septic Shock)  Outcome: Ongoing (interventions implemented as appropriate)      Problem: Wound (Includes Pressure Injury) (Adult)  Goal: Signs and Symptoms of Listed Potential Problems Will be Absent, Minimized or Managed (Wound)  Outcome: Ongoing (interventions implemented as appropriate)      Problem: Patient Care Overview  Goal: Plan of Care Review  Outcome: Ongoing (interventions implemented as appropriate)    Goal: Discharge Needs Assessment  Outcome: Ongoing (interventions implemented as appropriate)      Problem: Pneumonia (Adult)  Goal: Signs and Symptoms of Listed Potential Problems Will be Absent, Minimized or Managed (Pneumonia)  Outcome: Ongoing (interventions implemented as appropriate)

## 2019-05-06 NOTE — PLAN OF CARE
Problem: Skin Injury Risk (Adult)  Goal: Identify Related Risk Factors and Signs and Symptoms  Outcome: Ongoing (interventions implemented as appropriate)    Goal: Skin Health and Integrity  Outcome: Ongoing (interventions implemented as appropriate)      Problem: Fall Risk (Adult)  Goal: Identify Related Risk Factors and Signs and Symptoms  Outcome: Ongoing (interventions implemented as appropriate)    Goal: Absence of Fall  Outcome: Ongoing (interventions implemented as appropriate)      Problem: Sepsis/Septic Shock (Adult)  Goal: Signs and Symptoms of Listed Potential Problems Will be Absent, Minimized or Managed (Sepsis/Septic Shock)  Outcome: Ongoing (interventions implemented as appropriate)      Problem: Wound (Includes Pressure Injury) (Adult)  Goal: Signs and Symptoms of Listed Potential Problems Will be Absent, Minimized or Managed (Wound)  Outcome: Ongoing (interventions implemented as appropriate)      Problem: Patient Care Overview  Goal: Plan of Care Review  Outcome: Ongoing (interventions implemented as appropriate)    Goal: Individualization and Mutuality  Outcome: Ongoing (interventions implemented as appropriate)    Goal: Discharge Needs Assessment  Outcome: Ongoing (interventions implemented as appropriate)    Goal: Interprofessional Rounds/Family Conf  Outcome: Ongoing (interventions implemented as appropriate)      Problem: Pneumonia (Adult)  Goal: Signs and Symptoms of Listed Potential Problems Will be Absent, Minimized or Managed (Pneumonia)  Outcome: Ongoing (interventions implemented as appropriate)      Problem: Patient Care Overview  Goal: Plan of Care Review  Outcome: Ongoing (interventions implemented as appropriate)    Goal: Individualization and Mutuality  Outcome: Ongoing (interventions implemented as appropriate)    Goal: Discharge Needs Assessment  Outcome: Ongoing (interventions implemented as appropriate)    Goal: Interprofessional Rounds/Family Conf  Outcome: Ongoing  (interventions implemented as appropriate)
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
## 2019-05-06 NOTE — PLAN OF CARE
Problem: Skin Injury Risk (Adult)  Goal: Skin Health and Integrity  Outcome: Ongoing (interventions implemented as appropriate)      Problem: Fall Risk (Adult)  Goal: Absence of Fall  Outcome: Ongoing (interventions implemented as appropriate)

## 2019-05-07 LAB
6-ACETYL MORPHINE: NEGATIVE
ALBUMIN SERPL-MCNC: 3 G/DL (ref 3.5–5.2)
ALP SERPL-CCNC: 86 U/L (ref 39–117)
ALT SERPL W P-5'-P-CCNC: 71 U/L (ref 1–33)
AMPHET+METHAMPHET UR QL: NEGATIVE
ANION GAP SERPL CALCULATED.3IONS-SCNC: 11.8 MMOL/L
AST SERPL-CCNC: 171 U/L (ref 1–32)
BARBITURATES UR QL SCN: NEGATIVE
BASOPHILS # BLD AUTO: 0 10*3/MM3 (ref 0–0.2)
BASOPHILS NFR BLD AUTO: 0 % (ref 0–1.5)
BENZODIAZ UR QL SCN: NEGATIVE
BILIRUB CONJ SERPL-MCNC: <0.2 MG/DL (ref 0.2–0.3)
BILIRUB INDIRECT SERPL-MCNC: ABNORMAL MG/DL
BILIRUB SERPL-MCNC: 0.2 MG/DL (ref 0.2–1.2)
BUN BLD-MCNC: 10 MG/DL (ref 6–20)
BUN/CREAT SERPL: 14.1 (ref 7–25)
BUPRENORPHINE SERPL-MCNC: NEGATIVE NG/ML
CALCIUM SPEC-SCNC: 8 MG/DL (ref 8.6–10.5)
CANNABINOIDS SERPL QL: NEGATIVE
CHLORIDE SERPL-SCNC: 111 MMOL/L (ref 98–107)
CO2 SERPL-SCNC: 22.2 MMOL/L (ref 22–29)
COCAINE UR QL: NEGATIVE
CREAT BLD-MCNC: 0.71 MG/DL (ref 0.57–1)
DEPRECATED RDW RBC AUTO: 49.7 FL (ref 37–54)
EOSINOPHIL # BLD AUTO: 0.01 10*3/MM3 (ref 0–0.4)
EOSINOPHIL NFR BLD AUTO: 0.2 % (ref 0.3–6.2)
ERYTHROCYTE [DISTWIDTH] IN BLOOD BY AUTOMATED COUNT: 13.9 % (ref 12.3–15.4)
FERRITIN SERPL-MCNC: 121.8 NG/ML (ref 13–150)
GFR SERPL CREATININE-BSD FRML MDRD: 90 ML/MIN/1.73
GLUCOSE BLD-MCNC: 138 MG/DL (ref 65–99)
HCT VFR BLD AUTO: 31.9 % (ref 34–46.6)
HGB BLD-MCNC: 10.5 G/DL (ref 12–15.9)
IMM GRANULOCYTES # BLD AUTO: 0.01 10*3/MM3 (ref 0–0.05)
IMM GRANULOCYTES NFR BLD AUTO: 0.2 % (ref 0–0.5)
INR PPP: 0.92 (ref 0.9–1.1)
LYMPHOCYTES # BLD AUTO: 0.21 10*3/MM3 (ref 0.7–3.1)
LYMPHOCYTES NFR BLD AUTO: 3.9 % (ref 19.6–45.3)
MCH RBC QN AUTO: 31.9 PG (ref 26.6–33)
MCHC RBC AUTO-ENTMCNC: 32.9 G/DL (ref 31.5–35.7)
MCV RBC AUTO: 97 FL (ref 79–97)
METHADONE UR QL SCN: NEGATIVE
MONOCYTES # BLD AUTO: 0.35 10*3/MM3 (ref 0.1–0.9)
MONOCYTES NFR BLD AUTO: 6.5 % (ref 5–12)
NEUTROPHILS # BLD AUTO: 4.84 10*3/MM3 (ref 1.7–7)
NEUTROPHILS NFR BLD AUTO: 89.2 % (ref 42.7–76)
OPIATES UR QL: NEGATIVE
OXYCODONE UR QL SCN: POSITIVE
PCP UR QL SCN: NEGATIVE
PLATELET # BLD AUTO: 116 10*3/MM3 (ref 140–450)
PMV BLD AUTO: 11.3 FL (ref 6–12)
POTASSIUM BLD-SCNC: 4 MMOL/L (ref 3.5–5.2)
PROT SERPL-MCNC: 6.3 G/DL (ref 6–8.5)
PROTHROMBIN TIME: 12.8 SECONDS (ref 11–15.4)
RBC # BLD AUTO: 3.29 10*6/MM3 (ref 3.77–5.28)
SODIUM BLD-SCNC: 142 MMOL/L (ref 136–145)
SODIUM BLD-SCNC: 144 MMOL/L (ref 136–145)
SODIUM BLD-SCNC: 145 MMOL/L (ref 136–145)
WBC NRBC COR # BLD: 5.42 10*3/MM3 (ref 3.4–10.8)

## 2019-05-07 PROCEDURE — 99233 SBSQ HOSP IP/OBS HIGH 50: CPT | Performed by: NURSE PRACTITIONER

## 2019-05-07 PROCEDURE — 94799 UNLISTED PULMONARY SVC/PX: CPT

## 2019-05-07 PROCEDURE — 80307 DRUG TEST PRSMV CHEM ANLYZR: CPT | Performed by: INTERNAL MEDICINE

## 2019-05-07 PROCEDURE — 80048 BASIC METABOLIC PNL TOTAL CA: CPT | Performed by: INTERNAL MEDICINE

## 2019-05-07 PROCEDURE — 25010000002 CEFTRIAXONE: Performed by: INTERNAL MEDICINE

## 2019-05-07 PROCEDURE — 85610 PROTHROMBIN TIME: CPT | Performed by: INTERNAL MEDICINE

## 2019-05-07 PROCEDURE — 84295 ASSAY OF SERUM SODIUM: CPT | Performed by: NURSE PRACTITIONER

## 2019-05-07 PROCEDURE — 25010000002 FUROSEMIDE PER 20 MG: Performed by: INTERNAL MEDICINE

## 2019-05-07 PROCEDURE — 99222 1ST HOSP IP/OBS MODERATE 55: CPT | Performed by: SURGERY

## 2019-05-07 PROCEDURE — 25010000002 HEPARIN (PORCINE) PER 1000 UNITS: Performed by: HOSPITALIST

## 2019-05-07 PROCEDURE — 25010000002 METHYLPREDNISOLONE PER 40 MG: Performed by: HOSPITALIST

## 2019-05-07 PROCEDURE — 85025 COMPLETE CBC W/AUTO DIFF WBC: CPT | Performed by: INTERNAL MEDICINE

## 2019-05-07 PROCEDURE — 80076 HEPATIC FUNCTION PANEL: CPT | Performed by: INTERNAL MEDICINE

## 2019-05-07 PROCEDURE — 82728 ASSAY OF FERRITIN: CPT | Performed by: NURSE PRACTITIONER

## 2019-05-07 RX ORDER — IPRATROPIUM BROMIDE AND ALBUTEROL SULFATE 2.5; .5 MG/3ML; MG/3ML
3 SOLUTION RESPIRATORY (INHALATION)
Status: DISCONTINUED | OUTPATIENT
Start: 2019-05-07 | End: 2019-05-11 | Stop reason: HOSPADM

## 2019-05-07 RX ORDER — BUDESONIDE 0.5 MG/2ML
0.5 INHALANT ORAL
Status: DISCONTINUED | OUTPATIENT
Start: 2019-05-07 | End: 2019-05-11 | Stop reason: HOSPADM

## 2019-05-07 RX ORDER — FUROSEMIDE 10 MG/ML
20 INJECTION INTRAMUSCULAR; INTRAVENOUS ONCE
Status: COMPLETED | OUTPATIENT
Start: 2019-05-07 | End: 2019-05-07

## 2019-05-07 RX ORDER — IPRATROPIUM BROMIDE AND ALBUTEROL SULFATE 2.5; .5 MG/3ML; MG/3ML
3 SOLUTION RESPIRATORY (INHALATION) EVERY 6 HOURS PRN
Status: DISCONTINUED | OUTPATIENT
Start: 2019-05-07 | End: 2019-05-11 | Stop reason: HOSPADM

## 2019-05-07 RX ADMIN — Medication 150 MG: at 12:28

## 2019-05-07 RX ADMIN — HEPARIN SODIUM 5000 UNITS: 5000 INJECTION INTRAVENOUS; SUBCUTANEOUS at 12:27

## 2019-05-07 RX ADMIN — FUROSEMIDE 20 MG: 10 INJECTION, SOLUTION INTRAMUSCULAR; INTRAVENOUS at 13:01

## 2019-05-07 RX ADMIN — IPRATROPIUM BROMIDE AND ALBUTEROL SULFATE 3 ML: .5; 3 SOLUTION RESPIRATORY (INHALATION) at 10:06

## 2019-05-07 RX ADMIN — GABAPENTIN 800 MG: 400 CAPSULE ORAL at 12:28

## 2019-05-07 RX ADMIN — SODIUM CHLORIDE, PRESERVATIVE FREE 3 ML: 5 INJECTION INTRAVENOUS at 07:54

## 2019-05-07 RX ADMIN — METHYLPREDNISOLONE SODIUM SUCCINATE 40 MG: 40 INJECTION, POWDER, FOR SOLUTION INTRAMUSCULAR; INTRAVENOUS at 04:18

## 2019-05-07 RX ADMIN — METRONIDAZOLE 500 MG: 500 INJECTION, SOLUTION INTRAVENOUS at 00:01

## 2019-05-07 RX ADMIN — GABAPENTIN 800 MG: 400 CAPSULE ORAL at 20:32

## 2019-05-07 RX ADMIN — OXYCODONE HYDROCHLORIDE 15 MG: 15 TABLET ORAL at 02:13

## 2019-05-07 RX ADMIN — CEFTRIAXONE 2 G: 2 INJECTION, POWDER, FOR SOLUTION INTRAMUSCULAR; INTRAVENOUS at 21:02

## 2019-05-07 RX ADMIN — METHYLPREDNISOLONE SODIUM SUCCINATE 40 MG: 40 INJECTION, POWDER, FOR SOLUTION INTRAMUSCULAR; INTRAVENOUS at 17:35

## 2019-05-07 RX ADMIN — NICOTINE 1 PATCH: 14 PATCH TRANSDERMAL at 07:53

## 2019-05-07 RX ADMIN — HEPARIN SODIUM 5000 UNITS: 5000 INJECTION INTRAVENOUS; SUBCUTANEOUS at 20:31

## 2019-05-07 RX ADMIN — METRONIDAZOLE 500 MG: 500 INJECTION, SOLUTION INTRAVENOUS at 07:53

## 2019-05-07 RX ADMIN — IPRATROPIUM BROMIDE AND ALBUTEROL SULFATE 3 ML: .5; 3 SOLUTION RESPIRATORY (INHALATION) at 19:03

## 2019-05-07 RX ADMIN — ALPRAZOLAM 0.5 MG: 0.5 TABLET ORAL at 02:26

## 2019-05-07 RX ADMIN — METRONIDAZOLE 500 MG: 500 INJECTION, SOLUTION INTRAVENOUS at 16:00

## 2019-05-07 RX ADMIN — SODIUM CHLORIDE, PRESERVATIVE FREE 3 ML: 5 INJECTION INTRAVENOUS at 09:41

## 2019-05-07 RX ADMIN — AMITRIPTYLINE HYDROCHLORIDE 75 MG: 50 TABLET, FILM COATED ORAL at 20:32

## 2019-05-07 RX ADMIN — GUAIFENESIN 600 MG: 600 TABLET, EXTENDED RELEASE ORAL at 12:27

## 2019-05-07 RX ADMIN — BUDESONIDE 0.5 MG: 0.5 INHALANT RESPIRATORY (INHALATION) at 19:03

## 2019-05-07 RX ADMIN — OXYCODONE HYDROCHLORIDE 15 MG: 15 TABLET ORAL at 12:27

## 2019-05-07 RX ADMIN — ALPRAZOLAM 0.5 MG: 0.5 TABLET ORAL at 15:11

## 2019-05-07 RX ADMIN — LEVETIRACETAM 1000 MG: 500 TABLET, FILM COATED ORAL at 12:27

## 2019-05-07 RX ADMIN — LEVETIRACETAM 1000 MG: 500 TABLET, FILM COATED ORAL at 20:31

## 2019-05-07 RX ADMIN — GUAIFENESIN 600 MG: 600 TABLET, EXTENDED RELEASE ORAL at 20:31

## 2019-05-07 RX ADMIN — OXYCODONE HYDROCHLORIDE 15 MG: 15 TABLET ORAL at 20:43

## 2019-05-07 RX ADMIN — ASPIRIN 325 MG: 325 TABLET ORAL at 12:27

## 2019-05-07 NOTE — NURSING NOTE
Alisa Holland was seen/examined with MARÍA ELENA Foote, at shift change. Pt is AxOx4 and refused their head-to-toe skin assessment at this time. Pt denies any skin issues or changes since being admitted.
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
## 2019-05-07 NOTE — CONSULTS
Consultation note    Referring physician: Hospitalist service    Consulting Physician: Dr. Arsh Moy MD    Reason for consultation: Gallstone      HPI:   Patient is a 43-year-old white female admitted to the hospital service 2 days ago with diagnosis of severe sepsis with fever, tachycardia, tachypnea, CRP elevation, acute renal insufficiency and mild hypotension, acute hypoxic respiratory failure, acute renal insufficiency, mild troponin elevation, liver function elevation with positive hepatitis panel for hepatitis C, chronic pain, tobacco abuse.  As part of her work-up she had a CT scan which shows evidence of gallstones.  Radiologist noted that she could have gallbladder wall thickening associated with her hepatitis.  Patient reports she has been seen by Dr. Merlos, general surgeon, and in [place] about her gallstones.        Past Medical History:   Diagnosis Date   • Amputation of fifth toe, left, traumatic (CMS/HCC)     third, fourth, fifth toes   • Arthritis    • History of transfusion     2007, 2016   • Seizures (CMS/HCC)     last seizure 2014       Past Surgical History:   Procedure Laterality Date   • ANKLE FUSION Bilateral    • APPENDECTOMY     • ARM AMPUTATION AT SHOULDER Left    • NJ DRAIN LOWER LEG DEEP ABSC/HEMATOMA Left 1/19/2017    Procedure: LOWER EXTREMITY DEBRIDEMENT OSTEOMYELITIS, WOUND REPAIR FLAP;  Surgeon: Simón Reynoso MD;  Location: [facility];  Service: Plastics   • TOTAL HIP ARTHROPLASTY Right 08/2016         Current Facility-Administered Medications:   •  ALPRAZolam (XANAX) tablet 0.5 mg, 0.5 mg, Oral, BID PRN, Laisha Duvall, APRN, 0.5 mg at 05/07/19 0226  •  amitriptyline (ELAVIL) tablet 75 mg, 75 mg, Oral, Nightly, Laisha Duvall APRN, 75 mg at 05/06/19 2053  •  aspirin tablet 325 mg, 325 mg, Oral, Daily, Олег Boyer MD, 325 mg at 05/06/19 0910  •  cefTRIAXone (ROCEPHIN) 2 g/100 mL 0.9% NS VTB (AJ), 2 g, Intravenous, Q24H, Angelina Maddox MD  •   gabapentin (NEURONTIN) capsule 800 mg, 800 mg, Oral, Q8H, Laisha Duvall, APRN, 800 mg at 05/06/19 2052  •  guaiFENesin (MUCINEX) 12 hr tablet 600 mg, 600 mg, Oral, Q12H, Laisha Duvall APRN, 600 mg at 05/06/19 2052  •  heparin (porcine) 5000 UNIT/ML injection 5,000 Units, 5,000 Units, Subcutaneous, Q12H, Олег Boyer MD, 5,000 Units at 05/06/19 2052  •  ipratropium-albuterol (DUO-NEB) nebulizer solution 3 mL, 3 mL, Nebulization, Q6H PRN, Angelina Maddox MD, 3 mL at 05/07/19 1006  •  iron polysaccharides (NIFEREX) capsule 150 mg, 150 mg, Oral, Daily, Laisha Duvall APRN, 150 mg at 05/06/19 1624  •  levETIRAcetam (KEPPRA) tablet 1,000 mg, 1,000 mg, Oral, Q12H, Laisha Duvall APRN, 1,000 mg at 05/06/19 2052  •  Magnesium Sulfate 2 gram Bolus, followed by 8 gram infusion (total Mg dose 10 grams)- Mg less than or equal to 1mg/dL, 2 g, Intravenous, PRN **OR** Magnesium Sulfate 2 gram / 50mL Infusion (GIVE X 3 BAGS TO EQUAL 6GM TOTAL DOSE) - Mg 1.1 - 1.5 mg/dl, 2 g, Intravenous, PRN **OR** Magnesium Sulfate 4 gram infusion- Mg 1.6-1.9 mg/dL, 4 g, Intravenous, PRN, Laisha Duvall, APRN  •  methylPREDNISolone sodium succinate (SOLU-Medrol) injection 40 mg, 40 mg, Intravenous, Q12H, Олег Boyer MD, 40 mg at 05/07/19 0418  •  metroNIDAZOLE (FLAGYL) IVPB 500 mg, 500 mg, Intravenous, Q8H, Angelina Maddox MD, 500 mg at 05/07/19 0753  •  nicotine (NICODERM CQ) 14 MG/24HR patch 1 patch, 1 patch, Transdermal, Q24H, Laisha Duvall APRN, 1 patch at 05/07/19 0753  •  nitroglycerin (NITROSTAT) SL tablet 0.4 mg, 0.4 mg, Sublingual, Q5 Min PRN, Олег Boyer MD  •  oxyCODONE (ROXICODONE) immediate release tablet 15 mg, 15 mg, Oral, Q8H PRN, Laisha Duvall APRN, 15 mg at 05/07/19 0213  •  pantoprazole (PROTONIX) EC tablet 40 mg, 40 mg, Oral, Q AM, Laisha Duvall APRN, 40 mg at 05/06/19 0623  •  potassium chloride (K-DUR,KLOR-CON) ER tablet 40 mEq,  40 mEq, Oral, PRN **OR** potassium chloride (KLOR-CON) packet 40 mEq, 40 mEq, Oral, PRN **OR** potassium chloride 10 mEq in 100 mL IVPB, 10 mEq, Intravenous, Q1H PRN, Laisha Duvall APRN  •  [COMPLETED] Insert peripheral IV, , , Once **AND** sodium chloride 0.9 % flush 10 mL, 10 mL, Intravenous, PRN, Ceferino Oakes MD  •  sodium chloride 0.9 % flush 3 mL, 3 mL, Intravenous, Q12H, Олег Boyer MD, 3 mL at 05/07/19 0941  •  sodium chloride 0.9 % flush 3-10 mL, 3-10 mL, Intravenous, PRN, Олег Boyer MD    Allergies   Allergen Reactions   • Contrast Dye Anaphylaxis   • Meropenem Other (See Comments)     CRAMPING   • Toradol [Ketorolac Tromethamine] Hives   • Ultram [Tramadol Hcl] Hives   • Zofran [Ondansetron Hcl] Hives   • Zyvox [Linezolid] Rash       Social History     Socioeconomic History   • Marital status:      Spouse name: Not on file   • Number of children: Not on file   • Years of education: Not on file   • Highest education level: Not on file   Tobacco Use   • Smoking status: Heavy Tobacco Smoker     Packs/day: 0.50     Years: 2.00     Pack years: 1.00     Types: Cigarettes   Substance and Sexual Activity   • Alcohol use: No   • Drug use: No   • Sexual activity: Defer       Family History   Problem Relation Age of Onset   • Arthritis Mother    • Asthma Mother    • COPD Mother    • Diabetes Mother    • Heart disease Mother    • Hypertension Mother    • Hyperlipidemia Mother    • Cancer Paternal Grandfather        ROS:   Constitutional: denies any weight changes, fatigue or weakness.  Eyes: : denies blurred or double vision  Cardiovascular: denies chest pain, palpitations, edemas.  Respiratory: denies cough, sputum, SOB.  Gastrointestinal: denies N&V, abd pain, diarrhea, constipation.  Genitourinary: denies dysuria, frequency.  Endocrine: denies cold intolerance, lethargy and flushing.  Hem: denies excessive bruising and postop bleeding.  Musculoskeletal: denies  weakness, joint swelling, pain or stiffness.  Neuro: denies seizures, CVA, paresthesia, or peripheral neuropathy.   Skin: denies change in nevi, rashes, masses.    Physical Exam:   Vitals:    05/07/19 1036   BP: 125/82   Pulse: 72   Resp: 18   Temp: 98 °F (36.7 °C)   SpO2: 95%     General :  patient is a 43 y.o. female in no acute distress.    HEENT:    normocephalic, pupils equally round and reactive to light, extraocular motions                    intact.                     Chest:       clear bilaterally.  Equal breath sounds.  No rales or rhonchi    Heart:        regular rate and rhythm.    Abdomen:  soft, nontender.  No distention    :            normal external genitalia    Rectal:       not performed    Extremities: Patient of her left arm just distal to the elbow.  She also has some toes missing from her left foot.  Neurologic:  grossly within normal      Lab Results   Component Value Date    GLUCOSE 138 (H) 05/07/2019    BUN 10 05/07/2019    CREATININE 0.71 05/07/2019    EGFRIFNONA 90 05/07/2019    BCR 14.1 05/07/2019    CO2 22.2 05/07/2019    CALCIUM 8.0 (L) 05/07/2019    ALBUMIN 3.00 (L) 05/07/2019    LABIL2 0.9 (L) 08/10/2015     (H) 05/07/2019    ALT 71 (H) 05/07/2019     WBC   Date Value Ref Range Status   05/07/2019 5.42 3.40 - 10.80 10*3/mm3 Final     RBC   Date Value Ref Range Status   05/07/2019 3.29 (L) 3.77 - 5.28 10*6/mm3 Final     Hemoglobin   Date Value Ref Range Status   05/07/2019 10.5 (L) 12.0 - 15.9 g/dL Final     Hematocrit   Date Value Ref Range Status   05/07/2019 31.9 (L) 34.0 - 46.6 % Final     MCV   Date Value Ref Range Status   05/07/2019 97.0 79.0 - 97.0 fL Final     MCH   Date Value Ref Range Status   05/07/2019 31.9 26.6 - 33.0 pg Final     MCHC   Date Value Ref Range Status   05/07/2019 32.9 31.5 - 35.7 g/dL Final     RDW   Date Value Ref Range Status   05/07/2019 13.9 12.3 - 15.4 % Final     RDW-SD   Date Value Ref Range Status   05/07/2019 49.7 37.0 - 54.0 fl Final      MPV   Date Value Ref Range Status   05/07/2019 11.3 6.0 - 12.0 fL Final     Platelets   Date Value Ref Range Status   05/07/2019 116 (L) 140 - 450 10*3/mm3 Final     Neutrophil %   Date Value Ref Range Status   05/07/2019 89.2 (H) 42.7 - 76.0 % Final     Lymphocyte %   Date Value Ref Range Status   05/07/2019 3.9 (L) 19.6 - 45.3 % Final     Monocyte %   Date Value Ref Range Status   05/07/2019 6.5 5.0 - 12.0 % Final     Eosinophil %   Date Value Ref Range Status   05/07/2019 0.2 (L) 0.3 - 6.2 % Final     Basophil %   Date Value Ref Range Status   05/07/2019 0.0 0.0 - 1.5 % Final     Immature Grans %   Date Value Ref Range Status   05/07/2019 0.2 0.0 - 0.5 % Final     Neutrophils, Absolute   Date Value Ref Range Status   05/07/2019 4.84 1.70 - 7.00 10*3/mm3 Final     Lymphocytes, Absolute   Date Value Ref Range Status   05/07/2019 0.21 (L) 0.70 - 3.10 10*3/mm3 Final     Monocytes, Absolute   Date Value Ref Range Status   05/07/2019 0.35 0.10 - 0.90 10*3/mm3 Final     Eosinophils, Absolute   Date Value Ref Range Status   05/07/2019 0.01 0.00 - 0.40 10*3/mm3 Final     Basophils, Absolute   Date Value Ref Range Status   05/07/2019 0.00 0.00 - 0.20 10*3/mm3 Final     Immature Grans, Absolute   Date Value Ref Range Status   05/07/2019 0.01 0.00 - 0.05 10*3/mm3 Final       Imaging Results (last 72 hours)     Procedure Component Value Units Date/Time    US Abdomen Limited [[id number]] Collected:  05/06/19 0919     Updated:  05/06/19 0923    Narrative:       EXAMINATION: US ABDOMEN LIMITED-         CLINICAL INDICATION:     RUQ (new hep C dx, cholelithiasis, elevated  LFTs); J18.9-Pneumonia, unspecified organism; J18.9-Pneumonia,  unspecified organism     TECHNIQUE: Multiplanar gray scale ultrasound of the abdomen.     COMPARISON: NONE      FINDINGS:   Visualized pancreas is unremarkable.   Cholelithiasis noted. No gallbladder distention is noted. No  pericholecystic fluid. Wall thickening in part related to  incomplete  distention or can BE seen with underlying hepatocellular disease..   The CBD measures 5 mm.  Fatty liver changes and coarsening of liver echotexture.    No ascites demonstrated.   Technologist reports positive sonographic Alaniz sign.       Impression:       1. Cholelithiasis but contracted appearance of gallbladder which in part  accounts for wall thickening.  2. Wall thickening of gallbladder also came be seen in association with  underlying hepatitis.  3. Fatty changes of liver with coarsening of the liver echotexture.  4. Technologist reports positive sonographic Alaniz sign.     This report was finalized on 5/6/2019 9:21 AM by Dr. Robin Vincent MD.       XR Chest 1 View [[id number]] Collected:  05/05/19 0833     Updated:  05/05/19 0904    Narrative:       XR CHEST 1 VIEW-     CLINICAL INDICATION: Shortness of air.         COMPARISON: 10/31/2018.      TECHNIQUE: Single frontal view of the chest.     FINDINGS:     Left basilar pneumonia.  The cardiac silhouette is normal. The pulmonary vasculature is  unremarkable.  There is no evidence of an acute osseous abnormality.   There are no suspicious-appearing parenchymal soft tissue nodules.          Impression:       Left basilar pneumonia.     This report was finalized on 5/5/2019 9:02 AM by Dr. Bhavin Li MD.       CT Abdomen Pelvis Without Contrast [[id number]] Collected:  05/05/19 0833     Updated:  05/05/19 0903    Narrative:       CT ABDOMEN PELVIS WITHOUT CONTRAST-     CLINICAL INDICATION: Abdominal pain, unspecified.        COMPARISON: 10/30/2018.     TECHNIQUE: Axial images were acquired from the lung bases through the  pubic symphysis without any IV, but without oral contrast.  Reformatted images were created in both the coronal and sagittal planes.     Radiation dose reduction techniques were utilized per ALARA protocol.  Automated exposure control was initiated through either or CareDose or  DoseRigTransifex software packages by   protocol.           FINDINGS:   Dense left basilar consolidation is present.     The liver is homogeneous.     There is cholelithiasis.     The spleen is homogeneous.     There is no peripancreatic stranding or pancreatic head mass.     There is no adrenal enlargement.     The kidneys show no evidence of hydronephrosis or hydroureter. I do not  see any distal ureteral stones.      There are small bilateral adnexal cysts.     There is no bowel wall thickening or mesenteric stranding.     There is no evidence of mesenteric or retroperitoneal adenopathy.             Impression:          1. Left basilar pneumonia.  2. Small bilateral adnexal cysts, the largest measuring 3.3 cm.  3. Cholelithiasis.              This report was finalized on 5/5/2019 9:01 AM by Dr. Bhavin Li MD.       CT Chest Without Contrast [[id number]] Collected:  05/05/19 0813     Updated:  05/05/19 0816    Narrative:       CT CHEST WITHOUT CONTRAST-      CLINICAL INDICATION: Shortness of breath.        DOSE: 2873.29 mGy.cm  COMPARISON: None available.     Radiation dose reduction techniques were utilized per ALARA protocol.  Automated exposure control was initiated through either or Graphdive or  DoseRight software packages by  protocol.        PROCEDURE: Axial images were acquired from the thoracic inlet through  the upper abdomen without any IV contrast. Reformatted images were  created.     FINDINGS: Today's study shows patchy airspace disease throughout the  left lung. Minimal airspace disease in the right lung. The appearance is  suggestive of pneumonitis.     No adenopathy.     No pericardial or pleural effusions.       Impression:       Patchy airspace disease throughout the left lung with dense  consolidation in the left lower lobe.  Mild airspace disease in the right lung. Overall appearance concerning  for diffuse pneumonitis.      This report was finalized on 5/5/2019 8:14 AM by Dr. Bhavin Li MD.       XR Chest AP  [[id number]] Collected:  05/05/19 0803     Updated:  05/05/19 0806    Narrative:       XR CHEST AP-     CLINICAL INDICATION: line placement        COMPARISON: 05/05/2019      TECHNIQUE: Single frontal view of the chest.     FINDINGS:     Right internal jugular central line tip is in the right atrium  Left basilar pneumonia  There is no evidence of an acute osseous abnormality.   There are no suspicious-appearing parenchymal soft tissue nodules.          Impression:          1. Central line tip in the right atrium. No pneumothorax  2. Left basilar pneumonia     This report was finalized on 5/5/2019 8:04 AM by Dr. Bhavin Li MD.               Assessment:   Pneumonia  Gallstones which appear to be asymptomatic  Hepatitis C  Resolving sepsis    Plan:  Cholecystectomy is not indicated at this time.  She needs to be adequately treated for her pneumonia.  I would be glad to see her in the office after discharge for follow-up of her gallstones.    Dragon disclaimer:  Much of this encounter note is an electronic transcription/translation of spoken language to printed text. The electronic translation of spoken language may permit erroneous, or at times, nonsensical words or phrases to be inadvertently transcribed; Although I have reviewed the note for such errors, some may still exist.

## 2019-05-08 LAB
ALBUMIN SERPL-MCNC: 2.72 G/DL (ref 3.5–5.2)
ALBUMIN/GLOB SERPL: 0.9 G/DL
ALP SERPL-CCNC: 76 U/L (ref 39–117)
ALT SERPL W P-5'-P-CCNC: 55 U/L (ref 1–33)
ANION GAP SERPL CALCULATED.3IONS-SCNC: 9.7 MMOL/L
AST SERPL-CCNC: 89 U/L (ref 1–32)
BASOPHILS # BLD AUTO: 0 10*3/MM3 (ref 0–0.2)
BASOPHILS NFR BLD AUTO: 0 % (ref 0–1.5)
BILIRUB SERPL-MCNC: <0.2 MG/DL (ref 0.2–1.2)
BUN BLD-MCNC: 8 MG/DL (ref 6–20)
BUN/CREAT SERPL: 8.8 (ref 7–25)
CALCIUM SPEC-SCNC: 7.7 MG/DL (ref 8.6–10.5)
CHLORIDE SERPL-SCNC: 111 MMOL/L (ref 98–107)
CO2 SERPL-SCNC: 23.3 MMOL/L (ref 22–29)
CREAT BLD-MCNC: 0.91 MG/DL (ref 0.57–1)
CRP SERPL-MCNC: 4.21 MG/DL (ref 0–0.5)
DEPRECATED RDW RBC AUTO: 47.2 FL (ref 37–54)
EOSINOPHIL # BLD AUTO: 0 10*3/MM3 (ref 0–0.4)
EOSINOPHIL NFR BLD AUTO: 0 % (ref 0.3–6.2)
ERYTHROCYTE [DISTWIDTH] IN BLOOD BY AUTOMATED COUNT: 13.9 % (ref 12.3–15.4)
GFR SERPL CREATININE-BSD FRML MDRD: 68 ML/MIN/1.73
GLOBULIN UR ELPH-MCNC: 3.1 GM/DL
GLUCOSE BLD-MCNC: 118 MG/DL (ref 65–99)
HCT VFR BLD AUTO: 29.7 % (ref 34–46.6)
HGB BLD-MCNC: 9.4 G/DL (ref 12–15.9)
IMM GRANULOCYTES # BLD AUTO: 0.03 10*3/MM3 (ref 0–0.05)
IMM GRANULOCYTES NFR BLD AUTO: 0.9 % (ref 0–0.5)
LYMPHOCYTES # BLD AUTO: 0.47 10*3/MM3 (ref 0.7–3.1)
LYMPHOCYTES NFR BLD AUTO: 14.2 % (ref 19.6–45.3)
MAGNESIUM SERPL-MCNC: 1.4 MG/DL (ref 1.6–2.6)
MCH RBC QN AUTO: 30.9 PG (ref 26.6–33)
MCHC RBC AUTO-ENTMCNC: 31.6 G/DL (ref 31.5–35.7)
MCV RBC AUTO: 97.7 FL (ref 79–97)
MONOCYTES # BLD AUTO: 0.4 10*3/MM3 (ref 0.1–0.9)
MONOCYTES NFR BLD AUTO: 12.1 % (ref 5–12)
NEUTROPHILS # BLD AUTO: 2.4 10*3/MM3 (ref 1.7–7)
NEUTROPHILS NFR BLD AUTO: 72.8 % (ref 42.7–76)
PLATELET # BLD AUTO: 114 10*3/MM3 (ref 140–450)
PMV BLD AUTO: 11.1 FL (ref 6–12)
POTASSIUM BLD-SCNC: 3.5 MMOL/L (ref 3.5–5.2)
PROT SERPL-MCNC: 5.8 G/DL (ref 6–8.5)
RBC # BLD AUTO: 3.04 10*6/MM3 (ref 3.77–5.28)
SODIUM BLD-SCNC: 144 MMOL/L (ref 136–145)
WBC NRBC COR # BLD: 3.3 10*3/MM3 (ref 3.4–10.8)

## 2019-05-08 PROCEDURE — 99233 SBSQ HOSP IP/OBS HIGH 50: CPT | Performed by: NURSE PRACTITIONER

## 2019-05-08 PROCEDURE — 25010000002 PROMETHAZINE PER 50 MG: Performed by: NURSE PRACTITIONER

## 2019-05-08 PROCEDURE — 25010000002 CEFTRIAXONE: Performed by: INTERNAL MEDICINE

## 2019-05-08 PROCEDURE — 86140 C-REACTIVE PROTEIN: CPT | Performed by: NURSE PRACTITIONER

## 2019-05-08 PROCEDURE — 25010000002 HEPARIN (PORCINE) PER 1000 UNITS: Performed by: HOSPITALIST

## 2019-05-08 PROCEDURE — 80053 COMPREHEN METABOLIC PANEL: CPT | Performed by: NURSE PRACTITIONER

## 2019-05-08 PROCEDURE — 94799 UNLISTED PULMONARY SVC/PX: CPT

## 2019-05-08 PROCEDURE — 85025 COMPLETE CBC W/AUTO DIFF WBC: CPT | Performed by: INTERNAL MEDICINE

## 2019-05-08 PROCEDURE — 25010000002 FUROSEMIDE PER 20 MG: Performed by: INTERNAL MEDICINE

## 2019-05-08 PROCEDURE — 83735 ASSAY OF MAGNESIUM: CPT | Performed by: INTERNAL MEDICINE

## 2019-05-08 PROCEDURE — 25010000002 MAGNESIUM SULFATE 2 GM/50ML SOLUTION: Performed by: NURSE PRACTITIONER

## 2019-05-08 PROCEDURE — 25010000002 METHYLPREDNISOLONE PER 40 MG: Performed by: NURSE PRACTITIONER

## 2019-05-08 PROCEDURE — 25010000002 METHYLPREDNISOLONE PER 40 MG: Performed by: HOSPITALIST

## 2019-05-08 RX ORDER — METHYLPREDNISOLONE SODIUM SUCCINATE 40 MG/ML
20 INJECTION, POWDER, LYOPHILIZED, FOR SOLUTION INTRAMUSCULAR; INTRAVENOUS EVERY 12 HOURS
Status: DISCONTINUED | OUTPATIENT
Start: 2019-05-08 | End: 2019-05-09

## 2019-05-08 RX ORDER — FUROSEMIDE 10 MG/ML
20 INJECTION INTRAMUSCULAR; INTRAVENOUS ONCE
Status: COMPLETED | OUTPATIENT
Start: 2019-05-08 | End: 2019-05-08

## 2019-05-08 RX ORDER — MAGNESIUM SULFATE HEPTAHYDRATE 40 MG/ML
2 INJECTION, SOLUTION INTRAVENOUS
Status: DISPENSED | OUTPATIENT
Start: 2019-05-08 | End: 2019-05-08

## 2019-05-08 RX ORDER — ACETAMINOPHEN 325 MG/1
650 TABLET ORAL ONCE
Status: DISCONTINUED | OUTPATIENT
Start: 2019-05-08 | End: 2019-05-11 | Stop reason: HOSPADM

## 2019-05-08 RX ORDER — OXYCODONE HYDROCHLORIDE 15 MG/1
15 TABLET ORAL EVERY 6 HOURS PRN
Status: DISCONTINUED | OUTPATIENT
Start: 2019-05-08 | End: 2019-05-09

## 2019-05-08 RX ORDER — POTASSIUM CHLORIDE 1.5 G/1.77G
40 POWDER, FOR SOLUTION ORAL EVERY 4 HOURS
Status: COMPLETED | OUTPATIENT
Start: 2019-05-08 | End: 2019-05-08

## 2019-05-08 RX ORDER — HYDROMORPHONE HYDROCHLORIDE 2 MG/1
2 TABLET ORAL ONCE
Status: COMPLETED | OUTPATIENT
Start: 2019-05-08 | End: 2019-05-08

## 2019-05-08 RX ADMIN — LEVETIRACETAM 1000 MG: 500 TABLET, FILM COATED ORAL at 09:13

## 2019-05-08 RX ADMIN — METHYLPREDNISOLONE SODIUM SUCCINATE 20 MG: 40 INJECTION, POWDER, FOR SOLUTION INTRAMUSCULAR; INTRAVENOUS at 16:23

## 2019-05-08 RX ADMIN — GABAPENTIN 800 MG: 400 CAPSULE ORAL at 14:08

## 2019-05-08 RX ADMIN — ALPRAZOLAM 0.5 MG: 0.5 TABLET ORAL at 03:36

## 2019-05-08 RX ADMIN — PANTOPRAZOLE SODIUM 40 MG: 40 TABLET, DELAYED RELEASE ORAL at 05:15

## 2019-05-08 RX ADMIN — METRONIDAZOLE 500 MG: 500 INJECTION, SOLUTION INTRAVENOUS at 16:22

## 2019-05-08 RX ADMIN — POTASSIUM CHLORIDE 40 MEQ: 1.5 POWDER, FOR SOLUTION ORAL at 05:20

## 2019-05-08 RX ADMIN — HEPARIN SODIUM 5000 UNITS: 5000 INJECTION INTRAVENOUS; SUBCUTANEOUS at 09:13

## 2019-05-08 RX ADMIN — GUAIFENESIN 600 MG: 600 TABLET, EXTENDED RELEASE ORAL at 09:13

## 2019-05-08 RX ADMIN — METRONIDAZOLE 500 MG: 500 INJECTION, SOLUTION INTRAVENOUS at 09:14

## 2019-05-08 RX ADMIN — NICOTINE 1 PATCH: 14 PATCH TRANSDERMAL at 09:13

## 2019-05-08 RX ADMIN — LEVETIRACETAM 1000 MG: 500 TABLET, FILM COATED ORAL at 22:04

## 2019-05-08 RX ADMIN — GUAIFENESIN 600 MG: 600 TABLET, EXTENDED RELEASE ORAL at 22:03

## 2019-05-08 RX ADMIN — GABAPENTIN 800 MG: 400 CAPSULE ORAL at 05:15

## 2019-05-08 RX ADMIN — OXYCODONE HYDROCHLORIDE 15 MG: 15 TABLET ORAL at 14:08

## 2019-05-08 RX ADMIN — ASPIRIN 325 MG: 325 TABLET ORAL at 09:13

## 2019-05-08 RX ADMIN — IPRATROPIUM BROMIDE AND ALBUTEROL SULFATE 3 ML: .5; 3 SOLUTION RESPIRATORY (INHALATION) at 06:47

## 2019-05-08 RX ADMIN — METHYLPREDNISOLONE SODIUM SUCCINATE 40 MG: 40 INJECTION, POWDER, FOR SOLUTION INTRAMUSCULAR; INTRAVENOUS at 05:15

## 2019-05-08 RX ADMIN — ALPRAZOLAM 0.5 MG: 0.5 TABLET ORAL at 18:08

## 2019-05-08 RX ADMIN — GABAPENTIN 800 MG: 400 CAPSULE ORAL at 22:04

## 2019-05-08 RX ADMIN — FUROSEMIDE 20 MG: 10 INJECTION, SOLUTION INTRAMUSCULAR; INTRAVENOUS at 12:34

## 2019-05-08 RX ADMIN — MAGNESIUM SULFATE IN WATER 2 G: 40 INJECTION, SOLUTION INTRAVENOUS at 09:14

## 2019-05-08 RX ADMIN — IPRATROPIUM BROMIDE AND ALBUTEROL SULFATE 3 ML: .5; 3 SOLUTION RESPIRATORY (INHALATION) at 13:50

## 2019-05-08 RX ADMIN — OXYCODONE HYDROCHLORIDE 15 MG: 15 TABLET ORAL at 05:27

## 2019-05-08 RX ADMIN — HEPARIN SODIUM 5000 UNITS: 5000 INJECTION INTRAVENOUS; SUBCUTANEOUS at 22:05

## 2019-05-08 RX ADMIN — AMITRIPTYLINE HYDROCHLORIDE 75 MG: 50 TABLET, FILM COATED ORAL at 22:04

## 2019-05-08 RX ADMIN — OXYCODONE HYDROCHLORIDE 15 MG: 15 TABLET ORAL at 23:24

## 2019-05-08 RX ADMIN — POTASSIUM CHLORIDE 40 MEQ: 1.5 POWDER, FOR SOLUTION ORAL at 09:14

## 2019-05-08 RX ADMIN — BUDESONIDE 0.5 MG: 0.5 INHALANT RESPIRATORY (INHALATION) at 06:47

## 2019-05-08 RX ADMIN — SODIUM CHLORIDE, PRESERVATIVE FREE 3 ML: 5 INJECTION INTRAVENOUS at 22:04

## 2019-05-08 RX ADMIN — MAGNESIUM SULFATE IN WATER 2 G: 40 INJECTION, SOLUTION INTRAVENOUS at 05:24

## 2019-05-08 RX ADMIN — METRONIDAZOLE 500 MG: 500 INJECTION, SOLUTION INTRAVENOUS at 00:20

## 2019-05-08 RX ADMIN — CEFTRIAXONE 2 G: 2 INJECTION, POWDER, FOR SOLUTION INTRAMUSCULAR; INTRAVENOUS at 22:06

## 2019-05-08 RX ADMIN — HYDROMORPHONE HYDROCHLORIDE 2 MG: 2 TABLET ORAL at 15:54

## 2019-05-08 RX ADMIN — PROMETHAZINE HYDROCHLORIDE 6.25 MG: 25 INJECTION INTRAMUSCULAR; INTRAVENOUS at 02:17

## 2019-05-08 RX ADMIN — Medication 150 MG: at 09:13
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
## 2019-05-08 NOTE — PLAN OF CARE
Problem: Skin Injury Risk (Adult)  Goal: Identify Related Risk Factors and Signs and Symptoms  Outcome: Outcome(s) achieved Date Met: 05/08/19    Goal: Skin Health and Integrity  Outcome: Ongoing (interventions implemented as appropriate)      Problem: Fall Risk (Adult)  Goal: Identify Related Risk Factors and Signs and Symptoms  Outcome: Outcome(s) achieved Date Met: 05/08/19    Goal: Absence of Fall  Outcome: Ongoing (interventions implemented as appropriate)      Problem: Sepsis/Septic Shock (Adult)  Goal: Signs and Symptoms of Listed Potential Problems Will be Absent, Minimized or Managed (Sepsis/Septic Shock)  Outcome: Ongoing (interventions implemented as appropriate)      Problem: Wound (Includes Pressure Injury) (Adult)  Goal: Signs and Symptoms of Listed Potential Problems Will be Absent, Minimized or Managed (Wound)  Outcome: Ongoing (interventions implemented as appropriate)      Problem: Patient Care Overview  Goal: Plan of Care Review  Outcome: Ongoing (interventions implemented as appropriate)    Goal: Individualization and Mutuality  Outcome: Ongoing (interventions implemented as appropriate)    Goal: Discharge Needs Assessment  Outcome: Ongoing (interventions implemented as appropriate)    Goal: Interprofessional Rounds/Family Conf  Outcome: Ongoing (interventions implemented as appropriate)      Problem: Pneumonia (Adult)  Goal: Signs and Symptoms of Listed Potential Problems Will be Absent, Minimized or Managed (Pneumonia)  Outcome: Ongoing (interventions implemented as appropriate)      Problem: Patient Care Overview  Goal: Plan of Care Review  Outcome: Ongoing (interventions implemented as appropriate)    Goal: Individualization and Mutuality  Outcome: Ongoing (interventions implemented as appropriate)    Goal: Discharge Needs Assessment  Outcome: Ongoing (interventions implemented as appropriate)    Goal: Interprofessional Rounds/Family Conf  Outcome: Ongoing (interventions implemented as  appropriate)

## 2019-05-08 NOTE — PROGRESS NOTES
Discharge Planning Assessment   Fil[place]     Patient Name: Alisa Holland  MRN: [id number]  Today's Date: 5/8/2019    Admit Date: 5/4/2019      Discharge Plan     Row Name 05/08/19 1622       Plan    Plan  Pt admitted on 5/4/19. Pt lives at home with significant other and plans to return home at discharge.  Pt currently does not utilize home health or DME.  SS will follow.             Elisha Jaimes

## 2019-05-08 NOTE — PLAN OF CARE
Problem: Sepsis/Septic Shock (Adult)  Goal: Signs and Symptoms of Listed Potential Problems Will be Absent, Minimized or Managed (Sepsis/Septic Shock)  Outcome: Ongoing (interventions implemented as appropriate)      Problem: Wound (Includes Pressure Injury) (Adult)  Goal: Signs and Symptoms of Listed Potential Problems Will be Absent, Minimized or Managed (Wound)  Outcome: Ongoing (interventions implemented as appropriate)      Problem: Patient Care Overview  Goal: Plan of Care Review  Outcome: Ongoing (interventions implemented as appropriate)    Goal: Individualization and Mutuality  Outcome: Ongoing (interventions implemented as appropriate)    Goal: Discharge Needs Assessment  Outcome: Ongoing (interventions implemented as appropriate)    Goal: Interprofessional Rounds/Family Conf  Outcome: Ongoing (interventions implemented as appropriate)      Problem: Pneumonia (Adult)  Goal: Signs and Symptoms of Listed Potential Problems Will be Absent, Minimized or Managed (Pneumonia)  Outcome: Ongoing (interventions implemented as appropriate)      Problem: Patient Care Overview  Goal: Plan of Care Review  Outcome: Ongoing (interventions implemented as appropriate)    Goal: Individualization and Mutuality  Outcome: Ongoing (interventions implemented as appropriate)    Goal: Discharge Needs Assessment  Outcome: Ongoing (interventions implemented as appropriate)    Goal: Interprofessional Rounds/Family Conf  Outcome: Ongoing (interventions implemented as appropriate)

## 2019-05-09 ENCOUNTER — APPOINTMENT (OUTPATIENT)
Dept: GENERAL RADIOLOGY | Facility: HOSPITAL | Age: 44
End: 2019-05-09

## 2019-05-09 LAB
ALBUMIN SERPL-MCNC: 2.79 G/DL (ref 3.5–5.2)
ALBUMIN/GLOB SERPL: 0.8 G/DL
ALP SERPL-CCNC: 81 U/L (ref 39–117)
ALT SERPL W P-5'-P-CCNC: 47 U/L (ref 1–33)
ANION GAP SERPL CALCULATED.3IONS-SCNC: 10 MMOL/L
AST SERPL-CCNC: 57 U/L (ref 1–32)
BASOPHILS # BLD AUTO: 0.01 10*3/MM3 (ref 0–0.2)
BASOPHILS NFR BLD AUTO: 0.3 % (ref 0–1.5)
BILIRUB SERPL-MCNC: <0.2 MG/DL (ref 0.2–1.2)
BUN BLD-MCNC: 9 MG/DL (ref 6–20)
BUN/CREAT SERPL: 11.3 (ref 7–25)
CALCIUM SPEC-SCNC: 8.3 MG/DL (ref 8.6–10.5)
CHLORIDE SERPL-SCNC: 106 MMOL/L (ref 98–107)
CO2 SERPL-SCNC: 26 MMOL/L (ref 22–29)
CREAT BLD-MCNC: 0.8 MG/DL (ref 0.57–1)
CRP SERPL-MCNC: 4.36 MG/DL (ref 0–0.5)
DEPRECATED RDW RBC AUTO: 48.1 FL (ref 37–54)
EOSINOPHIL # BLD AUTO: 0 10*3/MM3 (ref 0–0.4)
EOSINOPHIL NFR BLD AUTO: 0 % (ref 0.3–6.2)
ERYTHROCYTE [DISTWIDTH] IN BLOOD BY AUTOMATED COUNT: 14.1 % (ref 12.3–15.4)
GFR SERPL CREATININE-BSD FRML MDRD: 79 ML/MIN/1.73
GLOBULIN UR ELPH-MCNC: 3.3 GM/DL
GLUCOSE BLD-MCNC: 135 MG/DL (ref 65–99)
HCT VFR BLD AUTO: 32.4 % (ref 34–46.6)
HGB BLD-MCNC: 10.2 G/DL (ref 12–15.9)
IMM GRANULOCYTES # BLD AUTO: 0.1 10*3/MM3 (ref 0–0.05)
IMM GRANULOCYTES NFR BLD AUTO: 2.6 % (ref 0–0.5)
LYMPHOCYTES # BLD AUTO: 0.66 10*3/MM3 (ref 0.7–3.1)
LYMPHOCYTES NFR BLD AUTO: 17.4 % (ref 19.6–45.3)
MAGNESIUM SERPL-MCNC: 2 MG/DL (ref 1.6–2.6)
MCH RBC QN AUTO: 31 PG (ref 26.6–33)
MCHC RBC AUTO-ENTMCNC: 31.5 G/DL (ref 31.5–35.7)
MCV RBC AUTO: 98.5 FL (ref 79–97)
MONOCYTES # BLD AUTO: 0.43 10*3/MM3 (ref 0.1–0.9)
MONOCYTES NFR BLD AUTO: 11.3 % (ref 5–12)
NEUTROPHILS # BLD AUTO: 2.59 10*3/MM3 (ref 1.7–7)
NEUTROPHILS NFR BLD AUTO: 68.4 % (ref 42.7–76)
PHOSPHATE SERPL-MCNC: 3.4 MG/DL (ref 2.5–4.5)
PLATELET # BLD AUTO: 114 10*3/MM3 (ref 140–450)
PMV BLD AUTO: 10.9 FL (ref 6–12)
POTASSIUM BLD-SCNC: 3.3 MMOL/L (ref 3.5–5.2)
PROT SERPL-MCNC: 6.1 G/DL (ref 6–8.5)
RBC # BLD AUTO: 3.29 10*6/MM3 (ref 3.77–5.28)
SODIUM BLD-SCNC: 142 MMOL/L (ref 136–145)
WBC NRBC COR # BLD: 3.79 10*3/MM3 (ref 3.4–10.8)

## 2019-05-09 PROCEDURE — 86140 C-REACTIVE PROTEIN: CPT | Performed by: NURSE PRACTITIONER

## 2019-05-09 PROCEDURE — 25010000002 HEPARIN (PORCINE) PER 1000 UNITS: Performed by: HOSPITALIST

## 2019-05-09 PROCEDURE — 85025 COMPLETE CBC W/AUTO DIFF WBC: CPT | Performed by: INTERNAL MEDICINE

## 2019-05-09 PROCEDURE — 94799 UNLISTED PULMONARY SVC/PX: CPT

## 2019-05-09 PROCEDURE — 93010 ELECTROCARDIOGRAM REPORT: CPT | Performed by: INTERNAL MEDICINE

## 2019-05-09 PROCEDURE — 71045 X-RAY EXAM CHEST 1 VIEW: CPT

## 2019-05-09 PROCEDURE — 25010000002 METHYLPREDNISOLONE PER 40 MG: Performed by: NURSE PRACTITIONER

## 2019-05-09 PROCEDURE — 83735 ASSAY OF MAGNESIUM: CPT | Performed by: NURSE PRACTITIONER

## 2019-05-09 PROCEDURE — 80053 COMPREHEN METABOLIC PANEL: CPT | Performed by: NURSE PRACTITIONER

## 2019-05-09 PROCEDURE — 25010000002 FUROSEMIDE PER 20 MG: Performed by: PHYSICIAN ASSISTANT

## 2019-05-09 PROCEDURE — 99233 SBSQ HOSP IP/OBS HIGH 50: CPT | Performed by: PHYSICIAN ASSISTANT

## 2019-05-09 PROCEDURE — 93005 ELECTROCARDIOGRAM TRACING: CPT | Performed by: PHYSICIAN ASSISTANT

## 2019-05-09 PROCEDURE — 25010000002 CEFTRIAXONE: Performed by: PHYSICIAN ASSISTANT

## 2019-05-09 PROCEDURE — 71045 X-RAY EXAM CHEST 1 VIEW: CPT | Performed by: RADIOLOGY

## 2019-05-09 PROCEDURE — 84100 ASSAY OF PHOSPHORUS: CPT | Performed by: INTERNAL MEDICINE

## 2019-05-09 RX ORDER — FLUMAZENIL 0.1 MG/ML
0.5 INJECTION INTRAVENOUS AS NEEDED
Status: DISCONTINUED | OUTPATIENT
Start: 2019-05-09 | End: 2019-05-11 | Stop reason: HOSPADM

## 2019-05-09 RX ORDER — OXYCODONE HYDROCHLORIDE 5 MG/1
10 TABLET ORAL EVERY 6 HOURS PRN
Status: DISCONTINUED | OUTPATIENT
Start: 2019-05-09 | End: 2019-05-10

## 2019-05-09 RX ORDER — NALOXONE HCL 0.4 MG/ML
0.4 VIAL (ML) INJECTION AS NEEDED
Status: DISCONTINUED | OUTPATIENT
Start: 2019-05-09 | End: 2019-05-11 | Stop reason: HOSPADM

## 2019-05-09 RX ORDER — PREDNISONE 20 MG/1
40 TABLET ORAL
Status: DISCONTINUED | OUTPATIENT
Start: 2019-05-10 | End: 2019-05-11 | Stop reason: HOSPADM

## 2019-05-09 RX ORDER — PROMETHAZINE HYDROCHLORIDE 12.5 MG/1
6.25 TABLET ORAL EVERY 6 HOURS PRN
Status: DISCONTINUED | OUTPATIENT
Start: 2019-05-09 | End: 2019-05-11 | Stop reason: HOSPADM

## 2019-05-09 RX ORDER — FUROSEMIDE 10 MG/ML
20 INJECTION INTRAMUSCULAR; INTRAVENOUS ONCE
Status: COMPLETED | OUTPATIENT
Start: 2019-05-09 | End: 2019-05-09

## 2019-05-09 RX ORDER — POTASSIUM CHLORIDE 1.5 G/1.77G
40 POWDER, FOR SOLUTION ORAL EVERY 4 HOURS
Status: COMPLETED | OUTPATIENT
Start: 2019-05-09 | End: 2019-05-09

## 2019-05-09 RX ORDER — AZITHROMYCIN 250 MG/1
500 TABLET, FILM COATED ORAL
Status: DISCONTINUED | OUTPATIENT
Start: 2019-05-09 | End: 2019-05-11 | Stop reason: HOSPADM

## 2019-05-09 RX ORDER — FUROSEMIDE 10 MG/ML
20 INJECTION INTRAMUSCULAR; INTRAVENOUS ONCE
Status: COMPLETED | OUTPATIENT
Start: 2019-05-10 | End: 2019-05-10

## 2019-05-09 RX ADMIN — IPRATROPIUM BROMIDE AND ALBUTEROL SULFATE 3 ML: .5; 3 SOLUTION RESPIRATORY (INHALATION) at 13:17

## 2019-05-09 RX ADMIN — CEFTRIAXONE 2 G: 2 INJECTION, POWDER, FOR SOLUTION INTRAMUSCULAR; INTRAVENOUS at 20:04

## 2019-05-09 RX ADMIN — GUAIFENESIN 600 MG: 600 TABLET, EXTENDED RELEASE ORAL at 20:03

## 2019-05-09 RX ADMIN — OXYCODONE HYDROCHLORIDE 15 MG: 15 TABLET ORAL at 08:13

## 2019-05-09 RX ADMIN — ALPRAZOLAM 0.5 MG: 0.5 TABLET ORAL at 17:40

## 2019-05-09 RX ADMIN — METHYLPREDNISOLONE SODIUM SUCCINATE 20 MG: 40 INJECTION, POWDER, FOR SOLUTION INTRAMUSCULAR; INTRAVENOUS at 05:37

## 2019-05-09 RX ADMIN — GABAPENTIN 800 MG: 400 CAPSULE ORAL at 13:55

## 2019-05-09 RX ADMIN — BUDESONIDE 0.5 MG: 0.5 INHALANT RESPIRATORY (INHALATION) at 19:07

## 2019-05-09 RX ADMIN — NICOTINE 1 PATCH: 14 PATCH TRANSDERMAL at 08:13

## 2019-05-09 RX ADMIN — BUDESONIDE 0.5 MG: 0.5 INHALANT RESPIRATORY (INHALATION) at 07:07

## 2019-05-09 RX ADMIN — AMITRIPTYLINE HYDROCHLORIDE 75 MG: 50 TABLET, FILM COATED ORAL at 20:03

## 2019-05-09 RX ADMIN — HEPARIN SODIUM 5000 UNITS: 5000 INJECTION INTRAVENOUS; SUBCUTANEOUS at 08:14

## 2019-05-09 RX ADMIN — METRONIDAZOLE 500 MG: 500 INJECTION, SOLUTION INTRAVENOUS at 00:12

## 2019-05-09 RX ADMIN — IPRATROPIUM BROMIDE AND ALBUTEROL SULFATE 3 ML: .5; 3 SOLUTION RESPIRATORY (INHALATION) at 07:08

## 2019-05-09 RX ADMIN — METRONIDAZOLE 500 MG: 500 INJECTION, SOLUTION INTRAVENOUS at 17:40

## 2019-05-09 RX ADMIN — GABAPENTIN 800 MG: 400 CAPSULE ORAL at 20:03

## 2019-05-09 RX ADMIN — SODIUM CHLORIDE, PRESERVATIVE FREE 3 ML: 5 INJECTION INTRAVENOUS at 20:03

## 2019-05-09 RX ADMIN — METRONIDAZOLE 500 MG: 500 INJECTION, SOLUTION INTRAVENOUS at 08:21

## 2019-05-09 RX ADMIN — GUAIFENESIN 600 MG: 600 TABLET, EXTENDED RELEASE ORAL at 08:13

## 2019-05-09 RX ADMIN — OXYCODONE HYDROCHLORIDE 10 MG: 5 TABLET ORAL at 14:47

## 2019-05-09 RX ADMIN — Medication 150 MG: at 08:13

## 2019-05-09 RX ADMIN — POTASSIUM CHLORIDE 40 MEQ: 1.5 POWDER, FOR SOLUTION ORAL at 08:13

## 2019-05-09 RX ADMIN — LEVETIRACETAM 1000 MG: 500 TABLET, FILM COATED ORAL at 20:03

## 2019-05-09 RX ADMIN — ASPIRIN 325 MG: 325 TABLET ORAL at 08:13

## 2019-05-09 RX ADMIN — POTASSIUM CHLORIDE 40 MEQ: 1.5 POWDER, FOR SOLUTION ORAL at 12:05

## 2019-05-09 RX ADMIN — FUROSEMIDE 20 MG: 10 INJECTION, SOLUTION INTRAMUSCULAR; INTRAVENOUS at 13:55

## 2019-05-09 RX ADMIN — LEVETIRACETAM 1000 MG: 500 TABLET, FILM COATED ORAL at 08:13

## 2019-05-09 RX ADMIN — IPRATROPIUM BROMIDE AND ALBUTEROL SULFATE 3 ML: .5; 3 SOLUTION RESPIRATORY (INHALATION) at 19:07

## 2019-05-09 RX ADMIN — PANTOPRAZOLE SODIUM 40 MG: 40 TABLET, DELAYED RELEASE ORAL at 05:38

## 2019-05-09 RX ADMIN — HEPARIN SODIUM 5000 UNITS: 5000 INJECTION INTRAVENOUS; SUBCUTANEOUS at 20:03

## 2019-05-09 RX ADMIN — AZITHROMYCIN 500 MG: 250 TABLET, FILM COATED ORAL at 18:19

## 2019-05-09 NOTE — PLAN OF CARE
Problem: Skin Injury Risk (Adult)  Goal: Skin Health and Integrity  Outcome: Ongoing (interventions implemented as appropriate)      Problem: Fall Risk (Adult)  Goal: Absence of Fall  Outcome: Ongoing (interventions implemented as appropriate)      Problem: Sepsis/Septic Shock (Adult)  Goal: Signs and Symptoms of Listed Potential Problems Will be Absent, Minimized or Managed (Sepsis/Septic Shock)  Outcome: Ongoing (interventions implemented as appropriate)      Problem: Wound (Includes Pressure Injury) (Adult)  Goal: Signs and Symptoms of Listed Potential Problems Will be Absent, Minimized or Managed (Wound)  Outcome: Ongoing (interventions implemented as appropriate)      Problem: Patient Care Overview  Goal: Plan of Care Review  Outcome: Ongoing (interventions implemented as appropriate)    Goal: Individualization and Mutuality  Outcome: Ongoing (interventions implemented as appropriate)    Goal: Discharge Needs Assessment  Outcome: Ongoing (interventions implemented as appropriate)    Goal: Interprofessional Rounds/Family Conf  Outcome: Ongoing (interventions implemented as appropriate)      Problem: Pneumonia (Adult)  Goal: Signs and Symptoms of Listed Potential Problems Will be Absent, Minimized or Managed (Pneumonia)  Outcome: Ongoing (interventions implemented as appropriate)      Problem: Patient Care Overview  Goal: Plan of Care Review  Outcome: Ongoing (interventions implemented as appropriate)    Goal: Individualization and Mutuality  Outcome: Ongoing (interventions implemented as appropriate)    Goal: Discharge Needs Assessment  Outcome: Ongoing (interventions implemented as appropriate)    Goal: Interprofessional Rounds/Family Conf  Outcome: Ongoing (interventions implemented as appropriate)
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
## 2019-05-09 NOTE — NURSING NOTE
Patient spoke with Dr. Maddox at bedside regarding leaving the floor and signing out AMA.  After their conversation, Dr. Maddox approved the patient to go outside.

## 2019-05-10 LAB
ALBUMIN SERPL-MCNC: 2.71 G/DL (ref 3.5–5.2)
ALBUMIN/GLOB SERPL: 0.9 G/DL
ALP SERPL-CCNC: 80 U/L (ref 39–117)
ALT SERPL W P-5'-P-CCNC: 39 U/L (ref 1–33)
ANION GAP SERPL CALCULATED.3IONS-SCNC: 11.9 MMOL/L
AST SERPL-CCNC: 39 U/L (ref 1–32)
BACTERIA SPEC AEROBE CULT: NORMAL
BACTERIA SPEC AEROBE CULT: NORMAL
BASOPHILS # BLD AUTO: 0.01 10*3/MM3 (ref 0–0.2)
BASOPHILS NFR BLD AUTO: 0.2 % (ref 0–1.5)
BILIRUB SERPL-MCNC: <0.2 MG/DL (ref 0.2–1.2)
BUN BLD-MCNC: 11 MG/DL (ref 6–20)
BUN/CREAT SERPL: 13.3 (ref 7–25)
CALCIUM SPEC-SCNC: 8.4 MG/DL (ref 8.6–10.5)
CHLORIDE SERPL-SCNC: 107 MMOL/L (ref 98–107)
CO2 SERPL-SCNC: 25.1 MMOL/L (ref 22–29)
CREAT BLD-MCNC: 0.83 MG/DL (ref 0.57–1)
CRP SERPL-MCNC: 2.87 MG/DL (ref 0–0.5)
DEPRECATED RDW RBC AUTO: 47.5 FL (ref 37–54)
EOSINOPHIL # BLD AUTO: 0 10*3/MM3 (ref 0–0.4)
EOSINOPHIL NFR BLD AUTO: 0 % (ref 0.3–6.2)
ERYTHROCYTE [DISTWIDTH] IN BLOOD BY AUTOMATED COUNT: 14 % (ref 12.3–15.4)
GFR SERPL CREATININE-BSD FRML MDRD: 75 ML/MIN/1.73
GLOBULIN UR ELPH-MCNC: 3.1 GM/DL
GLUCOSE BLD-MCNC: 126 MG/DL (ref 65–99)
HCT VFR BLD AUTO: 32.5 % (ref 34–46.6)
HGB BLD-MCNC: 10.2 G/DL (ref 12–15.9)
IMM GRANULOCYTES # BLD AUTO: 0.09 10*3/MM3 (ref 0–0.05)
IMM GRANULOCYTES NFR BLD AUTO: 2 % (ref 0–0.5)
LYMPHOCYTES # BLD AUTO: 1.07 10*3/MM3 (ref 0.7–3.1)
LYMPHOCYTES NFR BLD AUTO: 24.1 % (ref 19.6–45.3)
MAGNESIUM SERPL-MCNC: 1.4 MG/DL (ref 1.6–2.6)
MAGNESIUM SERPL-MCNC: 2.3 MG/DL (ref 1.6–2.6)
MCH RBC QN AUTO: 30.8 PG (ref 26.6–33)
MCHC RBC AUTO-ENTMCNC: 31.4 G/DL (ref 31.5–35.7)
MCV RBC AUTO: 98.2 FL (ref 79–97)
MONOCYTES # BLD AUTO: 0.38 10*3/MM3 (ref 0.1–0.9)
MONOCYTES NFR BLD AUTO: 8.6 % (ref 5–12)
NEUTROPHILS # BLD AUTO: 2.89 10*3/MM3 (ref 1.7–7)
NEUTROPHILS NFR BLD AUTO: 65.1 % (ref 42.7–76)
PHOSPHATE SERPL-MCNC: 3.3 MG/DL (ref 2.5–4.5)
PLATELET # BLD AUTO: 133 10*3/MM3 (ref 140–450)
PMV BLD AUTO: 11.3 FL (ref 6–12)
POTASSIUM BLD-SCNC: 2.7 MMOL/L (ref 3.5–5.2)
POTASSIUM BLD-SCNC: 4.1 MMOL/L (ref 3.5–5.2)
PROT SERPL-MCNC: 5.8 G/DL (ref 6–8.5)
RBC # BLD AUTO: 3.31 10*6/MM3 (ref 3.77–5.28)
SODIUM BLD-SCNC: 144 MMOL/L (ref 136–145)
T4 FREE SERPL-MCNC: 1.08 NG/DL (ref 0.93–1.7)
TSH SERPL DL<=0.05 MIU/L-ACNC: 3.75 MIU/ML (ref 0.27–4.2)
WBC NRBC COR # BLD: 4.44 10*3/MM3 (ref 3.4–10.8)

## 2019-05-10 PROCEDURE — 80053 COMPREHEN METABOLIC PANEL: CPT | Performed by: PHYSICIAN ASSISTANT

## 2019-05-10 PROCEDURE — 99233 SBSQ HOSP IP/OBS HIGH 50: CPT | Performed by: PHYSICIAN ASSISTANT

## 2019-05-10 PROCEDURE — 85025 COMPLETE CBC W/AUTO DIFF WBC: CPT | Performed by: INTERNAL MEDICINE

## 2019-05-10 PROCEDURE — 94799 UNLISTED PULMONARY SVC/PX: CPT

## 2019-05-10 PROCEDURE — 63710000001 PREDNISONE PER 1 MG: Performed by: INTERNAL MEDICINE

## 2019-05-10 PROCEDURE — 83735 ASSAY OF MAGNESIUM: CPT | Performed by: PHYSICIAN ASSISTANT

## 2019-05-10 PROCEDURE — 25010000002 CEFTRIAXONE: Performed by: INTERNAL MEDICINE

## 2019-05-10 PROCEDURE — 83735 ASSAY OF MAGNESIUM: CPT | Performed by: INTERNAL MEDICINE

## 2019-05-10 PROCEDURE — 84100 ASSAY OF PHOSPHORUS: CPT | Performed by: INTERNAL MEDICINE

## 2019-05-10 PROCEDURE — 63710000001 PROMETHAZINE PER 12.5 MG: Performed by: PHYSICIAN ASSISTANT

## 2019-05-10 PROCEDURE — 25010000002 MAGNESIUM SULFATE 2 GM/50ML SOLUTION: Performed by: NURSE PRACTITIONER

## 2019-05-10 PROCEDURE — 25010000002 HEPARIN (PORCINE) PER 1000 UNITS: Performed by: HOSPITALIST

## 2019-05-10 PROCEDURE — 86140 C-REACTIVE PROTEIN: CPT | Performed by: PHYSICIAN ASSISTANT

## 2019-05-10 PROCEDURE — 84439 ASSAY OF FREE THYROXINE: CPT | Performed by: PHYSICIAN ASSISTANT

## 2019-05-10 PROCEDURE — 84132 ASSAY OF SERUM POTASSIUM: CPT | Performed by: INTERNAL MEDICINE

## 2019-05-10 PROCEDURE — 84443 ASSAY THYROID STIM HORMONE: CPT | Performed by: PHYSICIAN ASSISTANT

## 2019-05-10 PROCEDURE — 25010000002 FUROSEMIDE PER 20 MG: Performed by: INTERNAL MEDICINE

## 2019-05-10 RX ORDER — MAGNESIUM SULFATE HEPTAHYDRATE 40 MG/ML
2 INJECTION, SOLUTION INTRAVENOUS
Status: COMPLETED | OUTPATIENT
Start: 2019-05-10 | End: 2019-05-10

## 2019-05-10 RX ORDER — OXYCODONE HYDROCHLORIDE 5 MG/1
10 TABLET ORAL EVERY 4 HOURS PRN
Status: DISCONTINUED | OUTPATIENT
Start: 2019-05-10 | End: 2019-05-11 | Stop reason: HOSPADM

## 2019-05-10 RX ORDER — POTASSIUM CHLORIDE 20 MEQ/1
40 TABLET, EXTENDED RELEASE ORAL EVERY 4 HOURS
Status: COMPLETED | OUTPATIENT
Start: 2019-05-10 | End: 2019-05-10

## 2019-05-10 RX ADMIN — OXYCODONE HYDROCHLORIDE 10 MG: 5 TABLET ORAL at 18:24

## 2019-05-10 RX ADMIN — HEPARIN SODIUM 5000 UNITS: 5000 INJECTION INTRAVENOUS; SUBCUTANEOUS at 08:13

## 2019-05-10 RX ADMIN — IPRATROPIUM BROMIDE AND ALBUTEROL SULFATE 3 ML: .5; 3 SOLUTION RESPIRATORY (INHALATION) at 13:50

## 2019-05-10 RX ADMIN — CEFTRIAXONE 2 G: 2 INJECTION, POWDER, FOR SOLUTION INTRAMUSCULAR; INTRAVENOUS at 20:56

## 2019-05-10 RX ADMIN — POTASSIUM CHLORIDE 40 MEQ: 1500 TABLET, EXTENDED RELEASE ORAL at 05:00

## 2019-05-10 RX ADMIN — METRONIDAZOLE 500 MG: 500 INJECTION, SOLUTION INTRAVENOUS at 16:55

## 2019-05-10 RX ADMIN — BUDESONIDE 0.5 MG: 0.5 INHALANT RESPIRATORY (INHALATION) at 07:11

## 2019-05-10 RX ADMIN — OXYCODONE HYDROCHLORIDE 10 MG: 5 TABLET ORAL at 02:11

## 2019-05-10 RX ADMIN — SODIUM CHLORIDE, PRESERVATIVE FREE 3 ML: 5 INJECTION INTRAVENOUS at 20:56

## 2019-05-10 RX ADMIN — PROMETHAZINE HYDROCHLORIDE 6.25 MG: 12.5 TABLET ORAL at 00:36

## 2019-05-10 RX ADMIN — MAGNESIUM SULFATE IN WATER 2 G: 40 INJECTION, SOLUTION INTRAVENOUS at 04:59

## 2019-05-10 RX ADMIN — OXYCODONE HYDROCHLORIDE 10 MG: 5 TABLET ORAL at 14:17

## 2019-05-10 RX ADMIN — GABAPENTIN 800 MG: 400 CAPSULE ORAL at 20:55

## 2019-05-10 RX ADMIN — MAGNESIUM SULFATE IN WATER 2 G: 40 INJECTION, SOLUTION INTRAVENOUS at 06:20

## 2019-05-10 RX ADMIN — GABAPENTIN 800 MG: 400 CAPSULE ORAL at 04:59

## 2019-05-10 RX ADMIN — METRONIDAZOLE 500 MG: 500 INJECTION, SOLUTION INTRAVENOUS at 08:14

## 2019-05-10 RX ADMIN — MAGNESIUM SULFATE IN WATER 2 G: 40 INJECTION, SOLUTION INTRAVENOUS at 08:14

## 2019-05-10 RX ADMIN — GUAIFENESIN 600 MG: 600 TABLET, EXTENDED RELEASE ORAL at 08:13

## 2019-05-10 RX ADMIN — NYSTATIN 500000 UNITS: 100000 SUSPENSION ORAL at 20:56

## 2019-05-10 RX ADMIN — LEVETIRACETAM 1000 MG: 500 TABLET, FILM COATED ORAL at 20:55

## 2019-05-10 RX ADMIN — Medication 150 MG: at 08:13

## 2019-05-10 RX ADMIN — HEPARIN SODIUM 5000 UNITS: 5000 INJECTION INTRAVENOUS; SUBCUTANEOUS at 20:56

## 2019-05-10 RX ADMIN — POTASSIUM CHLORIDE 40 MEQ: 1500 TABLET, EXTENDED RELEASE ORAL at 08:13

## 2019-05-10 RX ADMIN — IPRATROPIUM BROMIDE AND ALBUTEROL SULFATE 3 ML: .5; 3 SOLUTION RESPIRATORY (INHALATION) at 07:11

## 2019-05-10 RX ADMIN — BUDESONIDE 0.5 MG: 0.5 INHALANT RESPIRATORY (INHALATION) at 19:11

## 2019-05-10 RX ADMIN — LEVETIRACETAM 1000 MG: 500 TABLET, FILM COATED ORAL at 08:13

## 2019-05-10 RX ADMIN — POTASSIUM CHLORIDE 40 MEQ: 1500 TABLET, EXTENDED RELEASE ORAL at 11:26

## 2019-05-10 RX ADMIN — METRONIDAZOLE 500 MG: 500 INJECTION, SOLUTION INTRAVENOUS at 01:00

## 2019-05-10 RX ADMIN — GUAIFENESIN 600 MG: 600 TABLET, EXTENDED RELEASE ORAL at 20:55

## 2019-05-10 RX ADMIN — AZITHROMYCIN 500 MG: 250 TABLET, FILM COATED ORAL at 08:13

## 2019-05-10 RX ADMIN — OXYCODONE HYDROCHLORIDE 10 MG: 5 TABLET ORAL at 22:24

## 2019-05-10 RX ADMIN — SODIUM CHLORIDE, PRESERVATIVE FREE 10 ML: 5 INJECTION INTRAVENOUS at 08:14

## 2019-05-10 RX ADMIN — AMITRIPTYLINE HYDROCHLORIDE 75 MG: 50 TABLET, FILM COATED ORAL at 20:55

## 2019-05-10 RX ADMIN — PANTOPRAZOLE SODIUM 40 MG: 40 TABLET, DELAYED RELEASE ORAL at 05:00

## 2019-05-10 RX ADMIN — OXYCODONE HYDROCHLORIDE 10 MG: 5 TABLET ORAL at 08:19

## 2019-05-10 RX ADMIN — ASPIRIN 325 MG: 325 TABLET ORAL at 08:13

## 2019-05-10 RX ADMIN — IPRATROPIUM BROMIDE AND ALBUTEROL SULFATE 3 ML: .5; 3 SOLUTION RESPIRATORY (INHALATION) at 19:11

## 2019-05-10 RX ADMIN — IPRATROPIUM BROMIDE AND ALBUTEROL SULFATE 3 ML: .5; 3 SOLUTION RESPIRATORY (INHALATION) at 01:24

## 2019-05-10 RX ADMIN — ALPRAZOLAM 0.5 MG: 0.5 TABLET ORAL at 20:55

## 2019-05-10 RX ADMIN — FUROSEMIDE 20 MG: 10 INJECTION, SOLUTION INTRAMUSCULAR; INTRAVENOUS at 08:13

## 2019-05-10 RX ADMIN — PREDNISONE 40 MG: 20 TABLET ORAL at 08:13

## 2019-05-10 RX ADMIN — ALPRAZOLAM 0.5 MG: 0.5 TABLET ORAL at 06:20

## 2019-05-10 NOTE — PROGRESS NOTES
Discharge Planning Assessment   [place]     Patient Name: Alisa Holland  MRN: [id number]  Today's Date: 5/10/2019    Admit Date: 5/4/2019        Discharge Plan     Row Name 05/10/19 1649       Plan    Plan  Pt admitted on 5/4/19.  Pt lives at home with significant other and plans to return home at discharge.  Pt currently does not utilize home health or DME.  SS will follow.         Destination      No service coordination in this encounter.      Durable Medical Equipment      No service coordination in this encounter.      Dialysis/Infusion      No service coordination in this encounter.      Home Medical Care      No service coordination in this encounter.      Therapy      No service coordination in this encounter.      Community Resources      No service coordination in this encounter.        Expected Discharge Date and Time     Expected Discharge Date Expected Discharge Time    May 12, 2019         Demographic Summary    No documentation.       Functional Status    No documentation.       Psychosocial    No documentation.       Abuse/Neglect    No documentation.       Legal    No documentation.       Substance Abuse    No documentation.       Patient Forms    No documentation.           Elisha Jaimes

## 2019-05-10 NOTE — PROGRESS NOTES
Patient Identification:  Name:  Alisa Holland  Age:  43 y.o.  Sex:  female  :  1975  MRN:  [id number]  Visit Number:  [id number]  Primary Care Provider:  Narcisa Cyr APRN    Length of stay:  5    Chief Complaint: dyspnea, follow up pneumonia.     HPI:      Mrs. Martell is a 43-year-old female patient who was admitted to telemetry from [facility] ED on 2019.  She was admitted for severe sepsis related to bilateral community acquired pneumonia, with acute hypoxic respiratory failure and acute renal insufficiency.  Please see admitting H&P for further details.     Hospital course:      At admission, she was started on IV Rocephin and doxycycline.  Atypical studies were negative.  She was started on Flagyl and continued on Rocephin.  CT of the abdomen/pelvis in the ED showed small bilateral adnexal cysts and cholelithiasis.  General surgery was consulted and did not feel that surgical intervention was indicated for the cholelithiasis at this time.  She was hypoxic on admission, but is now maintaining O2 saturations on room air.  Transaminases were mildly elevated and subsequent acute hepatitis panel was reactive for hepatitis C. Patient reportedly attributed this to having tattoos completed in the past which were not done professionally.  She developed some anemia and thrombocytopenia.  She was started on oral iron supplementation. Acute renal insufficiency on admission has resolved.  History of left side above the elbow amputation as well as extensive surgery to the left lower extremity.  She takes chronic pain medications as well as gabapentin and Lyrica.  There has been concern throughout her admission about her going off the floor frequently.  She has also had several episodes of being significantly difficult to wake with sternal rub.  IV Solu-Medrol has been decreased.  She is received 2 doses of IV furosemide 20 mg with improvement in bilateral lower extremity edema.    Subjective:      During today's  examination, Mrs. Holland is standing at the sink doing her makeup.  He reports she feels significantly improved from the prior days.  She reports a huge difference in her breathing.  She tells me she is eating and drinking well. She does not request extra pain medications during my examinations.     Discussed with AM MARÍA ELENA Leigh without acute events overnight.      ----------------------------------------------------------------------------------------------------------------------  Current Hospital Meds:    acetaminophen 650 mg Oral Once   amitriptyline 75 mg Oral Nightly   aspirin 325 mg Oral Daily   azithromycin 500 mg Oral Q24H   budesonide 0.5 mg Nebulization BID - RT   ceftriaxone 2 g Intravenous Q24H   gabapentin 800 mg Oral Q8H   guaiFENesin 600 mg Oral Q12H   heparin (porcine) 5,000 Units Subcutaneous Q12H   ipratropium-albuterol 3 mL Nebulization 4x Daily - RT   iron polysaccharides 150 mg Oral Daily   levETIRAcetam 1,000 mg Oral Q12H   metroNIDAZOLE 500 mg Intravenous Q8H   nicotine 1 patch Transdermal Q24H   pantoprazole 40 mg Oral Q AM   predniSONE 40 mg Oral Daily With Breakfast   sodium chloride 3 mL Intravenous Q12H        ----------------------------------------------------------------------------------------------------------------------  Vital Signs:  Temp:  [97.6 °F (36.4 °C)-99.1 °F (37.3 °C)] 98.5 °F (36.9 °C)  Heart Rate:  [] 102  Resp:  [18-20] 18  BP: (102-143)/(63-86) 102/63      05/08/19  0241 05/09/19  0240 05/10/19  0248   Weight: 93.4 kg (206 lb) 90 kg (198 lb 6 oz) (unable to weigh pt; pt sister in bed with her asleep)     Body mass index is 31.06 kg/m².    Intake/Output Summary (Last 24 hours) at 5/10/2019 1153  Last data filed at 5/10/2019 0248  Gross per 24 hour   Intake 360 ml   Output --   Net 360 ml     No intake/output data recorded.  Diet Regular  ----------------------------------------------------------------------------------------------------------------------  Physical  exam:  Constitutional:  Well-developed and well-nourished.    HENT:  Head:  Normocephalic and atraumatic.  Mouth:  Moist mucous membranes.    Eyes:  Conjunctivae and EOM are normal.  No scleral icterus.    Neck:  Neck supple.  No JVD present.    Cardiovascular:  Normal rate, regular rhythm and normal heart sounds with no murmur.  Pulmonary/Chest:  No respiratory distress.  Bilateral breath sounds without significant wheezing/rhonchi/rales.    Abdominal:  Soft.  Bowel sounds are normal.  No distension and no tenderness.    Musculoskeletal:  Bilateral lower ext. edema left chronically greater than the right.  Surgical scars anterior left lower extremity and toes 2 through 5 have been removed. Left arm below elbow amputation. Callus on right 5th toe, left foot missing toes 2-5.   Neurological:  Alert and oriented to person, place, and time.  No cranial nerve deficit.  No tongue deviation.  No facial droop.  No slurred speech.   Skin:  Skin is warm and dry. No rash noted. No pallor. Left lower abdominal wall cellulitis resolved.   ----------------------------------------------------------------------------------------------------------------------  Tele: Sinus bradycardia in the 50s-sinus tach in the low 110s    ----------------------------------------------------------------------------------------------------------------------  Results from last 7 days   Lab Units 05/05/19  1230 05/05/19  0748 05/05/19  0130   TROPONIN T ng/mL <0.010 0.011 0.035*   PROBNP pg/mL  --   --  2,126.0*     Results from last 7 days   Lab Units 05/10/19  0051 05/09/19  0428 05/08/19  0328 05/07/19  0417  05/05/19  0005   CRP mg/dL 2.87* 4.36* 4.21*  --    < >  --    LACTATE mmol/L  --   --   --   --   --  1.5   WBC 10*3/mm3 4.44 3.79 3.30* 5.42   < > 8.75   HEMOGLOBIN g/dL 10.2* 10.2* 9.4* 10.5*   < > 13.2   HEMATOCRIT % 32.5* 32.4* 29.7* 31.9*   < > 38.9   MCV fL 98.2* 98.5* 97.7* 97.0   < > 94.2   MCHC g/dL 31.4* 31.5 31.6 32.9   < > 33.9    PLATELETS 10*3/mm3 133* 114* 114* 116*   < > 139*   INR   --   --   --  0.92  --   --     < > = values in this interval not displayed.     Results from last 7 days   Lab Units 05/04/19  2356   PH, ARTERIAL pH units 7.387   PO2 ART mm Hg 47.6*   PCO2, ARTERIAL mm Hg 44.1   HCO3 ART mmol/L 25.9     Results from last 7 days   Lab Units 05/10/19  0051 05/09/19  0428 05/08/19  0328   SODIUM mmol/L 144 142 144   POTASSIUM mmol/L 2.7* 3.3* 3.5   MAGNESIUM mg/dL 1.4* 2.0 1.4*   CHLORIDE mmol/L 107 106 111*   CO2 mmol/L 25.1 26.0 23.3   BUN mg/dL 11 9 8   CREATININE mg/dL 0.83 0.80 0.91   EGFR IF NONAFRICN AM mL/min/1.73 75 79 68   CALCIUM mg/dL 8.4* 8.3* 7.7*   GLUCOSE mg/dL 126* 135* 118*   ALBUMIN g/dL 2.71* 2.79* 2.72*   BILIRUBIN mg/dL <0.2* <0.2* <0.2*   ALK PHOS U/L 80 81 76   AST (SGOT) U/L 39* 57* 89*   ALT (SGPT) U/L 39* 47* 55*   Estimated Creatinine Clearance: 101.8 mL/min (by C-G formula based on SCr of 0.83 mg/dL).  No results found for: AMMONIA      Blood Culture   Date Value Ref Range Status   05/05/2019 No growth at 3 days  Preliminary   05/05/2019 No growth at 3 days  Preliminary     ----------------------------------------------------------------------------------------------------------------------  Imaging Results (last 24 hours)     ** No results found for the last 24 hours. **        Transthoracic echocardiogram    ----------------------------------------------------------------------------------------------------------------------  Assessment and Plan:     Severe Sepsis secondary to acute bilateral CAP: Blood cultures show no growth after 4 days.  Atypical studies negative.  Repeat chest x-ray shows overall increase in development of bilateral airspace disease.  Continue high dose Rocephin (day #6) and Flagyl (day #5) as well as duo nebs, Pulmicort nebs, Mucinex, and oral prednisone 40mg.  IV azithromycin added on 5/9/19. WBC count normal.  Afebrile.  CRP is stable and improving. Sputum culture  added/pending.Transthoracic echocardiogram reviewed with EF of 70% with normal diastolic and valvular function. Continue incentive spirometry.  Repeat AM labs.  Hopefully home soon given improvement.  IV Lasix repeated again today.      Acute hypoxic respiratory failure: Secondary to bilateral CAP. Resolved.  Now maintaining O2 saturations on room air.    Acute renal insufficiency: Admission creatinine 1.61.  Improved back to baseline.  Monitor with diuresis.  Avoid nephrotoxic medications.     Anemia/mild thrombocytopenia: H/H stable today. Found to be iron deficient and started on oral supplementation.  Platelet count stable in the 130s.  Continue to monitor.  Could follow-up as an outpatient and consider colonoscopy in the outpatient setting.  Educated her about this during evaluation.       Mildly elevated troponin: Likely related to severe sepsis and hypoxia at admission.  Troponin trended down.  Echocardiogram normal with report listed above.  Denies any anginal symptoms.  No further evaluation at this time.     Hepatitis C, Elevated transaminases, new: Transaminases are trending down.  Liver ultrasound showed fatty changes of the liver with coarsening of the liver echo texture. Will need continued follow up outpatient wit GI clinic. Monitor LFT trend.     Cholelithiasis: No surgical intervention indicated at this time.  Follow-up with Dr. Moy/general surgery as an outpatient.  She is tolerating regular diet.      LLQ abdominal cellulitis: Resolved.  Continue to monitor.     Hx of Seizures:: Continue Keppra 1000 mg twice a day.  Continue seizure precautions.      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 Reynoso every 2 weeks and has her callus shaved, she has not been on any antibiotics for this recently, we will continue to monitor and plan for continued follow up outpatient.      Chronic Pain: Home oxymorphone and Lyrica were held at admission.  She was continued  on twice daily as needed Xanax, Neurontin, and oxycodone.  Remains intermittently somnolent and difficult to arouse with sternal rub per RN.  Continue decreased oxycodone to 10 mg every 6 hours.     Tobacco use: Encouraged to quit smoking, she states that she has. Continue Nicoderm patch 14 mg Daily.     Acute hypomagnesemia and hypokalemia: Potassium is being replaced per protocol.  Magnesium is being replaced per protocol.  Repeat labs in a.m.    Obesity: BMI 31.06    Sinus bradycardia:   TSH and free T4 have been added to evaluate for underlying bradycardia.             ----------  Diet: Regular, Cardiac   Precautions: falls, seizure precautions   Lines/Tubes/Drains: RIJ triple lumen central line placed 5/5/19     DVT prophylaxis: Heparin BID SQ  GI prophylaxis: protonix 40mg PO daily   Disposition: Hopefully home in the next 48 hours.      Arlen Dee PA-C  05/10/19  11:53 AM

## 2019-05-10 NOTE — PLAN OF CARE
Problem: Wound (Includes Pressure Injury) (Adult)  Goal: Signs and Symptoms of Listed Potential Problems Will be Absent, Minimized or Managed (Wound)  Outcome: Ongoing (interventions implemented as appropriate)      Problem: Patient Care Overview  Goal: Individualization and Mutuality  Outcome: Ongoing (interventions implemented as appropriate)    Goal: Discharge Needs Assessment  Outcome: Ongoing (interventions implemented as appropriate)    Goal: Interprofessional Rounds/Family Conf  Outcome: Ongoing (interventions implemented as appropriate)      Problem: Pneumonia (Adult)  Goal: Signs and Symptoms of Listed Potential Problems Will be Absent, Minimized or Managed (Pneumonia)  Outcome: Ongoing (interventions implemented as appropriate)

## 2019-05-10 NOTE — PLAN OF CARE
Problem: Skin Injury Risk (Adult)  Goal: Skin Health and Integrity  Outcome: Ongoing (interventions implemented as appropriate)      Problem: Fall Risk (Adult)  Goal: Absence of Fall  Outcome: Ongoing (interventions implemented as appropriate)      Problem: Sepsis/Septic Shock (Adult)  Goal: Signs and Symptoms of Listed Potential Problems Will be Absent, Minimized or Managed (Sepsis/Septic Shock)  Outcome: Ongoing (interventions implemented as appropriate)      Problem: Wound (Includes Pressure Injury) (Adult)  Goal: Signs and Symptoms of Listed Potential Problems Will be Absent, Minimized or Managed (Wound)  Outcome: Ongoing (interventions implemented as appropriate)      Problem: Patient Care Overview  Goal: Plan of Care Review  Outcome: Ongoing (interventions implemented as appropriate)      Problem: Pneumonia (Adult)  Goal: Signs and Symptoms of Listed Potential Problems Will be Absent, Minimized or Managed (Pneumonia)  Outcome: Ongoing (interventions implemented as appropriate)      Problem: Patient Care Overview  Goal: Interprofessional Rounds/Family Conf  Outcome: Ongoing (interventions implemented as appropriate)

## 2019-05-11 ENCOUNTER — APPOINTMENT (OUTPATIENT)
Dept: GENERAL RADIOLOGY | Facility: HOSPITAL | Age: 44
End: 2019-05-11

## 2019-05-11 VITALS
BODY MASS INDEX: 31.01 KG/M2 | HEART RATE: 87 BPM | WEIGHT: 197.6 LBS | SYSTOLIC BLOOD PRESSURE: 135 MMHG | RESPIRATION RATE: 20 BRPM | TEMPERATURE: 98.2 F | HEIGHT: 67 IN | DIASTOLIC BLOOD PRESSURE: 79 MMHG | OXYGEN SATURATION: 96 %

## 2019-05-11 LAB
BASOPHILS # BLD AUTO: 0.01 10*3/MM3 (ref 0–0.2)
BASOPHILS NFR BLD AUTO: 0.3 % (ref 0–1.5)
DEPRECATED RDW RBC AUTO: 47.4 FL (ref 37–54)
EOSINOPHIL # BLD AUTO: 0.01 10*3/MM3 (ref 0–0.4)
EOSINOPHIL NFR BLD AUTO: 0.3 % (ref 0.3–6.2)
ERYTHROCYTE [DISTWIDTH] IN BLOOD BY AUTOMATED COUNT: 14.1 % (ref 12.3–15.4)
HCT VFR BLD AUTO: 33.3 % (ref 34–46.6)
HGB BLD-MCNC: 10.3 G/DL (ref 12–15.9)
IMM GRANULOCYTES # BLD AUTO: 0.11 10*3/MM3 (ref 0–0.05)
IMM GRANULOCYTES NFR BLD AUTO: 3.8 % (ref 0–0.5)
LYMPHOCYTES # BLD AUTO: 1.01 10*3/MM3 (ref 0.7–3.1)
LYMPHOCYTES NFR BLD AUTO: 34.7 % (ref 19.6–45.3)
MCH RBC QN AUTO: 30.1 PG (ref 26.6–33)
MCHC RBC AUTO-ENTMCNC: 30.9 G/DL (ref 31.5–35.7)
MCV RBC AUTO: 97.4 FL (ref 79–97)
MONOCYTES # BLD AUTO: 0.52 10*3/MM3 (ref 0.1–0.9)
MONOCYTES NFR BLD AUTO: 17.9 % (ref 5–12)
NEUTROPHILS # BLD AUTO: 1.25 10*3/MM3 (ref 1.7–7)
NEUTROPHILS NFR BLD AUTO: 43 % (ref 42.7–76)
PLATELET # BLD AUTO: 182 10*3/MM3 (ref 140–450)
PMV BLD AUTO: 11.1 FL (ref 6–12)
RBC # BLD AUTO: 3.42 10*6/MM3 (ref 3.77–5.28)
WBC NRBC COR # BLD: 2.91 10*3/MM3 (ref 3.4–10.8)

## 2019-05-11 PROCEDURE — 99239 HOSP IP/OBS DSCHRG MGMT >30: CPT | Performed by: INTERNAL MEDICINE

## 2019-05-11 PROCEDURE — 71046 X-RAY EXAM CHEST 2 VIEWS: CPT

## 2019-05-11 PROCEDURE — 63710000001 PREDNISONE PER 1 MG: Performed by: INTERNAL MEDICINE

## 2019-05-11 PROCEDURE — 85025 COMPLETE CBC W/AUTO DIFF WBC: CPT | Performed by: INTERNAL MEDICINE

## 2019-05-11 PROCEDURE — 94799 UNLISTED PULMONARY SVC/PX: CPT

## 2019-05-11 PROCEDURE — 25010000002 HEPARIN (PORCINE) PER 1000 UNITS: Performed by: HOSPITALIST

## 2019-05-11 PROCEDURE — 71046 X-RAY EXAM CHEST 2 VIEWS: CPT | Performed by: RADIOLOGY

## 2019-05-11 RX ORDER — BUDESONIDE AND FORMOTEROL FUMARATE DIHYDRATE 160; 4.5 UG/1; UG/1
2 AEROSOL RESPIRATORY (INHALATION)
Qty: 10.2 G | Refills: 0 | Status: ON HOLD | OUTPATIENT
Start: 2019-05-11 | End: 2020-08-04

## 2019-05-11 RX ORDER — AZITHROMYCIN 500 MG/1
500 TABLET, FILM COATED ORAL DAILY
Qty: 2 TABLET | Refills: 0 | Status: SHIPPED | OUTPATIENT
Start: 2019-05-12 | End: 2019-05-14

## 2019-05-11 RX ORDER — CEFDINIR 300 MG/1
300 CAPSULE ORAL 2 TIMES DAILY
Qty: 6 CAPSULE | Refills: 0 | Status: SHIPPED | OUTPATIENT
Start: 2019-05-11 | End: 2019-05-14

## 2019-05-11 RX ORDER — NICOTINE 21 MG/24HR
1 PATCH, TRANSDERMAL 24 HOURS TRANSDERMAL
Qty: 21 EACH | Refills: 0 | Status: ON HOLD | OUTPATIENT
Start: 2019-05-12 | End: 2020-08-04

## 2019-05-11 RX ORDER — IRON POLYSACCHARIDE COMPLEX 150 MG
150 CAPSULE ORAL DAILY
Qty: 30 CAPSULE | Refills: 0 | Status: ON HOLD | OUTPATIENT
Start: 2019-05-12 | End: 2020-08-04

## 2019-05-11 RX ORDER — PREDNISONE 20 MG/1
40 TABLET ORAL
Qty: 10 TABLET | Refills: 0 | Status: SHIPPED | OUTPATIENT
Start: 2019-05-12 | End: 2019-05-17

## 2019-05-11 RX ORDER — ALBUTEROL SULFATE 90 UG/1
2 AEROSOL, METERED RESPIRATORY (INHALATION) EVERY 4 HOURS PRN
Qty: 6.7 G | Refills: 0 | Status: ON HOLD | OUTPATIENT
Start: 2019-05-11 | End: 2020-08-04

## 2019-05-11 RX ADMIN — GUAIFENESIN 600 MG: 600 TABLET, EXTENDED RELEASE ORAL at 08:31

## 2019-05-11 RX ADMIN — NYSTATIN 500000 UNITS: 100000 SUSPENSION ORAL at 08:31

## 2019-05-11 RX ADMIN — OXYCODONE HYDROCHLORIDE 10 MG: 5 TABLET ORAL at 02:38

## 2019-05-11 RX ADMIN — HEPARIN SODIUM 5000 UNITS: 5000 INJECTION INTRAVENOUS; SUBCUTANEOUS at 08:31

## 2019-05-11 RX ADMIN — ASPIRIN 325 MG: 325 TABLET ORAL at 08:31

## 2019-05-11 RX ADMIN — NICOTINE 1 PATCH: 14 PATCH TRANSDERMAL at 08:31

## 2019-05-11 RX ADMIN — METRONIDAZOLE 500 MG: 500 INJECTION, SOLUTION INTRAVENOUS at 00:36

## 2019-05-11 RX ADMIN — IPRATROPIUM BROMIDE AND ALBUTEROL SULFATE 3 ML: .5; 3 SOLUTION RESPIRATORY (INHALATION) at 12:58

## 2019-05-11 RX ADMIN — GABAPENTIN 800 MG: 400 CAPSULE ORAL at 05:05

## 2019-05-11 RX ADMIN — LEVETIRACETAM 1000 MG: 500 TABLET, FILM COATED ORAL at 08:31

## 2019-05-11 RX ADMIN — OXYCODONE HYDROCHLORIDE 10 MG: 5 TABLET ORAL at 10:19

## 2019-05-11 RX ADMIN — GABAPENTIN 800 MG: 400 CAPSULE ORAL at 13:01

## 2019-05-11 RX ADMIN — PREDNISONE 40 MG: 20 TABLET ORAL at 08:31

## 2019-05-11 RX ADMIN — Medication 150 MG: at 08:31

## 2019-05-11 RX ADMIN — OXYCODONE HYDROCHLORIDE 10 MG: 5 TABLET ORAL at 06:35

## 2019-05-11 RX ADMIN — METRONIDAZOLE 500 MG: 500 INJECTION, SOLUTION INTRAVENOUS at 08:32

## 2019-05-11 RX ADMIN — PANTOPRAZOLE SODIUM 40 MG: 40 TABLET, DELAYED RELEASE ORAL at 05:05

## 2019-05-11 RX ADMIN — IPRATROPIUM BROMIDE AND ALBUTEROL SULFATE 3 ML: .5; 3 SOLUTION RESPIRATORY (INHALATION) at 07:06

## 2019-05-11 RX ADMIN — BUDESONIDE 0.5 MG: 0.5 INHALANT RESPIRATORY (INHALATION) at 07:15

## 2019-05-11 RX ADMIN — ALPRAZOLAM 0.5 MG: 0.5 TABLET ORAL at 08:31

## 2019-05-11 RX ADMIN — AZITHROMYCIN 500 MG: 250 TABLET, FILM COATED ORAL at 08:31

## 2019-05-11 RX ADMIN — OXYCODONE HYDROCHLORIDE 10 MG: 5 TABLET ORAL at 14:12

## 2019-05-11 NOTE — PLAN OF CARE
Problem: Patient Care Overview  Goal: Plan of Care Review  Outcome: Ongoing (interventions implemented as appropriate)    Goal: Individualization and Mutuality  Outcome: Ongoing (interventions implemented as appropriate)    Goal: Discharge Needs Assessment  Outcome: Ongoing (interventions implemented as appropriate)      Problem: Pneumonia (Adult)  Goal: Signs and Symptoms of Listed Potential Problems Will be Absent, Minimized or Managed (Pneumonia)  Outcome: Ongoing (interventions implemented as appropriate)

## 2019-05-11 NOTE — DISCHARGE SUMMARY
[facility] MEDICINE DISCHARGE SUMMARY    Patient Identification:  Name:  Alisa Holland  Age:  43 y.o.  Sex:  female  :  1975  MRN:  [id number]  Visit Number:  [id number]    Date of Admission: 2019  Date of Discharge:  2019     PCP: Narcisa Cyr APRN    DISCHARGE DIAGNOSIS  Severe sepsis due to pneumonia  Acute hypoxic respiratory failure, resolved  Acute renal failure  Chronic hepatitis C  Chronic pain with opioid dependence  Cholelithiasis    CONSULTS   Dr Moy    PROCEDURES PERFORMED    PERTINENT RADIOLOGY  Imaging Results (last 7 days)     Procedure Component Value Units Date/Time    XR Chest PA & Lateral [[id number]] Updated:  19 1153    XR Chest 1 View [[id number]] Collected:  19     Updated:  19    Narrative:       EXAMINATION: XR CHEST 1 VW-      CLINICAL INDICATION:     follow-up of left basilar pneumonia;  J18.9-Pneumonia, unspecified organism; J18.9-Pneumonia, unspecified  organism     TECHNIQUE:  XR CHEST 1 VW-      COMPARISON: 2019      FINDINGS:   Right IJ deep line with tip in the region of the cavoatrial junction.  Left lower lobe pneumonia slightly improved. Development of right lower  lobe pneumonia and left upper lobe pneumonia. Increased perihilar  markings.   Heart size within normal limits.   No pneumothorax.   No effusions.   Stable appearance of the bony structures.          Impression:       Overall increase and development of bilateral airspace  disease.     This report was finalized on 2019 9:25 AM by Dr. Robin Vincent MD.       US Abdomen Limited [[id number]] Collected:  19     Updated:  19    Narrative:       EXAMINATION: US ABDOMEN LIMITED-         CLINICAL INDICATION:     RUQ (new hep C dx, cholelithiasis, elevated  LFTs); J18.9-Pneumonia, unspecified organism; J18.9-Pneumonia,  unspecified organism     TECHNIQUE: Multiplanar gray scale ultrasound of the abdomen.     COMPARISON:  NONE      FINDINGS:   Visualized pancreas is unremarkable.   Cholelithiasis noted. No gallbladder distention is noted. No  pericholecystic fluid. Wall thickening in part related to incomplete  distention or can BE seen with underlying hepatocellular disease..   The CBD measures 5 mm.  Fatty liver changes and coarsening of liver echotexture.    No ascites demonstrated.   Technologist reports positive sonographic Alaniz sign.       Impression:       1. Cholelithiasis but contracted appearance of gallbladder which in part  accounts for wall thickening.  2. Wall thickening of gallbladder also came be seen in association with  underlying hepatitis.  3. Fatty changes of liver with coarsening of the liver echotexture.  4. Technologist reports positive sonographic Alaniz sign.     This report was finalized on 5/6/2019 9:21 AM by Dr. Robin Vincent MD.       XR Chest 1 View [[id number]] Collected:  05/05/19 0833     Updated:  05/05/19 0904    Narrative:       XR CHEST 1 VIEW-     CLINICAL INDICATION: Shortness of air.         COMPARISON: 10/31/2018.      TECHNIQUE: Single frontal view of the chest.     FINDINGS:     Left basilar pneumonia.  The cardiac silhouette is normal. The pulmonary vasculature is  unremarkable.  There is no evidence of an acute osseous abnormality.   There are no suspicious-appearing parenchymal soft tissue nodules.          Impression:       Left basilar pneumonia.     This report was finalized on 5/5/2019 9:02 AM by Dr. Bhavin Li MD.       CT Abdomen Pelvis Without Contrast [[id number]] Collected:  05/05/19 0833     Updated:  05/05/19 0903    Narrative:       CT ABDOMEN PELVIS WITHOUT CONTRAST-     CLINICAL INDICATION: Abdominal pain, unspecified.        COMPARISON: 10/30/2018.     TECHNIQUE: Axial images were acquired from the lung bases through the  pubic symphysis without any IV, but without oral contrast.  Reformatted images were created in both the coronal and sagittal planes.     Radiation  dose reduction techniques were utilized per ALARA protocol.  Automated exposure control was initiated through either or [employer] or  [employer] software packages by  protocol.           FINDINGS:   Dense left basilar consolidation is present.     The liver is homogeneous.     There is cholelithiasis.     The spleen is homogeneous.     There is no peripancreatic stranding or pancreatic head mass.     There is no adrenal enlargement.     The kidneys show no evidence of hydronephrosis or hydroureter. I do not  see any distal ureteral stones.      There are small bilateral adnexal cysts.     There is no bowel wall thickening or mesenteric stranding.     There is no evidence of mesenteric or retroperitoneal adenopathy.             Impression:          1. Left basilar pneumonia.  2. Small bilateral adnexal cysts, the largest measuring 3.3 cm.  3. Cholelithiasis.              This report was finalized on 5/5/2019 9:01 AM by Dr. Bhavin Li MD.       CT Chest Without Contrast [[id number]] Collected:  05/05/19 0813     Updated:  05/05/19 0816    Narrative:       CT CHEST WITHOUT CONTRAST-      CLINICAL INDICATION: Shortness of breath.        DOSE: 2873.29 mGy.cm  COMPARISON: None available.     Radiation dose reduction techniques were utilized per ALARA protocol.  Automated exposure control was initiated through either or [employer] or  [employer] software packages by  protocol.        PROCEDURE: Axial images were acquired from the thoracic inlet through  the upper abdomen without any IV contrast. Reformatted images were  created.     FINDINGS: Today's study shows patchy airspace disease throughout the  left lung. Minimal airspace disease in the right lung. The appearance is  suggestive of pneumonitis.     No adenopathy.     No pericardial or pleural effusions.       Impression:       Patchy airspace disease throughout the left lung with dense  consolidation in the left lower lobe.  Mild airspace disease  in the right lung. Overall appearance concerning  for diffuse pneumonitis.      This report was finalized on 5/5/2019 8:14 AM by Dr. Bhavin Li MD.       XR Chest AP [[id number]] Collected:  05/05/19 0803     Updated:  05/05/19 0806    Narrative:       XR CHEST AP-     CLINICAL INDICATION: line placement        COMPARISON: 05/05/2019      TECHNIQUE: Single frontal view of the chest.     FINDINGS:     Right internal jugular central line tip is in the right atrium  Left basilar pneumonia  There is no evidence of an acute osseous abnormality.   There are no suspicious-appearing parenchymal soft tissue nodules.          Impression:          1. Central line tip in the right atrium. No pneumothorax  2. Left basilar pneumonia     This report was finalized on 5/5/2019 8:04 AM by Dr. Bhavin Li MD.             HOSPITAL COURSE  43 y.o. female who presents with complaints of worsening shortness of breath and productive cough with green sputum of 1 week duration. She has history of chronic pain, hepatitis C, and tobacco dependence. She was treated for pneumonia and has been transitioned to complete omnicef and azithromycin. She received intermittent IV lasix due to her hypoxia. She no longer need oxygen and is not hypoxic with activity. Her most recent cxr shows improved aeration.There is concern that she has COPD and will need outpatient follow-up in pulmonary clinic including PFTs. She has scripts for symbicort and prn albuterol MDI.  Patient left the room repeatedly to smoke and has been advised on tobacco cessation.   She has chronic cholelithiasis and clinically has no signs of acute cholecystitis. Dr Moy recommends outpatient follow-up in clinic.   Her elevated transaminases were likely due to sepsis. However, she has chronic hep C and should also establish care in hep C clinic.     VITAL SIGNS:      05/09/19  0240 05/10/19  0248 05/11/19  0244   Weight: 90 kg (198 lb 6 oz) (unable to weigh pt; pt sister in bed  "with her asleep) 89.6 kg (197 lb 9.6 oz)     Body mass index is 30.94 kg/m².  /79 (BP Location: Right arm, Patient Position: Sitting)   Pulse 87   Temp 98.2 °F (36.8 °C) (Oral)   Resp 20   Ht 170.2 cm (67.01\")   Wt 89.6 kg (197 lb 9.6 oz)   LMP 05/01/2019 (Exact Date)   SpO2 96%   BMI 30.94 kg/m²     PHYSICAL EXAM:  Constitutional:  Well-developed and well-nourished.  No respiratory distress.      Neck:   No JVD present.    Cardiovascular:  Normal rate, regular rhythm and normal heart sounds with no murmur.  Pulmonary/Chest:  Clear to auscultation, no wheezes, faint rhonchi in left base  Abdominal:  Soft.  Bowel sounds are normal.  No distension and no tenderness.   Musculoskeletal: below elbow amputation, left. No edema  Neurological:  Alert and oriented to person, place, and time.  No cranial nerve deficit.    Skin:  Skin is warm and dry.  No rash noted.    Psychiatric:  Normal mood and affect.  Behavior is normal.  Judgment and thought content normal.   Peripheral vascular:  No edema and strong pulses on all 4 extremities.      DISCHARGE DISPOSITION   Stable    DISCHARGE DESTINATION  Home    DISCHARGE MEDICATIONS:     Discharge Medications      New Medications      Instructions Start Date   albuterol sulfate  (90 Base) MCG/ACT inhaler  Commonly known as:  PROVENTIL HFA;VENTOLIN HFA;PROAIR HFA   2 puffs, Inhalation, Every 4 Hours PRN      azithromycin 500 MG tablet  Commonly known as:  ZITHROMAX   500 mg, Oral, Daily   Start Date:  5/12/2019     budesonide-formoterol 160-4.5 MCG/ACT inhaler  Commonly known as:  SYMBICORT   2 puffs, Inhalation, 2 Times Daily - RT      cefdinir 300 MG capsule  Commonly known as:  OMNICEF   300 mg, Oral, 2 Times Daily      iron polysaccharides 150 MG capsule  Commonly known as:  NIFEREX   150 mg, Oral, Daily   Start Date:  5/12/2019     nicotine 14 MG/24HR patch  Commonly known as:  NICODERM CQ   1 patch, Transdermal, Every 24 Hours Scheduled   Start Date:  " 5/12/2019     nystatin 779608 UNIT/ML suspension  Commonly known as:  MYCOSTATIN   5 mL, Swish & Swallow, 4 Times Daily, For oral thrush      predniSONE 20 MG tablet  Commonly known as:  DELTASONE   40 mg, Oral, Daily With Breakfast   Start Date:  5/12/2019        Continue These Medications      Instructions Start Date   ALPRAZolam 0.5 MG tablet  Commonly known as:  XANAX   0.5 mg, Oral, 2 Times Daily PRN      amitriptyline 75 MG tablet  Commonly known as:  ELAVIL   75 mg, Oral, Nightly      gabapentin 800 MG tablet  Commonly known as:  NEURONTIN   800 mg, Oral, Every 8 Hours      levETIRAcetam 1000 MG tablet  Commonly known as:  KEPPRA   1,000 mg, Oral, 2 Times Daily      LYRICA 300 MG capsule  Generic drug:  pregabalin   300 mg, Oral, 2 Times Daily      oxyCODONE 15 MG immediate release tablet  Commonly known as:  ROXICODONE   15 mg, Oral, Every 6 Hours PRN      oxyMORphone ER 15 MG 12 hr tablet  Commonly known as:  OPANA ER   15 mg, Oral, Every 12 Hours             Diet Instructions     Diet: Regular      Discharge Diet:  Regular          Activity Instructions     Activity as Tolerated          Additional Instructions for the Follow-ups that You Need to Schedule     Call MD With Problems / Concerns   As directed      Notify physician if you have fever, chills, abdominal pain, nausea, vomiting, dark or bloody stools, shortness of breath, or feel fatigued.    Order Comments:  Notify physician if you have fever, chills, abdominal pain, nausea, vomiting, dark or bloody stools, shortness of breath, or feel fatigued.          Discharge Follow-up with PCP   As directed       Currently Documented PCP:    Narcisa Cyr APRN    PCP Phone Number:    [phone]     Follow Up Details:  in 1 week         Discharge Follow-up with Specialty: Dr Vu; 2 Weeks   As directed      Specialty:  Dr Vu    Follow Up:  2 Weeks    Follow Up Details:  COPD management, PFT         Discharge Follow-up with Specified Provider:   [facility]; 2 Weeks   As directed      To:  Dr Moy    Follow Up:  2 Weeks    Follow Up Details:  for cholelithiasis         Discharge Follow-up with Specified Provider: GI hepatitis C clinic; 3 Weeks   As directed      To:  GI hepatitis C clinic    Follow Up:  3 Weeks    Follow Up Details:  hep C treatment           Follow-up Information     Narcisa Cyr APRN .    Specialty:  Family Medicine  Why:  in 1 week  Contact information:  [address] [place] [address]  [phone]                   TEST  RESULTS PENDING AT DISCHARGE       Angelina Maddox MD  05/11/19  2:22 PM    Please note that this discharge summary required more than 30 minutes to complete.    Please send a copy of this dictation to the following providers:  Narcisa Cyr APRN

## 2019-05-11 NOTE — PLAN OF CARE
Problem: Skin Injury Risk (Adult)  Goal: Skin Health and Integrity  Outcome: Outcome(s) achieved Date Met: 05/11/19      Problem: Fall Risk (Adult)  Goal: Absence of Fall  Outcome: Ongoing (interventions implemented as appropriate)      Problem: Sepsis/Septic Shock (Adult)  Goal: Signs and Symptoms of Listed Potential Problems Will be Absent, Minimized or Managed (Sepsis/Septic Shock)  Outcome: Ongoing (interventions implemented as appropriate)      Problem: Wound (Includes Pressure Injury) (Adult)  Goal: Signs and Symptoms of Listed Potential Problems Will be Absent, Minimized or Managed (Wound)  Outcome: Ongoing (interventions implemented as appropriate)      Problem: Patient Care Overview  Goal: Plan of Care Review  Outcome: Ongoing (interventions implemented as appropriate)

## 2019-05-12 ENCOUNTER — READMISSION MANAGEMENT (OUTPATIENT)
Dept: CALL CENTER | Facility: HOSPITAL | Age: 44
End: 2019-05-12

## 2019-05-12 NOTE — OUTREACH NOTE
Prep Survey      Responses   Facility patient discharged from?  [facility]   Is patient eligible?  Yes   Discharge diagnosis  Severe sepsis d/t pneumonia, acute hypoxic resp. failure, ARF, chronic Hep. C, chronic pain with opioid dependence, Cholelithiasis   Does the patient have one of the following disease processes/diagnoses(primary or secondary)?  Sepsis [pneumonia]   Does the patient have Home health ordered?  No   Is there a DME ordered?  No   Comments regarding appointments  See AVS   Prep survey completed?  Yes          Zeinab Bermudez RN

## 2019-05-12 NOTE — PAYOR COMM NOTE
"[facility]   [facility]  PHONE  [phone]  FAX  [phone]  NPI:  [id number]    PATIENT D/C 5/11/19    Alisa Holland (43 y.o. Female)     Date of Birth Social Security Number Address Home Phone MRN    1975  [address]  [place] [id number] [phone] [id number]    Holiness Marital Status          Yazidism        Admission Date Admission Type Admitting Provider Attending Provider Department, Room/Bed    5/4/19 Emergency Олег Boyer MD  [facility] [address] SOUTH, 3311/2S    Discharge Date Discharge Disposition Discharge Destination        5/11/2019 Home or Self Care              Attending Provider:  (none)   Allergies:  Contrast Dye, Meropenem, Toradol [Ketorolac Tromethamine], Ultram [Tramadol Hcl], Zofran [Ondansetron Hcl], Zyvox [Linezolid]    Isolation:  None   Infection:  None   Code Status:  Prior    Ht:  170.2 cm (67.01\")   Wt:  89.6 kg (197 lb 9.6 oz)    Admission Cmt:  None   Principal Problem:  None                Active Insurance as of 5/4/2019     Primary Coverage     Payor Plan Insurance Group Employer/Plan Group    ANTHEM MEDICARE REPLACEMENT ANTHEM MEDICARE ADVANTAGE [id number]     Payor Plan Address Payor Plan Phone Number Payor Plan Fax Number Effective Dates    [address] [phone]  1/1/2019 - None Entered    [facility] [address]       Subscriber Name Subscriber Birth Date Member ID       ALISA HOLLAND 1975 [id number]           Secondary Coverage     Payor Plan Insurance Group Employer/Plan Group    [place]      Payor Plan Address Payor Plan Phone Number Payor Plan Fax Number Effective Dates    [address] [phone]  11/29/2016 - None Entered    [facility] [id number]       Subscriber Name Subscriber Birth Date Member ID       ALISA HOLLAND 1975 [id number]                 Emergency Contacts      (Rel.) Home Phone Work Phone Mobile Phone    Mendoza Louis (Mother) [phone] -- " --    Papo Corral -- -- [phone]    ROBERTO SIMON (Other) [phone] -- --

## 2019-05-14 ENCOUNTER — READMISSION MANAGEMENT (OUTPATIENT)
Dept: CALL CENTER | Facility: HOSPITAL | Age: 44
End: 2019-05-14

## 2019-05-14 NOTE — OUTREACH NOTE
Sepsis Week 1 Survey      Responses   Facility patient discharged from?  [facility]   Does the patient have one of the following disease processes/diagnoses(primary or secondary)?  Sepsis   Is there a successful TCM telephone encounter documented?  No   Week 1 attempt successful?  Yes   Call start time  1340   Rescheduled  Rescheduled-pt requested   Call end time  1340          Oneida Vasquez RN

## 2019-05-15 ENCOUNTER — READMISSION MANAGEMENT (OUTPATIENT)
Dept: CALL CENTER | Facility: HOSPITAL | Age: 44
End: 2019-05-15

## 2019-05-15 NOTE — OUTREACH NOTE
Sepsis Week 1 Survey      Responses   Facility patient discharged from?  [facility]   Does the patient have one of the following disease processes/diagnoses(primary or secondary)?  Sepsis   Is there a successful TCM telephone encounter documented?  No   Week 1 attempt successful?  No   Rescheduled  Revoked [No answer to rescheduled call.]          Elaine Dobson RN

## 2019-08-24 ENCOUNTER — HOSPITAL ENCOUNTER (EMERGENCY)
Facility: HOSPITAL | Age: 44
Discharge: HOME | End: 2019-08-24
Attending: EMERGENCY MEDICINE | Admitting: EMERGENCY MEDICINE

## 2019-08-24 VITALS
HEART RATE: 85 BPM | TEMPERATURE: 98.4 F | RESPIRATION RATE: 16 BRPM | BODY MASS INDEX: 27.28 KG/M2 | DIASTOLIC BLOOD PRESSURE: 68 MMHG | HEIGHT: 68 IN | WEIGHT: 180 LBS | OXYGEN SATURATION: 99 % | SYSTOLIC BLOOD PRESSURE: 105 MMHG

## 2019-08-24 DIAGNOSIS — T14.90XD HEALING WOUND: Primary | ICD-10-CM

## 2019-08-24 LAB
ALBUMIN SERPL-MCNC: 3.84 G/DL (ref 3.5–5.2)
ALBUMIN/GLOB SERPL: 1.2 G/DL
ALP SERPL-CCNC: 79 U/L (ref 39–117)
ALT SERPL W P-5'-P-CCNC: 51 U/L (ref 1–33)
ANION GAP SERPL CALCULATED.3IONS-SCNC: 9.9 MMOL/L (ref 5–15)
AST SERPL-CCNC: 59 U/L (ref 1–32)
BASOPHILS # BLD AUTO: 0.02 10*3/MM3 (ref 0–0.2)
BASOPHILS NFR BLD AUTO: 0.4 % (ref 0–1.5)
BILIRUB SERPL-MCNC: 0.4 MG/DL (ref 0.2–1.2)
BUN BLD-MCNC: 8 MG/DL (ref 6–20)
BUN/CREAT SERPL: 10.1 (ref 7–25)
CALCIUM SPEC-SCNC: 8.8 MG/DL (ref 8.6–10.5)
CHLORIDE SERPL-SCNC: 105 MMOL/L (ref 98–107)
CO2 SERPL-SCNC: 25.1 MMOL/L (ref 22–29)
CREAT BLD-MCNC: 0.79 MG/DL (ref 0.57–1)
CRP SERPL-MCNC: 0.16 MG/DL (ref 0–0.5)
D-LACTATE SERPL-SCNC: 0.4 MMOL/L (ref 0.5–2)
DEPRECATED RDW RBC AUTO: 41.3 FL (ref 37–54)
EOSINOPHIL # BLD AUTO: 0.35 10*3/MM3 (ref 0–0.4)
EOSINOPHIL NFR BLD AUTO: 6.7 % (ref 0.3–6.2)
ERYTHROCYTE [DISTWIDTH] IN BLOOD BY AUTOMATED COUNT: 12.1 % (ref 12.3–15.4)
GFR SERPL CREATININE-BSD FRML MDRD: 79 ML/MIN/1.73
GLOBULIN UR ELPH-MCNC: 3.3 GM/DL
GLUCOSE BLD-MCNC: 97 MG/DL (ref 65–99)
HCT VFR BLD AUTO: 37.8 % (ref 34–46.6)
HGB BLD-MCNC: 12.1 G/DL (ref 12–15.9)
IMM GRANULOCYTES # BLD AUTO: 0.01 10*3/MM3 (ref 0–0.05)
IMM GRANULOCYTES NFR BLD AUTO: 0.2 % (ref 0–0.5)
LYMPHOCYTES # BLD AUTO: 1.54 10*3/MM3 (ref 0.7–3.1)
LYMPHOCYTES NFR BLD AUTO: 29.5 % (ref 19.6–45.3)
MCH RBC QN AUTO: 30.9 PG (ref 26.6–33)
MCHC RBC AUTO-ENTMCNC: 32 G/DL (ref 31.5–35.7)
MCV RBC AUTO: 96.4 FL (ref 79–97)
MONOCYTES # BLD AUTO: 0.43 10*3/MM3 (ref 0.1–0.9)
MONOCYTES NFR BLD AUTO: 8.2 % (ref 5–12)
NEUTROPHILS # BLD AUTO: 2.87 10*3/MM3 (ref 1.7–7)
NEUTROPHILS NFR BLD AUTO: 55 % (ref 42.7–76)
PLATELET # BLD AUTO: 167 10*3/MM3 (ref 140–450)
PMV BLD AUTO: 11.2 FL (ref 6–12)
POTASSIUM BLD-SCNC: 3.5 MMOL/L (ref 3.5–5.2)
PROT SERPL-MCNC: 7.1 G/DL (ref 6–8.5)
RBC # BLD AUTO: 3.92 10*6/MM3 (ref 3.77–5.28)
SODIUM BLD-SCNC: 140 MMOL/L (ref 136–145)
WBC NRBC COR # BLD: 5.22 10*3/MM3 (ref 3.4–10.8)

## 2019-08-24 PROCEDURE — 86140 C-REACTIVE PROTEIN: CPT | Performed by: PHYSICIAN ASSISTANT

## 2019-08-24 PROCEDURE — 85025 COMPLETE CBC W/AUTO DIFF WBC: CPT | Performed by: PHYSICIAN ASSISTANT

## 2019-08-24 PROCEDURE — 99283 EMERGENCY DEPT VISIT LOW MDM: CPT

## 2019-08-24 PROCEDURE — 87040 BLOOD CULTURE FOR BACTERIA: CPT | Performed by: PHYSICIAN ASSISTANT

## 2019-08-24 PROCEDURE — 80053 COMPREHEN METABOLIC PANEL: CPT | Performed by: PHYSICIAN ASSISTANT

## 2019-08-24 PROCEDURE — 83605 ASSAY OF LACTIC ACID: CPT | Performed by: PHYSICIAN ASSISTANT

## 2019-08-24 RX ORDER — SODIUM CHLORIDE 0.9 % (FLUSH) 0.9 %
10 SYRINGE (ML) INJECTION AS NEEDED
Status: DISCONTINUED | OUTPATIENT
Start: 2019-08-24 | End: 2019-08-24 | Stop reason: HOSPADM

## 2019-08-29 LAB — BACTERIA SPEC AEROBE CULT: NORMAL

## 2020-01-13 ENCOUNTER — APPOINTMENT (OUTPATIENT)
Dept: GENERAL RADIOLOGY | Facility: HOSPITAL | Age: 45
End: 2020-01-13

## 2020-01-13 ENCOUNTER — HOSPITAL ENCOUNTER (EMERGENCY)
Facility: HOSPITAL | Age: 45
Discharge: HOME | End: 2020-01-14
Attending: FAMILY MEDICINE | Admitting: FAMILY MEDICINE

## 2020-01-13 DIAGNOSIS — N30.00 ACUTE CYSTITIS WITHOUT HEMATURIA: Primary | ICD-10-CM

## 2020-01-13 LAB
APTT PPP: 20.5 SECONDS (ref 23.8–36.1)
BACTERIA UR QL AUTO: ABNORMAL /HPF
BILIRUB UR QL STRIP: NEGATIVE
CLARITY UR: ABNORMAL
COLOR UR: ABNORMAL
CRP SERPL-MCNC: 0.04 MG/DL (ref 0–0.5)
D-LACTATE SERPL-SCNC: 1 MMOL/L (ref 0.5–2)
GLUCOSE UR STRIP-MCNC: NEGATIVE MG/DL
HGB UR QL STRIP.AUTO: ABNORMAL
HOLD SPECIMEN: NORMAL
HOLD SPECIMEN: NORMAL
HYALINE CASTS UR QL AUTO: ABNORMAL /LPF
INR PPP: 0.89 (ref 0.9–1.1)
KETONES UR QL STRIP: NEGATIVE
LEUKOCYTE ESTERASE UR QL STRIP.AUTO: ABNORMAL
LIPASE SERPL-CCNC: 45 U/L (ref 13–60)
NITRITE UR QL STRIP: POSITIVE
NT-PROBNP SERPL-MCNC: 73.5 PG/ML (ref 5–450)
PH UR STRIP.AUTO: 6.5 [PH] (ref 5–8)
PROT UR QL STRIP: NEGATIVE
PROTHROMBIN TIME: 12.5 SECONDS (ref 11–15.4)
RBC # UR: ABNORMAL /HPF
REF LAB TEST METHOD: ABNORMAL
SP GR UR STRIP: 1.01 (ref 1–1.03)
SQUAMOUS #/AREA URNS HPF: ABNORMAL /HPF
TROPONIN T SERPL-MCNC: <0.01 NG/ML (ref 0–0.03)
UROBILINOGEN UR QL STRIP: ABNORMAL
WBC UR QL AUTO: ABNORMAL /HPF
WHOLE BLOOD HOLD SPECIMEN: NORMAL

## 2020-01-13 PROCEDURE — 85610 PROTHROMBIN TIME: CPT | Performed by: FAMILY MEDICINE

## 2020-01-13 PROCEDURE — 80307 DRUG TEST PRSMV CHEM ANLYZR: CPT | Performed by: FAMILY MEDICINE

## 2020-01-13 PROCEDURE — 85730 THROMBOPLASTIN TIME PARTIAL: CPT | Performed by: FAMILY MEDICINE

## 2020-01-13 PROCEDURE — 87186 SC STD MICRODIL/AGAR DIL: CPT | Performed by: FAMILY MEDICINE

## 2020-01-13 PROCEDURE — 83880 ASSAY OF NATRIURETIC PEPTIDE: CPT | Performed by: FAMILY MEDICINE

## 2020-01-13 PROCEDURE — 87150 DNA/RNA AMPLIFIED PROBE: CPT | Performed by: FAMILY MEDICINE

## 2020-01-13 PROCEDURE — 25010000002 CEFTRIAXONE: Performed by: FAMILY MEDICINE

## 2020-01-13 PROCEDURE — 86140 C-REACTIVE PROTEIN: CPT | Performed by: FAMILY MEDICINE

## 2020-01-13 PROCEDURE — 83605 ASSAY OF LACTIC ACID: CPT | Performed by: FAMILY MEDICINE

## 2020-01-13 PROCEDURE — 87086 URINE CULTURE/COLONY COUNT: CPT | Performed by: FAMILY MEDICINE

## 2020-01-13 PROCEDURE — 84484 ASSAY OF TROPONIN QUANT: CPT | Performed by: FAMILY MEDICINE

## 2020-01-13 PROCEDURE — 83690 ASSAY OF LIPASE: CPT | Performed by: FAMILY MEDICINE

## 2020-01-13 PROCEDURE — 99284 EMERGENCY DEPT VISIT MOD MDM: CPT

## 2020-01-13 PROCEDURE — 87147 CULTURE TYPE IMMUNOLOGIC: CPT | Performed by: FAMILY MEDICINE

## 2020-01-13 PROCEDURE — 87088 URINE BACTERIA CULTURE: CPT | Performed by: FAMILY MEDICINE

## 2020-01-13 PROCEDURE — 87040 BLOOD CULTURE FOR BACTERIA: CPT | Performed by: FAMILY MEDICINE

## 2020-01-13 PROCEDURE — 93010 ELECTROCARDIOGRAM REPORT: CPT | Performed by: INTERNAL MEDICINE

## 2020-01-13 PROCEDURE — 96365 THER/PROPH/DIAG IV INF INIT: CPT

## 2020-01-13 PROCEDURE — 81001 URINALYSIS AUTO W/SCOPE: CPT | Performed by: FAMILY MEDICINE

## 2020-01-13 PROCEDURE — 25010000002 PROCHLORPERAZINE 10 MG/2ML SOLUTION: Performed by: FAMILY MEDICINE

## 2020-01-13 PROCEDURE — 71045 X-RAY EXAM CHEST 1 VIEW: CPT

## 2020-01-13 PROCEDURE — 36415 COLL VENOUS BLD VENIPUNCTURE: CPT

## 2020-01-13 PROCEDURE — 93005 ELECTROCARDIOGRAM TRACING: CPT | Performed by: FAMILY MEDICINE

## 2020-01-13 PROCEDURE — 96375 TX/PRO/DX INJ NEW DRUG ADDON: CPT

## 2020-01-13 RX ORDER — PROCHLORPERAZINE EDISYLATE 5 MG/ML
10 INJECTION INTRAMUSCULAR; INTRAVENOUS ONCE
Status: COMPLETED | OUTPATIENT
Start: 2020-01-13 | End: 2020-01-13

## 2020-01-13 RX ORDER — SODIUM CHLORIDE 0.9 % (FLUSH) 0.9 %
10 SYRINGE (ML) INJECTION AS NEEDED
Status: DISCONTINUED | OUTPATIENT
Start: 2020-01-13 | End: 2020-01-14 | Stop reason: HOSPADM

## 2020-01-13 RX ADMIN — PROCHLORPERAZINE EDISYLATE 10 MG: 5 INJECTION INTRAMUSCULAR; INTRAVENOUS at 22:31

## 2020-01-13 RX ADMIN — CEFTRIAXONE 2 G: 2 INJECTION, POWDER, FOR SOLUTION INTRAMUSCULAR; INTRAVENOUS at 23:36

## 2020-01-13 RX ADMIN — SODIUM CHLORIDE 1000 ML: 9 INJECTION, SOLUTION INTRAVENOUS at 21:45

## 2020-01-14 VITALS
HEART RATE: 82 BPM | OXYGEN SATURATION: 99 % | TEMPERATURE: 98.7 F | SYSTOLIC BLOOD PRESSURE: 112 MMHG | HEIGHT: 68 IN | WEIGHT: 180 LBS | BODY MASS INDEX: 27.28 KG/M2 | DIASTOLIC BLOOD PRESSURE: 81 MMHG | RESPIRATION RATE: 16 BRPM

## 2020-01-14 LAB
6-ACETYL MORPHINE: NEGATIVE
AMPHET+METHAMPHET UR QL: NEGATIVE
BACTERIA BLD CULT: ABNORMAL
BACTERIA ID TEST ISLT QL CULT: ABNORMAL
BARBITURATES UR QL SCN: NEGATIVE
BENZODIAZ UR QL SCN: POSITIVE
BUPRENORPHINE SERPL-MCNC: NEGATIVE NG/ML
CANNABINOIDS SERPL QL: NEGATIVE
COCAINE UR QL: NEGATIVE
METHADONE UR QL SCN: NEGATIVE
OPIATES UR QL: NEGATIVE
OXYCODONE UR QL SCN: POSITIVE
PCP UR QL SCN: NEGATIVE
TROPONIN T SERPL-MCNC: <0.01 NG/ML (ref 0–0.03)

## 2020-01-14 PROCEDURE — 84484 ASSAY OF TROPONIN QUANT: CPT | Performed by: FAMILY MEDICINE

## 2020-01-14 RX ORDER — CEFDINIR 300 MG/1
300 CAPSULE ORAL 2 TIMES DAILY
Qty: 14 CAPSULE | Refills: 0 | Status: ON HOLD | OUTPATIENT
Start: 2020-01-14 | End: 2020-08-04

## 2020-01-14 NOTE — ED NOTES
Dena attempting to Start IV at this time Patient is A&Ox4 NADN VSS WCTM.     Edilberto Pacheco, RN  01/13/20 2041

## 2020-01-14 NOTE — ED NOTES
Unable to obtain IV access multiple times, Called house and CCU, they are coming down to attempt.      Chino Donaldson, RN  01/13/20 2973

## 2020-01-14 NOTE — ED PROVIDER NOTES
Subjective   43 yo female presents with uncontrolled blood pressure; she also is having nausea and vomiting. Pt reports it started today. She also has a blood blister on the bottom of her right foot ( has been present for several days). Pt denies fever, cp and or shortnes of breath- states she has had some diarrhea but it has stopped.      History provided by:  Patient and significant other  Illness   Location:  See hpi  Severity:  Mild  Onset quality:  Gradual  Timing:  Intermittent  Progression:  Unchanged  Chronicity:  Recurrent  Context:  See hpi  Relieved by:  Nothing  Worsened by:  Nothing  Associated symptoms: diarrhea, fatigue and nausea    Associated symptoms: no abdominal pain, no chest pain and no fever        Review of Systems   Constitutional: Positive for fatigue. Negative for fever.   HENT: Negative.    Respiratory: Negative.    Cardiovascular: Negative.  Negative for chest pain.   Gastrointestinal: Positive for diarrhea and nausea. Negative for abdominal pain.   Endocrine: Negative.    Genitourinary: Negative.  Negative for dysuria.   Skin: Negative.    Neurological: Negative.    Psychiatric/Behavioral: Negative.    All other systems reviewed and are negative.      Past Medical History:   Diagnosis Date   • Amputation of fifth toe, left, traumatic (CMS/HCC)     third, fourth, fifth toes   • Arthritis    • History of transfusion     2007, 2016   • Infectious viral hepatitis    • Seizures (CMS/HCC)     last seizure 2014       Allergies   Allergen Reactions   • Contrast Dye Anaphylaxis   • Meropenem Other (See Comments)     CRAMPING   • Toradol [Ketorolac Tromethamine] Hives   • Ultram [Tramadol Hcl] Hives   • Zofran [Ondansetron Hcl] Hives   • Zyvox [Linezolid] Rash       Past Surgical History:   Procedure Laterality Date   • ANKLE FUSION Bilateral    • APPENDECTOMY     • ARM AMPUTATION AT SHOULDER Left    • OR DRAIN LOWER LEG DEEP ABSC/HEMATOMA Left 1/19/2017    Procedure: LOWER EXTREMITY DEBRIDEMENT  OSTEOMYELITIS, WOUND REPAIR FLAP;  Surgeon: Simón Reynoso MD;  Location: [facility];  Service: Plastics   • TOTAL HIP ARTHROPLASTY Right 08/2016       Family History   Problem Relation Age of Onset   • Arthritis Mother    • Asthma Mother    • COPD Mother    • Diabetes Mother    • Heart disease Mother    • Hypertension Mother    • Hyperlipidemia Mother    • Cancer Paternal Grandfather        Social History     Socioeconomic History   • Marital status:      Spouse name: Not on file   • Number of children: Not on file   • Years of education: Not on file   • Highest education level: Not on file   Tobacco Use   • Smoking status: Heavy Tobacco Smoker     Packs/day: 0.50     Years: 2.00     Pack years: 1.00     Types: Cigarettes   • Smokeless tobacco: Never Used   Substance and Sexual Activity   • Alcohol use: No   • Drug use: No   • Sexual activity: Defer           Objective   Physical Exam   Constitutional: She is oriented to person, place, and time. She appears well-developed and well-nourished. No distress.   Eyes: Pupils are equal, round, and reactive to light. EOM are normal.   Neck: Neck supple.   Pulmonary/Chest: Effort normal and breath sounds normal.   Abdominal: Soft. Bowel sounds are normal.   Musculoskeletal:   Small blood blister at the 5th MCP joint on the right foot   Neurological: She is alert and oriented to person, place, and time.   Skin: Skin is warm. Capillary refill takes less than 2 seconds.   Psychiatric: She has a normal mood and affect. Her behavior is normal. Judgment and thought content normal.   Nursing note and vitals reviewed.      Procedures           ED Course                                               MDM  Number of Diagnoses or Management Options  Acute cystitis without hematuria: new and requires workup     Amount and/or Complexity of Data Reviewed  Clinical lab tests: ordered and reviewed  Tests in the radiology section of CPT®: ordered and reviewed  Tests in the medicine  section of CPT®: reviewed and ordered  Independent visualization of images, tracings, or specimens: yes    Risk of Complications, Morbidity, and/or Mortality  Presenting problems: high  Diagnostic procedures: moderate  Management options: moderate    Patient Progress  Patient progress: stable (43 yo female with history of substance abuse presents with uncontrolled blood pressure, nausea and vomiting; she also reports that several years  Ago she had endocarditis- then today she started feeling bad and her pcp told her to come here and get checked. Pt declines current drug use, fever- says hse feels okay- has a UTI will start oral antibioitcs- have obtained cultures will notify if abnormal- pt is in agreement)      Final diagnoses:   Acute cystitis without hematuria            Marion Wharton,   01/15/20 4987

## 2020-01-14 NOTE — ED NOTES
Patient sent from PCP Patient has amputated left arm distal to the elbow patient has 4 toes amputated to the left foot. Patient complains of N/V today with loose wet stools..      Edilberto Pacheco RN  01/13/20 2007

## 2020-01-15 LAB — BACTERIA SPEC AEROBE CULT: ABNORMAL

## 2020-01-17 LAB
BACTERIA SPEC AEROBE CULT: ABNORMAL
BACTERIA SPEC AEROBE CULT: ABNORMAL
GRAM STN SPEC: ABNORMAL
ISOLATED FROM: ABNORMAL
ISOLATED FROM: ABNORMAL
STREP GROUPING: ABNORMAL

## 2020-01-18 LAB — BACTERIA SPEC AEROBE CULT: NORMAL

## 2020-08-03 ENCOUNTER — HOSPITAL ENCOUNTER (INPATIENT)
Facility: HOSPITAL | Age: 45
LOS: 2 days | Discharge: HOME | End: 2020-08-06
Attending: FAMILY MEDICINE | Admitting: HOSPITALIST

## 2020-08-03 ENCOUNTER — APPOINTMENT (OUTPATIENT)
Dept: GENERAL RADIOLOGY | Facility: HOSPITAL | Age: 45
End: 2020-08-03

## 2020-08-03 DIAGNOSIS — R11.2 NON-INTRACTABLE VOMITING WITH NAUSEA, UNSPECIFIED VOMITING TYPE: ICD-10-CM

## 2020-08-03 DIAGNOSIS — K80.50 BILIARY COLIC: ICD-10-CM

## 2020-08-03 DIAGNOSIS — R78.81 BACTEREMIA: Primary | ICD-10-CM

## 2020-08-03 LAB
BACTERIA UR QL AUTO: ABNORMAL /HPF
BILIRUB UR QL STRIP: NEGATIVE
CLARITY UR: CLEAR
COLOR UR: YELLOW
GLUCOSE UR STRIP-MCNC: NEGATIVE MG/DL
HGB UR QL STRIP.AUTO: NEGATIVE
HYALINE CASTS UR QL AUTO: ABNORMAL /LPF
KETONES UR QL STRIP: NEGATIVE
LEUKOCYTE ESTERASE UR QL STRIP.AUTO: ABNORMAL
NITRITE UR QL STRIP: NEGATIVE
PH UR STRIP.AUTO: 6 [PH] (ref 5–8)
PROT UR QL STRIP: NEGATIVE
RBC # UR: ABNORMAL /HPF
REF LAB TEST METHOD: ABNORMAL
SP GR UR STRIP: 1.01 (ref 1–1.03)
SQUAMOUS #/AREA URNS HPF: ABNORMAL /HPF
UROBILINOGEN UR QL STRIP: ABNORMAL
WBC UR QL AUTO: ABNORMAL /HPF

## 2020-08-03 PROCEDURE — 80053 COMPREHEN METABOLIC PANEL: CPT | Performed by: FAMILY MEDICINE

## 2020-08-03 PROCEDURE — 83690 ASSAY OF LIPASE: CPT | Performed by: FAMILY MEDICINE

## 2020-08-03 PROCEDURE — 84145 PROCALCITONIN (PCT): CPT | Performed by: FAMILY MEDICINE

## 2020-08-03 PROCEDURE — 71045 X-RAY EXAM CHEST 1 VIEW: CPT

## 2020-08-03 PROCEDURE — 81001 URINALYSIS AUTO W/SCOPE: CPT | Performed by: FAMILY MEDICINE

## 2020-08-03 PROCEDURE — 93005 ELECTROCARDIOGRAM TRACING: CPT | Performed by: FAMILY MEDICINE

## 2020-08-03 PROCEDURE — 84484 ASSAY OF TROPONIN QUANT: CPT | Performed by: FAMILY MEDICINE

## 2020-08-03 PROCEDURE — 93010 ELECTROCARDIOGRAM REPORT: CPT | Performed by: INTERNAL MEDICINE

## 2020-08-03 PROCEDURE — 36415 COLL VENOUS BLD VENIPUNCTURE: CPT

## 2020-08-03 PROCEDURE — 87040 BLOOD CULTURE FOR BACTERIA: CPT | Performed by: FAMILY MEDICINE

## 2020-08-03 PROCEDURE — 83735 ASSAY OF MAGNESIUM: CPT | Performed by: FAMILY MEDICINE

## 2020-08-03 PROCEDURE — 87086 URINE CULTURE/COLONY COUNT: CPT | Performed by: HOSPITALIST

## 2020-08-03 PROCEDURE — 86140 C-REACTIVE PROTEIN: CPT | Performed by: FAMILY MEDICINE

## 2020-08-03 PROCEDURE — P9612 CATHETERIZE FOR URINE SPEC: HCPCS

## 2020-08-03 PROCEDURE — 99284 EMERGENCY DEPT VISIT MOD MDM: CPT

## 2020-08-03 RX ORDER — SODIUM CHLORIDE 0.9 % (FLUSH) 0.9 %
10 SYRINGE (ML) INJECTION AS NEEDED
Status: DISCONTINUED | OUTPATIENT
Start: 2020-08-03 | End: 2020-08-06 | Stop reason: HOSPADM

## 2020-08-03 RX ADMIN — SODIUM CHLORIDE 1000 ML: 9 INJECTION, SOLUTION INTRAVENOUS at 22:38

## 2020-08-04 ENCOUNTER — APPOINTMENT (OUTPATIENT)
Dept: CT IMAGING | Facility: HOSPITAL | Age: 45
End: 2020-08-04

## 2020-08-04 ENCOUNTER — APPOINTMENT (OUTPATIENT)
Dept: NUCLEAR MEDICINE | Facility: HOSPITAL | Age: 45
End: 2020-08-04

## 2020-08-04 PROBLEM — Z72.0 TOBACCO USE: Chronic | Status: ACTIVE | Noted: 2020-08-04

## 2020-08-04 PROBLEM — R78.81 BACTEREMIA DUE TO STAPHYLOCOCCUS: Status: ACTIVE | Noted: 2020-08-04

## 2020-08-04 PROBLEM — K80.50 BILIARY COLIC: Status: ACTIVE | Noted: 2020-08-03

## 2020-08-04 PROBLEM — B95.8 BACTEREMIA DUE TO STAPHYLOCOCCUS: Status: ACTIVE | Noted: 2020-08-04

## 2020-08-04 PROBLEM — I50.32 DIASTOLIC CHF, CHRONIC (HCC): Chronic | Status: ACTIVE | Noted: 2020-08-04

## 2020-08-04 PROBLEM — E66.9 OBESITY (BMI 30-39.9): Chronic | Status: ACTIVE | Noted: 2020-08-04

## 2020-08-04 PROBLEM — B19.20 HEPATITIS C: Chronic | Status: ACTIVE | Noted: 2020-08-04

## 2020-08-04 PROBLEM — R56.9 SEIZURES (HCC): Chronic | Status: ACTIVE | Noted: 2020-08-04

## 2020-08-04 LAB
6-ACETYL MORPHINE: NEGATIVE
ALBUMIN SERPL-MCNC: 3.69 G/DL (ref 3.5–5.2)
ALBUMIN/GLOB SERPL: 1.2 G/DL
ALP SERPL-CCNC: 65 U/L (ref 39–117)
ALT SERPL W P-5'-P-CCNC: 40 U/L (ref 1–33)
AMPHET+METHAMPHET UR QL: NEGATIVE
ANION GAP SERPL CALCULATED.3IONS-SCNC: 13 MMOL/L (ref 5–15)
AST SERPL-CCNC: 37 U/L (ref 1–32)
B-HCG UR QL: NEGATIVE
BARBITURATES UR QL SCN: NEGATIVE
BASOPHILS # BLD AUTO: 0.05 10*3/MM3 (ref 0–0.2)
BASOPHILS NFR BLD AUTO: 0.7 % (ref 0–1.5)
BENZODIAZ UR QL SCN: POSITIVE
BILIRUB SERPL-MCNC: 0.4 MG/DL (ref 0–1.2)
BILIRUB UR QL STRIP: NEGATIVE
BUN SERPL-MCNC: 12 MG/DL (ref 6–20)
BUN/CREAT SERPL: 13.2 (ref 7–25)
BUPRENORPHINE SERPL-MCNC: NEGATIVE NG/ML
CALCIUM SPEC-SCNC: 8.6 MG/DL (ref 8.6–10.5)
CANNABINOIDS SERPL QL: NEGATIVE
CHLORIDE SERPL-SCNC: 104 MMOL/L (ref 98–107)
CLARITY UR: CLEAR
CO2 SERPL-SCNC: 21 MMOL/L (ref 22–29)
COCAINE UR QL: NEGATIVE
COLOR UR: YELLOW
CREAT SERPL-MCNC: 0.91 MG/DL (ref 0.57–1)
CRP SERPL-MCNC: 0.15 MG/DL (ref 0–0.5)
D-LACTATE SERPL-SCNC: 0.4 MMOL/L (ref 0.5–2)
DEPRECATED RDW RBC AUTO: 45.1 FL (ref 37–54)
EOSINOPHIL # BLD AUTO: 0.58 10*3/MM3 (ref 0–0.4)
EOSINOPHIL NFR BLD AUTO: 7.8 % (ref 0.3–6.2)
ERYTHROCYTE [DISTWIDTH] IN BLOOD BY AUTOMATED COUNT: 12.8 % (ref 12.3–15.4)
GFR SERPL CREATININE-BSD FRML MDRD: 67 ML/MIN/1.73
GLOBULIN UR ELPH-MCNC: 3 GM/DL
GLUCOSE SERPL-MCNC: 87 MG/DL (ref 65–99)
GLUCOSE UR STRIP-MCNC: NEGATIVE MG/DL
HCT VFR BLD AUTO: 32.3 % (ref 34–46.6)
HGB BLD-MCNC: 10.4 G/DL (ref 12–15.9)
HGB UR QL STRIP.AUTO: NEGATIVE
HOLD SPECIMEN: NORMAL
HOLD SPECIMEN: NORMAL
IMM GRANULOCYTES # BLD AUTO: 0.02 10*3/MM3 (ref 0–0.05)
IMM GRANULOCYTES NFR BLD AUTO: 0.3 % (ref 0–0.5)
INR PPP: 0.96 (ref 0.9–1.1)
KETONES UR QL STRIP: NEGATIVE
LEUKOCYTE ESTERASE UR QL STRIP.AUTO: NEGATIVE
LIPASE SERPL-CCNC: 23 U/L (ref 13–60)
LYMPHOCYTES # BLD AUTO: 1.9 10*3/MM3 (ref 0.7–3.1)
LYMPHOCYTES NFR BLD AUTO: 25.4 % (ref 19.6–45.3)
MAGNESIUM SERPL-MCNC: 1.8 MG/DL (ref 1.6–2.6)
MCH RBC QN AUTO: 31 PG (ref 26.6–33)
MCHC RBC AUTO-ENTMCNC: 32.2 G/DL (ref 31.5–35.7)
MCV RBC AUTO: 96.1 FL (ref 79–97)
METHADONE UR QL SCN: NEGATIVE
MONOCYTES # BLD AUTO: 0.74 10*3/MM3 (ref 0.1–0.9)
MONOCYTES NFR BLD AUTO: 9.9 % (ref 5–12)
NEUTROPHILS NFR BLD AUTO: 4.18 10*3/MM3 (ref 1.7–7)
NEUTROPHILS NFR BLD AUTO: 55.9 % (ref 42.7–76)
NITRITE UR QL STRIP: NEGATIVE
NRBC BLD AUTO-RTO: 0 /100 WBC (ref 0–0.2)
OPIATES UR QL: NEGATIVE
OXYCODONE UR QL SCN: NEGATIVE
PCP UR QL SCN: NEGATIVE
PH UR STRIP.AUTO: 6.5 [PH] (ref 5–8)
PLATELET # BLD AUTO: 235 10*3/MM3 (ref 140–450)
PMV BLD AUTO: 9.5 FL (ref 6–12)
POTASSIUM SERPL-SCNC: 3.8 MMOL/L (ref 3.5–5.2)
PROCALCITONIN SERPL-MCNC: <0.02 NG/ML (ref 0–0.25)
PROT SERPL-MCNC: 6.7 G/DL (ref 6–8.5)
PROT UR QL STRIP: NEGATIVE
PROTHROMBIN TIME: 12.6 SECONDS (ref 11.9–14.1)
RBC # BLD AUTO: 3.36 10*6/MM3 (ref 3.77–5.28)
SARS-COV-2 RDRP RESP QL NAA+PROBE: NOT DETECTED
SODIUM SERPL-SCNC: 138 MMOL/L (ref 136–145)
SP GR UR STRIP: 1 (ref 1–1.03)
TROPONIN T SERPL-MCNC: <0.01 NG/ML (ref 0–0.03)
UROBILINOGEN UR QL STRIP: NORMAL
WBC # BLD AUTO: 7.47 10*3/MM3 (ref 3.4–10.8)
WHOLE BLOOD HOLD SPECIMEN: NORMAL
WHOLE BLOOD HOLD SPECIMEN: NORMAL

## 2020-08-04 PROCEDURE — 78226 HEPATOBILIARY SYSTEM IMAGING: CPT

## 2020-08-04 PROCEDURE — 80307 DRUG TEST PRSMV CHEM ANLYZR: CPT | Performed by: HOSPITALIST

## 2020-08-04 PROCEDURE — 87147 CULTURE TYPE IMMUNOLOGIC: CPT | Performed by: FAMILY MEDICINE

## 2020-08-04 PROCEDURE — A9537 TC99M MEBROFENIN: HCPCS | Performed by: INTERNAL MEDICINE

## 2020-08-04 PROCEDURE — 0 TECHNETIUM TC 99M MEBROFENIN KIT: Performed by: INTERNAL MEDICINE

## 2020-08-04 PROCEDURE — 99223 1ST HOSP IP/OBS HIGH 75: CPT | Performed by: PHYSICIAN ASSISTANT

## 2020-08-04 PROCEDURE — 81025 URINE PREGNANCY TEST: CPT | Performed by: HOSPITALIST

## 2020-08-04 PROCEDURE — 83605 ASSAY OF LACTIC ACID: CPT | Performed by: FAMILY MEDICINE

## 2020-08-04 PROCEDURE — 85025 COMPLETE CBC W/AUTO DIFF WBC: CPT | Performed by: FAMILY MEDICINE

## 2020-08-04 PROCEDURE — 25010000002 HEPARIN (PORCINE) PER 1000 UNITS: Performed by: HOSPITALIST

## 2020-08-04 PROCEDURE — 94640 AIRWAY INHALATION TREATMENT: CPT

## 2020-08-04 PROCEDURE — 25010000002 VANCOMYCIN: Performed by: FAMILY MEDICINE

## 2020-08-04 PROCEDURE — 74176 CT ABD & PELVIS W/O CONTRAST: CPT

## 2020-08-04 PROCEDURE — 94799 UNLISTED PULMONARY SVC/PX: CPT

## 2020-08-04 PROCEDURE — 87150 DNA/RNA AMPLIFIED PROBE: CPT | Performed by: FAMILY MEDICINE

## 2020-08-04 PROCEDURE — 99221 1ST HOSP IP/OBS SF/LOW 40: CPT | Performed by: SURGERY

## 2020-08-04 PROCEDURE — 25010000002 MAGNESIUM SULFATE IN D5W 1G/100ML (PREMIX) 1-5 GM/100ML-% SOLUTION

## 2020-08-04 PROCEDURE — 25010000002 PROMETHAZINE PER 50 MG: Performed by: PHYSICIAN ASSISTANT

## 2020-08-04 PROCEDURE — 81003 URINALYSIS AUTO W/O SCOPE: CPT | Performed by: HOSPITALIST

## 2020-08-04 PROCEDURE — 78226 HEPATOBILIARY SYSTEM IMAGING: CPT | Performed by: RADIOLOGY

## 2020-08-04 PROCEDURE — 85610 PROTHROMBIN TIME: CPT | Performed by: FAMILY MEDICINE

## 2020-08-04 PROCEDURE — 87635 SARS-COV-2 COVID-19 AMP PRB: CPT | Performed by: INTERNAL MEDICINE

## 2020-08-04 PROCEDURE — 87040 BLOOD CULTURE FOR BACTERIA: CPT | Performed by: FAMILY MEDICINE

## 2020-08-04 RX ORDER — PANTOPRAZOLE SODIUM 40 MG/1
40 TABLET, DELAYED RELEASE ORAL
Status: DISCONTINUED | OUTPATIENT
Start: 2020-08-04 | End: 2020-08-06 | Stop reason: HOSPADM

## 2020-08-04 RX ORDER — TIZANIDINE 4 MG/1
4 TABLET ORAL NIGHTLY PRN
Status: DISCONTINUED | OUTPATIENT
Start: 2020-08-04 | End: 2020-08-06 | Stop reason: HOSPADM

## 2020-08-04 RX ORDER — TIZANIDINE 4 MG/1
4 TABLET ORAL NIGHTLY PRN
COMMUNITY
End: 2021-12-15

## 2020-08-04 RX ORDER — LEVETIRACETAM 500 MG/1
1000 TABLET ORAL 2 TIMES DAILY
COMMUNITY

## 2020-08-04 RX ORDER — AMITRIPTYLINE HYDROCHLORIDE 150 MG/1
150 TABLET, FILM COATED ORAL NIGHTLY
COMMUNITY

## 2020-08-04 RX ORDER — HEPARIN SODIUM 5000 [USP'U]/ML
5000 INJECTION, SOLUTION INTRAVENOUS; SUBCUTANEOUS EVERY 12 HOURS SCHEDULED
Status: DISCONTINUED | OUTPATIENT
Start: 2020-08-04 | End: 2020-08-06 | Stop reason: HOSPADM

## 2020-08-04 RX ORDER — IBUPROFEN 400 MG/1
400 TABLET ORAL ONCE
Status: COMPLETED | OUTPATIENT
Start: 2020-08-04 | End: 2020-08-04

## 2020-08-04 RX ORDER — HYDROCODONE BITARTRATE AND ACETAMINOPHEN 7.5; 325 MG/1; MG/1
1 TABLET ORAL EVERY 8 HOURS PRN
Status: CANCELLED | OUTPATIENT
Start: 2020-08-04

## 2020-08-04 RX ORDER — HYDROCODONE BITARTRATE AND ACETAMINOPHEN 7.5; 325 MG/1; MG/1
1 TABLET ORAL EVERY 8 HOURS PRN
COMMUNITY
End: 2020-08-06 | Stop reason: HOSPADM

## 2020-08-04 RX ORDER — IPRATROPIUM BROMIDE AND ALBUTEROL SULFATE 2.5; .5 MG/3ML; MG/3ML
3 SOLUTION RESPIRATORY (INHALATION) ONCE
Status: COMPLETED | OUTPATIENT
Start: 2020-08-04 | End: 2020-08-04

## 2020-08-04 RX ORDER — SODIUM CHLORIDE 9 MG/ML
100 INJECTION, SOLUTION INTRAVENOUS CONTINUOUS
Status: DISCONTINUED | OUTPATIENT
Start: 2020-08-04 | End: 2020-08-04

## 2020-08-04 RX ORDER — MAGNESIUM SULFATE 1 G/100ML
1 INJECTION INTRAVENOUS ONCE
Status: COMPLETED | OUTPATIENT
Start: 2020-08-04 | End: 2020-08-04

## 2020-08-04 RX ORDER — PREGABALIN 100 MG/1
300 CAPSULE ORAL EVERY 12 HOURS SCHEDULED
Status: DISCONTINUED | OUTPATIENT
Start: 2020-08-04 | End: 2020-08-06 | Stop reason: HOSPADM

## 2020-08-04 RX ORDER — GABAPENTIN 400 MG/1
800 CAPSULE ORAL EVERY 8 HOURS SCHEDULED
Status: DISCONTINUED | OUTPATIENT
Start: 2020-08-04 | End: 2020-08-06 | Stop reason: HOSPADM

## 2020-08-04 RX ORDER — GABAPENTIN 400 MG/1
800 CAPSULE ORAL EVERY 8 HOURS
Status: DISCONTINUED | OUTPATIENT
Start: 2020-08-04 | End: 2020-08-04

## 2020-08-04 RX ORDER — HYDRALAZINE HYDROCHLORIDE 20 MG/ML
10 INJECTION INTRAMUSCULAR; INTRAVENOUS ONCE
Status: DISCONTINUED | OUTPATIENT
Start: 2020-08-04 | End: 2020-08-06 | Stop reason: HOSPADM

## 2020-08-04 RX ORDER — OXYCODONE HYDROCHLORIDE 15 MG/1
15 TABLET ORAL EVERY 8 HOURS PRN
Status: DISCONTINUED | OUTPATIENT
Start: 2020-08-04 | End: 2020-08-06 | Stop reason: HOSPADM

## 2020-08-04 RX ORDER — MAGNESIUM SULFATE HEPTAHYDRATE 40 MG/ML
2 INJECTION, SOLUTION INTRAVENOUS AS NEEDED
Status: DISCONTINUED | OUTPATIENT
Start: 2020-08-04 | End: 2020-08-06 | Stop reason: HOSPADM

## 2020-08-04 RX ORDER — OXYCODONE HCL 20 MG/1
20 TABLET, FILM COATED, EXTENDED RELEASE ORAL EVERY 12 HOURS SCHEDULED
Status: DISCONTINUED | OUTPATIENT
Start: 2020-08-04 | End: 2020-08-06 | Stop reason: HOSPADM

## 2020-08-04 RX ORDER — L.ACID,PARA/B.BIFIDUM/S.THERM 8B CELL
1 CAPSULE ORAL DAILY
Status: DISCONTINUED | OUTPATIENT
Start: 2020-08-04 | End: 2020-08-04

## 2020-08-04 RX ORDER — MAGNESIUM SULFATE 1 G/100ML
1 INJECTION INTRAVENOUS AS NEEDED
Status: DISCONTINUED | OUTPATIENT
Start: 2020-08-04 | End: 2020-08-06 | Stop reason: HOSPADM

## 2020-08-04 RX ORDER — KIT FOR THE PREPARATION OF TECHNETIUM TC 99M MEBROFENIN 45 MG/10ML
1 INJECTION, POWDER, LYOPHILIZED, FOR SOLUTION INTRAVENOUS
Status: COMPLETED | OUTPATIENT
Start: 2020-08-04 | End: 2020-08-04

## 2020-08-04 RX ORDER — SODIUM CHLORIDE 0.9 % (FLUSH) 0.9 %
10 SYRINGE (ML) INJECTION AS NEEDED
Status: DISCONTINUED | OUTPATIENT
Start: 2020-08-04 | End: 2020-08-06 | Stop reason: HOSPADM

## 2020-08-04 RX ORDER — LEVETIRACETAM 500 MG/1
1000 TABLET ORAL EVERY 12 HOURS SCHEDULED
Status: DISCONTINUED | OUTPATIENT
Start: 2020-08-04 | End: 2020-08-06 | Stop reason: HOSPADM

## 2020-08-04 RX ORDER — SODIUM CHLORIDE 0.9 % (FLUSH) 0.9 %
10 SYRINGE (ML) INJECTION EVERY 12 HOURS SCHEDULED
Status: DISCONTINUED | OUTPATIENT
Start: 2020-08-04 | End: 2020-08-06 | Stop reason: HOSPADM

## 2020-08-04 RX ORDER — AMITRIPTYLINE HYDROCHLORIDE 50 MG/1
150 TABLET, FILM COATED ORAL NIGHTLY
Status: DISCONTINUED | OUTPATIENT
Start: 2020-08-04 | End: 2020-08-06 | Stop reason: HOSPADM

## 2020-08-04 RX ORDER — AMLODIPINE BESYLATE 5 MG/1
5 TABLET ORAL
Status: DISCONTINUED | OUTPATIENT
Start: 2020-08-04 | End: 2020-08-06 | Stop reason: HOSPADM

## 2020-08-04 RX ADMIN — PREGABALIN 300 MG: 100 CAPSULE ORAL at 10:55

## 2020-08-04 RX ADMIN — NICOTINE 1 PATCH: 7 PATCH, EXTENDED RELEASE TRANSDERMAL at 09:13

## 2020-08-04 RX ADMIN — GABAPENTIN 800 MG: 400 CAPSULE ORAL at 13:20

## 2020-08-04 RX ADMIN — SODIUM CHLORIDE 100 ML/HR: 9 INJECTION, SOLUTION INTRAVENOUS at 09:14

## 2020-08-04 RX ADMIN — PROMETHAZINE HYDROCHLORIDE 12.5 MG: 25 INJECTION INTRAMUSCULAR; INTRAVENOUS at 04:24

## 2020-08-04 RX ADMIN — SODIUM CHLORIDE, PRESERVATIVE FREE 10 ML: 5 INJECTION INTRAVENOUS at 21:06

## 2020-08-04 RX ADMIN — MAGNESIUM SULFATE IN DEXTROSE 1 G: 10 INJECTION, SOLUTION INTRAVENOUS at 14:14

## 2020-08-04 RX ADMIN — LEVETIRACETAM 1000 MG: 500 TABLET, FILM COATED ORAL at 11:10

## 2020-08-04 RX ADMIN — LEVETIRACETAM 1000 MG: 500 TABLET, FILM COATED ORAL at 21:07

## 2020-08-04 RX ADMIN — VANCOMYCIN HYDROCHLORIDE 1750 MG: 1 INJECTION, POWDER, LYOPHILIZED, FOR SOLUTION INTRAVENOUS at 01:32

## 2020-08-04 RX ADMIN — AMLODIPINE BESYLATE 5 MG: 5 TABLET ORAL at 13:20

## 2020-08-04 RX ADMIN — SODIUM CHLORIDE, PRESERVATIVE FREE 10 ML: 5 INJECTION INTRAVENOUS at 09:13

## 2020-08-04 RX ADMIN — HEPARIN SODIUM 5000 UNITS: 5000 INJECTION INTRAVENOUS; SUBCUTANEOUS at 21:06

## 2020-08-04 RX ADMIN — PANTOPRAZOLE SODIUM 40 MG: 40 TABLET, DELAYED RELEASE ORAL at 05:04

## 2020-08-04 RX ADMIN — OXYCODONE HYDROCHLORIDE 20 MG: 20 TABLET, FILM COATED, EXTENDED RELEASE ORAL at 21:07

## 2020-08-04 RX ADMIN — MEBROFENIN 1 DOSE: 45 INJECTION, POWDER, LYOPHILIZED, FOR SOLUTION INTRAVENOUS at 07:30

## 2020-08-04 RX ADMIN — OXYCODONE HYDROCHLORIDE 15 MG: 15 TABLET ORAL at 13:20

## 2020-08-04 RX ADMIN — IPRATROPIUM BROMIDE AND ALBUTEROL SULFATE 3 ML: .5; 3 SOLUTION RESPIRATORY (INHALATION) at 05:19

## 2020-08-04 RX ADMIN — SODIUM CHLORIDE 2 G: 900 INJECTION INTRAVENOUS at 05:04

## 2020-08-04 RX ADMIN — PREGABALIN 300 MG: 100 CAPSULE ORAL at 21:07

## 2020-08-04 RX ADMIN — PROMETHAZINE HYDROCHLORIDE 12.5 MG: 25 INJECTION INTRAMUSCULAR; INTRAVENOUS at 10:23

## 2020-08-04 RX ADMIN — GABAPENTIN 800 MG: 400 CAPSULE ORAL at 22:12

## 2020-08-04 RX ADMIN — OXYCODONE HYDROCHLORIDE 20 MG: 20 TABLET, FILM COATED, EXTENDED RELEASE ORAL at 10:56

## 2020-08-04 RX ADMIN — AMITRIPTYLINE HYDROCHLORIDE 150 MG: 50 TABLET, FILM COATED ORAL at 21:07

## 2020-08-04 RX ADMIN — IBUPROFEN 400 MG: 400 TABLET, FILM COATED ORAL at 05:04

## 2020-08-04 NOTE — ED NOTES
Attempted to room patient with no answer; searched lobby, radiology waiting and hallways with no answer     Heriberto Schuster, RN  08/03/20 9024
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
## 2020-08-04 NOTE — PROGRESS NOTES
"Assisted By: Shruthi DANIELS RN    CC: Jeanne javier pain    Interview History/HPI: Patient states she is hungry, nausea vomiting has resolved.  She states she has been having abdominal pain she describes as \"cramps\".  This is been more than the right upper quadrant and have been going on since Friday intermittently.  No associated fever.  She was called back to [facility] due to positive blood culture which is turned out to be a coag negative stap 1/1.  She decided to come here however instead.      Vitals:    08/04/20 1033   BP: 150/90   Pulse: 68   Resp: 18   Temp: 98.5 °F (36.9 °C)   SpO2:          Intake/Output Summary (Last 24 hours) at 8/4/2020 1101  Last data filed at 8/4/2020 0702  Gross per 24 hour   Intake 832.32 ml   Output 700 ml   Net 132.32 ml       EXAM: Lungs have bilateral breath sounds are clear no rhonchi rales or wheezing, heart regular rate and rhythm without murmur gallop, abdomen is soft she is tender right upper quadrant to deep palpation without peritoneal signs bowel sounds are active extremities are without edema she has excellent palpable distal pulses, she is missing all but her great toe on her left foot.  She has had ankle fusions.  She has a small callus on the fifth metatarsal plantar surface but this is not open.  No surrounding cellulitis.  Hearing is intact, mood is good.  She is missing her left arm.      EKG: Essentially normal EKG, reviewed    Tele: Sinus, reviewed    LABS:   Lab Results (last 48 hours)     Procedure Component Value Units Date/Time    Urine Drug Screen - Urine, Clean Catch [[id number]]  (Abnormal) Collected:  08/04/20 0240    Specimen:  Urine, Clean Catch Updated:  08/04/20 0311     Amphetamine Screen, Urine Negative     Barbiturates Screen, Urine Negative     Benzodiazepine Screen, Urine Positive     Cocaine Screen, Urine Negative     Methadone Screen, Urine Negative     Opiate Screen Negative     Phencyclidine (PCP), Urine Negative     THC, Screen, Urine Negative    "  6-ACETYL MORPHINE Negative     Buprenorphine, Screen, Urine Negative     Oxycodone Screen, Urine Negative    Narrative:       Negative Thresholds For Drugs Screened:                  Amphetamines              1000 ng/ml               Barbiturates               200 ng/ml               Benzodiazepines            200 ng/ml              Cocaine                    300 ng/ml              Methadone                  300 ng/ml              Opiates                    300 ng/ml               Phencyclidine               25 ng/ml               THC                         50 ng/ml              6-Acetyl Morphine           10 ng/ml              Buprenorphine                5 ng/ml              Oxycodone                  300 ng/ml    The reference range for all drugs tested is negative. This report includes final unconfirmed qualitative results to be used for medical treatment purposes only. Unconfirmed results must not be used for non-medical purposes such as employment or legal testing. Clinical consideration should be applied to any drug of abuse test, especially when unconfirmed quantitative results are used.        Pregnancy, Urine - Urine, Clean Catch [[id number]]  (Normal) Collected:  08/04/20 0240    Specimen:  Urine, Clean Catch Updated:  08/04/20 0254     HCG, Urine QL Negative    Narrative:       Diluted specimens may cause false negative results.    Urinalysis With Culture If Indicated - Urine, Random Void [[id number]]  (Normal) Collected:  08/04/20 0240    Specimen:  Urine, Random Void Updated:  08/04/20 0253     Color, UA Yellow     Appearance, UA Clear     pH, UA 6.5     Specific Gravity, UA 1.005     Glucose, UA Negative     Ketones, UA Negative     Bilirubin, UA Negative     Blood, UA Negative     Protein, UA Negative     Leuk Esterase, UA Negative     Nitrite, UA Negative     Urobilinogen, UA 0.2 E.U./dL    Narrative:       Urine microscopic not indicated.    Urine Culture - Urine, Urine, Catheter [[id number]]  Collected:  08/03/20 2232    Specimen:  Urine, Catheter Updated:  08/04/20 0152    Venus Draw [[id number]] Collected:  08/03/20 2354    Specimen:  Blood Updated:  08/04/20 0115    Narrative:       The following orders were created for panel order Venus Draw.  Procedure                               Abnormality         Status                     ---------                               -----------         ------                     Light Blue Top[[id number]]                                   Final result               Green Top (Gel)[[id number]]                                  Final result               Lavender Top[[id number]]                                     Final result               Gold Top - SST[[id number]]                                   Final result                 Please view results for these tests on the individual orders.    Light Blue Top [[id number]] Collected:  08/04/20 0009    Specimen:  Blood Updated:  08/04/20 0115     Extra Tube hold for add-on     Comment: Auto resulted       Gold Top - SST [[id number]] Collected:  08/04/20 0009    Specimen:  Blood Updated:  08/04/20 0115     Extra Tube Hold for add-ons.     Comment: Auto resulted.       Green Top (Gel) [[id number]] Collected:  08/03/20 2354    Specimen:  Blood from Hand, Right Updated:  08/04/20 0100     Extra Tube Hold for add-ons.     Comment: Auto resulted.       Lavender Top [[id number]] Collected:  08/03/20 2354    Specimen:  Blood from Hand, Right Updated:  08/04/20 0100     Extra Tube hold for add-on     Comment: Auto resulted       Lactic Acid, Plasma [[id number]]  (Abnormal) Collected:  08/04/20 0009    Specimen:  Blood Updated:  08/04/20 0037     Lactate 0.4 mmol/L     Comprehensive Metabolic Panel [[id number]]  (Abnormal) Collected:  08/03/20 2354    Specimen:  Blood from Hand, Right Updated:  08/04/20 0029     Glucose 87 mg/dL      BUN 12 mg/dL      Creatinine 0.91 mg/dL      Sodium 138 mmol/L      Potassium 3.8 mmol/L      Comment:  Specimen hemolyzed.  Results may be affected.        Chloride 104 mmol/L      CO2 21.0 mmol/L      Calcium 8.6 mg/dL      Total Protein 6.7 g/dL      Albumin 3.69 g/dL      ALT (SGPT) 40 U/L      AST (SGOT) 37 U/L      Alkaline Phosphatase 65 U/L      Total Bilirubin 0.4 mg/dL      eGFR Non African Amer 67 mL/min/1.73      Globulin 3.0 gm/dL      A/G Ratio 1.2 g/dL      BUN/Creatinine Ratio 13.2     Anion Gap 13.0 mmol/L     Narrative:       GFR Normal >60  Chronic Kidney Disease <60  Kidney Failure <15      Protime-INR [[id number]]  (Normal) Collected:  08/04/20 0009    Specimen:  Blood Updated:  08/04/20 0028     Protime 12.6 Seconds      Comment: Note new Reference Range        INR 0.96    Narrative:       Suggested INR therapeutic range for stable oral anticoagulant therapy:    Low Intensity therapy:   1.5-2.0  Moderate Intensity therapy:   2.0-3.0  High Intensity therapy:   2.5-4.0    Magnesium [[id number]]  (Normal) Collected:  08/03/20 2354    Specimen:  Blood from Hand, Right Updated:  08/04/20 0024     Magnesium 1.8 mg/dL     C-reactive Protein [[id number]]  (Normal) Collected:  08/03/20 2354    Specimen:  Blood from Hand, Right Updated:  08/04/20 0024     C-Reactive Protein 0.15 mg/dL     Lipase [[id number]]  (Normal) Collected:  08/03/20 2354    Specimen:  Blood from Hand, Right Updated:  08/04/20 0024     Lipase 23 U/L     Troponin [[id number]]  (Normal) Collected:  08/03/20 2354    Specimen:  Blood from Hand, Right Updated:  08/04/20 0024     Troponin T <0.010 ng/mL     Narrative:       Troponin T Reference Range:  <= 0.03 ng/mL-   Negative for AMI  >0.03 ng/mL-     Abnormal for myocardial necrosis.  Clinicians would have to utilize clinical acumen, EKG, Troponin and serial changes to determine if it is an Acute Myocardial Infarction or myocardial injury due to an underlying chronic condition.       Results may be falsely decreased if patient taking Biotin.      CBC & Differential [[id number]] Collected:   08/04/20 0018    Specimen:  Blood from Hand, Right Updated:  08/04/20 0020    Narrative:       The following orders were created for panel order CBC & Differential.  Procedure                               Abnormality         Status                     ---------                               -----------         ------                     CBC Auto Differential[[id number]]        Abnormal            Final result                 Please view results for these tests on the individual orders.    CBC Auto Differential [[id number]]  (Abnormal) Collected:  08/04/20 0018    Specimen:  Blood from Hand, Right Updated:  08/04/20 0020     WBC 7.47 10*3/mm3      RBC 3.36 10*6/mm3      Hemoglobin 10.4 g/dL      Hematocrit 32.3 %      MCV 96.1 fL      MCH 31.0 pg      MCHC 32.2 g/dL      RDW 12.8 %      RDW-SD 45.1 fl      MPV 9.5 fL      Platelets 235 10*3/mm3      Neutrophil % 55.9 %      Lymphocyte % 25.4 %      Monocyte % 9.9 %      Eosinophil % 7.8 %      Basophil % 0.7 %      Immature Grans % 0.3 %      Neutrophils, Absolute 4.18 10*3/mm3      Lymphocytes, Absolute 1.90 10*3/mm3      Monocytes, Absolute 0.74 10*3/mm3      Eosinophils, Absolute 0.58 10*3/mm3      Basophils, Absolute 0.05 10*3/mm3      Immature Grans, Absolute 0.02 10*3/mm3      nRBC 0.0 /100 WBC     Blood Culture - Blood, Arm, Left [[id number]] Collected:  08/04/20 0009    Specimen:  Blood from Arm, Left Updated:  08/04/20 0017    Procalcitonin [[id number]] Collected:  08/03/20 2354    Specimen:  Blood from Hand, Right Updated:  08/03/20 2358    Blood Culture - Blood, Arm, Right [[id number]] Collected:  08/03/20 2321    Specimen:  Blood from Arm, Right Updated:  08/03/20 2323    Urinalysis, Microscopic Only - Urine, Catheter [[id number]]  (Abnormal) Collected:  08/03/20 2232    Specimen:  Urine, Catheter Updated:  08/03/20 2302     RBC, UA 0-2 /HPF      WBC, UA 6-12 /HPF      Bacteria, UA None Seen /HPF      Squamous Epithelial Cells, UA 0-2 /HPF      Hyaline Casts,  UA None Seen /LPF      Methodology Automated Microscopy    Urinalysis With Microscopic If Indicated (No Culture) - Urine, Catheter [[id number]]  (Abnormal) Collected:  08/03/20 2232    Specimen:  Urine, Catheter Updated:  08/03/20 2240     Color, UA Yellow     Appearance, UA Clear     pH, UA 6.0     Specific Gravity, UA 1.010     Glucose, UA Negative     Ketones, UA Negative     Bilirubin, UA Negative     Blood, UA Negative     Protein, UA Negative     Leuk Esterase, UA Moderate (2+)     Nitrite, UA Negative     Urobilinogen, UA 0.2 E.U./dL          Radiology:    Imaging Results (Last 72 Hours)     Procedure Component Value Units Date/Time    NM Hepatobiliary Without CCK [[id number]] Collected:  08/04/20 0935     Updated:  08/04/20 1008    Narrative:       EXAMINATION: NM HEPATOBILIARY WITHOUT CCK-      CLINICAL INDICATION: Cholelithiasis        COMPARISON: None available.     PROCEDURE: 6.1 mCi technetium Choletec was administered. Dynamic imaging  of the liver and biliary tree was performed at 2 minutes per frame for  44 minutes.     Fatty meal was then ingested and images were acquired at 30 seconds per  frame for a total of 90 images.     FINDINGS: There was normal visualization of the gallbladder within an  hour after the Choletec.     Following the fatty meal, a 38% ejection fraction was calculated.       Impression:       Study results were within normal limits.      This report was finalized on 8/4/2020 10:06 AM by Dr. Bhavin Li MD.       CT Abdomen Pelvis Without Contrast [[id number]] Collected:  08/04/20 0313     Updated:  08/04/20 0316    Narrative:       CT Abdomen Pelvis WO    INDICATION:   Right upper quadrant pain. History of cholelithiasis.    TECHNIQUE:   CT of the abdomen and pelvis without IV contrast. Coronal and sagittal reconstructions were obtained.  Radiation dose reduction techniques included automated exposure control or exposure modulation based on body size. Count of known CT and cardiac  nuc  med studies performed in previous 12 months: 0.     COMPARISON:   5/5/2019    FINDINGS:  Abdomen: Included lung bases clear. Minimal atelectasis the right middle lobe. No pericardial effusion. Normal caliber aorta. Spleen and adrenal glands are unremarkable and the pancreas is negative. Uncomplicated cholelithiasis. Negative liver. The  kidneys are nonobstructed. No radiopaque stone on either side. No adenopathy.    Pelvis: Streak artifact from right hip replacement. Negative bladder and no drainable fluid collection. Incidental dominant follicles in both ovaries and diverticulosis. Moderate stool burden. Appendix not identified. No secondary sign of acute  appendicitis. The inguinal canals are negative. No suspicious bone lesion.      Impression:         1. No clearly acute process in the abdomen or pelvis. Appendix not identified but no secondary signs of inflammatory change in the right lower quadrant.  2. Cholelithiasis.  3. Diverticulosis.          Signer Name: Eugene Ernst MD   Signed: 8/4/2020 3:13 AM   Workstation Name: [facility]    Radiology [facility]    XR Chest 1 View [[id number]] Collected:  08/04/20 0137     Updated:  08/04/20 0139    Narrative:       CR Chest 1 Vw    INDICATION:   Sepsis in the emergency Department.     COMPARISON:    1/13/2020    FINDINGS:  Single portable AP view(s) of the chest.  Cardiac silhouette is borderline in size and stable. Shallow lung expansion with bronchovascular crowding. No effusion or dense consolidation and no pneumothorax. Left lung apex partially obscured by soft tissue  artifact.      Impression:       1. Low-volume image, otherwise negative chest.    Signer Name: Eugene Ernst MD   Signed: 8/4/2020 1:37 AM   Workstation Name: [facility]    Radiology [facility]          Results for orders placed during the hospital encounter of 05/04/19   Adult Transthoracic Echo Complete W/ Cont if Necessary Per Protocol    Narrative ·  Estimated EF = 70%.  · Normal diastolic function  · Normal Valves          Assessment/Plan:   Abdominal pain possibly related to symptomatic cholelithiasis however HIDA scan was normal.  Because however she is symptomatic with this at least by exam I have asked surgery to evaluate.  Diet has been advanced to low-fat diet for now.    1 of 1+ blood culture at [facility] hospital that was coag negative, this was probably contaminant, ID agrees, CRP is normal, blood cultures negative here so far.    Hypertension, have added Norvasc    Chronic pain syndrome, restart home medications    DVT prophylaxis, continue subcu heparin    There was a possible UTI in the hospital but UA negative here, will not pursue further especially in light of a normal CRP and asymptomatic.    History of hepatitis C, this most likely accounts for the elevated transaminases.    History of diastolic heart failure, currently compensated, because diet is being advanced I am stopping IV fluids.    Tobacco abuse, counseled to quit.  She is wearing a patch here.

## 2020-08-04 NOTE — PROGRESS NOTES
Discharge Planning Assessment   [place]     Patient Name: Alisa Holland  MRN: [id number]  Today's Date: 8/4/2020    Admit Date: 8/3/2020    Discharge Needs Assessment     Row Name 08/04/20 1234       Living Environment    Lives With  significant other    Name(s) of Who Lives With Patient  Pt lives with her fiance, Claude.     Current Living Arrangements  home/apartment/condo    Primary Care Provided by  self    Provides Primary Care For  no one    Family Caregiver if Needed  child(brenton), adult;spouse    Quality of Family Relationships  involved    Able to Return to Prior Arrangements  yes       Resource/Environmental Concerns    Transportation Concerns  car, none       Transition Planning    Patient/Family Anticipates Transition to  home with family    Transportation Anticipated  family or friend will provide       Discharge Needs Assessment    Equipment Currently Used at Home  none    Equipment Needed After Discharge  none        Discharge Plan     Row Name 08/04/20 1373       Plan    Plan  SS spoke with pt on this day. Pt lives at home with her fiance, Claude. Pt has a son named, Ramakrishna, who is considered her next of kin. Pt does not receive HH services at this time, but has in the past when getting IV antibiotics at home. She states that if these are needed at discharge this admission she would also like to do them at home. Pt does not use any DME. Pt's PCP is Marion Saleem. Pt plans to return home at discharge, with transportation provided by family. SS will follow and assist as needed.     Patient/Family in Agreement with Plan  yes            Demographic Summary     Row Name 08/04/20 1230       General Information    Admission Type  inpatient    Referral Source  nursing    Reason for Consult  discharge planning    Preferred Language  English     Used During This Interaction  no              PATRIC Rivas

## 2020-08-04 NOTE — PLAN OF CARE
Problem: Patient Care Overview  Goal: Plan of Care Review  Outcome: Ongoing (interventions implemented as appropriate)  Flowsheets  Taken 8/4/2020 0341  Progress: no change  Taken 8/4/2020 0230  Plan of Care Reviewed With: patient  Note:   Pt blood pressure high. See orders. UA obtained. Pt reports episodes of nausea. Will continue to monitor.
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
## 2020-08-04 NOTE — PLAN OF CARE
Problem: Patient Care Overview  Goal: Plan of Care Review  Outcome: Ongoing (interventions implemented as appropriate)  Flowsheets (Taken 8/4/2020 0076)  Progress: no change  Outcome Summary: Patient doing well. Home meds restarted. Diet increased to regular then backed off to clear liquids. Continues to complain of nausea and pain. VSS
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
## 2020-08-04 NOTE — ED NOTES
pt updated on wait times; verbalized understanding; denies any needs at this time; will continue to monitor; advised to notify RN of any change in condition.       Heriberto Schuster, RN  08/03/20 8421

## 2020-08-04 NOTE — PAYOR COMM NOTE
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  Fall cautions in place.  Patient is to be up with assistance.    · Obesity: BMI 30.54 kg/m2     · F/E/N: IV normal saline 100 mL/h.  Replace electrolytes as necessary.  N.p.o. diet.  ---------------------------------------------------  DVT Prophylaxis: subcutaneous heparin   GI Prophylaxis: Protonix   Activity: Up with assistance, fall precautions     The patient is considered to be a high risk patient due to: Bacteremia secondary to staph, questionable acute UTI.    INPATIENT status due to the need for care which can only be reasonably provided in an hospital setting such as aggressive/expedited ancillary services and/or consultation services, the necessity for IV medications, close physician monitoring and/or the possible need for procedures.  In such, I feel patient’s risk for adverse outcomes and need for care warrant INPATIENT evaluation and predict the patient’s care encounter to likely last beyond 2 midnights.    Code Status: FULL CODE     I have  discussed the patient's assessment and plan with the patient, nursing staff, and attending physician       Hui Saba PA-C  Hospitalist Service -- [facility]       08/04/20  03:40    Attending Physician: Олег Boyer MD       Electronically signed by Олег Boyer MD at 08/04/20 0439         Physician Progress Notes (last 48 hours) (Notes from 08/02/20 1011 through 08/04/20 1011)    No notes of this type exist for this encounter.       Consult Notes (last 48 hours) (Notes from 08/02/20 1011 through 08/04/20 1011)    No notes of this type exist for this encounter.       Only active medications are shown.   Scheduled Meds Sorted by Name   for Alisa Holland as of 08/04/20 1012    Legend:                           Inactive     Active     Linked               Medications 08/04/20 08/05/20 08/06/20 08/07/20 08/08/20 08/09/20 08/10/20   heparin (porcine) 5000 UNIT/ML injection 5,000 Units   Dose: 5,000 Units  Freq: Every 12 Hours Scheduled Route: SC  Indications Comment: Prophylaxis of Venous Thromboembolism  Start: 08/04/20 0900 0900 2100 0900 2100 0900 2100 0900 2100 0900 2100 0900 2100 0900 2100        hydrALAZINE (APRESOLINE) injection 10 mg   Dose: 10 mg  Freq: Once Route: IV  Start: 08/04/20 0445    Admin Instructions:   As needed for SBP greater than 170  Caution: Look alike/sound alike drug alert    (0429)              lactobacillus acidophilus (RISAQUAD) capsule 1 capsule   Dose: 1 capsule  Freq: Daily Route: PO  Start: 08/04/20 0900 0900 0900 0900 0900 0900 0900 0900        nicotine (NICODERM CQ) 7 MG/24HR patch 1 patch   Dose: 1 patch  Freq: Every 24 Hours Scheduled Route: TD  Start: 08/04/20 0900    Admin Instructions:   Apply to clean, dry, nonhairy area of skin (typically upper arm or shoulder)   Acutely Hazardous. Waste BOTH Residual Medication and/or Empty Package.    0913       0859     0900 0900 0900 0900 0900 0900        pantoprazole (PROTONIX) EC tablet 40 mg   Dose: 40 mg  Freq: Every Early Morning Route: PO  Start: 08/04/20 0600    Admin Instructions:   Swallow whole; do not crush, split, or chew.    0504      0600 0600 0600 0600 0600 0600        sodium chloride 0.9 % flush 10 mL   Dose: 10 mL  Freq: Every 12 Hours Scheduled Route: IV  Start: 08/04/20 0900 0913 2100 0900 2100 0900 2100 0900 2100 0900 2100 0900 2100 0900     2100        Medications 08/04/20 08/05/20 08/06/20 08/07/20 08/08/20 08/09/20 08/10/20       Continuous Meds Sorted by Name   for Alisa Holland as of 08/04/20 1012    Legend:                           Inactive     Active     Linked               Medications 08/04/20 08/05/20 08/06/20 08/07/20 08/08/20 08/09/20 08/10/20   Pharmacy Consult - Pharmacy to dose   Freq: Continuous PRN Route: XX  PRN Reason: Consult  Start: 08/04/20 0226    Order specific questions:   Pharmacy to Dose: aztreonam  Indication of Use possible UTI             Pharmacy to dose vancomycin   Freq: Continuous PRN Route: XX  PRN Reason: Consult  Indications of Use: BACTEREMIA  Indications Comment: staph bacteremia (prelim blood culture from [facility] growing staph)  Start: 08/04/20 0226 End: 08/09/20 0225 0225-D/C'd       sodium chloride 0.9 % infusion   Rate: 100 mL/hr Dose: 100 mL/hr  Freq: Continuous Route: IV  Last Dose: 100 mL/hr (08/04/20 0914)  Start: 08/04/20 0315    0304     0914                  PRN Meds Sorted by Name   for Alisa Holland as of 08/04/20 1012    Legend:                           Inactive     Active     Linked               Medications 08/04/20 08/05/20 08/06/20 08/07/20 08/08/20 08/09/20 08/10/20   Magnesium Sulfate 2 gram infusion - Mg less than or equal to 1.5 mg/dL   Dose: 2 g  Freq: As Needed Route: IV  PRN Comment: for Mg less than or equal 1.5  mg/dL  Start: 08/04/20 0342    Admin Instructions:   Recheck Mg level in the AM.             Or  Magnesium Sulfate 1 gram infusion - Mg 1.6-1.9 mg/dL   Dose: 1 g  Freq: As Needed Route: IV  PRN Comment: Mg 1.6-1.9 mg/dL.  Start: 08/04/20 0342    Admin Instructions:   Recheck Mg level in the AM.             Pharmacy Consult - Pharmacy to dose   Freq: Continuous PRN Route: XX  PRN Reason: Consult  Start: 08/04/20 0226    Order specific questions:   Pharmacy to Dose: aztreonam  Indication of Use possible UTI             Pharmacy to dose vancomycin   Freq: Continuous PRN Route: XX  PRN Reason: Consult  Indications of Use: BACTEREMIA  Indications Comment: staph bacteremia (prelim blood culture from [facility] growing staph)  Start: 08/04/20 0226 End: 08/09/20 0225 0225-D/C'd       promethazine (PHENERGAN) 12.5 mg in sodium chloride 0.9 % 50 mL   Dose: 12.5 mg  Freq: Every 4 Hours PRN Route: IV  PRN Reasons: Nausea,Vomiting  Start: 08/04/20 0343    Admin Instructions:   Must dilute in 50 mL NS. Administer via large-bore vein (not hand or wrist).    0424     1011              sodium chloride 0.9 % flush 10 mL   Dose: 10 mL  Freq: As Needed Route: IV  PRN Reason: Line Care  Start: 08/04/20 0226             sodium chloride 0.9 % flush 10 mL   Dose: 10 mL  Freq: As Needed Route: IV  PRN Reason: Line Care  Start: 08/03/20 2212             Medications 08/04/20 08/05/20 08/06/20 08/07/20 08/08/20 08/09/20 08/10/20
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
## 2020-08-05 ENCOUNTER — ANESTHESIA (OUTPATIENT)
Dept: PERIOP | Facility: HOSPITAL | Age: 45
End: 2020-08-05

## 2020-08-05 ENCOUNTER — APPOINTMENT (OUTPATIENT)
Dept: GENERAL RADIOLOGY | Facility: HOSPITAL | Age: 45
End: 2020-08-05

## 2020-08-05 ENCOUNTER — ANESTHESIA EVENT (OUTPATIENT)
Dept: PERIOP | Facility: HOSPITAL | Age: 45
End: 2020-08-05

## 2020-08-05 PROBLEM — B95.8 BACTEREMIA DUE TO STAPHYLOCOCCUS: Status: RESOLVED | Noted: 2020-08-04 | Resolved: 2020-08-05

## 2020-08-05 PROBLEM — K80.50 BILIARY COLIC: Status: RESOLVED | Noted: 2020-08-03 | Resolved: 2020-08-05

## 2020-08-05 PROBLEM — R78.81 BACTEREMIA DUE TO STAPHYLOCOCCUS: Status: RESOLVED | Noted: 2020-08-04 | Resolved: 2020-08-05

## 2020-08-05 LAB
BACTERIA SPEC AEROBE CULT: NO GROWTH
BASOPHILS # BLD AUTO: 0.03 10*3/MM3 (ref 0–0.2)
BASOPHILS NFR BLD AUTO: 0.4 % (ref 0–1.5)
CRP SERPL-MCNC: 0.23 MG/DL (ref 0–0.5)
DEPRECATED RDW RBC AUTO: 48.7 FL (ref 37–54)
EOSINOPHIL # BLD AUTO: 0.09 10*3/MM3 (ref 0–0.4)
EOSINOPHIL NFR BLD AUTO: 1.1 % (ref 0.3–6.2)
ERYTHROCYTE [DISTWIDTH] IN BLOOD BY AUTOMATED COUNT: 13.1 % (ref 12.3–15.4)
HCT VFR BLD AUTO: 36.7 % (ref 34–46.6)
HGB BLD-MCNC: 11.2 G/DL (ref 12–15.9)
IMM GRANULOCYTES # BLD AUTO: 0.03 10*3/MM3 (ref 0–0.05)
IMM GRANULOCYTES NFR BLD AUTO: 0.4 % (ref 0–0.5)
LYMPHOCYTES # BLD AUTO: 0.38 10*3/MM3 (ref 0.7–3.1)
LYMPHOCYTES NFR BLD AUTO: 4.8 % (ref 19.6–45.3)
MAGNESIUM SERPL-MCNC: 1.7 MG/DL (ref 1.6–2.6)
MCH RBC QN AUTO: 31.1 PG (ref 26.6–33)
MCHC RBC AUTO-ENTMCNC: 30.5 G/DL (ref 31.5–35.7)
MCV RBC AUTO: 101.9 FL (ref 79–97)
MONOCYTES # BLD AUTO: 0.09 10*3/MM3 (ref 0.1–0.9)
MONOCYTES NFR BLD AUTO: 1.1 % (ref 5–12)
NEUTROPHILS NFR BLD AUTO: 7.22 10*3/MM3 (ref 1.7–7)
NEUTROPHILS NFR BLD AUTO: 92.2 % (ref 42.7–76)
NRBC BLD AUTO-RTO: 0 /100 WBC (ref 0–0.2)
PLATELET # BLD AUTO: 216 10*3/MM3 (ref 140–450)
PMV BLD AUTO: 9.8 FL (ref 6–12)
RBC # BLD AUTO: 3.6 10*6/MM3 (ref 3.77–5.28)
WBC # BLD AUTO: 7.84 10*3/MM3 (ref 3.4–10.8)

## 2020-08-05 PROCEDURE — 25010000002 NEOSTIGMINE 10 MG/10ML SOLUTION: Performed by: NURSE ANESTHETIST, CERTIFIED REGISTERED

## 2020-08-05 PROCEDURE — 25010000002 HEPARIN (PORCINE) PER 1000 UNITS: Performed by: SURGERY

## 2020-08-05 PROCEDURE — 85025 COMPLETE CBC W/AUTO DIFF WBC: CPT | Performed by: INTERNAL MEDICINE

## 2020-08-05 PROCEDURE — 25010000002 PROMETHAZINE PER 50 MG: Performed by: PHYSICIAN ASSISTANT

## 2020-08-05 PROCEDURE — 25010000002 MIDAZOLAM PER 1 MG: Performed by: NURSE ANESTHETIST, CERTIFIED REGISTERED

## 2020-08-05 PROCEDURE — 25010000002 PROPOFOL 10 MG/ML EMULSION: Performed by: NURSE ANESTHETIST, CERTIFIED REGISTERED

## 2020-08-05 PROCEDURE — 25010000002 FENTANYL CITRATE (PF) 100 MCG/2ML SOLUTION: Performed by: ANESTHESIOLOGY

## 2020-08-05 PROCEDURE — 88304 TISSUE EXAM BY PATHOLOGIST: CPT | Performed by: SURGERY

## 2020-08-05 PROCEDURE — 99231 SBSQ HOSP IP/OBS SF/LOW 25: CPT | Performed by: INTERNAL MEDICINE

## 2020-08-05 PROCEDURE — 0FT44ZZ RESECTION OF GALLBLADDER, PERCUTANEOUS ENDOSCOPIC APPROACH: ICD-10-PCS | Performed by: SURGERY

## 2020-08-05 PROCEDURE — 25010000002 HEPARIN (PORCINE) PER 1000 UNITS: Performed by: HOSPITALIST

## 2020-08-05 PROCEDURE — 47562 LAPAROSCOPIC CHOLECYSTECTOMY: CPT | Performed by: SURGERY

## 2020-08-05 PROCEDURE — 25010000002 FENTANYL CITRATE (PF) 100 MCG/2ML SOLUTION: Performed by: NURSE ANESTHETIST, CERTIFIED REGISTERED

## 2020-08-05 PROCEDURE — 25010000002 MORPHINE PER 10 MG: Performed by: SURGERY

## 2020-08-05 PROCEDURE — 83735 ASSAY OF MAGNESIUM: CPT | Performed by: SURGERY

## 2020-08-05 PROCEDURE — 86140 C-REACTIVE PROTEIN: CPT | Performed by: SURGERY

## 2020-08-05 DEVICE — LIGACLIP 10-M/L, 10MM ENDOSCOPIC ROTATING MULTIPLE CLIP APPLIERS
Type: IMPLANTABLE DEVICE | Site: ABDOMEN | Status: FUNCTIONAL
Brand: LIGACLIP

## 2020-08-05 RX ORDER — SODIUM CHLORIDE 0.9 % (FLUSH) 0.9 %
10 SYRINGE (ML) INJECTION EVERY 12 HOURS SCHEDULED
Status: DISCONTINUED | OUTPATIENT
Start: 2020-08-05 | End: 2020-08-06 | Stop reason: HOSPADM

## 2020-08-05 RX ORDER — MIDAZOLAM HYDROCHLORIDE 1 MG/ML
1 INJECTION INTRAMUSCULAR; INTRAVENOUS
Status: DISCONTINUED | OUTPATIENT
Start: 2020-08-05 | End: 2020-08-05 | Stop reason: HOSPADM

## 2020-08-05 RX ORDER — MAGNESIUM HYDROXIDE 1200 MG/15ML
LIQUID ORAL AS NEEDED
Status: DISCONTINUED | OUTPATIENT
Start: 2020-08-05 | End: 2020-08-05 | Stop reason: HOSPADM

## 2020-08-05 RX ORDER — FENTANYL CITRATE 50 UG/ML
100 INJECTION, SOLUTION INTRAMUSCULAR; INTRAVENOUS
Status: DISCONTINUED | OUTPATIENT
Start: 2020-08-05 | End: 2020-08-05 | Stop reason: HOSPADM

## 2020-08-05 RX ORDER — FENTANYL CITRATE 50 UG/ML
50 INJECTION, SOLUTION INTRAMUSCULAR; INTRAVENOUS
Status: DISCONTINUED | OUTPATIENT
Start: 2020-08-05 | End: 2020-08-05 | Stop reason: HOSPADM

## 2020-08-05 RX ORDER — NEOSTIGMINE METHYLSULFATE 1 MG/ML
INJECTION, SOLUTION INTRAVENOUS AS NEEDED
Status: DISCONTINUED | OUTPATIENT
Start: 2020-08-05 | End: 2020-08-05 | Stop reason: SURG

## 2020-08-05 RX ORDER — MEPERIDINE HYDROCHLORIDE 25 MG/ML
12.5 INJECTION INTRAMUSCULAR; INTRAVENOUS; SUBCUTANEOUS
Status: DISCONTINUED | OUTPATIENT
Start: 2020-08-05 | End: 2020-08-05 | Stop reason: HOSPADM

## 2020-08-05 RX ORDER — GLYCOPYRROLATE 0.2 MG/ML
INJECTION INTRAMUSCULAR; INTRAVENOUS AS NEEDED
Status: DISCONTINUED | OUTPATIENT
Start: 2020-08-05 | End: 2020-08-05 | Stop reason: SURG

## 2020-08-05 RX ORDER — MIDAZOLAM HYDROCHLORIDE 1 MG/ML
INJECTION INTRAMUSCULAR; INTRAVENOUS AS NEEDED
Status: DISCONTINUED | OUTPATIENT
Start: 2020-08-05 | End: 2020-08-05 | Stop reason: SURG

## 2020-08-05 RX ORDER — FENTANYL CITRATE 50 UG/ML
INJECTION, SOLUTION INTRAMUSCULAR; INTRAVENOUS AS NEEDED
Status: DISCONTINUED | OUTPATIENT
Start: 2020-08-05 | End: 2020-08-05 | Stop reason: SURG

## 2020-08-05 RX ORDER — SODIUM CHLORIDE 0.9 % (FLUSH) 0.9 %
10 SYRINGE (ML) INJECTION EVERY 12 HOURS SCHEDULED
Status: DISCONTINUED | OUTPATIENT
Start: 2020-08-05 | End: 2020-08-05 | Stop reason: HOSPADM

## 2020-08-05 RX ORDER — SODIUM CHLORIDE 0.9 % (FLUSH) 0.9 %
10 SYRINGE (ML) INJECTION AS NEEDED
Status: DISCONTINUED | OUTPATIENT
Start: 2020-08-05 | End: 2020-08-06 | Stop reason: HOSPADM

## 2020-08-05 RX ORDER — SODIUM CHLORIDE 9 MG/ML
INJECTION, SOLUTION INTRAVENOUS AS NEEDED
Status: DISCONTINUED | OUTPATIENT
Start: 2020-08-05 | End: 2020-08-05 | Stop reason: HOSPADM

## 2020-08-05 RX ORDER — LIDOCAINE HYDROCHLORIDE 20 MG/ML
INJECTION, SOLUTION EPIDURAL; INFILTRATION; INTRACAUDAL; PERINEURAL AS NEEDED
Status: DISCONTINUED | OUTPATIENT
Start: 2020-08-05 | End: 2020-08-05 | Stop reason: SURG

## 2020-08-05 RX ORDER — SODIUM CHLORIDE 0.9 % (FLUSH) 0.9 %
10 SYRINGE (ML) INJECTION AS NEEDED
Status: DISCONTINUED | OUTPATIENT
Start: 2020-08-05 | End: 2020-08-05 | Stop reason: HOSPADM

## 2020-08-05 RX ORDER — PROPOFOL 10 MG/ML
VIAL (ML) INTRAVENOUS AS NEEDED
Status: DISCONTINUED | OUTPATIENT
Start: 2020-08-05 | End: 2020-08-05 | Stop reason: SURG

## 2020-08-05 RX ORDER — SODIUM CHLORIDE, SODIUM LACTATE, POTASSIUM CHLORIDE, CALCIUM CHLORIDE 600; 310; 30; 20 MG/100ML; MG/100ML; MG/100ML; MG/100ML
75 INJECTION, SOLUTION INTRAVENOUS CONTINUOUS
Status: DISCONTINUED | OUTPATIENT
Start: 2020-08-05 | End: 2020-08-06 | Stop reason: HOSPADM

## 2020-08-05 RX ORDER — IPRATROPIUM BROMIDE AND ALBUTEROL SULFATE 2.5; .5 MG/3ML; MG/3ML
3 SOLUTION RESPIRATORY (INHALATION) ONCE AS NEEDED
Status: DISCONTINUED | OUTPATIENT
Start: 2020-08-05 | End: 2020-08-05 | Stop reason: HOSPADM

## 2020-08-05 RX ORDER — FAMOTIDINE 10 MG/ML
INJECTION, SOLUTION INTRAVENOUS AS NEEDED
Status: DISCONTINUED | OUTPATIENT
Start: 2020-08-05 | End: 2020-08-05 | Stop reason: SURG

## 2020-08-05 RX ORDER — ONDANSETRON 2 MG/ML
INJECTION INTRAMUSCULAR; INTRAVENOUS AS NEEDED
Status: DISCONTINUED | OUTPATIENT
Start: 2020-08-05 | End: 2020-08-05

## 2020-08-05 RX ORDER — VECURONIUM BROMIDE 1 MG/ML
INJECTION, POWDER, LYOPHILIZED, FOR SOLUTION INTRAVENOUS AS NEEDED
Status: DISCONTINUED | OUTPATIENT
Start: 2020-08-05 | End: 2020-08-05 | Stop reason: SURG

## 2020-08-05 RX ADMIN — CEFAZOLIN 2 G: 1 INJECTION, POWDER, FOR SOLUTION INTRAMUSCULAR; INTRAVENOUS; PARENTERAL at 13:12

## 2020-08-05 RX ADMIN — PREGABALIN 300 MG: 100 CAPSULE ORAL at 22:22

## 2020-08-05 RX ADMIN — FENTANYL CITRATE 100 MCG: 50 INJECTION INTRAMUSCULAR; INTRAVENOUS at 13:05

## 2020-08-05 RX ADMIN — GABAPENTIN 800 MG: 400 CAPSULE ORAL at 14:58

## 2020-08-05 RX ADMIN — NICOTINE 1 PATCH: 7 PATCH, EXTENDED RELEASE TRANSDERMAL at 08:29

## 2020-08-05 RX ADMIN — LIDOCAINE HYDROCHLORIDE 100 MG: 20 INJECTION, SOLUTION EPIDURAL; INFILTRATION; INTRACAUDAL; PERINEURAL at 13:11

## 2020-08-05 RX ADMIN — AMLODIPINE BESYLATE 5 MG: 5 TABLET ORAL at 08:30

## 2020-08-05 RX ADMIN — SODIUM CHLORIDE, POTASSIUM CHLORIDE, SODIUM LACTATE AND CALCIUM CHLORIDE 125 ML/HR: 600; 310; 30; 20 INJECTION, SOLUTION INTRAVENOUS at 12:12

## 2020-08-05 RX ADMIN — LEVETIRACETAM 1000 MG: 500 TABLET, FILM COATED ORAL at 08:29

## 2020-08-05 RX ADMIN — MORPHINE SULFATE 4 MG: 4 INJECTION, SOLUTION INTRAMUSCULAR; INTRAVENOUS at 22:22

## 2020-08-05 RX ADMIN — OXYCODONE HYDROCHLORIDE 20 MG: 20 TABLET, FILM COATED, EXTENDED RELEASE ORAL at 08:29

## 2020-08-05 RX ADMIN — PROPOFOL 120 MG: 10 INJECTION, EMULSION INTRAVENOUS at 13:11

## 2020-08-05 RX ADMIN — AMITRIPTYLINE HYDROCHLORIDE 150 MG: 50 TABLET, FILM COATED ORAL at 23:32

## 2020-08-05 RX ADMIN — NEOSTIGMINE METHYLSULFATE 3 MG: 1 INJECTION, SOLUTION INTRAVENOUS at 13:46

## 2020-08-05 RX ADMIN — GLYCOPYRROLATE 0.4 MG: 0.2 INJECTION INTRAMUSCULAR; INTRAVENOUS at 13:46

## 2020-08-05 RX ADMIN — HEPARIN SODIUM 5000 UNITS: 5000 INJECTION INTRAVENOUS; SUBCUTANEOUS at 08:30

## 2020-08-05 RX ADMIN — LEVETIRACETAM 1000 MG: 500 TABLET, FILM COATED ORAL at 23:28

## 2020-08-05 RX ADMIN — MORPHINE SULFATE 4 MG: 4 INJECTION, SOLUTION INTRAMUSCULAR; INTRAVENOUS at 16:18

## 2020-08-05 RX ADMIN — MIDAZOLAM HYDROCHLORIDE 2 MG: 1 INJECTION, SOLUTION INTRAMUSCULAR; INTRAVENOUS at 13:05

## 2020-08-05 RX ADMIN — FAMOTIDINE 20 MG: 10 INJECTION INTRAVENOUS at 13:05

## 2020-08-05 RX ADMIN — GABAPENTIN 800 MG: 400 CAPSULE ORAL at 23:28

## 2020-08-05 RX ADMIN — PREGABALIN 300 MG: 100 CAPSULE ORAL at 08:29

## 2020-08-05 RX ADMIN — VECURONIUM BROMIDE 4 MG: 1 INJECTION, POWDER, LYOPHILIZED, FOR SOLUTION INTRAVENOUS at 13:11

## 2020-08-05 RX ADMIN — SODIUM CHLORIDE, PRESERVATIVE FREE 10 ML: 5 INJECTION INTRAVENOUS at 20:58

## 2020-08-05 RX ADMIN — OXYCODONE HYDROCHLORIDE 15 MG: 15 TABLET ORAL at 14:59

## 2020-08-05 RX ADMIN — HEPARIN SODIUM 5000 UNITS: 5000 INJECTION INTRAVENOUS; SUBCUTANEOUS at 20:57

## 2020-08-05 RX ADMIN — OXYCODONE HYDROCHLORIDE 20 MG: 20 TABLET, FILM COATED, EXTENDED RELEASE ORAL at 23:28

## 2020-08-05 RX ADMIN — FENTANYL CITRATE 100 MCG: 50 INJECTION INTRAMUSCULAR; INTRAVENOUS at 12:12

## 2020-08-05 NOTE — ANESTHESIA PREPROCEDURE EVALUATION
Anesthesia Evaluation     no history of anesthetic complications:  NPO Solid Status: > 8 hours  NPO Liquid Status: > 8 hours           Airway   Mallampati: II  TM distance: >3 FB  Neck ROM: full  No difficulty expected  Dental - normal exam   (+) edentulous    Pulmonary - normal exam   (+) pneumonia ,   Cardiovascular - normal exam    (+) CHF ,       Neuro/Psych  (+) seizures,     GI/Hepatic/Renal/Endo    (+) morbid obesity,  hepatitis C, liver disease,     Musculoskeletal     Abdominal  - normal exam    Bowel sounds: normal.   Substance History      OB/GYN          Other                        Anesthesia Plan    ASA 3     general with block     intravenous induction     Anesthetic plan, all risks, benefits, and alternatives have been provided, discussed and informed consent has been obtained with: patient.

## 2020-08-05 NOTE — PROGRESS NOTES
PROGRESS NOTE         Patient Identification:  Name:  Alisa Holland  Age:  45 y.o.  Sex:  female  :  1975  MRN:  [id number]  Visit Number:  [id number]  Primary Care Provider:  Marion Saleem MD         LOS: 1 day       ----------------------------------------------------------------------------------------------------------------------  Subjective       Chief Complaints:    Abdominal Pain        Interval History:      Patient resting in bed this morning.  WBC normal.  Afebrile, no diarrhea.  She reports some mild nausea and abdominal discomfort in relation to her gallbladder.  Scheduled to have cholecystectomy today.    Review of Systems:    Constitutional: no fever, chills and night sweats. No appetite change or unexpected weight change. No fatigue.  Eyes: no eye drainage, itching or redness.  HEENT: no mouth sores, dysphagia or nose bleed.  Respiratory: no for shortness of breath, cough or production of sputum.  Cardiovascular: no chest pain, no palpitations, no orthopnea.  Gastrointestinal: Positive for nausea vomiting abdominal pain.  Genitourinary: no dysuria or polyuria.  Hematologic/lymphatic: no lymph node abnormalities, no easy bruising or easy bleeding.  Musculoskeletal: no muscle or joint pain.  Skin: No rash and no itching.  Neurological: no loss of consciousness, no seizure, no headache.  Behavioral/Psych: no depression or suicidal ideation.  Endocrine: no hot flashes.  Immunologic: negative.  ----------------------------------------------------------------------------------------------------------------------      Objective       Current Hospital Meds:    amitriptyline 150 mg Oral Nightly   amLODIPine 5 mg Oral Q24H   gabapentin 800 mg Oral Q8H   heparin (porcine) 5,000 Units Subcutaneous Q12H   hydrALAZINE 10 mg Intravenous Once   levETIRAcetam 1,000 mg Oral Q12H   nicotine 1 patch Transdermal Q24H   oxyCODONE ER 20 mg Oral Q12H   pantoprazole 40 mg Oral Q AM   pregabalin 300  mg Oral Q12H   sodium chloride 10 mL Intravenous Q12H   sodium chloride 10 mL Intravenous Q12H        ----------------------------------------------------------------------------------------------------------------------    Vital Signs:  Temp:  [97.9 °F (36.6 °C)-98.5 °F (36.9 °C)] 98.5 °F (36.9 °C)  Heart Rate:  [60-73] 68  Resp:  [18] 18  BP: (118-144)/(74-86) 132/74  No data found.  SpO2 Percentage    08/04/20 0610 08/04/20 0745 08/05/20 0630   SpO2: 99% 99% 97%     SpO2:  [97 %] 97 %  on   ;   Device (Oxygen Therapy): room air    Body mass index is 32.15 kg/m².  Wt Readings from Last 3 Encounters:   08/05/20 93.1 kg (205 lb 4 oz)   01/13/20 81.6 kg (180 lb)   08/24/19 81.6 kg (180 lb)        Intake/Output Summary (Last 24 hours) at 8/5/2020 1103  Last data filed at 8/5/2020 0900  Gross per 24 hour   Intake 672 ml   Output --   Net 672 ml     NPO Diet  ----------------------------------------------------------------------------------------------------------------------      Physical Exam:    Constitutional:  Well-developed and well-nourished.  No respiratory distress.      HENT:  Head: Normocephalic and atraumatic.  Mouth:  Moist mucous membranes.    Eyes:  Conjunctivae and EOM are normal.  No scleral icterus.  Neck:  Neck supple.  No JVD present.    Cardiovascular:  Normal rate, regular rhythm and normal heart sounds with no murmur. No edema.  Pulmonary/Chest:  No respiratory distress.  Wheezing present.  Abdominal:  Soft.  Bowel sounds are normal.  Abdominal tenderness present.  Mostly in the right upper quadrant.  Musculoskeletal:  No edema, no tenderness, and no deformity.  No swelling or redness of joints.  Neurological:  Alert and oriented to person, place, and time.  No facial droop.  No slurred speech.   Skin:  Skin is warm and dry.  No rash noted.  No pallor.   Psychiatric:  Normal mood and affect.  Behavior is  normal.  ----------------------------------------------------------------------------------------------------------------------  Results from last 7 days   Lab Units 08/03/20  2354   TROPONIN T ng/mL <0.010           Results from last 7 days   Lab Units 08/04/20  0018 08/04/20  0009 08/03/20  2354   CRP mg/dL  --   --  0.15   LACTATE mmol/L  --  0.4*  --    WBC 10*3/mm3 7.47  --   --    HEMOGLOBIN g/dL 10.4*  --   --    HEMATOCRIT % 32.3*  --   --    MCV fL 96.1  --   --    MCHC g/dL 32.2  --   --    PLATELETS 10*3/mm3 235  --   --    INR   --  0.96  --      Results from last 7 days   Lab Units 08/03/20  2354   SODIUM mmol/L 138   POTASSIUM mmol/L 3.8   MAGNESIUM mg/dL 1.8   CHLORIDE mmol/L 104   CO2 mmol/L 21.0*   BUN mg/dL 12   CREATININE mg/dL 0.91   EGFR IF NONAFRICN AM mL/min/1.73 67   CALCIUM mg/dL 8.6   GLUCOSE mg/dL 87   ALBUMIN g/dL 3.69   BILIRUBIN mg/dL 0.4   ALK PHOS U/L 65   AST (SGOT) U/L 37*   ALT (SGPT) U/L 40*   Estimated Creatinine Clearance: 92.4 mL/min (by C-G formula based on SCr of 0.91 mg/dL).  No results found for: AMMONIA    No results found for: HGBA1C, POCGLU  Lab Results   Component Value Date    HGBA1C 5.30 05/13/2017     Lab Results   Component Value Date    TSH 3.750 05/10/2019    FREET4 1.08 05/10/2019       Blood Culture   Date Value Ref Range Status   08/04/2020 No growth at 24 hours  Preliminary   08/03/2020 No growth at 24 hours  Preliminary     No results found for: URINECX  No results found for: WOUNDCX  No results found for: STOOLCX  No results found for: RESPCX  Pain Management Panel     Pain Management Panel Latest Ref Rng & Units 8/4/2020 1/13/2020    AMPHETAMINES SCREEN, URINE Negative Negative Negative    BARBITURATES SCREEN Negative Negative Negative    BENZODIAZEPINE SCREEN, URINE Negative Positive(A) Positive(A)    BUPRENORPHINEUR Negative Negative Negative    COCAINE SCREEN, URINE Negative Negative Negative    METHADONE SCREEN, URINE Negative Negative Negative             ----------------------------------------------------------------------------------------------------------------------  Imaging Results (Last 24 Hours)     ** No results found for the last 24 hours. **          ----------------------------------------------------------------------------------------------------------------------    Assessment/Plan       Assessment/Plan     ASSESSMENT:    1.  Sepsis  2.  Bacteremia versus contaminant    PLAN:  Patient resting in bed this morning.  WBC normal.  Afebrile, no diarrhea.  She reports some mild nausea and abdominal discomfort in relation to her gallbladder.  Scheduled to have cholecystectomy today.    Blood cultures on 8/3/2020 revealed no growth thus far.  She had 1 out of 1 positive blood cultures revealing staph coag negative at [facility].  This is believed to be contaminant.     Chest x-ray negative.  CT abdomen pelvis revealing cholelithiasis and diverticulosis. NM Hepatobiliary study within normal limits.    Cholecystectomy planned for today per general surgery.    We will continue to monitor off antibiotic therapy at this time.  Patient stable from ID standpoint.    Code Status:   Code Status and Medical Interventions:   Ordered at: 08/04/20 0156     Level Of Support Discussed With:    Patient     Code Status:    CPR     Medical Interventions (Level of Support Prior to Arrest):    Full       Josie Moy, APRN  08/05/20  11:03

## 2020-08-05 NOTE — PLAN OF CARE
Problem: Patient Care Overview  Goal: Plan of Care Review  Outcome: Ongoing (interventions implemented as appropriate)  Flowsheets (Taken 8/4/2020 2015)  Plan of Care Reviewed With: patient  Note:   Patient complained of nausea and pain during the night, administered PRN Phenergan and scheduled Oxycodone. No other complaints. VSS. No acute changes during the night.

## 2020-08-05 NOTE — ANESTHESIA POSTPROCEDURE EVALUATION
Patient: lAisa Holland    Procedure Summary     Date:  08/05/20 Room / Location:  [facility] OR 01 /  [facility] OR    Anesthesia Start:  1305 Anesthesia Stop:  1400    Procedure:  CHOLECYSTECTOMY LAPAROSCOPIC (N/A Abdomen) Diagnosis:       Biliary colic      (Biliary colic [K80.50])    Surgeon:  Arsh Moy MD Provider:  Calixto Garcia MD    Anesthesia Type:  general with block ASA Status:  3          Anesthesia Type: general with block    Vitals  Vitals Value Taken Time   /70 8/5/2020  2:37 PM   Temp 98.2 °F (36.8 °C) 8/5/2020  2:31 PM   Pulse 84 8/5/2020  2:37 PM   Resp 15 8/5/2020  2:37 PM   SpO2 95 % 8/5/2020  2:37 PM           Post Anesthesia Care and Evaluation    Patient location during evaluation: PHASE II  Patient participation: complete - patient participated  Level of consciousness: awake and alert  Pain score: 1  Pain management: adequate  Airway patency: patent  Anesthetic complications: No anesthetic complications  PONV Status: controlled  Cardiovascular status: acceptable  Respiratory status: acceptable  Hydration status: acceptable

## 2020-08-05 NOTE — OP NOTE
Alisa Brownpancho  8/5/2020      Operative Progress Note:    Surgeon and Assistant: Dr. Moy    Pre-Operative Diagnosis: Cholelithiasis and biliary colic    Post-Operative Diagnosis: Cholelithiasis biliary,    Procedure(s): Laparoscopic cholecystectomy    Type of Anesthesia Administered: General endotracheal with tap block    Estimated Blood Loss: Minimal    Blood Products: None    Specimen Obtained/Removed: Gallbladder    Complication(s):  None    Graft/Implant/Prosthetics/Implanted Device/Transplants:  None    Indication: Patient is 45-year-old white female    Findings: Minimal inflammation in the gallbladder.  Stones in the gallbladder.  Cholangiogram not performed because patient allergic to the contrast material    Operative Report:  Patient was taken operating room and laid in a supine position on the operating table. General endotracheal anesthesia was induced. The abdomen was prepped and draped usual sterile fashion. A supraumbilical incision was made and a blunt 12 mm Holt trocar was placed in the routine fashion. The abdomen was insufflated CO2 to max pressure 15 mmHg. A 30° angle scope was passed. Epigastric and lateral trochars were passed under direct vision after injecting 0.5% Marcaine with epinephrine. The gallbladder was grasped and retracted. Cystic duct and cystic artery were dissected out and controlled with hemoclips.  Cholangiogram was elected not to be performed because the patient is allergic to contrast material.  The gallbladder is removed from its bed using Bovie cautery. The gallbladder was then removed through the umbilical port. I checked for hemostasis. It was excellent. I removed all trochars under direct vision. Fascia at the umbilicus closed with oh Vicryls ×2. Skin incisions closed with subcuticular 4-0 Monocryl. A pressure dressing was placed at the umbilicus. Dermabond was placed on the other wounds. The procedure terminated. Patient tolerated procedure very well and was returned  recovery room in satisfactory condition.      Electronically Signed by: Arsh Moy MD        Dictated Utilizing Dragon Dictation

## 2020-08-05 NOTE — ANESTHESIA PROCEDURE NOTES
"Peripheral Block    Pre-sedation assessment completed: 8/5/2020 1:13 PM    Patient location during procedure: OR  Start time: 8/5/2020 1:13 PM  Stop time: 8/5/2020 1:18 PM  Reason for block: at surgeon's request and post-op pain management  Performed by  Anesthesiologist: Calixto Garcia MD  Preanesthetic Checklist  Completed: patient identified, site marked, surgical consent, pre-op evaluation, timeout performed, IV checked, risks and benefits discussed and monitors and equipment checked  Prep:  Pt Position: supine  Sterile barriers:cap, gloves, sterile barriers and mask  Prep: ChloraPrep  Patient monitoring: blood pressure monitoring, continuous pulse oximetry and EKG  Procedure  Nursing cardiac assessment comments yes: Sedation, GA, Spinal,Epidural   Performed under: general  Guidance:ultrasound guided  ULTRASOUND INTERPRETATION. Using ultrasound guidance a 20 G (20g 4\" Stimuplex) gauge needle was placed in close proximity to the nerve, at which point, under ultrasound guidance anesthetic was injected in the area of the nerve and spread of the anesthesia was seen on ultrasound in close proximity thereto.  There were no abnormalities seen on ultrasound; a digital image was taken; and the patient tolerated the procedure with no complications. Images:still images obtained    Laterality:Bilateral  Block Type:TAP  Injection Technique:single-shot  Needle Type:short-bevel  Needle Gauge:20 G  Resistance on Injection: none          Medications  Comment:Block Injection:  Total volume divided equally between Right and Left block      Post Assessment  Injection Assessment: negative aspiration for heme, incremental injection and no paresthesia on injection  Patient Tolerance:comfortable throughout block  Complications:no  Additional Notes  The pt was in the supine position under general anesthesia    Under Ultrasound guidance, a Singh 4inch 360 degree needle was advanced with Normal Saline hydro dissection of tissue.  The " Rectus and Transversus Abdominus muscles where visualized.  The needle tip was placed between the Transversus Abdominus and rectus abdominus, local anesthetic spread was visualized in the Transversus Abdominus Plane. Injection was made incrementally with aspiration every 5 mls.  There was no  intravascular injection,  injection pressure was normal, there was no neural injection, and the procedure was completed without difficulty. The same procedure was completed for left and right sided subcostal tap blocks. Thank You.

## 2020-08-05 NOTE — PROGRESS NOTES
Assisted By: Cindy RING    CC: Follow-up on abdominal pain    Interview History/HPI: Patient underwent laparoscopic cholecystectomy earlier.  Patient states she feels okay she is just sore in her abdomen.  She states she is hungry, she denies any nausea at least currently.  She was on 3 L of oxygen while I was in the room she was 96% saturated this was postop, I have decreased her 2 L.      Vitals:    08/05/20 1600   BP: 132/68   Pulse: 76   Resp: 20   Temp: 97.4 °F (36.3 °C)   SpO2: 98%         Intake/Output Summary (Last 24 hours) at 8/5/2020 1731  Last data filed at 8/5/2020 1713  Gross per 24 hour   Intake 853 ml   Output 1000 ml   Net -147 ml       EXAM: Lungs have bilateral breath sounds are clear heart regular rate and rhythm she is missing her left arm, abdomen is soft and nondistended she is diffusely tender but without peritoneal signs bowel sounds are active mood is good skin warm and dry hearing is intact    Tele: Sinus rhythm    LABS:   Lab Results (last 48 hours)     Procedure Component Value Units Date/Time    Magnesium [[id number]]  (Normal) Collected:  08/05/20 1643    Specimen:  Blood Updated:  08/05/20 1726     Magnesium 1.7 mg/dL     CBC & Differential [[id number]] Collected:  08/05/20 1643    Specimen:  Blood Updated:  08/05/20 1711    Narrative:       The following orders were created for panel order CBC & Differential.  Procedure                               Abnormality         Status                     ---------                               -----------         ------                     CBC Auto Differential[[id number]]        Abnormal            Final result                 Please view results for these tests on the individual orders.    CBC Auto Differential [[id number]]  (Abnormal) Collected:  08/05/20 1643    Specimen:  Blood Updated:  08/05/20 1711     WBC 7.84 10*3/mm3      RBC 3.60 10*6/mm3      Hemoglobin 11.2 g/dL      Hematocrit 36.7 %      .9 fL      MCH 31.1 pg      MCHC 30.5  g/dL      RDW 13.1 %      RDW-SD 48.7 fl      MPV 9.8 fL      Platelets 216 10*3/mm3      Neutrophil % 92.2 %      Lymphocyte % 4.8 %      Monocyte % 1.1 %      Eosinophil % 1.1 %      Basophil % 0.4 %      Immature Grans % 0.4 %      Neutrophils, Absolute 7.22 10*3/mm3      Lymphocytes, Absolute 0.38 10*3/mm3      Monocytes, Absolute 0.09 10*3/mm3      Eosinophils, Absolute 0.09 10*3/mm3      Basophils, Absolute 0.03 10*3/mm3      Immature Grans, Absolute 0.03 10*3/mm3      nRBC 0.0 /100 WBC     C-reactive Protein [[id number]] Collected:  08/05/20 1642    Specimen:  Blood Updated:  08/05/20 1702    Tissue Pathology Exam [[id number]] Collected:  08/05/20 1347    Specimen:  Tissue from Gallbladder Updated:  08/05/20 1448    Urine Culture - Urine, Urine, Catheter [[id number]]  (Normal) Collected:  08/03/20 2232    Specimen:  Urine, Catheter Updated:  08/05/20 1214     Urine Culture No growth    Blood Culture - Blood, Arm, Left [[id number]] Collected:  08/04/20 0009    Specimen:  Blood from Arm, Left Updated:  08/05/20 0030     Blood Culture No growth at 24 hours    Blood Culture - Blood, Arm, Right [[id number]] Collected:  08/03/20 2321    Specimen:  Blood from Arm, Right Updated:  08/04/20 2330     Blood Culture No growth at 24 hours    COVID-19, [facility] IN-HOUSE,NP Swab (NO TRANSPORT MEDIA) 2 HR TAT - Swab, Nasopharynx [[id number]]  (Normal) Collected:  08/04/20 1549    Specimen:  Swab from Nasopharynx Updated:  08/04/20 1725     COVID19 Not Detected    Narrative:       Fact sheet for providers: https://www.fda.gov/media/114462/download     Fact sheet for patients: https://www.fda.gov/media/301249/download    Procalcitonin [[id number]]  (Normal) Collected:  08/03/20 2354    Specimen:  Blood from Hand, Right Updated:  08/04/20 1239     Procalcitonin <0.02 ng/mL     Narrative:       As a Marker for Sepsis (Non-Neonates):   1. <0.5 ng/mL represents a low risk of severe sepsis and/or septic shock.  1. >2 ng/mL represents a high  "risk of severe sepsis and/or septic shock.    As a Marker for Lower Respiratory Tract Infections that require antibiotic therapy:  PCT on Admission     Antibiotic Therapy             6-12 Hrs later  > 0.5                Strongly Recommended            >0.25 - <0.5         Recommended  0.1 - 0.25           Discouraged                   Remeasure/reassess PCT  <0.1                 Strongly Discouraged          Remeasure/reassess PCT      As 28 day mortality risk marker: \"Change in Procalcitonin Result\" (> 80 % or <=80 %) if Day 0 (or Day 1) and Day 4 values are available. Refer to http://www.[facility]-pct-calculator.com/   Change in PCT <=80 %   A decrease of PCT levels below or equal to 80 % defines a positive change in PCT test result representing a higher risk for 28-day all-cause mortality of patients diagnosed with severe sepsis or septic shock.  Change in PCT > 80 %   A decrease of PCT levels of more than 80 % defines a negative change in PCT result representing a lower risk for 28-day all-cause mortality of patients diagnosed with severe sepsis or septic shock.                Results may be falsely decreased if patient taking Biotin.     Urine Drug Screen - Urine, Clean Catch [[id number]]  (Abnormal) Collected:  08/04/20 0240    Specimen:  Urine, Clean Catch Updated:  08/04/20 0311     Amphetamine Screen, Urine Negative     Barbiturates Screen, Urine Negative     Benzodiazepine Screen, Urine Positive     Cocaine Screen, Urine Negative     Methadone Screen, Urine Negative     Opiate Screen Negative     Phencyclidine (PCP), Urine Negative     THC, Screen, Urine Negative     6-ACETYL MORPHINE Negative     Buprenorphine, Screen, Urine Negative     Oxycodone Screen, Urine Negative    Narrative:       Negative Thresholds For Drugs Screened:                  Amphetamines              1000 ng/ml               Barbiturates               200 ng/ml               Benzodiazepines            200 ng/ml              Cocaine       "              300 ng/ml              Methadone                  300 ng/ml              Opiates                    300 ng/ml               Phencyclidine               25 ng/ml               THC                         50 ng/ml              6-Acetyl Morphine           10 ng/ml              Buprenorphine                5 ng/ml              Oxycodone                  300 ng/ml    The reference range for all drugs tested is negative. This report includes final unconfirmed qualitative results to be used for medical treatment purposes only. Unconfirmed results must not be used for non-medical purposes such as employment or legal testing. Clinical consideration should be applied to any drug of abuse test, especially when unconfirmed quantitative results are used.        Pregnancy, Urine - Urine, Clean Catch [[id number]]  (Normal) Collected:  08/04/20 0240    Specimen:  Urine, Clean Catch Updated:  08/04/20 0254     HCG, Urine QL Negative    Narrative:       Diluted specimens may cause false negative results.    Urinalysis With Culture If Indicated - Urine, Random Void [[id number]]  (Normal) Collected:  08/04/20 0240    Specimen:  Urine, Random Void Updated:  08/04/20 0253     Color, UA Yellow     Appearance, UA Clear     pH, UA 6.5     Specific Gravity, UA 1.005     Glucose, UA Negative     Ketones, UA Negative     Bilirubin, UA Negative     Blood, UA Negative     Protein, UA Negative     Leuk Esterase, UA Negative     Nitrite, UA Negative     Urobilinogen, UA 0.2 E.U./dL    Narrative:       Urine microscopic not indicated.    [place] Draw [[id number]] Collected:  08/03/20 2354    Specimen:  Blood Updated:  08/04/20 0115    Narrative:       The following orders were created for panel order [place] Draw.  Procedure                               Abnormality         Status                     ---------                               -----------         ------                     Light Blue Top[[id number]]                                    Final result               Green Top (Gel)[[id number]]                                  Final result               Lavender Top[[id number]]                                     Final result               Gold Top - SST[[id number]]                                   Final result                 Please view results for these tests on the individual orders.    Light Blue Top [[id number]] Collected:  08/04/20 0009    Specimen:  Blood Updated:  08/04/20 0115     Extra Tube hold for add-on     Comment: Auto resulted       Gold Top - SST [[id number]] Collected:  08/04/20 0009    Specimen:  Blood Updated:  08/04/20 0115     Extra Tube Hold for add-ons.     Comment: Auto resulted.       Green Top (Gel) [[id number]] Collected:  08/03/20 2354    Specimen:  Blood from Hand, Right Updated:  08/04/20 0100     Extra Tube Hold for add-ons.     Comment: Auto resulted.       Lavender Top [[id number]] Collected:  08/03/20 2354    Specimen:  Blood from Hand, Right Updated:  08/04/20 0100     Extra Tube hold for add-on     Comment: Auto resulted       Lactic Acid, Plasma [[id number]]  (Abnormal) Collected:  08/04/20 0009    Specimen:  Blood Updated:  08/04/20 0037     Lactate 0.4 mmol/L     Comprehensive Metabolic Panel [[id number]]  (Abnormal) Collected:  08/03/20 2354    Specimen:  Blood from Hand, Right Updated:  08/04/20 0029     Glucose 87 mg/dL      BUN 12 mg/dL      Creatinine 0.91 mg/dL      Sodium 138 mmol/L      Potassium 3.8 mmol/L      Comment: Specimen hemolyzed.  Results may be affected.        Chloride 104 mmol/L      CO2 21.0 mmol/L      Calcium 8.6 mg/dL      Total Protein 6.7 g/dL      Albumin 3.69 g/dL      ALT (SGPT) 40 U/L      AST (SGOT) 37 U/L      Alkaline Phosphatase 65 U/L      Total Bilirubin 0.4 mg/dL      eGFR Non African Amer 67 mL/min/1.73      Globulin 3.0 gm/dL      A/G Ratio 1.2 g/dL      BUN/Creatinine Ratio 13.2     Anion Gap 13.0 mmol/L     Narrative:       GFR Normal >60  Chronic Kidney Disease  <60  Kidney Failure <15      Protime-INR [[id number]]  (Normal) Collected:  08/04/20 0009    Specimen:  Blood Updated:  08/04/20 0028     Protime 12.6 Seconds      Comment: Note new Reference Range        INR 0.96    Narrative:       Suggested INR therapeutic range for stable oral anticoagulant therapy:    Low Intensity therapy:   1.5-2.0  Moderate Intensity therapy:   2.0-3.0  High Intensity therapy:   2.5-4.0    Magnesium [[id number]]  (Normal) Collected:  08/03/20 2354    Specimen:  Blood from Hand, Right Updated:  08/04/20 0024     Magnesium 1.8 mg/dL     C-reactive Protein [[id number]]  (Normal) Collected:  08/03/20 2354    Specimen:  Blood from Hand, Right Updated:  08/04/20 0024     C-Reactive Protein 0.15 mg/dL     Lipase [[id number]]  (Normal) Collected:  08/03/20 2354    Specimen:  Blood from Hand, Right Updated:  08/04/20 0024     Lipase 23 U/L     Troponin [[id number]]  (Normal) Collected:  08/03/20 2354    Specimen:  Blood from Hand, Right Updated:  08/04/20 0024     Troponin T <0.010 ng/mL     Narrative:       Troponin T Reference Range:  <= 0.03 ng/mL-   Negative for AMI  >0.03 ng/mL-     Abnormal for myocardial necrosis.  Clinicians would have to utilize clinical acumen, EKG, Troponin and serial changes to determine if it is an Acute Myocardial Infarction or myocardial injury due to an underlying chronic condition.       Results may be falsely decreased if patient taking Biotin.      CBC & Differential [[id number]] Collected:  08/04/20 0018    Specimen:  Blood from Hand, Right Updated:  08/04/20 0020    Narrative:       The following orders were created for panel order CBC & Differential.  Procedure                               Abnormality         Status                     ---------                               -----------         ------                     CBC Auto Differential[[id number]]        Abnormal            Final result                 Please view results for these tests on the individual  orders.    CBC Auto Differential [[id number]]  (Abnormal) Collected:  08/04/20 0018    Specimen:  Blood from Hand, Right Updated:  08/04/20 0020     WBC 7.47 10*3/mm3      RBC 3.36 10*6/mm3      Hemoglobin 10.4 g/dL      Hematocrit 32.3 %      MCV 96.1 fL      MCH 31.0 pg      MCHC 32.2 g/dL      RDW 12.8 %      RDW-SD 45.1 fl      MPV 9.5 fL      Platelets 235 10*3/mm3      Neutrophil % 55.9 %      Lymphocyte % 25.4 %      Monocyte % 9.9 %      Eosinophil % 7.8 %      Basophil % 0.7 %      Immature Grans % 0.3 %      Neutrophils, Absolute 4.18 10*3/mm3      Lymphocytes, Absolute 1.90 10*3/mm3      Monocytes, Absolute 0.74 10*3/mm3      Eosinophils, Absolute 0.58 10*3/mm3      Basophils, Absolute 0.05 10*3/mm3      Immature Grans, Absolute 0.02 10*3/mm3      nRBC 0.0 /100 WBC     Urinalysis, Microscopic Only - Urine, Catheter [[id number]]  (Abnormal) Collected:  08/03/20 2232    Specimen:  Urine, Catheter Updated:  08/03/20 2302     RBC, UA 0-2 /HPF      WBC, UA 6-12 /HPF      Bacteria, UA None Seen /HPF      Squamous Epithelial Cells, UA 0-2 /HPF      Hyaline Casts, UA None Seen /LPF      Methodology Automated Microscopy    Urinalysis With Microscopic If Indicated (No Culture) - Urine, Catheter [[id number]]  (Abnormal) Collected:  08/03/20 2232    Specimen:  Urine, Catheter Updated:  08/03/20 2240     Color, UA Yellow     Appearance, UA Clear     pH, UA 6.0     Specific Gravity, UA 1.010     Glucose, UA Negative     Ketones, UA Negative     Bilirubin, UA Negative     Blood, UA Negative     Protein, UA Negative     Leuk Esterase, UA Moderate (2+)     Nitrite, UA Negative     Urobilinogen, UA 0.2 E.U./dL          Radiology:    Imaging Results (Last 72 Hours)     Procedure Component Value Units Date/Time    NM Hepatobiliary Without CCK [[id number]] Collected:  08/04/20 0935     Updated:  08/04/20 1008    Narrative:       EXAMINATION: NM HEPATOBILIARY WITHOUT CCK-      CLINICAL INDICATION: Cholelithiasis        COMPARISON:  None available.     PROCEDURE: 6.1 mCi technetium Choletec was administered. Dynamic imaging  of the liver and biliary tree was performed at 2 minutes per frame for  44 minutes.     Fatty meal was then ingested and images were acquired at 30 seconds per  frame for a total of 90 images.     FINDINGS: There was normal visualization of the gallbladder within an  hour after the Choletec.     Following the fatty meal, a 38% ejection fraction was calculated.       Impression:       Study results were within normal limits.      This report was finalized on 8/4/2020 10:06 AM by Dr. Bhavin Li MD.       CT Abdomen Pelvis Without Contrast [[id number]] Collected:  08/04/20 0313     Updated:  08/04/20 0316    Narrative:       CT Abdomen Pelvis WO    INDICATION:   Right upper quadrant pain. History of cholelithiasis.    TECHNIQUE:   CT of the abdomen and pelvis without IV contrast. Coronal and sagittal reconstructions were obtained.  Radiation dose reduction techniques included automated exposure control or exposure modulation based on body size. Count of known CT and cardiac nuc  med studies performed in previous 12 months: 0.     COMPARISON:   5/5/2019    FINDINGS:  Abdomen: Included lung bases clear. Minimal atelectasis the right middle lobe. No pericardial effusion. Normal caliber aorta. Spleen and adrenal glands are unremarkable and the pancreas is negative. Uncomplicated cholelithiasis. Negative liver. The  kidneys are nonobstructed. No radiopaque stone on either side. No adenopathy.    Pelvis: Streak artifact from right hip replacement. Negative bladder and no drainable fluid collection. Incidental dominant follicles in both ovaries and diverticulosis. Moderate stool burden. Appendix not identified. No secondary sign of acute  appendicitis. The inguinal canals are negative. No suspicious bone lesion.      Impression:         1. No clearly acute process in the abdomen or pelvis. Appendix not identified but no secondary  signs of inflammatory change in the right lower quadrant.  2. Cholelithiasis.  3. Diverticulosis.          Signer Name: Eugene Ernst MD   Signed: 8/4/2020 3:13 AM   Workstation Name: [facility]    [facility]    XR Chest 1 View [[id number]] Collected:  08/04/20 0137     Updated:  08/04/20 0139    Narrative:       CR Chest 1 Vw    INDICATION:   Sepsis in the emergency Department.     COMPARISON:    1/13/2020    FINDINGS:  Single portable AP view(s) of the chest.  Cardiac silhouette is borderline in size and stable. Shallow lung expansion with bronchovascular crowding. No effusion or dense consolidation and no pneumothorax. Left lung apex partially obscured by soft tissue  artifact.      Impression:       1. Low-volume image, otherwise negative chest.    Signer Name: Eugene Ernst MD   Signed: 8/4/2020 1:37 AM   Workstation Name: [facility]    Radiology [facility]          Results for orders placed during the hospital encounter of 05/04/19   Adult Transthoracic Echo Complete W/ Cont if Necessary Per Protocol    Narrative · Estimated EF = 70%.  · Normal diastolic function  · Normal Valves      Discussed with Dr. Moy    Assessment/Plan:   Abdominal pain, patient had positive Alaniz sign and cholelithiasis.  She has undergone laparoscopic cholecystectomy.  Dr. Moy is written for her to have a regular diet.  Her diet will be advanced, will monitor overnight, if she is improved consider discharge tomorrow if labs acceptable.    Hypertension, controlled currently on Norvasc    Positive blood culture at [facility], 1 of 1 for coag negative staph.  Blood cultures negative here, monitoring off antibiotics.    History of diastolic heart failure, currently compensated, I have decreased IV fluids and if taking diet well these can be discontinued tomorrow.    DVT prophylaxis, subcu heparin

## 2020-08-05 NOTE — ANESTHESIA PROCEDURE NOTES
Airway  Urgency: elective    Date/Time: 8/5/2020 1:05 PM  Airway not difficult    General Information and Staff    Patient location during procedure: OR  Anesthesiologist: Calixto Garcia MD  CRNA: Eugene Lin CRNA    Indications and Patient Condition  Indications for airway management: airway protection    Preoxygenated: yes  MILS not maintained throughout  Mask difficulty assessment: 0 - not attempted    Final Airway Details  Final airway type: endotracheal airway      Successful airway: ETT  Cuffed: yes   Successful intubation technique: direct laryngoscopy  Endotracheal tube insertion site: oral  Blade: Reed  Blade size: 2  ETT size (mm): 7.5  Cormack-Lehane Classification: grade I - full view of glottis  Placement verified by: chest auscultation and capnometry   Measured from: lips  ETT/EBT  to lips (cm): 22  Number of attempts at approach: 1  Assessment: lips, teeth, and gum same as pre-op and atraumatic intubation    Additional Comments  Atraumatic ETT placement, dentition unchanged.

## 2020-08-05 NOTE — PROGRESS NOTES
Discharge Planning Assessment   [facility]     Patient Name: Alisa Holland  MRN: [id number]  Today's Date: 8/5/2020    Admit Date: 8/3/2020      Discharge Plan     Row Name 08/05/20 1648       Plan    Plan SS initial assessment was completed on 8/4/20. Pt lives with her fiance, Claude and she plans to return home at discharge. Pt does not utilize home health services. Pt has utilized home health services in the past for assistance with home IV antibiotics. Pt does not utilize DME. SS to follow.         VALERIA Bright

## 2020-08-06 VITALS
OXYGEN SATURATION: 97 % | WEIGHT: 204.6 LBS | HEIGHT: 67 IN | HEART RATE: 86 BPM | TEMPERATURE: 97.7 F | DIASTOLIC BLOOD PRESSURE: 62 MMHG | SYSTOLIC BLOOD PRESSURE: 118 MMHG | RESPIRATION RATE: 20 BRPM | BODY MASS INDEX: 32.11 KG/M2

## 2020-08-06 LAB
ALBUMIN SERPL-MCNC: 3.5 G/DL (ref 3.5–5.2)
ALBUMIN/GLOB SERPL: 1.2 G/DL
ALP SERPL-CCNC: 68 U/L (ref 39–117)
ALT SERPL W P-5'-P-CCNC: 43 U/L (ref 1–33)
ANION GAP SERPL CALCULATED.3IONS-SCNC: 8.5 MMOL/L (ref 5–15)
AST SERPL-CCNC: 43 U/L (ref 1–32)
BACTERIA BLD CULT: ABNORMAL
BASOPHILS # BLD AUTO: 0.01 10*3/MM3 (ref 0–0.2)
BASOPHILS NFR BLD AUTO: 0.2 % (ref 0–1.5)
BILIRUB SERPL-MCNC: 0.2 MG/DL (ref 0–1.2)
BUN SERPL-MCNC: 10 MG/DL (ref 6–20)
BUN/CREAT SERPL: 11.4 (ref 7–25)
CALCIUM SPEC-SCNC: 8.5 MG/DL (ref 8.6–10.5)
CHLORIDE SERPL-SCNC: 104 MMOL/L (ref 98–107)
CO2 SERPL-SCNC: 25.5 MMOL/L (ref 22–29)
CREAT SERPL-MCNC: 0.88 MG/DL (ref 0.57–1)
DEPRECATED RDW RBC AUTO: 45.6 FL (ref 37–54)
EOSINOPHIL # BLD AUTO: 0.01 10*3/MM3 (ref 0–0.4)
EOSINOPHIL NFR BLD AUTO: 0.2 % (ref 0.3–6.2)
ERYTHROCYTE [DISTWIDTH] IN BLOOD BY AUTOMATED COUNT: 12.8 % (ref 12.3–15.4)
GFR SERPL CREATININE-BSD FRML MDRD: 70 ML/MIN/1.73
GLOBULIN UR ELPH-MCNC: 3 GM/DL
GLUCOSE SERPL-MCNC: 130 MG/DL (ref 65–99)
HCT VFR BLD AUTO: 33.4 % (ref 34–46.6)
HGB BLD-MCNC: 10.5 G/DL (ref 12–15.9)
IMM GRANULOCYTES # BLD AUTO: 0.02 10*3/MM3 (ref 0–0.05)
IMM GRANULOCYTES NFR BLD AUTO: 0.3 % (ref 0–0.5)
LYMPHOCYTES # BLD AUTO: 0.48 10*3/MM3 (ref 0.7–3.1)
LYMPHOCYTES NFR BLD AUTO: 7.9 % (ref 19.6–45.3)
MCH RBC QN AUTO: 31 PG (ref 26.6–33)
MCHC RBC AUTO-ENTMCNC: 31.4 G/DL (ref 31.5–35.7)
MCV RBC AUTO: 98.5 FL (ref 79–97)
MONOCYTES # BLD AUTO: 0.32 10*3/MM3 (ref 0.1–0.9)
MONOCYTES NFR BLD AUTO: 5.3 % (ref 5–12)
NEUTROPHILS NFR BLD AUTO: 5.2 10*3/MM3 (ref 1.7–7)
NEUTROPHILS NFR BLD AUTO: 86.1 % (ref 42.7–76)
NRBC BLD AUTO-RTO: 0 /100 WBC (ref 0–0.2)
PLATELET # BLD AUTO: 236 10*3/MM3 (ref 140–450)
PMV BLD AUTO: 9.6 FL (ref 6–12)
POTASSIUM SERPL-SCNC: 3.6 MMOL/L (ref 3.5–5.2)
PROT SERPL-MCNC: 6.5 G/DL (ref 6–8.5)
RBC # BLD AUTO: 3.39 10*6/MM3 (ref 3.77–5.28)
SODIUM SERPL-SCNC: 138 MMOL/L (ref 136–145)
WBC # BLD AUTO: 6.04 10*3/MM3 (ref 3.4–10.8)

## 2020-08-06 PROCEDURE — 80053 COMPREHEN METABOLIC PANEL: CPT | Performed by: INTERNAL MEDICINE

## 2020-08-06 PROCEDURE — 25010000002 HEPARIN (PORCINE) PER 1000 UNITS: Performed by: SURGERY

## 2020-08-06 PROCEDURE — 99232 SBSQ HOSP IP/OBS MODERATE 35: CPT | Performed by: INTERNAL MEDICINE

## 2020-08-06 PROCEDURE — 99024 POSTOP FOLLOW-UP VISIT: CPT | Performed by: SURGERY

## 2020-08-06 PROCEDURE — 25010000002 MORPHINE PER 10 MG: Performed by: SURGERY

## 2020-08-06 PROCEDURE — 85025 COMPLETE CBC W/AUTO DIFF WBC: CPT | Performed by: INTERNAL MEDICINE

## 2020-08-06 RX ORDER — AMLODIPINE BESYLATE 5 MG/1
5 TABLET ORAL
Qty: 30 TABLET | Refills: 0 | Status: SHIPPED | OUTPATIENT
Start: 2020-08-07 | End: 2021-12-15

## 2020-08-06 RX ADMIN — SODIUM CHLORIDE, PRESERVATIVE FREE 10 ML: 5 INJECTION INTRAVENOUS at 08:58

## 2020-08-06 RX ADMIN — OXYCODONE HYDROCHLORIDE 20 MG: 20 TABLET, FILM COATED, EXTENDED RELEASE ORAL at 09:51

## 2020-08-06 RX ADMIN — OXYCODONE HYDROCHLORIDE 15 MG: 15 TABLET ORAL at 11:27

## 2020-08-06 RX ADMIN — MORPHINE SULFATE 4 MG: 4 INJECTION, SOLUTION INTRAMUSCULAR; INTRAVENOUS at 04:50

## 2020-08-06 RX ADMIN — OXYCODONE HYDROCHLORIDE 15 MG: 15 TABLET ORAL at 03:24

## 2020-08-06 RX ADMIN — AMLODIPINE BESYLATE 5 MG: 5 TABLET ORAL at 08:56

## 2020-08-06 RX ADMIN — PREGABALIN 300 MG: 100 CAPSULE ORAL at 09:50

## 2020-08-06 RX ADMIN — GABAPENTIN 800 MG: 400 CAPSULE ORAL at 05:54

## 2020-08-06 RX ADMIN — PANTOPRAZOLE SODIUM 40 MG: 40 TABLET, DELAYED RELEASE ORAL at 05:56

## 2020-08-06 RX ADMIN — HEPARIN SODIUM 5000 UNITS: 5000 INJECTION INTRAVENOUS; SUBCUTANEOUS at 08:56

## 2020-08-06 RX ADMIN — LEVETIRACETAM 1000 MG: 500 TABLET, FILM COATED ORAL at 08:56

## 2020-08-06 RX ADMIN — NICOTINE 1 PATCH: 7 PATCH, EXTENDED RELEASE TRANSDERMAL at 08:57

## 2020-08-06 NOTE — PROGRESS NOTES
Postoperative day #1 laparoscopic cholecystectomy    Afebrile vital signs stable    Wounds okay.    Okay for discharge.    Return to office 1 week

## 2020-08-06 NOTE — PROGRESS NOTES
Date of admission: 8/4/2020  Date of discharge: 8/6/2020    Principal diagnosis: Symptomatic cholelithiasis  Secondary diagnoses:  -Chronic pain syndrome  -Recent positive blood culture at an outlying hospital coag negative  -Hypertension  -Reported history of diastolic heart failure but not an issue this admission  -Hepatitis C positive causing chronically elevated transaminases    Consultants:  Dr. Molina infectious disease  Dr. Moy general surgery    Procedures:  Laparoscopic cholecystectomy performed by Dr. Moy on 8/5/2020  HIDA scan that was normal  CT abdomen pelvis showing cholelithiasis/diverticulosis  Chest x-ray showing low volume only  EKG chest with some nonspecific findings    Exam: Assisted by Maru RING  Patient has been up she got a shower she states she feels well she has eaten she has not had a bowel movement since surgery as of yet however.  Vital signs: 115/62, 20, 86, 97.7  She has no distress mood is good her incision sites look good abdomen is mildly tender to deep palpation bowel sounds are active lungs are clear heart regular rate and rhythm she is missing her left arm    Hospital course: Patient was admitted after she had been called back to [facility] for positive blood culture however she came here.  She was found to have coag negative staph at that blood culture therefore infectious disease did see the patient but no further intervention was needed.  Patient's repeat blood cultures remain negative here.  CRP was normal.  Patient was complaining of abdominal pain which she seem to have chronically and she was apparently being worked up for gallstones however gallstones noted on CT here, HIDA scan was done that was normal but she had a very positive Alaniz sign was thought to have symptomatic cholelithiasis.  She was seen by general surgery and laparoscopic cholecystectomy was done.  Patient is doing well postop and will be discharged, discussed with Dr. Moy who stated it was okay to  discharge and he would see her in a week.  Patient was persistently hypertensive here she was started on Norvasc and blood pressure has been much better controlled on this and this will be continued on discharge.  Can be further evaluated by her primary care provider.    Follow-up:  Her PCP 1 week  Dr. Moy 1 week  Pain clinic as scheduled in [place] 8/15    Diet: Cardiac    Activity: Routine postop cholecystectomy restrictions, advised not to smoke    Disposition: Home    Medications:  Elavil 150 every night  Norvasc 5 mg daily  Gabapentin 800 mg 3 times a day  Keppra 1000 mg twice daily  Lyrica 300 mg twice daily  Oxycodone 15 mg every 8 hours as needed  Opana ER 10 mg every 12 hours  Tizanidine 4 mg at night as needed    Less than 30-minute discharge

## 2020-08-06 NOTE — PLAN OF CARE
Problem: Patient Care Overview  Goal: Plan of Care Review  Flowsheets  Taken 8/6/2020 0326  Progress: no change  Taken 8/5/2020 2100  Plan of Care Reviewed With: patient  Note:   Patient resting in bed, complains of pain. PRN and scheduled pain medication given per orders. No acute changes. Will continue to monitor.

## 2020-08-06 NOTE — DISCHARGE INSTR - ACTIVITY
No heavy lifting, more than 10 pounds, pushing, pulling or straining until released by Dr. Moy.    May shower starting tomorrow, otherwise keep incisions clean and dry.

## 2020-08-06 NOTE — PLAN OF CARE
Problem: Patient Care Overview  Goal: Plan of Care Review  Outcome: Ongoing (interventions implemented as appropriate)  Flowsheets (Taken 8/6/2020 1093)  Progress: improving  Plan of Care Reviewed With: patient  Outcome Summary: Patient has complained of pain consistently today, administered pain meds. Obstructed powerglide, contacted [facility]. Due to anticipation of discharge, it was d/c'd. No acute events, will continue to monitor.

## 2020-08-06 NOTE — DISCHARGE SUMMARY
Date of admission: 8/4/2020   Date of discharge: 8/6/2020     Principal diagnosis: Symptomatic cholelithiasis  Secondary diagnoses:  -Chronic pain syndrome  -Recent positive blood culture at an outlying hospital coag negative  -Hypertension  -Reported history of diastolic heart failure but not an issue this admission  -Hepatitis C positive causing chronically elevated transaminases     Consultants:  Dr. Molina infectious disease  Dr. Moy general surgery     Procedures:  Laparoscopic cholecystectomy performed by Dr. Moy on 8/5/2020  HIDA scan that was normal  CT abdomen pelvis showing cholelithiasis/diverticulosis  Chest x-ray showing low volume only  EKG chest with some nonspecific findings     Exam: Assisted by Mrau RING  Patient has been up she got a shower she states she feels well she has eaten she has not had a bowel movement since surgery as of yet however.  Vital signs: 115/62, 20, 86, 97.7  She has no distress mood is good her incision sites look good abdomen is mildly tender to deep palpation bowel sounds are active lungs are clear heart regular rate and rhythm she is missing her left arm     Hospital course: Patient was admitted after she had been called back to [place] for positive blood culture however she came here.  She was found to have coag negative staph at that blood culture therefore infectious disease did see the patient but no further intervention was needed.  Patient's repeat blood cultures remain negative here.  CRP was normal.  Patient was complaining of abdominal pain which she seem to have chronically and she was apparently being worked up for gallstones however gallstones noted on CT here, HIDA scan was done that was normal but she had a very positive Alaniz sign was thought to have symptomatic cholelithiasis.  She was seen by general surgery and laparoscopic cholecystectomy was done.  Patient is doing well postop and will be discharged, discussed with Dr. Moy who  stated it was okay to discharge and he would see her in a week.  Patient was persistently hypertensive here she was started on Norvasc and blood pressure has been much better controlled on this and this will be continued on discharge.  Can be further evaluated by her primary care provider.     Follow-up:  Her PCP 1 week  Dr. Moy 1 week  Pain clinic as scheduled in [place] 8/15     Diet: Cardiac     Activity: Routine postop cholecystectomy restrictions, advised not to smoke     Disposition: Home     Medications:  Elavil 150 every night  Norvasc 5 mg daily  Gabapentin 800 mg 3 times a day  Keppra 1000 mg twice daily  Lyrica 300 mg twice daily  Oxycodone 15 mg every 8 hours as needed  Opana ER 10 mg every 12 hours  Tizanidine 4 mg at night as needed     Less than 30-minute discharge    Her blood culture was noted to be 1 of 2+ BC ID consistent with coag negative staph.  Feel this is contaminant, and discussed with Izzy MENDOZA NP as well.  She concurs.

## 2020-08-06 NOTE — PROGRESS NOTES
Discharge Planning Assessment   [facility]     Patient Name: Alisa Holland  MRN: [id number]  Today's Date: 8/6/2020    Admit Date: 8/3/2020      Discharge Plan     Row Name 08/06/20 1351       Plan    Final Discharge Disposition Code  01 - home or self-care    Final Note Pt is being discharged home today. No other needs identified.         VALERIA Bright

## 2020-08-06 NOTE — DISCHARGE INSTR - APPOINTMENTS
You have a scheduled follow-up appointment with Dr. Saleem on 8/14/20 at 9:15 am.  You can reach the office at .

## 2020-08-06 NOTE — PROGRESS NOTES
PROGRESS NOTE         Patient Identification:  Name:  Alisa Holland  Age:  45 y.o.  Sex:  female  :  1975  MRN:  [id number]  Visit Number:  [id number]  Primary Care Provider:  Marion Saleem MD         LOS: 2 days       ----------------------------------------------------------------------------------------------------------------------  Subjective       Chief Complaints:    Abdominal Pain        Interval History:      Patient status post cholecystectomy.  Afebrile, no diarrhea.  WBC normal.  Currently on room air with no apparent distress.    Review of Systems:    Constitutional: no fever, chills and night sweats. No appetite change or unexpected weight change. No fatigue.  Eyes: no eye drainage, itching or redness.  HEENT: no mouth sores, dysphagia or nose bleed.  Respiratory: no for shortness of breath, cough or production of sputum.  Cardiovascular: no chest pain, no palpitations, no orthopnea.  Gastrointestinal: No for nausea or vomiting. Mild abdominal pain.  Genitourinary: no dysuria or polyuria.  Hematologic/lymphatic: no lymph node abnormalities, no easy bruising or easy bleeding.  Musculoskeletal: no muscle or joint pain.  Skin: No rash and no itching.  Neurological: no loss of consciousness, no seizure, no headache.  Behavioral/Psych: no depression or suicidal ideation.  Endocrine: no hot flashes.  Immunologic: negative.  ----------------------------------------------------------------------------------------------------------------------      Objective       Current Hospital Meds:    amitriptyline 150 mg Oral Nightly   amLODIPine 5 mg Oral Q24H   gabapentin 800 mg Oral Q8H   heparin (porcine) 5,000 Units Subcutaneous Q12H   hydrALAZINE 10 mg Intravenous Once   levETIRAcetam 1,000 mg Oral Q12H   nicotine 1 patch Transdermal Q24H   oxyCODONE ER 20 mg Oral Q12H   pantoprazole 40 mg Oral Q AM   pregabalin 300 mg Oral Q12H   sodium chloride 10 mL Intravenous Q12H   sodium chloride 10  mL Intravenous Q12H       lactated ringers 75 mL/hr Last Rate: 75 mL/hr (08/05/20 1737)     ----------------------------------------------------------------------------------------------------------------------    Vital Signs:  Temp:  [97.1 °F (36.2 °C)-98.8 °F (37.1 °C)] 98.2 °F (36.8 °C)  Heart Rate:  [68-96] 76  Resp:  [10-20] 20  BP: (103-161)/(54-94) 104/54  No data found.  SpO2 Percentage    08/05/20 1630 08/05/20 1700 08/06/20 0612   SpO2: 96% 98% 95%     SpO2:  [93 %-100 %] 95 %  on  Flow (L/min):  [2-4] 2;   Device (Oxygen Therapy): room air    Body mass index is 32.04 kg/m².  Wt Readings from Last 3 Encounters:   08/06/20 92.8 kg (204 lb 9.6 oz)   01/13/20 81.6 kg (180 lb)   08/24/19 81.6 kg (180 lb)        Intake/Output Summary (Last 24 hours) at 8/6/2020 1023  Last data filed at 8/6/2020 0700  Gross per 24 hour   Intake 2643 ml   Output 2200 ml   Net 443 ml     Diet Regular  ----------------------------------------------------------------------------------------------------------------------      Physical Exam:    Constitutional:  Well-developed and well-nourished.  No respiratory distress.      HENT:  Head: Normocephalic and atraumatic.  Mouth:  Moist mucous membranes.    Eyes:  Conjunctivae and EOM are normal.  No scleral icterus.  Neck:  Neck supple.  No JVD present.    Cardiovascular:  Normal rate, regular rhythm and normal heart sounds with no murmur. No edema.  Pulmonary/Chest:  No respiratory distress.  Wheezing present.  Abdominal:  Soft.  Bowel sounds are normal.  Abdominal tenderness present.  Mostly in the right upper quadrant.  Musculoskeletal:  No edema, no tenderness, and no deformity.  No swelling or redness of joints.  Neurological:  Alert and oriented to person, place, and time.  No facial droop.  No slurred speech.   Skin:  Skin is warm and dry.  No rash noted.  No pallor.   Psychiatric:  Normal mood and affect.  Behavior is  normal.  ----------------------------------------------------------------------------------------------------------------------  Results from last 7 days   Lab Units 08/03/20 2354   TROPONIN T ng/mL <0.010           Results from last 7 days   Lab Units 08/06/20 0409 08/05/20 1643 08/05/20 1642 08/04/20  0018 08/04/20  0009 08/03/20 2354   CRP mg/dL  --   --  0.23  --   --  0.15   LACTATE mmol/L  --   --   --   --  0.4*  --    WBC 10*3/mm3 6.04 7.84  --  7.47  --   --    HEMOGLOBIN g/dL 10.5* 11.2*  --  10.4*  --   --    HEMATOCRIT % 33.4* 36.7  --  32.3*  --   --    MCV fL 98.5* 101.9*  --  96.1  --   --    MCHC g/dL 31.4* 30.5*  --  32.2  --   --    PLATELETS 10*3/mm3 236 216  --  235  --   --    INR   --   --   --   --  0.96  --      Results from last 7 days   Lab Units 08/06/20 0409 08/05/20 1643 08/03/20 2354   SODIUM mmol/L 138  --  138   POTASSIUM mmol/L 3.6  --  3.8   MAGNESIUM mg/dL  --  1.7 1.8   CHLORIDE mmol/L 104  --  104   CO2 mmol/L 25.5  --  21.0*   BUN mg/dL 10  --  12   CREATININE mg/dL 0.88  --  0.91   EGFR IF NONAFRICN AM mL/min/1.73 70  --  67   CALCIUM mg/dL 8.5*  --  8.6   GLUCOSE mg/dL 130*  --  87   ALBUMIN g/dL 3.50  --  3.69   BILIRUBIN mg/dL 0.2  --  0.4   ALK PHOS U/L 68  --  65   AST (SGOT) U/L 43*  --  37*   ALT (SGPT) U/L 43*  --  40*   Estimated Creatinine Clearance: 95.4 mL/min (by C-G formula based on SCr of 0.88 mg/dL).  No results found for: AMMONIA    No results found for: HGBA1C, POCGLU  Lab Results   Component Value Date    HGBA1C 5.30 05/13/2017     Lab Results   Component Value Date    TSH 3.750 05/10/2019    FREET4 1.08 05/10/2019       Blood Culture   Date Value Ref Range Status   08/04/2020 No growth at 24 hours  Preliminary   08/03/2020 No growth at 24 hours  Preliminary     No results found for: URINECX  No results found for: WOUNDCX  No results found for: STOOLCX  No results found for: RESPCX  Pain Management Panel     Pain Management Panel Latest Ref Rng &  Units 8/4/2020 1/13/2020    AMPHETAMINES SCREEN, URINE Negative Negative Negative    BARBITURATES SCREEN Negative Negative Negative    BENZODIAZEPINE SCREEN, URINE Negative Positive(A) Positive(A)    BUPRENORPHINEUR Negative Negative Negative    COCAINE SCREEN, URINE Negative Negative Negative    METHADONE SCREEN, URINE Negative Negative Negative            ----------------------------------------------------------------------------------------------------------------------  Imaging Results (Last 24 Hours)     ** No results found for the last 24 hours. **          ----------------------------------------------------------------------------------------------------------------------    Assessment/Plan       Assessment/Plan     ASSESSMENT:    1.  Sepsis  2.  Bacteremia versus contaminant    PLAN:    Patient status post cholecystectomy.  Afebrile, no diarrhea.  WBC normal.  Currently on room air with no apparent distress.    Blood cultures on 8/3/2020 revealed no growth thus far.  She had 1 out of 1 positive blood cultures revealing staph coag negative at [facility].  This is believed to be contaminant.     Chest x-ray negative.  CT abdomen pelvis revealing cholelithiasis and diverticulosis. NM Hepatobiliary study within normal limits     Patient stable from ID standpoint.  ID will sign off for now.  Please contact us if we can further assist in this case.    Code Status:   Code Status and Medical Interventions:   Ordered at: 08/04/20 0156     Level Of Support Discussed With:    Patient     Code Status:    CPR     Medical Interventions (Level of Support Prior to Arrest):    Full       Izzy Luke, APRN  08/06/20  10:23

## 2020-08-07 ENCOUNTER — READMISSION MANAGEMENT (OUTPATIENT)
Dept: CALL CENTER | Facility: HOSPITAL | Age: 45
End: 2020-08-07

## 2020-08-07 LAB
BACTERIA SPEC AEROBE CULT: ABNORMAL
GRAM STN SPEC: ABNORMAL
ISOLATED FROM: ABNORMAL

## 2020-08-07 NOTE — OUTREACH NOTE
Prep Survey      Responses   [facility] patient discharged from?  [facility]   Is LACE score < 7 ?  No   Eligibility  Readm Mgmt   Discharge diagnosis  Symptomatic cholelithiasis   Does the patient have one of the following disease processes/diagnoses(primary or secondary)?  General Surgery   Does the patient have Home health ordered?  No   Is there a DME ordered?  No   General alerts for this patient  Hx: CHF    Prep survey completed?  Yes          Zora Gonzalez RN

## 2020-08-08 LAB — BACTERIA SPEC AEROBE CULT: NORMAL

## 2020-08-10 ENCOUNTER — READMISSION MANAGEMENT (OUTPATIENT)
Dept: CALL CENTER | Facility: HOSPITAL | Age: 45
End: 2020-08-10

## 2020-08-10 LAB
LAB AP CASE REPORT: NORMAL
PATH REPORT.FINAL DX SPEC: NORMAL

## 2020-08-10 NOTE — OUTREACH NOTE
General Surgery Week 1 Survey      Responses   [facility] patient discharged from?  [facility]   Does the patient have one of the following disease processes/diagnoses(primary or secondary)?  General Surgery   Is there a successful TCM telephone encounter documented?  No   Week 1 attempt successful?  Yes   Call start time  0957   Call end time  1004   Discharge diagnosis  Symptomatic cholelithiasis   Meds reviewed with patient/caregiver?  Yes   Is the patient having any side effects they believe may be caused by any medication additions or changes?  No   Does the patient have all medications related to this admission filled (includes all antibiotics, pain medications, etc.)  Yes   Is the patient taking all medications as directed (includes completed medication regime)?  Yes   Does the patient have a follow up appointment scheduled with their surgeon?  Yes   Has the patient kept scheduled appointments due by today?  N/A   Has home health visited the patient within 72 hours of discharge?  N/A   Psychosocial issues?  No   Did the patient receive a copy of their discharge instructions?  Yes   Nursing interventions  Reviewed instructions with patient, Educated on MyChart   What is the patient's perception of their health status since discharge?  Improving   Nursing interventions  Nurse provided patient education   Is the patient /caregiver able to teach back basic post-op care?  Continue use of incentive spirometry at least 1 week post discharge, Practice 'cough and deep breath', Lifting as instructed by MD in discharge instructions, Do not remove steri-strips, Keep incision areas clean,dry and protected, No tub bath, swimming, or hot tub until instructed by MD, Take showers only when approved by MD-sponge bathe until then, Drive as instructed by MD in discharge instructions   Is the patient/caregiver able to teach back signs and symptoms of incisional infection?  Increased redness, swelling or pain at the incisonal  site, Increased drainage or bleeding, Incisional warmth, Pus or odor from incision, Fever   Is the patient/caregiver able to teach back steps to recovery at home?  Set small, achievable goals for return to baseline health, Rest and rebuild strength, gradually increase activity   Is the patient/caregiver able to teach back the hierarchy of who to call/visit for symptoms/problems? PCP, Specialist, Home health nurse, Urgent Care, ED, 911  Yes   Additional teach back comments  pt states is weak, is hopeful to start feeling better soon   Week 1 call completed?  Yes          Stephanie Puckett RN

## 2020-08-11 ENCOUNTER — TELEPHONE (OUTPATIENT)
Dept: SURGERY | Facility: CLINIC | Age: 45
End: 2020-08-11

## 2020-08-11 NOTE — TELEPHONE ENCOUNTER
I tried calling patient to reconcile med list and go over allergies/history prior to her appt with us Thursday. There was no answer and no voicemail.

## 2020-08-13 ENCOUNTER — HOSPITAL ENCOUNTER (OUTPATIENT)
Dept: GENERAL RADIOLOGY | Facility: HOSPITAL | Age: 45
Discharge: HOME | End: 2020-08-13

## 2020-08-18 ENCOUNTER — OFFICE VISIT (OUTPATIENT)
Dept: SURGERY | Facility: CLINIC | Age: 45
End: 2020-08-18

## 2020-08-18 VITALS
SYSTOLIC BLOOD PRESSURE: 125 MMHG | OXYGEN SATURATION: 97 % | TEMPERATURE: 98.1 F | WEIGHT: 204 LBS | RESPIRATION RATE: 17 BRPM | BODY MASS INDEX: 32.02 KG/M2 | DIASTOLIC BLOOD PRESSURE: 83 MMHG | HEIGHT: 67 IN | HEART RATE: 89 BPM

## 2020-08-18 DIAGNOSIS — K81.9 CHOLECYSTITIS: Primary | ICD-10-CM

## 2020-08-18 PROCEDURE — 99024 POSTOP FOLLOW-UP VISIT: CPT | Performed by: SURGERY
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
## 2020-08-19 ENCOUNTER — READMISSION MANAGEMENT (OUTPATIENT)
Dept: CALL CENTER | Facility: HOSPITAL | Age: 45
End: 2020-08-19

## 2020-08-19 NOTE — OUTREACH NOTE
General Surgery Week 2 Survey      Responses   [facility] patient discharged from?  [facility]   Does the patient have one of the following disease processes/diagnoses(primary or secondary)?  General Surgery   Week 2 attempt successful?  No   Unsuccessful attempts  Attempt 1          Pratima Decker RN

## 2020-08-24 ENCOUNTER — READMISSION MANAGEMENT (OUTPATIENT)
Dept: CALL CENTER | Facility: HOSPITAL | Age: 45
End: 2020-08-24

## 2020-08-24 NOTE — OUTREACH NOTE
General Surgery Week 2 Survey      Responses   [facility] patient discharged from?  [facility]   Does the patient have one of the following disease processes/diagnoses(primary or secondary)?  General Surgery   Week 2 attempt successful?  No   Unsuccessful attempts  Attempt 2          Kiersten Cox RN

## 2020-08-28 ENCOUNTER — HOSPITAL ENCOUNTER (EMERGENCY)
Facility: HOSPITAL | Age: 45
Discharge: LEFT BEFORE DISCHARGE | End: 2020-08-29

## 2020-08-28 VITALS
OXYGEN SATURATION: 99 % | HEIGHT: 67 IN | TEMPERATURE: 98.9 F | RESPIRATION RATE: 18 BRPM | BODY MASS INDEX: 29.82 KG/M2 | HEART RATE: 105 BPM | DIASTOLIC BLOOD PRESSURE: 70 MMHG | SYSTOLIC BLOOD PRESSURE: 119 MMHG | WEIGHT: 190 LBS

## 2020-08-31 ENCOUNTER — READMISSION MANAGEMENT (OUTPATIENT)
Dept: CALL CENTER | Facility: HOSPITAL | Age: 45
End: 2020-08-31

## 2020-08-31 NOTE — OUTREACH NOTE
General Surgery Week 3 Survey      Responses   [facility] patient discharged from?  [facility]   Does the patient have one of the following disease processes/diagnoses(primary or secondary)?  General Surgery   Week 3 attempt successful?  Yes   Call start time  0831   Call end time  0836   Discharge diagnosis  Symptomatic cholelithiasis  Laparoscopic cholecystectomy 08/05/2020   Meds reviewed with patient/caregiver?  Yes   Is the patient taking all medications as directed (includes completed medication regime)?  Yes   Has the patient kept scheduled appointments due by today?  Yes   Comments  Surgeon said everything looked fine.   What is the patient's perception of their health status since discharge?  Improving   Week 3 call completed?  Yes   Revoked  No further contact(revokes)-requires comment   Graduated/Revoked comments  Goals met. Aware of Nurse Call Center.          Elaine Dobson RN

## 2020-11-30 NOTE — ED NOTES
"Outpatient Clinic Note  Date:  12/1/2020  PCP:  Joselin Winter DO    Chief Complaint   Patient presents with   â¢ Office Visit     HPI:  Emerald Saldana is a 47year old female presenting to [facility] for the following:    Chart review reveals the patient saw endocrinology for diabetes on 10/23/20. They continued her currently insulin regimen and discussed lifestyle change. She saw her PCP on 7/9/20 and had U/S of her L leg performed due to new pain and edema concerns which was negative and XR which showed OA and calcaneal spurs. Rash on shin of R leg  - very itchy and sore, for the past 5 days  - had something similar a couple years ago and dermatologist prescribed an ointment that was helpful (fluocinonide)- she can't remember what the diagnosis was  - has been using hydrocortisone and bacitracin a couple times a day, with lotion as well without improvement  - has some swellling in her L leg, but not R leg  - no open wounds  - doesn't shave so she doesn't think that irritated it  - has a history of ""weird\"" dermatologic problems  - occasionally gets weird fluid pimples on her face     Shower chair has broken  - needs a new chair, the leg is not sturdy  - has had this one for the past 4 years  - has a lot of problems with low back pain and fibromyalgia which limits her ability to stand for long periods, she is seeing the chiropractor here and working on lifestyle measures to lose weight (eliminated soda last month!)  - ambulates with cane at baseline    T2DM  - she eliminated soda last month and blood sugars have been improving  - states she relapsed a bit because of recent loss of a family member but is working through this    Review of Systems   Constitutional: Negative for chills and fever. HENT: Negative for congestion and rhinorrhea. Musculoskeletal: Positive for arthralgias, gait problem and myalgias. Skin: Positive for rash. Negative for color change and wound.        Problem List, Pertinent Surgical " Attempted to start IV at this time and failed.     Edilberto Pacheco, RN  01/13/20 2017     History, Family History, Allergies, Current Medications, as well as nursing notes were all reviewed and updated as indicated. PHYSICAL EXAM:    Physical Exam  Constitutional:       General: She is not in acute distress. Appearance: Normal appearance. HENT:      Head: Normocephalic and atraumatic. Right Ear: External ear normal.      Left Ear: External ear normal.      Nose: Nose normal.   Eyes:      Extraocular Movements: Extraocular movements intact. Conjunctiva/sclera: Conjunctivae normal.   Neck:      Musculoskeletal: Normal range of motion. Pulmonary:      Effort: Pulmonary effort is normal.   Musculoskeletal: Normal range of motion. Comments: Negative Isela's   Skin:     General: Skin is warm and dry. Findings: Rash (fine erythematous papular rash on front of R lower leg, not circumscribed, not warm, no skin breakdown) present. Neurological:      General: No focal deficit present. Mental Status: She is alert and oriented to person, place, and time. Psychiatric:         Mood and Affect: Mood normal.         Behavior: Behavior normal.         ASSESSMENT & PLAN:  Danna Torres was seen today for office visit. Diagnoses and all orders for this visit:    Dermatitis  - discussed differential diagnoses with the patient and feel strongly that this is not infectious either viral bacterial or fungal.  Does not appear plaque-like as an psoriasis. Would favor atopic dermatitis at this point. We discussed that fluocinonide is a very strong steroid. We discussed that usually next step would be triamcinolone. Given the patient's failure to respond to hydrocortisone therapy and response to fluocinonide in the past will represcribed this to use the smallest amount for the shortest duration possible. She voiced agreement. Discussed that if symptoms worsen or fail to improve we will likely need to reassess and or refer the patient to Dermatology.   -     fluocinonide (LIDEX) 0.05 % cream; Apply topically daily for 14 days. Chronic bilateral low back pain with bilateral sciatica  Right hip pain  Sacroiliac pain  Fibromyalgia  Morbid obesity with BMI of 45.0-49.9, adult (CMS/[facility])  - placed order for home care DME to deliver shower chair. Patient last received 1 about 4 years ago however the leg is broken on it. Due to obesity and low back pain and fibromyalgia she is unable to tolerate standing in the shower for a long period of time. She is dependent on a cane as well for ambulation and requires this assistance. -     SERVICE TO HOME CARE DME    Health Maintenance:  Health Maintenance Due   Topic Date Due   â¢ Hepatitis B Vaccine (1 of 3 - Risk 3-dose series) 06/16/1987   â¢ Shingles Vaccine (1 of 2) 06/16/2018   â¢ Diabetes Eye Exam  12/17/2019   â¢ Medicare Wellness Visit  05/28/2021   - we discussed vaccinations today. Given this rash we decided to hold off on shingles vaccination and defer to her Medicare wellness visit which was encouraged to be scheduled soon as possible. The patient states that she obtain hepatitis-B vaccine series in the past.    FOLLOW-UP:  Return in about 4 weeks (around 12/29/2020) for Medicare Wellness Visit. Jace Buitrago DO  Family [facility]  12/1/2020    20 minutes were spent with the patient, the majority of which was in face-to-face consultation re: the above concerns and coordination of care.

## 2020-12-23 ENCOUNTER — TELEPHONE (OUTPATIENT)
Dept: SURGERY | Facility: CLINIC | Age: 45
End: 2020-12-23

## 2021-01-09 ENCOUNTER — HOSPITAL ENCOUNTER (EMERGENCY)
Facility: HOSPITAL | Age: 46
Discharge: HOME | End: 2021-01-09
Attending: EMERGENCY MEDICINE | Admitting: EMERGENCY MEDICINE

## 2021-01-09 VITALS
WEIGHT: 190 LBS | SYSTOLIC BLOOD PRESSURE: 113 MMHG | HEART RATE: 80 BPM | BODY MASS INDEX: 29.82 KG/M2 | TEMPERATURE: 98.5 F | DIASTOLIC BLOOD PRESSURE: 71 MMHG | HEIGHT: 67 IN | RESPIRATION RATE: 18 BRPM | OXYGEN SATURATION: 100 %

## 2021-01-09 DIAGNOSIS — E11.621 DIABETIC ULCER OF RIGHT FIFTH TOE (HCC): Primary | ICD-10-CM

## 2021-01-09 DIAGNOSIS — L97.519 DIABETIC ULCER OF RIGHT FIFTH TOE (HCC): Primary | ICD-10-CM

## 2021-01-09 LAB
ALBUMIN SERPL-MCNC: 3.72 G/DL (ref 3.5–5.2)
ALBUMIN/GLOB SERPL: 1.2 G/DL
ALP SERPL-CCNC: 71 U/L (ref 39–117)
ALT SERPL W P-5'-P-CCNC: 40 U/L (ref 1–33)
ANION GAP SERPL CALCULATED.3IONS-SCNC: 5.9 MMOL/L (ref 5–15)
AST SERPL-CCNC: 29 U/L (ref 1–32)
BASOPHILS # BLD AUTO: 0.04 10*3/MM3 (ref 0–0.2)
BASOPHILS NFR BLD AUTO: 0.7 % (ref 0–1.5)
BILIRUB SERPL-MCNC: 0.3 MG/DL (ref 0–1.2)
BUN SERPL-MCNC: 16 MG/DL (ref 6–20)
BUN/CREAT SERPL: 16.8 (ref 7–25)
CALCIUM SPEC-SCNC: 8.6 MG/DL (ref 8.6–10.5)
CHLORIDE SERPL-SCNC: 108 MMOL/L (ref 98–107)
CO2 SERPL-SCNC: 28.1 MMOL/L (ref 22–29)
CREAT SERPL-MCNC: 0.95 MG/DL (ref 0.57–1)
CRP SERPL-MCNC: <0.3 MG/DL (ref 0–0.5)
DEPRECATED RDW RBC AUTO: 49.6 FL (ref 37–54)
EOSINOPHIL # BLD AUTO: 0.42 10*3/MM3 (ref 0–0.4)
EOSINOPHIL NFR BLD AUTO: 6.8 % (ref 0.3–6.2)
ERYTHROCYTE [DISTWIDTH] IN BLOOD BY AUTOMATED COUNT: 13.5 % (ref 12.3–15.4)
ERYTHROCYTE [SEDIMENTATION RATE] IN BLOOD: 8 MM/HR (ref 0–20)
GFR SERPL CREATININE-BSD FRML MDRD: 64 ML/MIN/1.73
GLOBULIN UR ELPH-MCNC: 3.1 GM/DL
GLUCOSE SERPL-MCNC: 89 MG/DL (ref 65–99)
HCT VFR BLD AUTO: 39.6 % (ref 34–46.6)
HGB BLD-MCNC: 12.4 G/DL (ref 12–15.9)
IMM GRANULOCYTES # BLD AUTO: 0.02 10*3/MM3 (ref 0–0.05)
IMM GRANULOCYTES NFR BLD AUTO: 0.3 % (ref 0–0.5)
LYMPHOCYTES # BLD AUTO: 1.56 10*3/MM3 (ref 0.7–3.1)
LYMPHOCYTES NFR BLD AUTO: 25.4 % (ref 19.6–45.3)
MCH RBC QN AUTO: 31 PG (ref 26.6–33)
MCHC RBC AUTO-ENTMCNC: 31.3 G/DL (ref 31.5–35.7)
MCV RBC AUTO: 99 FL (ref 79–97)
MONOCYTES # BLD AUTO: 0.57 10*3/MM3 (ref 0.1–0.9)
MONOCYTES NFR BLD AUTO: 9.3 % (ref 5–12)
NEUTROPHILS NFR BLD AUTO: 3.53 10*3/MM3 (ref 1.7–7)
NEUTROPHILS NFR BLD AUTO: 57.5 % (ref 42.7–76)
NRBC BLD AUTO-RTO: 0 /100 WBC (ref 0–0.2)
PLATELET # BLD AUTO: 214 10*3/MM3 (ref 140–450)
PMV BLD AUTO: 11 FL (ref 6–12)
POTASSIUM SERPL-SCNC: 3.6 MMOL/L (ref 3.5–5.2)
PROT SERPL-MCNC: 6.8 G/DL (ref 6–8.5)
RBC # BLD AUTO: 4 10*6/MM3 (ref 3.77–5.28)
SODIUM SERPL-SCNC: 142 MMOL/L (ref 136–145)
WBC # BLD AUTO: 6.14 10*3/MM3 (ref 3.4–10.8)

## 2021-01-09 PROCEDURE — 80053 COMPREHEN METABOLIC PANEL: CPT | Performed by: EMERGENCY MEDICINE

## 2021-01-09 PROCEDURE — 36415 COLL VENOUS BLD VENIPUNCTURE: CPT

## 2021-01-09 PROCEDURE — 86140 C-REACTIVE PROTEIN: CPT | Performed by: EMERGENCY MEDICINE

## 2021-01-09 PROCEDURE — 85652 RBC SED RATE AUTOMATED: CPT | Performed by: EMERGENCY MEDICINE

## 2021-01-09 PROCEDURE — 85025 COMPLETE CBC W/AUTO DIFF WBC: CPT | Performed by: EMERGENCY MEDICINE

## 2021-01-09 PROCEDURE — 87040 BLOOD CULTURE FOR BACTERIA: CPT | Performed by: EMERGENCY MEDICINE

## 2021-01-09 PROCEDURE — 99283 EMERGENCY DEPT VISIT LOW MDM: CPT

## 2021-01-09 RX ORDER — DOXYCYCLINE 100 MG/1
100 CAPSULE ORAL ONCE
Status: COMPLETED | OUTPATIENT
Start: 2021-01-09 | End: 2021-01-09

## 2021-01-09 RX ORDER — SODIUM CHLORIDE 0.9 % (FLUSH) 0.9 %
10 SYRINGE (ML) INJECTION AS NEEDED
Status: DISCONTINUED | OUTPATIENT
Start: 2021-01-09 | End: 2021-01-09

## 2021-01-09 RX ORDER — AMOXICILLIN AND CLAVULANATE POTASSIUM 875; 125 MG/1; MG/1
1 TABLET, FILM COATED ORAL 2 TIMES DAILY
Qty: 28 TABLET | Refills: 0 | Status: SHIPPED | OUTPATIENT
Start: 2021-01-09 | End: 2021-12-15

## 2021-01-09 RX ORDER — OXYCODONE AND ACETAMINOPHEN 10; 325 MG/1; MG/1
1 TABLET ORAL ONCE
Status: COMPLETED | OUTPATIENT
Start: 2021-01-09 | End: 2021-01-09

## 2021-01-09 RX ORDER — DOXYCYCLINE 100 MG/1
100 CAPSULE ORAL 2 TIMES DAILY
Qty: 28 CAPSULE | Refills: 0 | Status: SHIPPED | OUTPATIENT
Start: 2021-01-09 | End: 2021-12-15

## 2021-01-09 RX ORDER — AMOXICILLIN AND CLAVULANATE POTASSIUM 875; 125 MG/1; MG/1
1 TABLET, FILM COATED ORAL ONCE
Status: COMPLETED | OUTPATIENT
Start: 2021-01-09 | End: 2021-01-09

## 2021-01-09 RX ORDER — HYDROCODONE BITARTRATE AND ACETAMINOPHEN 5; 325 MG/1; MG/1
1 TABLET ORAL EVERY 6 HOURS PRN
Qty: 10 TABLET | Refills: 0 | Status: SHIPPED | OUTPATIENT
Start: 2021-01-09 | End: 2021-12-15

## 2021-01-09 RX ADMIN — AMOXICILLIN AND CLAVULANATE POTASSIUM 1 TABLET: 875; 125 TABLET, FILM COATED ORAL at 06:01

## 2021-01-09 RX ADMIN — OXYCODONE HYDROCHLORIDE AND ACETAMINOPHEN 1 TABLET: 10; 325 TABLET ORAL at 06:01

## 2021-01-09 RX ADMIN — DOXYCYCLINE 100 MG: 100 CAPSULE ORAL at 06:01

## 2021-01-09 NOTE — ED NOTES
Dr. Velazquez aware that we are enable to obtain IV access.     Chino Donaldson RN  01/09/21 0533

## 2021-01-09 NOTE — ED NOTES
Patient noted with ulcer noted to plantar surface of 5th digit of right foot.     Francine Camara, RN  01/09/21 0513
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
## 2021-01-14 LAB — BACTERIA SPEC AEROBE CULT: NORMAL

## 2021-03-15 ENCOUNTER — BULK ORDERING (OUTPATIENT)
Dept: CASE MANAGEMENT | Facility: OTHER | Age: 46
End: 2021-03-15

## 2021-03-15 DIAGNOSIS — Z23 IMMUNIZATION DUE: ICD-10-CM

## 2021-11-24 ENCOUNTER — APPOINTMENT (OUTPATIENT)
Dept: PHARMACY | Facility: HOSPITAL | Age: 46
End: 2021-11-24

## 2021-12-15 ENCOUNTER — DISEASE STATE MANAGEMENT VISIT (OUTPATIENT)
Dept: PHARMACY | Facility: HOSPITAL | Age: 46
End: 2021-12-15

## 2021-12-15 VITALS
SYSTOLIC BLOOD PRESSURE: 119 MMHG | WEIGHT: 213.5 LBS | DIASTOLIC BLOOD PRESSURE: 80 MMHG | BODY MASS INDEX: 32.36 KG/M2 | HEART RATE: 95 BPM | HEIGHT: 68 IN

## 2021-12-15 DIAGNOSIS — Z03.89 ENCOUNTER FOR OBSERVATION FOR OTHER SUSPECTED DISEASES AND CONDITIONS RULED OUT: ICD-10-CM

## 2021-12-15 DIAGNOSIS — B18.2 CHRONIC HEPATITIS C WITHOUT HEPATIC COMA: Primary | ICD-10-CM

## 2021-12-15 DIAGNOSIS — Z11.59 ENCOUNTER FOR SCREENING FOR OTHER VIRAL DISEASES: ICD-10-CM

## 2021-12-15 LAB
AMPHET+METHAMPHET UR QL: NEGATIVE
AMPHETAMINES UR QL: NEGATIVE
BARBITURATES UR QL SCN: NEGATIVE
BENZODIAZ UR QL SCN: NEGATIVE
BUPRENORPHINE SERPL-MCNC: NEGATIVE NG/ML
CANNABINOIDS SERPL QL: NEGATIVE
COCAINE UR QL: NEGATIVE
INR PPP: 0.91 (ref 0.9–1.1)
METHADONE UR QL SCN: NEGATIVE
OPIATES UR QL: NEGATIVE
OXYCODONE UR QL SCN: NEGATIVE
PCP UR QL SCN: NEGATIVE
PROPOXYPH UR QL: NEGATIVE
PROTHROMBIN TIME: 12.7 SECONDS (ref 12.8–14.5)
TRICYCLICS UR QL SCN: POSITIVE

## 2021-12-15 PROCEDURE — 82105 ALPHA-FETOPROTEIN SERUM: CPT

## 2021-12-15 PROCEDURE — G0432 EIA HIV-1/HIV-2 SCREEN: HCPCS

## 2021-12-15 PROCEDURE — 80053 COMPREHEN METABOLIC PANEL: CPT

## 2021-12-15 PROCEDURE — 85025 COMPLETE CBC W/AUTO DIFF WBC: CPT

## 2021-12-15 PROCEDURE — 85610 PROTHROMBIN TIME: CPT

## 2021-12-15 PROCEDURE — 87522 HEPATITIS C REVRS TRNSCRPJ: CPT

## 2021-12-15 PROCEDURE — 86706 HEP B SURFACE ANTIBODY: CPT

## 2021-12-15 PROCEDURE — 87902 NFCT AGT GNTYP ALYS HEP C: CPT

## 2021-12-15 PROCEDURE — 86708 HEPATITIS A ANTIBODY: CPT

## 2021-12-15 PROCEDURE — 86704 HEP B CORE ANTIBODY TOTAL: CPT

## 2021-12-15 PROCEDURE — 80306 DRUG TEST PRSMV INSTRMNT: CPT

## 2021-12-15 PROCEDURE — 84703 CHORIONIC GONADOTROPIN ASSAY: CPT

## 2021-12-15 PROCEDURE — 87340 HEPATITIS B SURFACE AG IA: CPT

## 2021-12-15 RX ORDER — CLONAZEPAM 1 MG/1
1 TABLET ORAL DAILY
COMMUNITY

## 2021-12-16 LAB
ALBUMIN SERPL-MCNC: 4.7 G/DL (ref 3.5–5.2)
ALBUMIN/GLOB SERPL: 1.2 G/DL
ALP SERPL-CCNC: 104 U/L (ref 39–117)
ALPHA-FETOPROTEIN: 5.31 NG/ML (ref 0–8.3)
ALT SERPL W P-5'-P-CCNC: 55 U/L (ref 1–33)
ANION GAP SERPL CALCULATED.3IONS-SCNC: 15.1 MMOL/L (ref 5–15)
AST SERPL-CCNC: 30 U/L (ref 1–32)
BASOPHILS # BLD AUTO: 0.06 10*3/MM3 (ref 0–0.2)
BASOPHILS NFR BLD AUTO: 1 % (ref 0–1.5)
BILIRUB SERPL-MCNC: 0.4 MG/DL (ref 0–1.2)
BUN SERPL-MCNC: 16 MG/DL (ref 6–20)
BUN/CREAT SERPL: 16.7 (ref 7–25)
CALCIUM SPEC-SCNC: 9.6 MG/DL (ref 8.6–10.5)
CHLORIDE SERPL-SCNC: 106 MMOL/L (ref 98–107)
CO2 SERPL-SCNC: 16.9 MMOL/L (ref 22–29)
CREAT SERPL-MCNC: 0.96 MG/DL (ref 0.57–1)
DEPRECATED RDW RBC AUTO: 42.7 FL (ref 37–54)
EOSINOPHIL # BLD AUTO: 0.49 10*3/MM3 (ref 0–0.4)
EOSINOPHIL NFR BLD AUTO: 7.8 % (ref 0.3–6.2)
ERYTHROCYTE [DISTWIDTH] IN BLOOD BY AUTOMATED COUNT: 11.8 % (ref 12.3–15.4)
GFR SERPL CREATININE-BSD FRML MDRD: 63 ML/MIN/1.73
GLOBULIN UR ELPH-MCNC: 3.9 GM/DL
GLUCOSE SERPL-MCNC: 75 MG/DL (ref 65–99)
HBV SURFACE AB SER RIA-ACNC: REACTIVE
HBV SURFACE AG SERPL QL IA: NORMAL
HCG SERPL QL: NEGATIVE
HCT VFR BLD AUTO: 43.5 % (ref 34–46.6)
HGB BLD-MCNC: 14.5 G/DL (ref 12–15.9)
HIV1+2 AB SER QL: NORMAL
IMM GRANULOCYTES # BLD AUTO: 0.02 10*3/MM3 (ref 0–0.05)
IMM GRANULOCYTES NFR BLD AUTO: 0.3 % (ref 0–0.5)
LYMPHOCYTES # BLD AUTO: 1.79 10*3/MM3 (ref 0.7–3.1)
LYMPHOCYTES NFR BLD AUTO: 28.4 % (ref 19.6–45.3)
MCH RBC QN AUTO: 33.3 PG (ref 26.6–33)
MCHC RBC AUTO-ENTMCNC: 33.3 G/DL (ref 31.5–35.7)
MCV RBC AUTO: 99.8 FL (ref 79–97)
MONOCYTES # BLD AUTO: 0.47 10*3/MM3 (ref 0.1–0.9)
MONOCYTES NFR BLD AUTO: 7.4 % (ref 5–12)
NEUTROPHILS NFR BLD AUTO: 3.48 10*3/MM3 (ref 1.7–7)
NEUTROPHILS NFR BLD AUTO: 55.1 % (ref 42.7–76)
NRBC BLD AUTO-RTO: 0 /100 WBC (ref 0–0.2)
PLATELET # BLD AUTO: 286 10*3/MM3 (ref 140–450)
PMV BLD AUTO: 11.2 FL (ref 6–12)
POTASSIUM SERPL-SCNC: 3.8 MMOL/L (ref 3.5–5.2)
PROT SERPL-MCNC: 8.6 G/DL (ref 6–8.5)
RBC # BLD AUTO: 4.36 10*6/MM3 (ref 3.77–5.28)
SODIUM SERPL-SCNC: 138 MMOL/L (ref 136–145)
WBC NRBC COR # BLD: 6.31 10*3/MM3 (ref 3.4–10.8)

## 2021-12-17 LAB
HAV AB SER QL IA: NEGATIVE
HBV CORE AB SERPL QL IA: NEGATIVE

## 2021-12-18 LAB
HCV GENTYP SERPL NAA+PROBE: 3
HCV GENTYP SERPL NAA+PROBE: NORMAL
HCV RNA SERPL NAA+PROBE-ACNC: NORMAL IU/ML
HCV RNA SERPL NAA+PROBE-LOG IU: 5.67 LOG10 IU/ML
LABORATORY COMMENT REPORT: NORMAL
REF LAB TEST REF RANGE: NORMAL

## 2021-12-20 LAB
A2 MACROGLOB SERPL-MCNC: 239 MG/DL (ref 110–276)
ALT SERPL W P-5'-P-CCNC: 64 IU/L (ref 0–40)
APO A-I SERPL-MCNC: 173 MG/DL (ref 116–209)
BILIRUB SERPL-MCNC: 0.4 MG/DL (ref 0–1.2)
FIBROSIS SCORING:: ABNORMAL
FIBROSIS STAGE SERPL QL: ABNORMAL
GGT SERPL-CCNC: 23 IU/L (ref 0–60)
HAPTOGLOB SERPL-MCNC: 78 MG/DL (ref 42–296)
HCV AB SER QL: ABNORMAL
LABORATORY COMMENT REPORT: ABNORMAL
LIVER FIBR SCORE SERPL CALC.FIBROSURE: 0.16 (ref 0–0.21)
NECROINFLAMM ACTIVITY SCORING:: ABNORMAL
NECROINFLAMMATORY ACT GRADE SERPL QL: ABNORMAL
NECROINFLAMMATORY ACT SCORE SERPL: 0.33 (ref 0–0.17)
SERVICE CMNT-IMP: ABNORMAL

## 2021-12-27 ENCOUNTER — APPOINTMENT (OUTPATIENT)
Dept: ULTRASOUND IMAGING | Facility: HOSPITAL | Age: 46
End: 2021-12-27

## 2021-12-29 ENCOUNTER — APPOINTMENT (OUTPATIENT)
Dept: PHARMACY | Facility: HOSPITAL | Age: 46
End: 2021-12-29

## 2022-01-03 ENCOUNTER — APPOINTMENT (OUTPATIENT)
Dept: ULTRASOUND IMAGING | Facility: HOSPITAL | Age: 47
End: 2022-01-03

## 2022-01-05 ENCOUNTER — APPOINTMENT (OUTPATIENT)
Dept: PHARMACY | Facility: HOSPITAL | Age: 47
End: 2022-01-05

## 2022-01-06 ENCOUNTER — HOSPITAL ENCOUNTER (OUTPATIENT)
Dept: ULTRASOUND IMAGING | Facility: HOSPITAL | Age: 47
End: 2022-01-06

## 2022-01-10 ENCOUNTER — HOSPITAL ENCOUNTER (OUTPATIENT)
Dept: ULTRASOUND IMAGING | Facility: HOSPITAL | Age: 47
Discharge: HOME | End: 2022-01-10
Admitting: PHYSICIAN ASSISTANT

## 2022-01-10 DIAGNOSIS — B18.2 CHRONIC HEPATITIS C WITHOUT HEPATIC COMA: ICD-10-CM

## 2022-01-10 PROCEDURE — 76705 ECHO EXAM OF ABDOMEN: CPT | Performed by: RADIOLOGY

## 2022-01-10 PROCEDURE — 76705 ECHO EXAM OF ABDOMEN: CPT

## 2022-01-12 ENCOUNTER — DISEASE STATE MANAGEMENT VISIT (OUTPATIENT)
Dept: PHARMACY | Facility: HOSPITAL | Age: 47
End: 2022-01-12

## 2022-01-12 ENCOUNTER — SPECIALTY PHARMACY (OUTPATIENT)
Dept: PHARMACY | Facility: HOSPITAL | Age: 47
End: 2022-01-12

## 2022-01-12 ENCOUNTER — TELEPHONE (OUTPATIENT)
Dept: GASTROENTEROLOGY | Facility: CLINIC | Age: 47
End: 2022-01-12

## 2022-01-12 VITALS
BODY MASS INDEX: 32.96 KG/M2 | WEIGHT: 210 LBS | HEIGHT: 67 IN | HEART RATE: 125 BPM | DIASTOLIC BLOOD PRESSURE: 83 MMHG | SYSTOLIC BLOOD PRESSURE: 124 MMHG

## 2022-01-12 DIAGNOSIS — B18.2 CHRONIC HEPATITIS C WITHOUT HEPATIC COMA: Primary | ICD-10-CM

## 2022-01-12 PROCEDURE — 99214 OFFICE O/P EST MOD 30 MIN: CPT | Performed by: PHYSICIAN ASSISTANT

## 2022-01-12 RX ORDER — GLECAPREVIR AND PIBRENTASVIR 40; 100 MG/1; MG/1
3 TABLET, FILM COATED ORAL DAILY
Qty: 84 TABLET | Refills: 1
Start: 2022-01-12 | End: 2022-01-12 | Stop reason: SDUPTHER

## 2022-01-12 RX ORDER — GLECAPREVIR AND PIBRENTASVIR 40; 100 MG/1; MG/1
3 TABLET, FILM COATED ORAL DAILY
Qty: 84 TABLET | Refills: 1 | Status: SHIPPED | OUTPATIENT
Start: 2022-01-12 | End: 2022-01-14 | Stop reason: CLARIF
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
## 2022-01-12 NOTE — TELEPHONE ENCOUNTER
Please let Patient's PCP, Tamela Aguilera from [facility] in [place], know that patient's CO2 level was 16.9 on recent labs.
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
## 2022-01-14 ENCOUNTER — SPECIALTY PHARMACY (OUTPATIENT)
Dept: PHARMACY | Facility: HOSPITAL | Age: 47
End: 2022-01-14

## 2022-01-14 RX ORDER — VELPATASVIR AND SOFOSBUVIR 100; 400 MG/1; MG/1
1 TABLET, FILM COATED ORAL DAILY
Qty: 28 TABLET | Refills: 2 | Status: SHIPPED | OUTPATIENT
Start: 2022-01-14
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
## 2022-01-18 ENCOUNTER — SPECIALTY PHARMACY (OUTPATIENT)
Dept: PHARMACY | Facility: HOSPITAL | Age: 47
End: 2022-01-18

## 2022-01-18 NOTE — PROGRESS NOTES
Initial Education Provided for Epclusa  An initial outreach was conducted, including assessment of therapy appropriateness and specialty medication education for [facility]. The patient was introduced to services offered by [facility], including: regular assessments, refill coordination, curbside pick-up or mail order delivery options, prior authorization maintenance, and financial assistance programs as applicable. The patient was also provided with contact information for the pharmacy team.     The patient has been provided with the following education. All questions and concerns have been addressed prior to the patient receiving the medication, and the patient has verbalized understanding of the education and any materials provided.  Additional patient education shall be provided and documented upon request by the patient, provider or payer.      Patient was counseled on new medication Epclusa (sofosbuvir and velpatasvir)     - This medication is used to treat hepatitis C infection and the goal of this treatment is to cure Hepatitis C.   - Take 1 tablet by mouth at the same time each day, with or without food.   - You will take this for a total of 12 weeks    - Frequently reported side effects of this drug include: headache, loss of energy, nausea and diarrhea.     - Go to the ED or call 911 with signs of a significant reaction including wheezing; chest tightness; fever; itching; bad cough; blue skin color; seizures; or swelling of face, lips, tongue or throat.   - Hepatic decompensation and hepatic failure have been reported. This typically occurs within the first 4 weeks of treatment initiation. Talk to your doctor right away if you notice dark urine, fatigue, lack of appetite, nausea, abdominal pain, light-colored stools, vomiting or yellow skin.       - Be sure to follow up with our clinic 4 weeks after starting the medication to ensure you are tolerating the medication well and that you  are responding appropriately to the medication.   - Make sure to tell your doctor or pharmacist about all medications you are taking, including herbal supplements and OTC products.    - Do not stop taking this medication without talking to your provider.     - It is very important that you do not miss a dose of this medication.  Use a pill planner, medication adherence edilberto or other tool to help you remember how to take your medication.    - If you do miss a dose, take the missed dose the same day as soon as you remember and your next dose at the regular time the next day. Do not take more than 1 tablet in a day.     - Keep this medication away from extreme temperatures or moisture exposure. Store at room temperature.     Patient Specific Counseling Points:     - Females of childbearing potential should consider postponing pregnancy until therapy is complete to reduce the risk of HCV transmission.   - This medication should not be used in pregnant females and it is not known if the medication is present in breast milk.   - Ms. Holland denies possibility of pregnancy or breastfeeding.     Welcome information and patient satisfaction survey to be sent by retail team with patient's initial fill.  Care Coordinator to set up future refill outreaches, coordinate prescription delivery, and escalate clinical questions to pharmacist.     Adherence and Self-Administration  • Barriers to Patient Adherence and/or Self-Administration:None  • Methods for Supporting Patient Adherence and/or Self-Administration: None Required    Associated Vaccinations     Your chronic liver disease puts you at risk for serious complications if you get infected with hepatitis A virus.  If you've never been vaccinated against hepatitis A, you need 2 doses of this vaccine, usually spaced 6-19 months apart.     If you already have chronic hepatitis B infection, you won't need a hepatitis B vaccine.  However, if you do not have sufficient Hepatitis B  antibodies (either not vaccinated or insufficient response to vaccination), you should get the Hepatitis B vaccination series.  The vaccine is given in 2 or 3 doses, depending on the brand.     A combination A & B vaccination is also available if both are needed.     a. Contraindications: Severe allergic reaction (eg, anaphylaxis) after a previous dose of any hepatitis A-containing or hepatitis B-containing vaccine or any component of the formulation, including yeast and neomycin.   b. Precautions: Consider deferring administration in patients with moderate or severe acute illness.  Use with caution in patients with bleeding disorders or severely immunocompromised patients    Alisa Stephaniepancho was counseled that the following immunizations are recommended: Hep A vaccine     The patient would like mail out specialty services.     Siomara Ruiz, PharmD  01/18/22  13:17 EST

## 2022-02-08 ENCOUNTER — SPECIALTY PHARMACY (OUTPATIENT)
Dept: PHARMACY | Facility: HOSPITAL | Age: 47
End: 2022-02-08

## 2022-02-09 ENCOUNTER — APPOINTMENT (OUTPATIENT)
Dept: PHARMACY | Facility: HOSPITAL | Age: 47
End: 2022-02-09

## 2022-02-09 ENCOUNTER — DISEASE STATE MANAGEMENT VISIT (OUTPATIENT)
Dept: PHARMACY | Facility: HOSPITAL | Age: 47
End: 2022-02-09

## 2022-02-09 VITALS
HEIGHT: 67 IN | DIASTOLIC BLOOD PRESSURE: 84 MMHG | BODY MASS INDEX: 35.31 KG/M2 | WEIGHT: 225 LBS | HEART RATE: 104 BPM | SYSTOLIC BLOOD PRESSURE: 139 MMHG

## 2022-02-09 DIAGNOSIS — R19.7 DIARRHEA, UNSPECIFIED TYPE: Primary | ICD-10-CM

## 2022-02-09 PROCEDURE — 99213 OFFICE O/P EST LOW 20 MIN: CPT | Performed by: PHYSICIAN ASSISTANT

## 2022-02-09 PROCEDURE — 87522 HEPATITIS C REVRS TRNSCRPJ: CPT

## 2022-02-09 PROCEDURE — 80053 COMPREHEN METABOLIC PANEL: CPT
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
## 2022-02-10 LAB
ALBUMIN SERPL-MCNC: 3.8 G/DL (ref 3.5–5.2)
ALBUMIN/GLOB SERPL: 1.1 G/DL
ALP SERPL-CCNC: 108 U/L (ref 39–117)
ALT SERPL W P-5'-P-CCNC: 15 U/L (ref 1–33)
ANION GAP SERPL CALCULATED.3IONS-SCNC: 10.4 MMOL/L (ref 5–15)
AST SERPL-CCNC: 25 U/L (ref 1–32)
BILIRUB SERPL-MCNC: 0.2 MG/DL (ref 0–1.2)
BUN SERPL-MCNC: 8 MG/DL (ref 6–20)
BUN/CREAT SERPL: 8.6 (ref 7–25)
CALCIUM SPEC-SCNC: 9.2 MG/DL (ref 8.6–10.5)
CHLORIDE SERPL-SCNC: 108 MMOL/L (ref 98–107)
CO2 SERPL-SCNC: 23.6 MMOL/L (ref 22–29)
CREAT SERPL-MCNC: 0.93 MG/DL (ref 0.57–1)
GFR SERPL CREATININE-BSD FRML MDRD: 65 ML/MIN/1.73
GLOBULIN UR ELPH-MCNC: 3.4 GM/DL
GLUCOSE SERPL-MCNC: 90 MG/DL (ref 65–99)
POTASSIUM SERPL-SCNC: 3.9 MMOL/L (ref 3.5–5.2)
PROT SERPL-MCNC: 7.2 G/DL (ref 6–8.5)
SODIUM SERPL-SCNC: 142 MMOL/L (ref 136–145)

## 2022-02-12 LAB
HCV RNA SERPL NAA+PROBE-ACNC: <15 IU/ML
TEST INFORMATION: NORMAL

## 2022-02-15 ENCOUNTER — TELEPHONE (OUTPATIENT)
Dept: GASTROENTEROLOGY | Facility: CLINIC | Age: 47
End: 2022-02-15

## 2022-02-15 DIAGNOSIS — B18.2 CHRONIC HEPATITIS C WITHOUT HEPATIC COMA: Primary | ICD-10-CM

## 2022-02-15 NOTE — TELEPHONE ENCOUNTER
Please notify patient that HCV level was <15 on recent labs.  Liver enzymes were normal as well.  Stress the importance of completing this month of medication as scheduled.  Have patient return in 4 weeks for next set of labs to check viral load and liver enzymes.

## 2022-02-16 ENCOUNTER — APPOINTMENT (OUTPATIENT)
Dept: PHARMACY | Facility: HOSPITAL | Age: 47
End: 2022-02-16

## 2022-03-01 ENCOUNTER — SPECIALTY PHARMACY (OUTPATIENT)
Dept: PHARMACY | Facility: HOSPITAL | Age: 47
End: 2022-03-01
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
## 2022-03-21 ENCOUNTER — HOSPITAL ENCOUNTER (OUTPATIENT)
Dept: GENERAL RADIOLOGY | Facility: HOSPITAL | Age: 47
Discharge: HOME | End: 2022-03-21
Admitting: PHYSICIAN ASSISTANT

## 2022-03-21 ENCOUNTER — TELEPHONE (OUTPATIENT)
Dept: GASTROENTEROLOGY | Facility: CLINIC | Age: 47
End: 2022-03-21

## 2022-03-21 DIAGNOSIS — R19.7 DIARRHEA, UNSPECIFIED TYPE: ICD-10-CM

## 2022-03-21 PROCEDURE — 74018 RADEX ABDOMEN 1 VIEW: CPT

## 2022-03-21 PROCEDURE — 74018 RADEX ABDOMEN 1 VIEW: CPT | Performed by: RADIOLOGY

## 2022-03-21 NOTE — TELEPHONE ENCOUNTER
Please let patient know that her xray revealed constipation, which is causing the overflow diarrhea.  Ask her to start daily Miralax 17 gm.  Thanks.

## 2022-03-30 ENCOUNTER — DISEASE STATE MANAGEMENT VISIT (OUTPATIENT)
Dept: PHARMACY | Facility: HOSPITAL | Age: 47
End: 2022-03-30

## 2022-03-30 VITALS
HEIGHT: 67 IN | DIASTOLIC BLOOD PRESSURE: 99 MMHG | BODY MASS INDEX: 34.53 KG/M2 | HEART RATE: 92 BPM | WEIGHT: 220 LBS | SYSTOLIC BLOOD PRESSURE: 154 MMHG

## 2022-03-30 DIAGNOSIS — B18.2 CHRONIC HEPATITIS C WITHOUT HEPATIC COMA: Primary | ICD-10-CM

## 2022-03-30 PROCEDURE — 87522 HEPATITIS C REVRS TRNSCRPJ: CPT

## 2022-03-30 PROCEDURE — 80053 COMPREHEN METABOLIC PANEL: CPT

## 2022-03-30 PROCEDURE — 99213 OFFICE O/P EST LOW 20 MIN: CPT | Performed by: PHYSICIAN ASSISTANT

## 2022-03-30 RX ORDER — POLYETHYLENE GLYCOL 3350 17 G/17G
510 POWDER, FOR SOLUTION ORAL ONCE
Qty: 510 G | Refills: 0 | Status: SHIPPED | OUTPATIENT
Start: 2022-03-30 | End: 2022-03-30
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
## 2022-03-31 LAB
ALBUMIN SERPL-MCNC: 4.5 G/DL (ref 3.5–5.2)
ALBUMIN/GLOB SERPL: 1.3 G/DL
ALP SERPL-CCNC: 113 U/L (ref 39–117)
ALT SERPL W P-5'-P-CCNC: 14 U/L (ref 1–33)
ANION GAP SERPL CALCULATED.3IONS-SCNC: 14 MMOL/L (ref 5–15)
AST SERPL-CCNC: 19 U/L (ref 1–32)
BILIRUB SERPL-MCNC: 0.4 MG/DL (ref 0–1.2)
BUN SERPL-MCNC: 12 MG/DL (ref 6–20)
BUN/CREAT SERPL: 15.2 (ref 7–25)
CALCIUM SPEC-SCNC: 9.4 MG/DL (ref 8.6–10.5)
CHLORIDE SERPL-SCNC: 102 MMOL/L (ref 98–107)
CO2 SERPL-SCNC: 23 MMOL/L (ref 22–29)
CREAT SERPL-MCNC: 0.79 MG/DL (ref 0.57–1)
EGFRCR SERPLBLD CKD-EPI 2021: 94.1 ML/MIN/1.73
GLOBULIN UR ELPH-MCNC: 3.6 GM/DL
GLUCOSE SERPL-MCNC: 72 MG/DL (ref 65–99)
POTASSIUM SERPL-SCNC: 3.8 MMOL/L (ref 3.5–5.2)
PROT SERPL-MCNC: 8.1 G/DL (ref 6–8.5)
SODIUM SERPL-SCNC: 139 MMOL/L (ref 136–145)

## 2022-04-01 LAB
HCV RNA SERPL NAA+PROBE-ACNC: NORMAL IU/ML
TEST INFORMATION: NORMAL

## 2022-04-08 ENCOUNTER — TELEPHONE (OUTPATIENT)
Dept: PHARMACY | Facility: HOSPITAL | Age: 47
End: 2022-04-08

## 2022-04-08 NOTE — TELEPHONE ENCOUNTER
Ms. Holland called today to let us know that she has had a 50lb weight gain in the last 3 months.  She states she went to the ED last night and was told nothing was wrong.  Reports she went to [facility]. She said they didn't do anything to check her heart.  She states she is going to stop taking Epclusa. I asked if she was instructed to stop by ED and she says no.     Advised her that stopping medication would decrease chances of being cured.  Since she felt like she was not evaluated properly advised her to return to the ED for further evaluation or contact her PCP.

## 2022-04-11 NOTE — TELEPHONE ENCOUNTER
Can we find out how long she has left of her Epclusa?  And also if she is having any other symptoms.  Thanks.

## 2022-04-12 ENCOUNTER — TELEPHONE (OUTPATIENT)
Dept: GASTROENTEROLOGY | Facility: CLINIC | Age: 47
End: 2022-04-12

## 2022-04-12 DIAGNOSIS — B18.2 CHRONIC HEPATITIS C WITHOUT HEPATIC COMA: Primary | ICD-10-CM

## 2022-04-12 NOTE — TELEPHONE ENCOUNTER
Please notify patient that HCV was not detected on recent labs.  Liver enzymes were normal as well.  See if she is willing to complete the remaining medication as scheduled.  Have patient return in 4 weeks for end of treatment labs to check viral load and liver enzymes.

## 2022-04-14 ENCOUNTER — TELEPHONE (OUTPATIENT)
Dept: GASTROENTEROLOGY | Facility: CLINIC | Age: 47
End: 2022-04-14

## 2022-04-14 DIAGNOSIS — B18.2 CHRONIC HEPATITIS C WITHOUT HEPATIC COMA: Primary | ICD-10-CM

## 2022-04-14 NOTE — TELEPHONE ENCOUNTER
Spoke with patient and she is reporting that her symptoms are not getting better and feels like she may have more weight gain. I called Gi clinic and scheduled for 04/20/22 but patient has appointment in [place] that day. Patient rescheduled with GI office but cannot get her in till June 4, 2022. I can schedule her in the clinic 04/27/2022.

## 2022-04-14 NOTE — TELEPHONE ENCOUNTER
I have ordered a CT scan of abdomen for patient due to her significant weight gain during Epclusa treatment.  I have tried to contact patient with no success.  Please continue to try to reach her.  Thank you.

## 2022-04-18 ENCOUNTER — APPOINTMENT (OUTPATIENT)
Dept: CT IMAGING | Facility: HOSPITAL | Age: 47
End: 2022-04-18

## 2022-06-29 ENCOUNTER — TELEPHONE (OUTPATIENT)
Dept: PHARMACY | Facility: HOSPITAL | Age: 47
End: 2022-06-29

## 2022-06-29 NOTE — TELEPHONE ENCOUNTER
No Answer/Busy - called [phone] and the mailbox was full; called [phone] and the line keeps ringing. 6/29/22 precious

## 2023-02-23 ENCOUNTER — APPOINTMENT (OUTPATIENT)
Dept: CT IMAGING | Facility: HOSPITAL | Age: 48
End: 2023-02-23
Payer: MEDICARE

## 2023-02-23 ENCOUNTER — APPOINTMENT (OUTPATIENT)
Dept: NUCLEAR MEDICINE | Facility: HOSPITAL | Age: 48
End: 2023-02-23
Payer: MEDICARE

## 2023-02-23 ENCOUNTER — HOSPITAL ENCOUNTER (EMERGENCY)
Facility: HOSPITAL | Age: 48
Discharge: HOME | End: 2023-02-23
Attending: EMERGENCY MEDICINE | Admitting: STUDENT IN AN ORGANIZED HEALTH CARE EDUCATION/TRAINING PROGRAM
Payer: MEDICARE

## 2023-02-23 ENCOUNTER — APPOINTMENT (OUTPATIENT)
Dept: ULTRASOUND IMAGING | Facility: HOSPITAL | Age: 48
End: 2023-02-23
Payer: MEDICARE

## 2023-02-23 VITALS
OXYGEN SATURATION: 97 % | BODY MASS INDEX: 31.39 KG/M2 | RESPIRATION RATE: 18 BRPM | HEIGHT: 67 IN | SYSTOLIC BLOOD PRESSURE: 110 MMHG | HEART RATE: 108 BPM | TEMPERATURE: 97.7 F | WEIGHT: 200 LBS | DIASTOLIC BLOOD PRESSURE: 76 MMHG

## 2023-02-23 DIAGNOSIS — J18.9 PNEUMONIA OF BOTH LOWER LOBES DUE TO INFECTIOUS ORGANISM: Primary | ICD-10-CM

## 2023-02-23 LAB
BASOPHILS # BLD AUTO: 0.04 10*3/MM3 (ref 0–0.2)
BASOPHILS NFR BLD AUTO: 0.3 % (ref 0–1.5)
DEPRECATED RDW RBC AUTO: 45.6 FL (ref 37–54)
EOSINOPHIL # BLD AUTO: 0.41 10*3/MM3 (ref 0–0.4)
EOSINOPHIL NFR BLD AUTO: 3 % (ref 0.3–6.2)
ERYTHROCYTE [DISTWIDTH] IN BLOOD BY AUTOMATED COUNT: 12.6 % (ref 12.3–15.4)
FLUAV RNA RESP QL NAA+PROBE: NOT DETECTED
FLUBV RNA RESP QL NAA+PROBE: NOT DETECTED
HCT VFR BLD AUTO: 42 % (ref 34–46.6)
HGB BLD-MCNC: 13.6 G/DL (ref 12–15.9)
IMM GRANULOCYTES # BLD AUTO: 0.04 10*3/MM3 (ref 0–0.05)
IMM GRANULOCYTES NFR BLD AUTO: 0.3 % (ref 0–0.5)
LYMPHOCYTES # BLD AUTO: 2.43 10*3/MM3 (ref 0.7–3.1)
LYMPHOCYTES NFR BLD AUTO: 18 % (ref 19.6–45.3)
MCH RBC QN AUTO: 32.3 PG (ref 26.6–33)
MCHC RBC AUTO-ENTMCNC: 32.4 G/DL (ref 31.5–35.7)
MCV RBC AUTO: 99.8 FL (ref 79–97)
MONOCYTES # BLD AUTO: 0.75 10*3/MM3 (ref 0.1–0.9)
MONOCYTES NFR BLD AUTO: 5.6 % (ref 5–12)
NEUTROPHILS NFR BLD AUTO: 72.8 % (ref 42.7–76)
NEUTROPHILS NFR BLD AUTO: 9.83 10*3/MM3 (ref 1.7–7)
NRBC BLD AUTO-RTO: 0 /100 WBC (ref 0–0.2)
PLATELET # BLD AUTO: 242 10*3/MM3 (ref 140–450)
PMV BLD AUTO: 10.3 FL (ref 6–12)
RBC # BLD AUTO: 4.21 10*6/MM3 (ref 3.77–5.28)
SARS-COV-2 RNA RESP QL NAA+PROBE: NOT DETECTED
WBC NRBC COR # BLD: 13.5 10*3/MM3 (ref 3.4–10.8)

## 2023-02-23 PROCEDURE — 0 TECHNETIUM ALBUMIN AGGREGATED: Performed by: STUDENT IN AN ORGANIZED HEALTH CARE EDUCATION/TRAINING PROGRAM

## 2023-02-23 PROCEDURE — 70450 CT HEAD/BRAIN W/O DYE: CPT

## 2023-02-23 PROCEDURE — 99283 EMERGENCY DEPT VISIT LOW MDM: CPT

## 2023-02-23 PROCEDURE — 85025 COMPLETE CBC W/AUTO DIFF WBC: CPT | Performed by: EMERGENCY MEDICINE

## 2023-02-23 PROCEDURE — 78582 LUNG VENTILAT&PERFUS IMAGING: CPT

## 2023-02-23 PROCEDURE — 0 TECHNETIUM TC 99M PENTETATE INHALER: Performed by: STUDENT IN AN ORGANIZED HEALTH CARE EDUCATION/TRAINING PROGRAM

## 2023-02-23 PROCEDURE — 93970 EXTREMITY STUDY: CPT

## 2023-02-23 PROCEDURE — 71250 CT THORAX DX C-: CPT

## 2023-02-23 PROCEDURE — 93005 ELECTROCARDIOGRAM TRACING: CPT | Performed by: EMERGENCY MEDICINE

## 2023-02-23 PROCEDURE — 36415 COLL VENOUS BLD VENIPUNCTURE: CPT

## 2023-02-23 PROCEDURE — A9567 TECHNETIUM TC-99M AEROSOL: HCPCS | Performed by: STUDENT IN AN ORGANIZED HEALTH CARE EDUCATION/TRAINING PROGRAM

## 2023-02-23 PROCEDURE — 93010 ELECTROCARDIOGRAM REPORT: CPT | Performed by: INTERNAL MEDICINE

## 2023-02-23 PROCEDURE — 87636 SARSCOV2 & INF A&B AMP PRB: CPT | Performed by: STUDENT IN AN ORGANIZED HEALTH CARE EDUCATION/TRAINING PROGRAM

## 2023-02-23 PROCEDURE — A9540 TC99M MAA: HCPCS | Performed by: STUDENT IN AN ORGANIZED HEALTH CARE EDUCATION/TRAINING PROGRAM

## 2023-02-23 RX ORDER — SODIUM CHLORIDE 0.9 % (FLUSH) 0.9 %
10 SYRINGE (ML) INJECTION AS NEEDED
Status: DISCONTINUED | OUTPATIENT
Start: 2023-02-23 | End: 2023-02-24 | Stop reason: HOSPADM

## 2023-02-23 RX ORDER — PREDNISONE 50 MG/1
50 TABLET ORAL DAILY
Qty: 5 TABLET | Refills: 0 | Status: SHIPPED | OUTPATIENT
Start: 2023-02-23

## 2023-02-23 RX ORDER — LEVOFLOXACIN 750 MG/1
750 TABLET ORAL ONCE
Status: COMPLETED | OUTPATIENT
Start: 2023-02-23 | End: 2023-02-23

## 2023-02-23 RX ORDER — LEVOFLOXACIN 5 MG/ML
750 INJECTION, SOLUTION INTRAVENOUS ONCE
Status: DISCONTINUED | OUTPATIENT
Start: 2023-02-23 | End: 2023-02-23

## 2023-02-23 RX ADMIN — KIT FOR THE PREPARATION OF TECHNETIUM TC 99M ALBUMIN AGGREGATED 1 DOSE: 2.5 INJECTION, POWDER, FOR SOLUTION INTRAVENOUS at 21:04

## 2023-02-23 RX ADMIN — KIT FOR THE PREPARATION OF TECHNETIUM TC 99M PENTETATE 1 DOSE: 20 INJECTION, POWDER, LYOPHILIZED, FOR SOLUTION INTRAVENOUS; RESPIRATORY (INHALATION) at 20:15

## 2023-02-23 RX ADMIN — LEVOFLOXACIN 750 MG: 750 TABLET, FILM COATED ORAL at 21:34

## 2023-02-24 LAB
QT INTERVAL: 404 MS
QTC INTERVAL: 454 MS

## 2023-09-27 ENCOUNTER — TELEPHONE (OUTPATIENT)
Dept: CARDIOLOGY | Facility: CLINIC | Age: 48
End: 2023-09-27

## 2023-09-27 NOTE — TELEPHONE ENCOUNTER
Caller: Alisa Holland    Relationship to patient: Self    Best call back number: [phone]    Type of visit: NEW PATIENT     Requested date: ASAP     If rescheduling, when is the original appointment: 08/21/23     Additional notes:PATIENT CALLED IN TO RESCHEDULE APPOINTMENT THAT WAS MISSED ON 08/21/23. PATIENT WOULD LIKE TO GET APPOINTMENT ON THE SAME DAY AS CLAUDE HENEGAR IF POSSIBLE.     PATIENT IS NOT CURRENTLY HAVING ANY CARDIAC ISSUES

## 2023-10-16 ENCOUNTER — LAB (OUTPATIENT)
Dept: LAB | Facility: HOSPITAL | Age: 48
End: 2023-10-16
Payer: MEDICARE

## 2023-10-16 ENCOUNTER — TRANSCRIBE ORDERS (OUTPATIENT)
Dept: ADMINISTRATIVE | Facility: HOSPITAL | Age: 48
End: 2023-10-16
Payer: MEDICARE

## 2023-10-16 DIAGNOSIS — J44.9 CHRONIC OBSTRUCTIVE PULMONARY DISEASE, UNSPECIFIED COPD TYPE: ICD-10-CM

## 2023-10-16 DIAGNOSIS — I73.9 PERIPHERAL VASCULAR DISEASE, UNSPECIFIED: ICD-10-CM

## 2023-10-16 DIAGNOSIS — I10 ESSENTIAL (PRIMARY) HYPERTENSION: ICD-10-CM

## 2023-10-16 DIAGNOSIS — L02.612 ABSCESS OF LEFT FOOT: ICD-10-CM

## 2023-10-16 DIAGNOSIS — Z45.2 FITTING AND ADJUSTMENT OF VASCULAR CATHETER: ICD-10-CM

## 2023-10-16 DIAGNOSIS — L03.116 CELLULITIS OF LEFT FOOT: ICD-10-CM

## 2023-10-16 DIAGNOSIS — L02.612 ABSCESS OF LEFT FOOT: Primary | ICD-10-CM

## 2023-10-16 PROCEDURE — 80053 COMPREHEN METABOLIC PANEL: CPT

## 2023-10-16 PROCEDURE — 36415 COLL VENOUS BLD VENIPUNCTURE: CPT

## 2023-10-16 PROCEDURE — 85652 RBC SED RATE AUTOMATED: CPT

## 2023-10-16 PROCEDURE — 85025 COMPLETE CBC W/AUTO DIFF WBC: CPT

## 2023-10-16 PROCEDURE — 82550 ASSAY OF CK (CPK): CPT

## 2023-10-16 PROCEDURE — 86140 C-REACTIVE PROTEIN: CPT

## 2023-10-17 LAB
ALBUMIN SERPL-MCNC: 4 G/DL (ref 3.5–5.2)
ALBUMIN/GLOB SERPL: 1.7 G/DL
ALP SERPL-CCNC: 113 U/L (ref 39–117)
ALT SERPL W P-5'-P-CCNC: 22 U/L (ref 1–33)
ANION GAP SERPL CALCULATED.3IONS-SCNC: 10 MMOL/L (ref 5–15)
AST SERPL-CCNC: 28 U/L (ref 1–32)
BASOPHILS # BLD AUTO: 0.05 10*3/MM3 (ref 0–0.2)
BASOPHILS NFR BLD AUTO: 0.7 % (ref 0–1.5)
BILIRUB SERPL-MCNC: 0.3 MG/DL (ref 0–1.2)
BUN SERPL-MCNC: 9 MG/DL (ref 6–20)
BUN/CREAT SERPL: 9.8 (ref 7–25)
CALCIUM SPEC-SCNC: 8.7 MG/DL (ref 8.6–10.5)
CHLORIDE SERPL-SCNC: 110 MMOL/L (ref 98–107)
CK SERPL-CCNC: 66 U/L (ref 20–180)
CO2 SERPL-SCNC: 24 MMOL/L (ref 22–29)
CREAT SERPL-MCNC: 0.92 MG/DL (ref 0.57–1)
CRP SERPL-MCNC: 0.52 MG/DL (ref 0–0.5)
DEPRECATED RDW RBC AUTO: 41.9 FL (ref 37–54)
EGFRCR SERPLBLD CKD-EPI 2021: 77.4 ML/MIN/1.73
EOSINOPHIL # BLD AUTO: 0.76 10*3/MM3 (ref 0–0.4)
EOSINOPHIL NFR BLD AUTO: 11.3 % (ref 0.3–6.2)
ERYTHROCYTE [DISTWIDTH] IN BLOOD BY AUTOMATED COUNT: 12.2 % (ref 12.3–15.4)
ERYTHROCYTE [SEDIMENTATION RATE] IN BLOOD: 14 MM/HR (ref 0–20)
GLOBULIN UR ELPH-MCNC: 2.4 GM/DL
GLUCOSE SERPL-MCNC: 101 MG/DL (ref 65–99)
HCT VFR BLD AUTO: 38.9 % (ref 34–46.6)
HGB BLD-MCNC: 13.2 G/DL (ref 12–15.9)
IMM GRANULOCYTES # BLD AUTO: 0.02 10*3/MM3 (ref 0–0.05)
IMM GRANULOCYTES NFR BLD AUTO: 0.3 % (ref 0–0.5)
LYMPHOCYTES # BLD AUTO: 1.28 10*3/MM3 (ref 0.7–3.1)
LYMPHOCYTES NFR BLD AUTO: 19.1 % (ref 19.6–45.3)
MCH RBC QN AUTO: 32.2 PG (ref 26.6–33)
MCHC RBC AUTO-ENTMCNC: 33.9 G/DL (ref 31.5–35.7)
MCV RBC AUTO: 94.9 FL (ref 79–97)
MONOCYTES # BLD AUTO: 0.51 10*3/MM3 (ref 0.1–0.9)
MONOCYTES NFR BLD AUTO: 7.6 % (ref 5–12)
NEUTROPHILS NFR BLD AUTO: 4.08 10*3/MM3 (ref 1.7–7)
NEUTROPHILS NFR BLD AUTO: 61 % (ref 42.7–76)
NRBC BLD AUTO-RTO: 0.1 /100 WBC (ref 0–0.2)
PLATELET # BLD AUTO: 256 10*3/MM3 (ref 140–450)
PMV BLD AUTO: 11.9 FL (ref 6–12)
POTASSIUM SERPL-SCNC: 4.5 MMOL/L (ref 3.5–5.2)
PROT SERPL-MCNC: 6.4 G/DL (ref 6–8.5)
RBC # BLD AUTO: 4.1 10*6/MM3 (ref 3.77–5.28)
SODIUM SERPL-SCNC: 144 MMOL/L (ref 136–145)
WBC NRBC COR # BLD: 6.7 10*3/MM3 (ref 3.4–10.8)

## 2023-10-27 ENCOUNTER — TRANSCRIBE ORDERS (OUTPATIENT)
Dept: ADMINISTRATIVE | Facility: HOSPITAL | Age: 48
End: 2023-10-27
Payer: MEDICARE

## 2023-10-27 ENCOUNTER — LAB (OUTPATIENT)
Dept: LAB | Facility: HOSPITAL | Age: 48
End: 2023-10-27
Payer: MEDICARE

## 2023-10-27 DIAGNOSIS — R73.9 HYPERGLYCEMIA: ICD-10-CM

## 2023-10-27 DIAGNOSIS — I10 ESSENTIAL (PRIMARY) HYPERTENSION: ICD-10-CM

## 2023-10-27 DIAGNOSIS — I10 ESSENTIAL (PRIMARY) HYPERTENSION: Primary | ICD-10-CM

## 2023-10-27 DIAGNOSIS — Z79.899 ENCOUNTER FOR LONG-TERM (CURRENT) USE OF OTHER MEDICATIONS: ICD-10-CM

## 2023-10-27 DIAGNOSIS — E11.65 TYPE 2 DIABETES MELLITUS WITH HYPERGLYCEMIA, WITH LONG-TERM CURRENT USE OF INSULIN: ICD-10-CM

## 2023-10-27 DIAGNOSIS — E55.9 VITAMIN D DEFICIENCY: ICD-10-CM

## 2023-10-27 DIAGNOSIS — Z79.4 TYPE 2 DIABETES MELLITUS WITH HYPERGLYCEMIA, WITH LONG-TERM CURRENT USE OF INSULIN: ICD-10-CM

## 2023-10-27 LAB
BASOPHILS # BLD AUTO: 0.05 10*3/MM3 (ref 0–0.2)
BASOPHILS NFR BLD AUTO: 0.7 % (ref 0–1.5)
DEPRECATED RDW RBC AUTO: 48.5 FL (ref 37–54)
EOSINOPHIL # BLD AUTO: 0.56 10*3/MM3 (ref 0–0.4)
EOSINOPHIL NFR BLD AUTO: 8.1 % (ref 0.3–6.2)
ERYTHROCYTE [DISTWIDTH] IN BLOOD BY AUTOMATED COUNT: 13.2 % (ref 12.3–15.4)
HBA1C MFR BLD: 5.2 % (ref 4.8–5.6)
HCT VFR BLD AUTO: 41.4 % (ref 34–46.6)
HGB BLD-MCNC: 13 G/DL (ref 12–15.9)
IMM GRANULOCYTES # BLD AUTO: 0.02 10*3/MM3 (ref 0–0.05)
IMM GRANULOCYTES NFR BLD AUTO: 0.3 % (ref 0–0.5)
LYMPHOCYTES # BLD AUTO: 2.22 10*3/MM3 (ref 0.7–3.1)
LYMPHOCYTES NFR BLD AUTO: 32.2 % (ref 19.6–45.3)
MCH RBC QN AUTO: 31.2 PG (ref 26.6–33)
MCHC RBC AUTO-ENTMCNC: 31.4 G/DL (ref 31.5–35.7)
MCV RBC AUTO: 99.3 FL (ref 79–97)
MONOCYTES # BLD AUTO: 0.48 10*3/MM3 (ref 0.1–0.9)
MONOCYTES NFR BLD AUTO: 7 % (ref 5–12)
NEUTROPHILS NFR BLD AUTO: 3.56 10*3/MM3 (ref 1.7–7)
NEUTROPHILS NFR BLD AUTO: 51.7 % (ref 42.7–76)
NRBC BLD AUTO-RTO: 0 /100 WBC (ref 0–0.2)
PLATELET # BLD AUTO: 172 10*3/MM3 (ref 140–450)
PMV BLD AUTO: 11.3 FL (ref 6–12)
RBC # BLD AUTO: 4.17 10*6/MM3 (ref 3.77–5.28)
WBC NRBC COR # BLD: 6.89 10*3/MM3 (ref 3.4–10.8)

## 2023-10-27 PROCEDURE — 82306 VITAMIN D 25 HYDROXY: CPT

## 2023-10-27 PROCEDURE — 80053 COMPREHEN METABOLIC PANEL: CPT

## 2023-10-27 PROCEDURE — 83036 HEMOGLOBIN GLYCOSYLATED A1C: CPT

## 2023-10-27 PROCEDURE — 80061 LIPID PANEL: CPT

## 2023-10-27 PROCEDURE — 85025 COMPLETE CBC W/AUTO DIFF WBC: CPT

## 2023-10-27 PROCEDURE — 84479 ASSAY OF THYROID (T3 OR T4): CPT

## 2023-10-27 PROCEDURE — 82607 VITAMIN B-12: CPT

## 2023-10-27 PROCEDURE — 84443 ASSAY THYROID STIM HORMONE: CPT

## 2023-10-27 PROCEDURE — 84436 ASSAY OF TOTAL THYROXINE: CPT

## 2023-10-27 PROCEDURE — 36415 COLL VENOUS BLD VENIPUNCTURE: CPT

## 2023-10-28 LAB
25(OH)D3 SERPL-MCNC: 30.5 NG/ML (ref 30–100)
ALBUMIN SERPL-MCNC: 4 G/DL (ref 3.5–5.2)
ALBUMIN/GLOB SERPL: 1.4 G/DL
ALP SERPL-CCNC: 109 U/L (ref 39–117)
ALT SERPL W P-5'-P-CCNC: 15 U/L (ref 1–33)
ANION GAP SERPL CALCULATED.3IONS-SCNC: 8 MMOL/L (ref 5–15)
AST SERPL-CCNC: 18 U/L (ref 1–32)
BILIRUB SERPL-MCNC: <0.2 MG/DL (ref 0–1.2)
BUN SERPL-MCNC: 7 MG/DL (ref 6–20)
BUN/CREAT SERPL: 8.1 (ref 7–25)
CALCIUM SPEC-SCNC: 9.3 MG/DL (ref 8.6–10.5)
CHLORIDE SERPL-SCNC: 105 MMOL/L (ref 98–107)
CHOLEST SERPL-MCNC: 164 MG/DL (ref 0–200)
CO2 SERPL-SCNC: 28 MMOL/L (ref 22–29)
CREAT SERPL-MCNC: 0.86 MG/DL (ref 0.57–1)
EGFRCR SERPLBLD CKD-EPI 2021: 84 ML/MIN/1.73
GLOBULIN UR ELPH-MCNC: 2.9 GM/DL
GLUCOSE SERPL-MCNC: 91 MG/DL (ref 65–99)
HDLC SERPL-MCNC: 45 MG/DL (ref 40–60)
LDLC SERPL CALC-MCNC: 85 MG/DL (ref 0–100)
LDLC/HDLC SERPL: 1.75 {RATIO}
POTASSIUM SERPL-SCNC: 4.5 MMOL/L (ref 3.5–5.2)
PROT SERPL-MCNC: 6.9 G/DL (ref 6–8.5)
SODIUM SERPL-SCNC: 141 MMOL/L (ref 136–145)
T-UPTAKE NFR SERPL: 1.1 TBI (ref 0.8–1.3)
T4 SERPL-MCNC: 5.79 MCG/DL (ref 4.5–11.7)
TRIGL SERPL-MCNC: 202 MG/DL (ref 0–150)
TSH SERPL DL<=0.05 MIU/L-ACNC: 2.9 UIU/ML (ref 0.27–4.2)
VIT B12 BLD-MCNC: 446 PG/ML (ref 211–946)
VLDLC SERPL-MCNC: 34 MG/DL (ref 5–40)

## 2023-11-20 ENCOUNTER — HOSPITAL ENCOUNTER (EMERGENCY)
Facility: HOSPITAL | Age: 48
Discharge: HOME | End: 2023-11-20
Attending: STUDENT IN AN ORGANIZED HEALTH CARE EDUCATION/TRAINING PROGRAM
Payer: MEDICARE

## 2023-11-20 VITALS
DIASTOLIC BLOOD PRESSURE: 96 MMHG | HEIGHT: 67 IN | WEIGHT: 205 LBS | TEMPERATURE: 98.5 F | BODY MASS INDEX: 32.18 KG/M2 | RESPIRATION RATE: 18 BRPM | HEART RATE: 96 BPM | SYSTOLIC BLOOD PRESSURE: 129 MMHG | OXYGEN SATURATION: 94 %

## 2023-11-20 DIAGNOSIS — T82.524A DISPLACEMENT OF PERIPHERALLY INSERTED CENTRAL VENOUS CATHETER (PICC): Primary | ICD-10-CM

## 2023-11-20 LAB
ALBUMIN SERPL-MCNC: 4.1 G/DL (ref 3.5–5.2)
ALBUMIN/GLOB SERPL: 1.3 G/DL
ALP SERPL-CCNC: 105 U/L (ref 39–117)
ALT SERPL W P-5'-P-CCNC: 13 U/L (ref 1–33)
AMPHET+METHAMPHET UR QL: NEGATIVE
AMPHETAMINES UR QL: NEGATIVE
ANION GAP SERPL CALCULATED.3IONS-SCNC: 11.1 MMOL/L (ref 5–15)
AST SERPL-CCNC: 19 U/L (ref 1–32)
BARBITURATES UR QL SCN: NEGATIVE
BASOPHILS # BLD AUTO: 0.05 10*3/MM3 (ref 0–0.2)
BASOPHILS NFR BLD AUTO: 0.9 % (ref 0–1.5)
BENZODIAZ UR QL SCN: POSITIVE
BILIRUB SERPL-MCNC: 0.2 MG/DL (ref 0–1.2)
BUN SERPL-MCNC: 6 MG/DL (ref 6–20)
BUN/CREAT SERPL: 7.6 (ref 7–25)
BUPRENORPHINE SERPL-MCNC: NEGATIVE NG/ML
CALCIUM SPEC-SCNC: 9.1 MG/DL (ref 8.6–10.5)
CANNABINOIDS SERPL QL: NEGATIVE
CHLORIDE SERPL-SCNC: 108 MMOL/L (ref 98–107)
CO2 SERPL-SCNC: 23.9 MMOL/L (ref 22–29)
COCAINE UR QL: NEGATIVE
CREAT SERPL-MCNC: 0.79 MG/DL (ref 0.57–1)
CRP SERPL-MCNC: 1.04 MG/DL (ref 0–0.5)
DEPRECATED RDW RBC AUTO: 48 FL (ref 37–54)
EGFRCR SERPLBLD CKD-EPI 2021: 93 ML/MIN/1.73
EOSINOPHIL # BLD AUTO: 0.42 10*3/MM3 (ref 0–0.4)
EOSINOPHIL NFR BLD AUTO: 7.4 % (ref 0.3–6.2)
ERYTHROCYTE [DISTWIDTH] IN BLOOD BY AUTOMATED COUNT: 13.2 % (ref 12.3–15.4)
ERYTHROCYTE [SEDIMENTATION RATE] IN BLOOD: 32 MM/HR (ref 0–20)
FENTANYL UR-MCNC: NEGATIVE NG/ML
GLOBULIN UR ELPH-MCNC: 3.1 GM/DL
GLUCOSE SERPL-MCNC: 104 MG/DL (ref 65–99)
HCT VFR BLD AUTO: 40.7 % (ref 34–46.6)
HGB BLD-MCNC: 12.8 G/DL (ref 12–15.9)
IMM GRANULOCYTES # BLD AUTO: 0.02 10*3/MM3 (ref 0–0.05)
IMM GRANULOCYTES NFR BLD AUTO: 0.4 % (ref 0–0.5)
LYMPHOCYTES # BLD AUTO: 1.57 10*3/MM3 (ref 0.7–3.1)
LYMPHOCYTES NFR BLD AUTO: 27.5 % (ref 19.6–45.3)
MCH RBC QN AUTO: 30.8 PG (ref 26.6–33)
MCHC RBC AUTO-ENTMCNC: 31.4 G/DL (ref 31.5–35.7)
MCV RBC AUTO: 97.8 FL (ref 79–97)
METHADONE UR QL SCN: NEGATIVE
MONOCYTES # BLD AUTO: 0.47 10*3/MM3 (ref 0.1–0.9)
MONOCYTES NFR BLD AUTO: 8.2 % (ref 5–12)
NEUTROPHILS NFR BLD AUTO: 3.17 10*3/MM3 (ref 1.7–7)
NEUTROPHILS NFR BLD AUTO: 55.6 % (ref 42.7–76)
NRBC BLD AUTO-RTO: 0 /100 WBC (ref 0–0.2)
OPIATES UR QL: NEGATIVE
OXYCODONE UR QL SCN: POSITIVE
PCP UR QL SCN: NEGATIVE
PLATELET # BLD AUTO: 231 10*3/MM3 (ref 140–450)
PMV BLD AUTO: 11.2 FL (ref 6–12)
POTASSIUM SERPL-SCNC: 3.4 MMOL/L (ref 3.5–5.2)
PROT SERPL-MCNC: 7.2 G/DL (ref 6–8.5)
RBC # BLD AUTO: 4.16 10*6/MM3 (ref 3.77–5.28)
SODIUM SERPL-SCNC: 143 MMOL/L (ref 136–145)
TRICYCLICS UR QL SCN: NEGATIVE
WBC NRBC COR # BLD AUTO: 5.7 10*3/MM3 (ref 3.4–10.8)

## 2023-11-20 PROCEDURE — 86140 C-REACTIVE PROTEIN: CPT | Performed by: STUDENT IN AN ORGANIZED HEALTH CARE EDUCATION/TRAINING PROGRAM

## 2023-11-20 PROCEDURE — 85025 COMPLETE CBC W/AUTO DIFF WBC: CPT | Performed by: STUDENT IN AN ORGANIZED HEALTH CARE EDUCATION/TRAINING PROGRAM

## 2023-11-20 PROCEDURE — 80307 DRUG TEST PRSMV CHEM ANLYZR: CPT | Performed by: STUDENT IN AN ORGANIZED HEALTH CARE EDUCATION/TRAINING PROGRAM

## 2023-11-20 PROCEDURE — 36415 COLL VENOUS BLD VENIPUNCTURE: CPT

## 2023-11-20 PROCEDURE — 85652 RBC SED RATE AUTOMATED: CPT | Performed by: STUDENT IN AN ORGANIZED HEALTH CARE EDUCATION/TRAINING PROGRAM

## 2023-11-20 PROCEDURE — 99283 EMERGENCY DEPT VISIT LOW MDM: CPT

## 2023-11-20 PROCEDURE — 80053 COMPREHEN METABOLIC PANEL: CPT | Performed by: STUDENT IN AN ORGANIZED HEALTH CARE EDUCATION/TRAINING PROGRAM

## 2023-11-21 ENCOUNTER — OFFICE VISIT (OUTPATIENT)
Dept: SURGERY | Facility: CLINIC | Age: 48
End: 2023-11-21
Payer: MEDICARE

## 2023-11-21 VITALS — BODY MASS INDEX: 32.18 KG/M2 | WEIGHT: 205 LBS | HEIGHT: 67 IN

## 2023-11-21 DIAGNOSIS — M86.9 OSTEOMYELITIS OF LEFT FOOT, UNSPECIFIED TYPE: Primary | ICD-10-CM
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
## 2023-11-30 ENCOUNTER — HOSPITAL ENCOUNTER (EMERGENCY)
Facility: HOSPITAL | Age: 48
Discharge: HOME | End: 2023-11-30
Attending: STUDENT IN AN ORGANIZED HEALTH CARE EDUCATION/TRAINING PROGRAM
Payer: MEDICARE

## 2023-11-30 ENCOUNTER — APPOINTMENT (OUTPATIENT)
Dept: GENERAL RADIOLOGY | Facility: HOSPITAL | Age: 48
End: 2023-11-30
Payer: MEDICARE

## 2023-11-30 ENCOUNTER — APPOINTMENT (OUTPATIENT)
Dept: CT IMAGING | Facility: HOSPITAL | Age: 48
End: 2023-11-30
Payer: MEDICARE

## 2023-11-30 VITALS
WEIGHT: 205 LBS | HEART RATE: 89 BPM | DIASTOLIC BLOOD PRESSURE: 72 MMHG | BODY MASS INDEX: 32.18 KG/M2 | HEIGHT: 67 IN | OXYGEN SATURATION: 94 % | SYSTOLIC BLOOD PRESSURE: 110 MMHG | TEMPERATURE: 98 F | RESPIRATION RATE: 18 BRPM

## 2023-11-30 DIAGNOSIS — R51.9 ACUTE NONINTRACTABLE HEADACHE, UNSPECIFIED HEADACHE TYPE: ICD-10-CM

## 2023-11-30 DIAGNOSIS — R07.9 CHEST PAIN, UNSPECIFIED TYPE: Primary | ICD-10-CM

## 2023-11-30 LAB
ALBUMIN SERPL-MCNC: 4.3 G/DL (ref 3.5–5.2)
ALBUMIN/GLOB SERPL: 1.2 G/DL
ALP SERPL-CCNC: 106 U/L (ref 39–117)
ALT SERPL W P-5'-P-CCNC: 7 U/L (ref 1–33)
ANION GAP SERPL CALCULATED.3IONS-SCNC: 12 MMOL/L (ref 5–15)
AST SERPL-CCNC: 14 U/L (ref 1–32)
BASOPHILS # BLD AUTO: 0.04 10*3/MM3 (ref 0–0.2)
BASOPHILS NFR BLD AUTO: 0.7 % (ref 0–1.5)
BILIRUB SERPL-MCNC: <0.2 MG/DL (ref 0–1.2)
BUN SERPL-MCNC: 17 MG/DL (ref 6–20)
BUN/CREAT SERPL: 15.2 (ref 7–25)
CALCIUM SPEC-SCNC: 9.4 MG/DL (ref 8.6–10.5)
CHLORIDE SERPL-SCNC: 102 MMOL/L (ref 98–107)
CO2 SERPL-SCNC: 25 MMOL/L (ref 22–29)
CREAT SERPL-MCNC: 1.12 MG/DL (ref 0.57–1)
DEPRECATED RDW RBC AUTO: 48.5 FL (ref 37–54)
EGFRCR SERPLBLD CKD-EPI 2021: 61.2 ML/MIN/1.73
EOSINOPHIL # BLD AUTO: 0.53 10*3/MM3 (ref 0–0.4)
EOSINOPHIL NFR BLD AUTO: 9.3 % (ref 0.3–6.2)
ERYTHROCYTE [DISTWIDTH] IN BLOOD BY AUTOMATED COUNT: 12.7 % (ref 12.3–15.4)
GEN 5 2HR TROPONIN T REFLEX: 7 NG/L
GLOBULIN UR ELPH-MCNC: 3.7 GM/DL
GLUCOSE SERPL-MCNC: 98 MG/DL (ref 65–99)
HCT VFR BLD AUTO: 41.4 % (ref 34–46.6)
HGB BLD-MCNC: 12.5 G/DL (ref 12–15.9)
HOLD SPECIMEN: NORMAL
HOLD SPECIMEN: NORMAL
IMM GRANULOCYTES # BLD AUTO: 0.01 10*3/MM3 (ref 0–0.05)
IMM GRANULOCYTES NFR BLD AUTO: 0.2 % (ref 0–0.5)
LYMPHOCYTES # BLD AUTO: 1.74 10*3/MM3 (ref 0.7–3.1)
LYMPHOCYTES NFR BLD AUTO: 30.7 % (ref 19.6–45.3)
MCH RBC QN AUTO: 31.3 PG (ref 26.6–33)
MCHC RBC AUTO-ENTMCNC: 30.2 G/DL (ref 31.5–35.7)
MCV RBC AUTO: 103.5 FL (ref 79–97)
MONOCYTES # BLD AUTO: 0.52 10*3/MM3 (ref 0.1–0.9)
MONOCYTES NFR BLD AUTO: 9.2 % (ref 5–12)
NEUTROPHILS NFR BLD AUTO: 2.83 10*3/MM3 (ref 1.7–7)
NEUTROPHILS NFR BLD AUTO: 49.9 % (ref 42.7–76)
NRBC BLD AUTO-RTO: 0 /100 WBC (ref 0–0.2)
PLATELET # BLD AUTO: 85 10*3/MM3 (ref 140–450)
PMV BLD AUTO: 12.2 FL (ref 6–12)
POTASSIUM SERPL-SCNC: 3.7 MMOL/L (ref 3.5–5.2)
PROT SERPL-MCNC: 8 G/DL (ref 6–8.5)
QT INTERVAL: 340 MS
QTC INTERVAL: 462 MS
RBC # BLD AUTO: 4 10*6/MM3 (ref 3.77–5.28)
SODIUM SERPL-SCNC: 139 MMOL/L (ref 136–145)
TROPONIN T DELTA: 0 NG/L
TROPONIN T SERPL HS-MCNC: 7 NG/L
WBC NRBC COR # BLD AUTO: 5.67 10*3/MM3 (ref 3.4–10.8)
WHOLE BLOOD HOLD COAG: NORMAL
WHOLE BLOOD HOLD SPECIMEN: NORMAL

## 2023-11-30 PROCEDURE — 36415 COLL VENOUS BLD VENIPUNCTURE: CPT

## 2023-11-30 PROCEDURE — 93010 ELECTROCARDIOGRAM REPORT: CPT | Performed by: INTERNAL MEDICINE

## 2023-11-30 PROCEDURE — 84484 ASSAY OF TROPONIN QUANT: CPT | Performed by: NURSE PRACTITIONER

## 2023-11-30 PROCEDURE — 80053 COMPREHEN METABOLIC PANEL: CPT | Performed by: NURSE PRACTITIONER

## 2023-11-30 PROCEDURE — 71045 X-RAY EXAM CHEST 1 VIEW: CPT

## 2023-11-30 PROCEDURE — 71045 X-RAY EXAM CHEST 1 VIEW: CPT | Performed by: RADIOLOGY

## 2023-11-30 PROCEDURE — 63710000001 DIPHENHYDRAMINE PER 50 MG: Performed by: NURSE PRACTITIONER

## 2023-11-30 PROCEDURE — 70450 CT HEAD/BRAIN W/O DYE: CPT | Performed by: RADIOLOGY

## 2023-11-30 PROCEDURE — 70450 CT HEAD/BRAIN W/O DYE: CPT

## 2023-11-30 PROCEDURE — 63710000001 PROCHLORPERAZINE MALEATE PER 10 MG: Performed by: NURSE PRACTITIONER

## 2023-11-30 PROCEDURE — 25010000002 DEXAMETHASONE SODIUM PHOSPHATE 10 MG/ML SOLUTION: Performed by: NURSE PRACTITIONER

## 2023-11-30 PROCEDURE — 96372 THER/PROPH/DIAG INJ SC/IM: CPT

## 2023-11-30 PROCEDURE — 93005 ELECTROCARDIOGRAM TRACING: CPT | Performed by: NURSE PRACTITIONER

## 2023-11-30 PROCEDURE — 99284 EMERGENCY DEPT VISIT MOD MDM: CPT

## 2023-11-30 PROCEDURE — 85025 COMPLETE CBC W/AUTO DIFF WBC: CPT | Performed by: NURSE PRACTITIONER

## 2023-11-30 RX ORDER — SODIUM CHLORIDE 0.9 % (FLUSH) 0.9 %
10 SYRINGE (ML) INJECTION AS NEEDED
Status: DISCONTINUED | OUTPATIENT
Start: 2023-11-30 | End: 2023-11-30

## 2023-11-30 RX ORDER — DEXAMETHASONE SODIUM PHOSPHATE 10 MG/ML
10 INJECTION, SOLUTION INTRAMUSCULAR; INTRAVENOUS ONCE
Status: DISCONTINUED | OUTPATIENT
Start: 2023-11-30 | End: 2023-11-30

## 2023-11-30 RX ORDER — DEXAMETHASONE SODIUM PHOSPHATE 10 MG/ML
10 INJECTION, SOLUTION INTRAMUSCULAR; INTRAVENOUS ONCE
Status: COMPLETED | OUTPATIENT
Start: 2023-11-30 | End: 2023-11-30

## 2023-11-30 RX ORDER — ASPIRIN 81 MG/1
324 TABLET, CHEWABLE ORAL ONCE
Status: COMPLETED | OUTPATIENT
Start: 2023-11-30 | End: 2023-11-30

## 2023-11-30 RX ORDER — DIPHENHYDRAMINE HCL 25 MG
25 CAPSULE ORAL ONCE
Status: COMPLETED | OUTPATIENT
Start: 2023-11-30 | End: 2023-11-30

## 2023-11-30 RX ORDER — PROCHLORPERAZINE EDISYLATE 5 MG/ML
10 INJECTION INTRAMUSCULAR; INTRAVENOUS ONCE
Status: DISCONTINUED | OUTPATIENT
Start: 2023-11-30 | End: 2023-11-30

## 2023-11-30 RX ORDER — DIPHENHYDRAMINE HYDROCHLORIDE 50 MG/ML
25 INJECTION INTRAMUSCULAR; INTRAVENOUS ONCE
Status: DISCONTINUED | OUTPATIENT
Start: 2023-11-30 | End: 2023-11-30

## 2023-11-30 RX ORDER — PROCHLORPERAZINE MALEATE 10 MG
10 TABLET ORAL ONCE
Status: COMPLETED | OUTPATIENT
Start: 2023-11-30 | End: 2023-11-30

## 2023-11-30 RX ADMIN — DEXAMETHASONE SODIUM PHOSPHATE 10 MG: 10 INJECTION INTRAMUSCULAR; INTRAVENOUS at 02:49

## 2023-11-30 RX ADMIN — DIPHENHYDRAMINE HYDROCHLORIDE 25 MG: 25 CAPSULE ORAL at 02:50

## 2023-11-30 RX ADMIN — ASPIRIN 324 MG: 81 TABLET, CHEWABLE ORAL at 01:45

## 2023-11-30 RX ADMIN — PROCHLORPERAZINE MALEATE 10 MG: 10 TABLET ORAL at 02:50

## 2023-11-30 NOTE — ED NOTES
MEDICAL SCREENING:    Reason for Visit: Chest pain    Patient initially seen in triage.  The patient was advised further evaluation and diagnostic testing will be needed, some of the treatment and testing will be initiated in the lobby in order to begin the process.  The patient will be returned to the waiting area for the time being and possibly be re-assessed by a subsequent ED provider.  The patient will be brought back to the treatment area in as timely manner as possible.      Josie Moy, APRN  11/30/23 0035
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
## 2024-01-20 ENCOUNTER — NURSE TRIAGE (OUTPATIENT)
Dept: CALL CENTER | Facility: HOSPITAL | Age: 49
End: 2024-01-20
Payer: MEDICARE

## 2024-01-20 NOTE — TELEPHONE ENCOUNTER
Patient called regarding chronic wound due to osteomyelitis - dressing came out, was bleeding - now stopped after redressing. Patient is not being seen at [facility] for wound care. Patient called her ortho surgeon at [facility] who is overseeing wound care, no provider on call until 1/23. Patient states she has been discharged from home health. Instructed patient that if bleeding starts again, dressing becomes saturated she needs to go to ER. Advised patient to reach out to Home Health, they may have nurse on call who is familiar with her care or call [facility] and see if they have nurse call line who can access providers notes, offer triage advise. Patient verbalizes understanding.       Reason for Disposition   [1] Caller has URGENT question AND [2] triager unable to answer question    Additional Information   Negative: Major bleeding into NPWT device (e.g., canister filling with blood, sudden gush of blood)   Negative: Major bleeding from wound (e.g., actively dripping or spurting)   Negative: Sounds like a life-threatening emergency to the triager   Negative: Widespread rash   Negative: [1] Wound infection suspected (i.e., pain, spreading redness, or pus; in a cut, puncture, scrape or sutured wound) AND [2] not chronic wound   Negative: [1] New foot sore or wound AND [2] diabetes mellitus   Negative: New skin sore or ulcer   Negative: SEVERE pain (e.g., excruciating)  (Exception: Caused by tight compression wrap and triager is able help patient completely resolve pain.)   Negative: [1] Total Contact Cast feels too tight (e.g., causing pain, numbness or toes tingling) AND [2] not improved with leg elevation   Negative: [1] NPWT AND [2] new bright red blood in canister or tubing  (Exception: Flecks of blood, pink-tinged drainage, or unchanged from normal.)   Negative: Patient sounds very sick or weak to the triager   Negative: [1] Looks infected (spreading redness, red streak, pus) AND [2] fever   Negative: [1]  "Looks infected AND [2] large red area (> 2 in. or 5 cm)   Negative: [1] Looks infected AND [2] diabetes mellitus or weak immune system (e.g., HIV positive, cancer chemo, splenectomy, organ transplant, chronic steroids)   Negative: [1] NPWT AND [2] suction is not working  (Exception: Triager able to troubleshoot and help caller-patient fix problem.)   Negative: [1] NPWT AND [2] device is alarming  (Exception: Triager able to troubleshoot and help caller-patient fix the alarm.)    Answer Assessment - Initial Assessment Questions  1. SYMPTOM or QUESTION: \"What is your reason for calling today?\" or \"How can I best help you?\" (e.g., bleeding, redness, drainage, pain)      Dressing came off,   2. WOUND APPEARANCE: \"What does the wound look like?\" \"Is there new or spreading redness?\" If Yes, ask: \"What is the size of the red area?\" (Inches, centimeters, or compare to size of a coin).  \"Has the drainage changed or increased?\"  \"Is there a new odor?\"        Chronic wound  3. ONSET: \"When did the problem begin?\"      Last night  4. LOCATION: \"Where is the wound(s)?\" (e.g., , ankle, lower left leg)      Foot  5. BLEEDING: \"Are you having a problem with bleeding?\"  (e.g., amount, timing)      Bleed initial - now appears stopped  6. PAIN: \"Is there any pain?\" If Yes, ask: \"How bad is the pain?\" (Scale 1-10; or mild, moderate, severe)      N/a  7. FEVER: \"Do you have a fever?\" If Yes, ask: \"What is your temperature, how was it measured, and when did it start?\"      N/a  8. OTHER SYMPTOMS: \"Do you have any other symptoms?\" (e.g., chills, rash elsewhere, new weakness)      N/a  9. TREATMENT: \"How have you been treating the wound?\"      Applied new dressing  10. NEGATIVE PRESSURE WOUND THERAPY  (NPWT): \"What concerns do you have about your negative pressure dressing?\"  \"When was your last dressing change?\"  \"Are you getting a device alert or alarm?\"  \"What have you done to try to fix the problem?\"           N/a  11. PREGNANCY: \"Is " "there any chance you are pregnant?\" \"When was your last menstrual period?        N/a    Protocols used: Wound - Chronic and Negative Pressure Wound Therapy-ADULT-AH    "

## 2024-03-15 ENCOUNTER — TELEPHONE (OUTPATIENT)
Dept: CARDIOLOGY | Facility: CLINIC | Age: 49
End: 2024-03-15

## 2024-05-13 ENCOUNTER — OFFICE VISIT (OUTPATIENT)
Dept: ORTHOPEDIC SURGERY | Facility: CLINIC | Age: 49
End: 2024-05-13
Payer: MEDICARE

## 2024-05-13 ENCOUNTER — HOSPITAL ENCOUNTER (OUTPATIENT)
Dept: GENERAL RADIOLOGY | Facility: HOSPITAL | Age: 49
Discharge: HOME | End: 2024-05-13
Admitting: PHYSICIAN ASSISTANT
Payer: MEDICARE

## 2024-05-13 VITALS
WEIGHT: 210 LBS | BODY MASS INDEX: 32.96 KG/M2 | OXYGEN SATURATION: 94 % | HEART RATE: 95 BPM | HEIGHT: 67 IN | DIASTOLIC BLOOD PRESSURE: 64 MMHG | SYSTOLIC BLOOD PRESSURE: 105 MMHG

## 2024-05-13 DIAGNOSIS — M25.562 LEFT KNEE PAIN, UNSPECIFIED CHRONICITY: ICD-10-CM

## 2024-05-13 DIAGNOSIS — M17.12 OSTEOARTHRITIS OF LEFT KNEE, UNSPECIFIED OSTEOARTHRITIS TYPE: Primary | ICD-10-CM

## 2024-05-13 PROCEDURE — 99213 OFFICE O/P EST LOW 20 MIN: CPT | Performed by: PHYSICIAN ASSISTANT

## 2024-05-13 PROCEDURE — 73562 X-RAY EXAM OF KNEE 3: CPT | Performed by: RADIOLOGY

## 2024-05-13 PROCEDURE — 73562 X-RAY EXAM OF KNEE 3: CPT

## 2024-05-13 RX ORDER — FLUTICASONE PROPIONATE 50 MCG
SPRAY, SUSPENSION (ML) NASAL
COMMUNITY
End: 2024-05-22 | Stop reason: ALTCHOICE
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
## 2024-05-22 ENCOUNTER — OFFICE VISIT (OUTPATIENT)
Dept: CARDIOLOGY | Facility: CLINIC | Age: 49
End: 2024-05-22
Payer: MEDICARE

## 2024-05-22 VITALS
BODY MASS INDEX: 31.89 KG/M2 | DIASTOLIC BLOOD PRESSURE: 71 MMHG | SYSTOLIC BLOOD PRESSURE: 104 MMHG | HEIGHT: 67 IN | HEART RATE: 112 BPM | RESPIRATION RATE: 16 BRPM | WEIGHT: 203.2 LBS | OXYGEN SATURATION: 88 %

## 2024-05-22 DIAGNOSIS — R60.0 LEG EDEMA, LEFT: ICD-10-CM

## 2024-05-22 DIAGNOSIS — R06.09 DYSPNEA ON EXERTION: Primary | ICD-10-CM

## 2024-05-22 DIAGNOSIS — Z72.0 TOBACCO ABUSE: ICD-10-CM

## 2024-05-22 PROCEDURE — 93000 ELECTROCARDIOGRAM COMPLETE: CPT | Performed by: INTERNAL MEDICINE

## 2024-05-22 PROCEDURE — 99204 OFFICE O/P NEW MOD 45 MIN: CPT | Performed by: INTERNAL MEDICINE

## 2024-05-22 RX ORDER — VARENICLINE TARTRATE 1 MG/1
1 TABLET, FILM COATED ORAL 2 TIMES DAILY
Qty: 56 TABLET | Refills: 1 | Status: SHIPPED | OUTPATIENT
Start: 2024-06-19 | End: 2024-08-14

## 2024-05-22 RX ORDER — FUROSEMIDE 20 MG/1
20 TABLET ORAL DAILY
Qty: 30 TABLET | Refills: 2 | Status: SHIPPED | OUTPATIENT
Start: 2024-05-22

## 2024-05-22 RX ORDER — VARENICLINE TARTRATE 0.5 (11)-1
KIT ORAL
Qty: 1 EACH | Refills: 0 | Status: SHIPPED | OUTPATIENT
Start: 2024-05-22 | End: 2024-06-19
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
## 2024-05-24 DIAGNOSIS — R60.0 LEG EDEMA, LEFT: Primary | ICD-10-CM

## 2024-06-20 ENCOUNTER — TELEPHONE (OUTPATIENT)
Dept: ORTHOPEDIC SURGERY | Facility: CLINIC | Age: 49
End: 2024-06-20
Payer: MEDICARE

## 2024-06-20 NOTE — TELEPHONE ENCOUNTER
Caller: Alisa Holland    Relationship to patient: Self    Best call back number: [phone]    Patient is needing: PATIENT IS EST FOR LEFT KNEE AND STATES THAT SHE FELL TWICE YESTERDAY AND WANTS TO BE WORKED IN TO GET IT LOOKED AT. PLEASE ADVISE

## 2024-06-20 NOTE — TELEPHONE ENCOUNTER
After speaking with Ashkan patient can come in Monday or Tuesday. Returned patients call and [facility] for a return call.

## 2024-06-25 ENCOUNTER — OFFICE VISIT (OUTPATIENT)
Dept: ORTHOPEDIC SURGERY | Facility: CLINIC | Age: 49
End: 2024-06-25
Payer: MEDICARE

## 2024-06-25 ENCOUNTER — TELEPHONE (OUTPATIENT)
Dept: CARDIOLOGY | Facility: CLINIC | Age: 49
End: 2024-06-25
Payer: MEDICARE

## 2024-06-25 VITALS — WEIGHT: 201.2 LBS | HEIGHT: 67 IN | BODY MASS INDEX: 31.58 KG/M2

## 2024-06-25 DIAGNOSIS — G47.30 SLEEP APNEA, UNSPECIFIED TYPE: Primary | ICD-10-CM

## 2024-06-25 DIAGNOSIS — M17.12 OSTEOARTHRITIS OF LEFT KNEE, UNSPECIFIED OSTEOARTHRITIS TYPE: Primary | ICD-10-CM

## 2024-06-25 DIAGNOSIS — R06.09 DYSPNEA ON EXERTION: ICD-10-CM

## 2024-06-25 PROCEDURE — 99213 OFFICE O/P EST LOW 20 MIN: CPT | Performed by: PHYSICIAN ASSISTANT

## 2024-06-25 NOTE — TELEPHONE ENCOUNTER
Hub to relay.     Called pt to advise her that we got her overnight pulse ox back and Ronaldo is recommending a sleep study if she is agreeable. If she is they will contact her when they are going to deliver it. Pt was not at home at the time and they will have her call us back.
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
## 2024-06-25 NOTE — TELEPHONE ENCOUNTER
Name: Alisa Holland      Relationship: Self      Best Callback Number: [phone]      HUB PROVIDED THE RELAY MESSAGE FROM THE OFFICE      PATIENT: VOICED UNDERSTANDING AND HAS NO FURTHER QUESTIONS AT THIS TIME    ADDITIONAL INFORMATION: PT IS AGREEABLE TO HAVING THE SLEEP STUDY DONE. WILL WAIT ON FURTHER INSTRUCTIONS.

## 2024-06-26 ENCOUNTER — TELEPHONE (OUTPATIENT)
Dept: ORTHOPEDIC SURGERY | Facility: CLINIC | Age: 49
End: 2024-06-26
Payer: MEDICARE

## 2024-06-26 NOTE — TELEPHONE ENCOUNTER
Provider: PINA HIGGINS    Caller: ASHWIN COLEMAN    Relationship to Patient: PATIENT    Pharmacy: NA    Phone Number: [phone]    Reason for Call: TO GET THE NAME OF THE SURGEON WHO WOULD PERFORM HER SURGERY    When was the patient last seen: 6.25.24

## 2024-06-28 NOTE — TELEPHONE ENCOUNTER
Returned patients call and let her know after speaking to Ashkan about the referred to office he still thinks [facility] is where the patient needs to go. Patient verbalized understanding.

## 2024-07-09 ENCOUNTER — TELEPHONE (OUTPATIENT)
Dept: ORTHOPEDIC SURGERY | Facility: CLINIC | Age: 49
End: 2024-07-09
Payer: MEDICARE

## 2024-07-09 NOTE — TELEPHONE ENCOUNTER
Called patient and then called [facility] to check on referral and patient is not scheduled yet but they are going to call her.

## 2024-07-09 NOTE — TELEPHONE ENCOUNTER
Caller: Alisa Holland    Relationship: Self    Best call back number:[phone]    What is the best time to reach you: ANYTIME BEFORE 4PM    Who are you requesting to speak with (clinical staff, provider,  specific staff member): CLINICAL    Do you know the name of the person who called: YUNIEL SPOKE WITH PATIENT ON 6/28/24    What was the call regarding: TRANSFER OF CARE- FOR KNEE REPLACEMENT    Is it okay if the provider responds through MyChart: NO- WOULD LIKE TO TALK TO SOMEONE

## 2024-07-15 ENCOUNTER — TELEPHONE (OUTPATIENT)
Dept: CARDIOLOGY | Facility: CLINIC | Age: 49
End: 2024-07-15
Payer: MEDICARE

## 2024-07-15 NOTE — TELEPHONE ENCOUNTER
Caller: Alisa Holland    Relationship: Self    Best call back number: [phone] (home)     What is the best time to reach you: 10 AM OR SO    Who are you requesting to speak with (clinical staff, provider,  specific staff member): CLINICAL    What was the call regarding: PT SAID WAS TOLD SHE WOULD BE GETTING INFORMATION ON A SLEEP STUDY. SHE HAS NOT RECEIVED ANY FOLLOW UP INFORMATION.PLEASE CALL PT TO DISCUSS.

## 2024-08-09 ENCOUNTER — TELEPHONE (OUTPATIENT)
Dept: CARDIOLOGY | Facility: CLINIC | Age: 49
End: 2024-08-09
Payer: MEDICARE

## 2024-08-09 NOTE — TELEPHONE ENCOUNTER
Caller: [facility]RE[facility]    Relationship: Other    Best call back number: [phone]     What is the best time to reach you: ANYTIME 8-5    What was the call regarding: REQUESTING THAT THE OFFICE CALL THIS PT, THEY HAVE NOT BEEN ABLE TO MAKE CONTACT WITH HER- SHE HAS HAD equipment FOR 15 DAYS NOW FOR A 1 NIGHT TEST. PLEASE ADVISE.

## 2024-10-23 ENCOUNTER — TELEPHONE (OUTPATIENT)
Dept: ORTHOPEDIC SURGERY | Facility: CLINIC | Age: 49
End: 2024-10-23
Payer: MEDICARE

## 2024-10-23 NOTE — TELEPHONE ENCOUNTER
Patient called asking about referral. Advised the patient to call [facility] and schedule an appt. Patient had already been scheduled 7/22/2024 but had forgot about it. Patient also asked to switch out the OA brace for a larger one. Advised patient to come in and we would switch it out for her. Patient verbalized understanding.    Yes...

## 2025-02-03 ENCOUNTER — HOSPITAL ENCOUNTER (EMERGENCY)
Facility: HOSPITAL | Age: 50
Discharge: HOME | End: 2025-02-04
Attending: EMERGENCY MEDICINE
Payer: MEDICARE

## 2025-02-03 DIAGNOSIS — T83.9XXA FOLEY CATHETER PROBLEM, INITIAL ENCOUNTER: Primary | ICD-10-CM

## 2025-02-03 PROCEDURE — 99283 EMERGENCY DEPT VISIT LOW MDM: CPT

## 2025-02-04 VITALS
BODY MASS INDEX: 27.47 KG/M2 | TEMPERATURE: 98.8 F | SYSTOLIC BLOOD PRESSURE: 128 MMHG | DIASTOLIC BLOOD PRESSURE: 80 MMHG | HEART RATE: 71 BPM | HEIGHT: 67 IN | WEIGHT: 175 LBS | RESPIRATION RATE: 14 BRPM | OXYGEN SATURATION: 98 %

## 2025-02-04 RX ORDER — LEVOFLOXACIN 750 MG/1
TABLET, FILM COATED ORAL
Status: DISCONTINUED
Start: 2025-02-04 | End: 2025-02-04 | Stop reason: HOSPADM

## 2025-02-04 RX ORDER — LEVOFLOXACIN 750 MG/1
750 TABLET, FILM COATED ORAL ONCE
Status: COMPLETED | OUTPATIENT
Start: 2025-02-04 | End: 2025-02-04

## 2025-02-04 RX ADMIN — RIVAROXABAN 20 MG: 20 TABLET, FILM COATED ORAL at 00:35

## 2025-02-04 RX ADMIN — LEVOFLOXACIN 750 MG: 750 TABLET, FILM COATED ORAL at 00:35

## 2025-02-04 NOTE — DISCHARGE INSTRUCTIONS
Take all of your meds as prescribed, if you begin experiencing symptoms of urinary retention please return to the ER for evaluation.  If you have any difficulty filling the prescriptions please return to the ER.  Please follow-up outpatient with your primary care as well as lung specialist.
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
## 2025-03-06 ENCOUNTER — OFFICE VISIT (OUTPATIENT)
Dept: UROLOGY | Facility: CLINIC | Age: 50
End: 2025-03-06
Payer: MEDICARE

## 2025-03-06 VITALS
SYSTOLIC BLOOD PRESSURE: 111 MMHG | HEART RATE: 103 BPM | WEIGHT: 186 LBS | BODY MASS INDEX: 29.19 KG/M2 | DIASTOLIC BLOOD PRESSURE: 67 MMHG | HEIGHT: 67 IN

## 2025-03-06 DIAGNOSIS — R33.9 INCOMPLETE EMPTYING OF BLADDER: Primary | ICD-10-CM

## 2025-03-06 DIAGNOSIS — K59.04 CHRONIC IDIOPATHIC CONSTIPATION: ICD-10-CM

## 2025-03-06 RX ORDER — TAMSULOSIN HYDROCHLORIDE 0.4 MG/1
1 CAPSULE ORAL DAILY
Qty: 90 CAPSULE | Refills: 3 | Status: SHIPPED | OUTPATIENT
Start: 2025-03-06

## 2025-03-06 RX ORDER — DOCUSATE SODIUM 250 MG
250 CAPSULE ORAL DAILY
Qty: 90 CAPSULE | Refills: 3 | Status: SHIPPED | OUTPATIENT
Start: 2025-03-06
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
## 2025-04-10 ENCOUNTER — OFFICE VISIT (OUTPATIENT)
Dept: PULMONOLOGY | Facility: CLINIC | Age: 50
End: 2025-04-10
Payer: MEDICARE

## 2025-04-10 VITALS
HEART RATE: 91 BPM | BODY MASS INDEX: 28.19 KG/M2 | SYSTOLIC BLOOD PRESSURE: 122 MMHG | HEIGHT: 67 IN | WEIGHT: 179.6 LBS | TEMPERATURE: 97.4 F | OXYGEN SATURATION: 93 % | DIASTOLIC BLOOD PRESSURE: 70 MMHG

## 2025-04-10 DIAGNOSIS — J22 ACUTE LOWER RESPIRATORY INFECTION: ICD-10-CM

## 2025-04-10 DIAGNOSIS — Z86.711 HISTORY OF PULMONARY EMBOLISM: ICD-10-CM

## 2025-04-10 DIAGNOSIS — R06.02 SHORTNESS OF BREATH: Primary | ICD-10-CM

## 2025-04-10 DIAGNOSIS — J96.11 CHRONIC HYPOXIC RESPIRATORY FAILURE: ICD-10-CM

## 2025-04-10 RX ORDER — NICOTINE 21 MG/24HR
1 PATCH, TRANSDERMAL 24 HOURS TRANSDERMAL DAILY
COMMUNITY
Start: 2025-04-03 | End: 2025-05-03

## 2025-04-10 RX ORDER — ALBUTEROL SULFATE 90 UG/1
2 INHALANT RESPIRATORY (INHALATION) EVERY 4 HOURS PRN
COMMUNITY

## 2025-04-10 RX ORDER — VANCOMYCIN HYDROCHLORIDE 5 G/1
INJECTION, POWDER, LYOPHILIZED, FOR SOLUTION INTRAVENOUS
COMMUNITY

## 2025-04-10 RX ORDER — GUAIFENESIN 600 MG/1
600 TABLET, EXTENDED RELEASE ORAL 2 TIMES DAILY PRN
Qty: 20 TABLET | Refills: 3 | Status: SHIPPED | OUTPATIENT
Start: 2025-04-10 | End: 2025-04-20

## 2025-04-10 RX ORDER — PROMETHAZINE HYDROCHLORIDE 25 MG/1
TABLET ORAL
COMMUNITY
Start: 2025-03-25

## 2025-04-10 RX ORDER — SODIUM CHLORIDE 0.9 % (FLUSH) 0.9 %
SYRINGE (ML) INJECTION
COMMUNITY

## 2025-04-10 RX ORDER — CEFUROXIME AXETIL 500 MG/1
TABLET ORAL
COMMUNITY
Start: 2025-03-24

## 2025-04-10 RX ORDER — DOXYCYCLINE 100 MG/1
100 CAPSULE ORAL 2 TIMES DAILY
Qty: 10 CAPSULE | Refills: 0 | Status: SHIPPED | OUTPATIENT
Start: 2025-04-10 | End: 2025-04-15

## 2025-04-10 RX ORDER — RIVAROXABAN 15 MG/1
TABLET, FILM COATED ORAL
COMMUNITY
Start: 2025-02-01

## 2025-04-10 RX ORDER — BUSPIRONE HYDROCHLORIDE 5 MG/1
TABLET ORAL
COMMUNITY

## 2025-04-10 RX ORDER — VARENICLINE TARTRATE 0.5 (11)-1
0.5 KIT ORAL
COMMUNITY

## 2025-04-10 NOTE — PROGRESS NOTES
Chief Complaint  Shortness of Breath    Andrzej Holland presents to [facility] PULMONARY & CRITICAL CARE MEDICINE  History of Present Illness    Mrs. Holland presents today for initial evaluation.   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 History of PE.    She has been notable in the past for episodic pneumonia.    She was recently notable for hospitalization in late March 2025 for seizure-like episodes.  Workup was also notable for UTI (but had recently been prescribed antibiotic for this in outpatient setting).   As she was worried about falling and hitting her knee during the seizure, evaluation was completed which also appeared notable for a metallic like foreign body of the left medial thigh.  Prior to discharge, she was placed referral for orthopedic evaluation.  Seizure disorder medications were adjusted prior to discharge.    Within the last few days, she started to have increased mucus production.  Notes that his of a brown coloration upon initial production, then changes to a yellow/green throughout the day.  Has noted this over the last few days.  Has a Trelegy inhaler but had some confusion on proper use.  Notable for long-term smoking history.  Previously smoked as much as 1.5 pack/day average, now decreased to less than 1 pack/day average.  States that she has had a PE in the past and was on a blood thinner.  Has ultimately self discontinued this as she was not told whether to continue or discontinue.  She is notable for contrast allergy.  States that she has oxygen supplies.  Due to previous significant drop in O2, she was advised to use 4 L.  Feels  "that this too much.  States that she is home concentrator and large tanks.    She is accompanied by her sister today.  Her , passed within the last 2 months.        Objective   Vital Signs:  /70   Pulse 91   Temp 97.4 °F (36.3 °C)   Ht 170.2 cm (67\")   Wt 81.5 kg (179 lb 9.6 oz)   SpO2 93%   BMI 28.13 kg/m²   Estimated body mass index is 28.13 kg/m² as calculated from the following:    Height as of this encounter: 170.2 cm (67\").    Weight as of this encounter: 81.5 kg (179 lb 9.6 oz).  Room air      Physical Exam  Vitals reviewed.   Constitutional:       General: She is not in acute distress.  Cardiovascular:      Rate and Rhythm: Normal rate and regular rhythm.   Pulmonary:      Effort: Pulmonary effort is normal.      Breath sounds: Rhonchi (Left end expiratory rhonchi) present. No wheezing or rales.   Neurological:      Mental Status: She is alert and oriented to person, place, and time.   Psychiatric:         Behavior: Behavior normal.        Result Review :  The following data was reviewed by: Jessica Chaney PA-C on 04/10/2025:      Chest CT report January 2025          -----------------------------------------------------------------------------------    Perfusion scan January 2025          -----------------------------------------------------------------------------------    Chest x-ray report March 31, 2025          -----------------------------------------------------------------------------------    Echocardiogram 2019      -----------------------------------------------------------------------------------    Bilateral lower extremity venous Doppler report        -----------------------------------------------------------------------------------          Assessment and Plan   Diagnoses and all orders for this visit:    1. Shortness of breath (Primary)  -     Cancel: Complete PFT - Pre & Post Bronchodilator; Future  -     Complete PFT - Pre & Post Bronchodilator; Future    2. History " of pulmonary embolism  -     NM Lung Ventilation Perfusion; Future    3. Acute lower respiratory infection  -     Respiratory Culture - Sputum, Cough; Future    4. Chronic hypoxic respiratory failure  -     Overnight Sleep Oximetry Study; Future    Other orders  -     doxycycline (VIBRAMYCIN) 100 MG capsule; Take 1 capsule by mouth 2 (Two) Times a Day for 5 days.  Dispense: 10 capsule; Refill: 0  -     guaiFENesin (Mucinex) 600 MG 12 hr tablet; Take 1 tablet by mouth 2 (Two) Times a Day As Needed for Cough or Congestion for up to 10 days.  Dispense: 20 tablet; Refill: 3          Shortness of breath,   concern for developing LRTI,   history of pneumonia,   cigarette dependence,   COPD:  States that COPD has been diagnosed previously while hospitalized, but no formal PFT testing completed.  Remains current smoker.  Has intervally cut down from 1.5 pack/day average to less than 1 pack/day average.  Ordered full PFT.  Continue albuterol inhaler as needed  Currently on Trelegy 200 mcg daily but likely not inhaling properly after reviewing the instructions.  Provided detailed instructions  Can use Mucinex as needed for congestion.  Ordered doxycycline course  Ordered respiratory sputum culture  Order follow-up CT chest, previous pneumonia changes on the last CT chest January 2025          Chronic hypoxic respiratory failure:  States that he required higher liter flow during times of illness previously.  Now using on an as-needed basis during the daytime and previously recommended for 4 L use.  Unclear of the exact but she is using at nighttime.  Agree with daytime use as needed to maintain SpO2 greater than 88%.  SpO2 appropriate today on room air at 93%, but recommended use with longer exertional activity.  Recommend 2 L  Ordered overnight pulse oximetry test to be completed on current liter usage, then will confirm appropriate amount.        History of PE:  Previously on Xarelto, but reported that she still decided to  discontinue use and had some confusion of her duration of therapy.  Ordered VQ scan, allergies contrast  Will assist her with cardiology follow-up again if needed, discussed this today for recommendation for follow-up        Follow Up   Return in about 6 weeks (around 5/22/2025), or if symptoms worsen or fail to improve, for Recheck.  Patient was given instructions and counseling regarding her condition or for health maintenance advice. Please see specific information pulled into the AVS if appropriate.

## 2025-04-21 ENCOUNTER — APPOINTMENT (OUTPATIENT)
Dept: GENERAL RADIOLOGY | Facility: HOSPITAL | Age: 50
End: 2025-04-21
Payer: MEDICARE

## 2025-04-21 ENCOUNTER — APPOINTMENT (OUTPATIENT)
Dept: CT IMAGING | Facility: HOSPITAL | Age: 50
End: 2025-04-21
Payer: MEDICARE

## 2025-04-21 ENCOUNTER — HOSPITAL ENCOUNTER (EMERGENCY)
Facility: HOSPITAL | Age: 50
Discharge: ANOTHER HOSPITAL | End: 2025-04-22
Admitting: STUDENT IN AN ORGANIZED HEALTH CARE EDUCATION/TRAINING PROGRAM
Payer: MEDICARE

## 2025-04-21 DIAGNOSIS — T42.4X1A BENZODIAZEPINE OVERDOSE, ACCIDENTAL OR UNINTENTIONAL, INITIAL ENCOUNTER: ICD-10-CM

## 2025-04-21 DIAGNOSIS — R56.9 SEIZURE: ICD-10-CM

## 2025-04-21 DIAGNOSIS — F11.929: Primary | ICD-10-CM

## 2025-04-21 DIAGNOSIS — R40.2432 GLASGOW COMA SCALE TOTAL SCORE 3-8, AT ARRIVAL TO EMERGENCY DEPARTMENT: ICD-10-CM

## 2025-04-21 DIAGNOSIS — R40.1 STUPOR: ICD-10-CM

## 2025-04-21 PROCEDURE — 99291 CRITICAL CARE FIRST HOUR: CPT

## 2025-04-21 PROCEDURE — 36415 COLL VENOUS BLD VENIPUNCTURE: CPT

## 2025-04-21 PROCEDURE — 70450 CT HEAD/BRAIN W/O DYE: CPT | Performed by: RADIOLOGY

## 2025-04-21 PROCEDURE — 96372 THER/PROPH/DIAG INJ SC/IM: CPT

## 2025-04-21 PROCEDURE — 51702 INSERT TEMP BLADDER CATH: CPT

## 2025-04-21 PROCEDURE — 72125 CT NECK SPINE W/O DYE: CPT | Performed by: RADIOLOGY

## 2025-04-21 PROCEDURE — 71045 X-RAY EXAM CHEST 1 VIEW: CPT

## 2025-04-21 PROCEDURE — 25010000002 NALOXONE PER 1 MG

## 2025-04-21 PROCEDURE — 70450 CT HEAD/BRAIN W/O DYE: CPT

## 2025-04-21 PROCEDURE — 25010000002 NALOXONE HCL 2 MG/2ML SOLUTION PREFILLED SYRINGE

## 2025-04-21 PROCEDURE — 72125 CT NECK SPINE W/O DYE: CPT

## 2025-04-21 RX ORDER — NALOXONE HYDROCHLORIDE 1 MG/ML
2 INJECTION INTRAMUSCULAR; INTRAVENOUS; SUBCUTANEOUS ONCE
Status: COMPLETED | OUTPATIENT
Start: 2025-04-21 | End: 2025-04-21

## 2025-04-21 RX ORDER — NALOXONE HCL 0.4 MG/ML
1 VIAL (ML) INJECTION ONCE
Status: DISCONTINUED | OUTPATIENT
Start: 2025-04-21 | End: 2025-04-21

## 2025-04-21 RX ORDER — NALOXONE HCL 0.4 MG/ML
1 VIAL (ML) INJECTION ONCE
Status: COMPLETED | OUTPATIENT
Start: 2025-04-21 | End: 2025-04-21

## 2025-04-21 RX ADMIN — NALOXONE HYDROCHLORIDE 2 MG: 1 INJECTION INTRAMUSCULAR; INTRAVENOUS; SUBCUTANEOUS at 23:36

## 2025-04-21 RX ADMIN — NALOXONE HYDROCHLORIDE 1 MG: 0.4 INJECTION, SOLUTION INTRAMUSCULAR; INTRAVENOUS; SUBCUTANEOUS at 22:30

## 2025-04-22 ENCOUNTER — APPOINTMENT (OUTPATIENT)
Dept: GENERAL RADIOLOGY | Facility: HOSPITAL | Age: 50
End: 2025-04-22
Payer: MEDICARE

## 2025-04-22 VITALS
TEMPERATURE: 97.5 F | WEIGHT: 179.68 LBS | RESPIRATION RATE: 22 BRPM | DIASTOLIC BLOOD PRESSURE: 99 MMHG | SYSTOLIC BLOOD PRESSURE: 142 MMHG | OXYGEN SATURATION: 100 % | HEART RATE: 51 BPM | BODY MASS INDEX: 28.2 KG/M2 | HEIGHT: 67 IN

## 2025-04-22 LAB
ALBUMIN SERPL-MCNC: 4 G/DL (ref 3.5–5.2)
ALBUMIN/GLOB SERPL: 1.2 G/DL
ALP SERPL-CCNC: 105 U/L (ref 39–117)
ALT SERPL W P-5'-P-CCNC: 8 U/L (ref 1–33)
AMPHET+METHAMPHET UR QL: NEGATIVE
AMPHETAMINES UR QL: POSITIVE
ANION GAP SERPL CALCULATED.3IONS-SCNC: 11.6 MMOL/L (ref 5–15)
APTT PPP: 27 SECONDS (ref 24.5–35.9)
AST SERPL-CCNC: 14 U/L (ref 1–32)
BARBITURATES UR QL SCN: NEGATIVE
BASOPHILS # BLD AUTO: 0.03 10*3/MM3 (ref 0–0.2)
BASOPHILS NFR BLD AUTO: 0.6 % (ref 0–1.5)
BENZODIAZ UR QL SCN: POSITIVE
BILIRUB SERPL-MCNC: 0.4 MG/DL (ref 0–1.2)
BILIRUB UR QL STRIP: NEGATIVE
BUN SERPL-MCNC: 7 MG/DL (ref 6–20)
BUN/CREAT SERPL: 8.8 (ref 7–25)
BUPRENORPHINE SERPL-MCNC: NEGATIVE NG/ML
CALCIUM SPEC-SCNC: 9.6 MG/DL (ref 8.6–10.5)
CANNABINOIDS SERPL QL: NEGATIVE
CHLORIDE SERPL-SCNC: 111 MMOL/L (ref 98–107)
CLARITY UR: CLEAR
CO2 SERPL-SCNC: 26.4 MMOL/L (ref 22–29)
COCAINE UR QL: NEGATIVE
COLOR UR: YELLOW
CREAT SERPL-MCNC: 0.8 MG/DL (ref 0.57–1)
D-LACTATE SERPL-SCNC: 0.8 MMOL/L (ref 0.5–2)
DEPRECATED RDW RBC AUTO: 47.2 FL (ref 37–54)
EGFRCR SERPLBLD CKD-EPI 2021: 91 ML/MIN/1.73
EOSINOPHIL # BLD AUTO: 0.09 10*3/MM3 (ref 0–0.4)
EOSINOPHIL NFR BLD AUTO: 1.8 % (ref 0.3–6.2)
ERYTHROCYTE [DISTWIDTH] IN BLOOD BY AUTOMATED COUNT: 13.5 % (ref 12.3–15.4)
FENTANYL UR-MCNC: NEGATIVE NG/ML
GLOBULIN UR ELPH-MCNC: 3.4 GM/DL
GLUCOSE SERPL-MCNC: 126 MG/DL (ref 65–99)
GLUCOSE UR STRIP-MCNC: NEGATIVE MG/DL
HCT VFR BLD AUTO: 40.7 % (ref 34–46.6)
HGB BLD-MCNC: 12.9 G/DL (ref 12–15.9)
HGB UR QL STRIP.AUTO: NEGATIVE
HOLD SPECIMEN: NORMAL
HOLD SPECIMEN: NORMAL
IMM GRANULOCYTES # BLD AUTO: 0.01 10*3/MM3 (ref 0–0.05)
IMM GRANULOCYTES NFR BLD AUTO: 0.2 % (ref 0–0.5)
INR PPP: 1.06 (ref 0.9–1.1)
KETONES UR QL STRIP: NEGATIVE
LEUKOCYTE ESTERASE UR QL STRIP.AUTO: NEGATIVE
LYMPHOCYTES # BLD AUTO: 0.91 10*3/MM3 (ref 0.7–3.1)
LYMPHOCYTES NFR BLD AUTO: 18.2 % (ref 19.6–45.3)
MAGNESIUM SERPL-MCNC: 1.9 MG/DL (ref 1.6–2.6)
MCH RBC QN AUTO: 29.9 PG (ref 26.6–33)
MCHC RBC AUTO-ENTMCNC: 31.7 G/DL (ref 31.5–35.7)
MCV RBC AUTO: 94.2 FL (ref 79–97)
METHADONE UR QL SCN: NEGATIVE
MONOCYTES # BLD AUTO: 0.29 10*3/MM3 (ref 0.1–0.9)
MONOCYTES NFR BLD AUTO: 5.8 % (ref 5–12)
NEUTROPHILS NFR BLD AUTO: 3.66 10*3/MM3 (ref 1.7–7)
NEUTROPHILS NFR BLD AUTO: 73.4 % (ref 42.7–76)
NITRITE UR QL STRIP: NEGATIVE
NRBC BLD AUTO-RTO: 0 /100 WBC (ref 0–0.2)
OPIATES UR QL: NEGATIVE
OXYCODONE UR QL SCN: NEGATIVE
PCP UR QL SCN: NEGATIVE
PH UR STRIP.AUTO: 7.5 [PH] (ref 5–8)
PLATELET # BLD AUTO: 291 10*3/MM3 (ref 140–450)
PMV BLD AUTO: 10 FL (ref 6–12)
POTASSIUM SERPL-SCNC: 3.8 MMOL/L (ref 3.5–5.2)
PROT SERPL-MCNC: 7.4 G/DL (ref 6–8.5)
PROT UR QL STRIP: NEGATIVE
PROTHROMBIN TIME: 14 SECONDS (ref 11.6–15.1)
RBC # BLD AUTO: 4.32 10*6/MM3 (ref 3.77–5.28)
SODIUM SERPL-SCNC: 149 MMOL/L (ref 136–145)
SP GR UR STRIP: 1.01 (ref 1–1.03)
TRICYCLICS UR QL SCN: POSITIVE
TSH SERPL DL<=0.05 MIU/L-ACNC: 0.6 UIU/ML (ref 0.27–4.2)
UROBILINOGEN UR QL STRIP: NORMAL
WBC NRBC COR # BLD AUTO: 4.99 10*3/MM3 (ref 3.4–10.8)

## 2025-04-22 PROCEDURE — 25010000002 PROPOFOL 10 MG/ML EMULSION

## 2025-04-22 PROCEDURE — 80307 DRUG TEST PRSMV CHEM ANLYZR: CPT | Performed by: STUDENT IN AN ORGANIZED HEALTH CARE EDUCATION/TRAINING PROGRAM

## 2025-04-22 PROCEDURE — 85610 PROTHROMBIN TIME: CPT

## 2025-04-22 PROCEDURE — 71045 X-RAY EXAM CHEST 1 VIEW: CPT | Performed by: RADIOLOGY

## 2025-04-22 PROCEDURE — 96365 THER/PROPH/DIAG IV INF INIT: CPT

## 2025-04-22 PROCEDURE — 96366 THER/PROPH/DIAG IV INF ADDON: CPT

## 2025-04-22 PROCEDURE — 71045 X-RAY EXAM CHEST 1 VIEW: CPT

## 2025-04-22 PROCEDURE — 94799 UNLISTED PULMONARY SVC/PX: CPT

## 2025-04-22 PROCEDURE — 85025 COMPLETE CBC W/AUTO DIFF WBC: CPT

## 2025-04-22 PROCEDURE — 96375 TX/PRO/DX INJ NEW DRUG ADDON: CPT

## 2025-04-22 PROCEDURE — 94761 N-INVAS EAR/PLS OXIMETRY MLT: CPT

## 2025-04-22 PROCEDURE — 25010000002 SUCCINYLCHOLINE PER 20 MG: Performed by: STUDENT IN AN ORGANIZED HEALTH CARE EDUCATION/TRAINING PROGRAM

## 2025-04-22 PROCEDURE — 83735 ASSAY OF MAGNESIUM: CPT

## 2025-04-22 PROCEDURE — 51702 INSERT TEMP BLADDER CATH: CPT

## 2025-04-22 PROCEDURE — 83605 ASSAY OF LACTIC ACID: CPT

## 2025-04-22 PROCEDURE — 81003 URINALYSIS AUTO W/O SCOPE: CPT | Performed by: STUDENT IN AN ORGANIZED HEALTH CARE EDUCATION/TRAINING PROGRAM

## 2025-04-22 PROCEDURE — 36415 COLL VENOUS BLD VENIPUNCTURE: CPT

## 2025-04-22 PROCEDURE — 85730 THROMBOPLASTIN TIME PARTIAL: CPT

## 2025-04-22 PROCEDURE — 31500 INSERT EMERGENCY AIRWAY: CPT

## 2025-04-22 PROCEDURE — 87040 BLOOD CULTURE FOR BACTERIA: CPT

## 2025-04-22 PROCEDURE — 25010000002 LEVETRIRACETAM PER 10 MG

## 2025-04-22 PROCEDURE — 84443 ASSAY THYROID STIM HORMONE: CPT

## 2025-04-22 PROCEDURE — 94002 VENT MGMT INPAT INIT DAY: CPT

## 2025-04-22 PROCEDURE — 80053 COMPREHEN METABOLIC PANEL: CPT

## 2025-04-22 PROCEDURE — 25010000002 PROPOFOL 1000 MG/100ML EMULSION

## 2025-04-22 RX ORDER — FLUMAZENIL 0.1 MG/ML
0.5 INJECTION INTRAVENOUS ONCE
Status: COMPLETED | OUTPATIENT
Start: 2025-04-22 | End: 2025-04-22

## 2025-04-22 RX ORDER — LEVETIRACETAM 500 MG/5ML
1000 INJECTION, SOLUTION, CONCENTRATE INTRAVENOUS ONCE
Status: COMPLETED | OUTPATIENT
Start: 2025-04-22 | End: 2025-04-22

## 2025-04-22 RX ORDER — SUCCINYLCHOLINE CHLORIDE 20 MG/ML
100 INJECTION INTRAMUSCULAR; INTRAVENOUS ONCE
Status: COMPLETED | OUTPATIENT
Start: 2025-04-22 | End: 2025-04-22

## 2025-04-22 RX ORDER — ETOMIDATE 2 MG/ML
10 INJECTION INTRAVENOUS ONCE
Status: COMPLETED | OUTPATIENT
Start: 2025-04-22 | End: 2025-04-22

## 2025-04-22 RX ORDER — LEVETIRACETAM 500 MG/5ML
INJECTION, SOLUTION, CONCENTRATE INTRAVENOUS
Status: COMPLETED
Start: 2025-04-22 | End: 2025-04-22

## 2025-04-22 RX ORDER — PROPOFOL 10 MG/ML
INJECTION, EMULSION INTRAVENOUS
Status: COMPLETED
Start: 2025-04-22 | End: 2025-04-22

## 2025-04-22 RX ADMIN — PROPOFOL 5 MCG/KG/MIN: 10 INJECTION, EMULSION INTRAVENOUS at 02:02

## 2025-04-22 RX ADMIN — FLUMAZENIL 0.5 MG: 0.1 INJECTION, SOLUTION INTRAVENOUS at 00:23

## 2025-04-22 RX ADMIN — ETOMIDATE 10 MG: 2 INJECTION, SOLUTION INTRAVENOUS at 03:25

## 2025-04-22 RX ADMIN — LEVETIRACETAM 1000 MG: 500 INJECTION, SOLUTION, CONCENTRATE INTRAVENOUS at 01:46

## 2025-04-22 RX ADMIN — PROPOFOL 50 MCG/KG/MIN: 10 INJECTION, EMULSION INTRAVENOUS at 05:17

## 2025-04-22 RX ADMIN — PROPOFOL 50 MCG/KG/MIN: 10 INJECTION, EMULSION INTRAVENOUS at 07:57

## 2025-04-22 RX ADMIN — SUCCINYLCHOLINE CHLORIDE 100 MG: 20 INJECTION, SOLUTION INTRAMUSCULAR; INTRAVENOUS at 03:26

## 2025-04-22 NOTE — ED NOTES
Called [employer] and spoke to Richa. They checked weather and said that we can either have someone take patient to [place] now or they can land here in an hour. I told her that it's so close to shift change we probably won't be able to get anyone now. She said they will come here in an hour but will call first.
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
## 2025-04-22 NOTE — ED NOTES
Called [facility] and spoke to Corinne. She is going to page the hospitalist at [facility] and call back for Dr Delgado.

## 2025-04-22 NOTE — ED NOTES
Patient noted to be having a seizure that lasted approx. 2 minutes. Dr. Luke and Marisela, Lead RN at bedside.

## 2025-04-27 LAB
BACTERIA SPEC AEROBE CULT: NORMAL
BACTERIA SPEC AEROBE CULT: NORMAL

## 2025-05-05 ENCOUNTER — APPOINTMENT (OUTPATIENT)
Dept: GENERAL RADIOLOGY | Facility: HOSPITAL | Age: 50
End: 2025-05-05
Payer: COMMERCIAL

## 2025-05-05 ENCOUNTER — HOSPITAL ENCOUNTER (OUTPATIENT)
Facility: HOSPITAL | Age: 50
Discharge: HOME | End: 2025-05-13
Attending: INTERNAL MEDICINE | Admitting: INTERNAL MEDICINE
Payer: COMMERCIAL

## 2025-05-05 LAB
BILIRUB UR QL STRIP: NEGATIVE
CLARITY UR: CLEAR
COLOR UR: YELLOW
GLUCOSE UR STRIP-MCNC: NEGATIVE MG/DL
HGB UR QL STRIP.AUTO: NEGATIVE
KETONES UR QL STRIP: NEGATIVE
LEUKOCYTE ESTERASE UR QL STRIP.AUTO: NEGATIVE
NITRITE UR QL STRIP: NEGATIVE
PH UR STRIP.AUTO: 7 [PH] (ref 5–8)
PROT UR QL STRIP: NEGATIVE
SP GR UR STRIP: 1.01 (ref 1–1.03)
UROBILINOGEN UR QL STRIP: NORMAL

## 2025-05-05 PROCEDURE — 81003 URINALYSIS AUTO W/O SCOPE: CPT | Performed by: INTERNAL MEDICINE

## 2025-05-05 PROCEDURE — 93010 ELECTROCARDIOGRAM REPORT: CPT | Performed by: STUDENT IN AN ORGANIZED HEALTH CARE EDUCATION/TRAINING PROGRAM

## 2025-05-05 PROCEDURE — 93005 ELECTROCARDIOGRAM TRACING: CPT | Performed by: INTERNAL MEDICINE

## 2025-05-06 ENCOUNTER — APPOINTMENT (OUTPATIENT)
Dept: GENERAL RADIOLOGY | Facility: HOSPITAL | Age: 50
End: 2025-05-06
Payer: MEDICARE

## 2025-05-06 ENCOUNTER — OUTSIDE FACILITY SERVICE (OUTPATIENT)
Dept: INFECTIOUS DISEASES | Facility: CLINIC | Age: 50
End: 2025-05-06
Payer: COMMERCIAL

## 2025-05-06 ENCOUNTER — OUTSIDE FACILITY SERVICE (OUTPATIENT)
Dept: PULMONOLOGY | Facility: CLINIC | Age: 50
End: 2025-05-06
Payer: MEDICARE

## 2025-05-06 ENCOUNTER — APPOINTMENT (OUTPATIENT)
Dept: GENERAL RADIOLOGY | Facility: HOSPITAL | Age: 50
End: 2025-05-06
Payer: COMMERCIAL

## 2025-05-06 LAB
ALBUMIN SERPL-MCNC: 3.5 G/DL (ref 3.5–5.2)
ALBUMIN/GLOB SERPL: 1 G/DL
ALP SERPL-CCNC: 117 U/L (ref 39–117)
ALT SERPL W P-5'-P-CCNC: 37 U/L (ref 1–33)
AMPHET+METHAMPHET UR QL: NEGATIVE
AMPHETAMINES UR QL: NEGATIVE
ANION GAP SERPL CALCULATED.3IONS-SCNC: 8.5 MMOL/L (ref 5–15)
AST SERPL-CCNC: 32 U/L (ref 1–32)
BARBITURATES UR QL SCN: NEGATIVE
BASOPHILS # BLD AUTO: 0.03 10*3/MM3 (ref 0–0.2)
BASOPHILS NFR BLD AUTO: 0.4 % (ref 0–1.5)
BENZODIAZ UR QL SCN: POSITIVE
BILIRUB SERPL-MCNC: 0.2 MG/DL (ref 0–1.2)
BUN SERPL-MCNC: 22 MG/DL (ref 6–20)
BUN/CREAT SERPL: 24.2 (ref 7–25)
BUPRENORPHINE SERPL-MCNC: NEGATIVE NG/ML
CALCIUM SPEC-SCNC: 9.5 MG/DL (ref 8.6–10.5)
CANNABINOIDS SERPL QL: NEGATIVE
CHLORIDE SERPL-SCNC: 104 MMOL/L (ref 98–107)
CO2 SERPL-SCNC: 27.5 MMOL/L (ref 22–29)
COCAINE UR QL: NEGATIVE
CREAT SERPL-MCNC: 0.91 MG/DL (ref 0.57–1)
CRP SERPL-MCNC: 0.43 MG/DL (ref 0–0.5)
DEPRECATED RDW RBC AUTO: 48 FL (ref 37–54)
EGFRCR SERPLBLD CKD-EPI 2021: 78 ML/MIN/1.73
EOSINOPHIL # BLD AUTO: 0.27 10*3/MM3 (ref 0–0.4)
EOSINOPHIL NFR BLD AUTO: 3.8 % (ref 0.3–6.2)
ERYTHROCYTE [DISTWIDTH] IN BLOOD BY AUTOMATED COUNT: 14 % (ref 12.3–15.4)
FENTANYL UR-MCNC: NEGATIVE NG/ML
GLOBULIN UR ELPH-MCNC: 3.5 GM/DL
GLUCOSE SERPL-MCNC: 114 MG/DL (ref 65–99)
HCT VFR BLD AUTO: 37.2 % (ref 34–46.6)
HGB BLD-MCNC: 11.8 G/DL (ref 12–15.9)
IMM GRANULOCYTES # BLD AUTO: 0.04 10*3/MM3 (ref 0–0.05)
IMM GRANULOCYTES NFR BLD AUTO: 0.6 % (ref 0–0.5)
LYMPHOCYTES # BLD AUTO: 3 10*3/MM3 (ref 0.7–3.1)
LYMPHOCYTES NFR BLD AUTO: 42.1 % (ref 19.6–45.3)
MCH RBC QN AUTO: 30.2 PG (ref 26.6–33)
MCHC RBC AUTO-ENTMCNC: 31.7 G/DL (ref 31.5–35.7)
MCV RBC AUTO: 95.1 FL (ref 79–97)
METHADONE UR QL SCN: NEGATIVE
MONOCYTES # BLD AUTO: 0.43 10*3/MM3 (ref 0.1–0.9)
MONOCYTES NFR BLD AUTO: 6 % (ref 5–12)
NEUTROPHILS NFR BLD AUTO: 3.36 10*3/MM3 (ref 1.7–7)
NEUTROPHILS NFR BLD AUTO: 47.1 % (ref 42.7–76)
NRBC BLD AUTO-RTO: 0 /100 WBC (ref 0–0.2)
OPIATES UR QL: NEGATIVE
OXYCODONE UR QL SCN: POSITIVE
PCP UR QL SCN: NEGATIVE
PLATELET # BLD AUTO: 283 10*3/MM3 (ref 140–450)
PMV BLD AUTO: 9.9 FL (ref 6–12)
POTASSIUM SERPL-SCNC: 4 MMOL/L (ref 3.5–5.2)
PREALB SERPL-MCNC: 26.9 MG/DL (ref 20–40)
PROT SERPL-MCNC: 7 G/DL (ref 6–8.5)
QT INTERVAL: 402 MS
QTC INTERVAL: 454 MS
RBC # BLD AUTO: 3.91 10*6/MM3 (ref 3.77–5.28)
SODIUM SERPL-SCNC: 140 MMOL/L (ref 136–145)
TRICYCLICS UR QL SCN: POSITIVE
WBC NRBC COR # BLD AUTO: 7.13 10*3/MM3 (ref 3.4–10.8)

## 2025-05-06 PROCEDURE — 73630 X-RAY EXAM OF FOOT: CPT | Performed by: RADIOLOGY

## 2025-05-06 PROCEDURE — 71045 X-RAY EXAM CHEST 1 VIEW: CPT

## 2025-05-06 PROCEDURE — 73630 X-RAY EXAM OF FOOT: CPT

## 2025-05-06 PROCEDURE — 80053 COMPREHEN METABOLIC PANEL: CPT | Performed by: INTERNAL MEDICINE

## 2025-05-06 PROCEDURE — 84134 ASSAY OF PREALBUMIN: CPT | Performed by: INTERNAL MEDICINE

## 2025-05-06 PROCEDURE — 85025 COMPLETE CBC W/AUTO DIFF WBC: CPT | Performed by: INTERNAL MEDICINE

## 2025-05-06 PROCEDURE — 80307 DRUG TEST PRSMV CHEM ANLYZR: CPT | Performed by: PHYSICIAN ASSISTANT

## 2025-05-06 PROCEDURE — 97162 PT EVAL MOD COMPLEX 30 MIN: CPT

## 2025-05-06 PROCEDURE — 71045 X-RAY EXAM CHEST 1 VIEW: CPT | Performed by: RADIOLOGY

## 2025-05-06 PROCEDURE — 36415 COLL VENOUS BLD VENIPUNCTURE: CPT | Performed by: INTERNAL MEDICINE

## 2025-05-06 PROCEDURE — 80177 DRUG SCRN QUAN LEVETIRACETAM: CPT | Performed by: INTERNAL MEDICINE

## 2025-05-06 PROCEDURE — 86140 C-REACTIVE PROTEIN: CPT | Performed by: INTERNAL MEDICINE

## 2025-05-07 ENCOUNTER — OUTSIDE FACILITY SERVICE (OUTPATIENT)
Dept: INFECTIOUS DISEASES | Facility: CLINIC | Age: 50
End: 2025-05-07

## 2025-05-07 ENCOUNTER — OUTSIDE FACILITY SERVICE (OUTPATIENT)
Dept: PULMONOLOGY | Facility: CLINIC | Age: 50
End: 2025-05-07
Payer: MEDICARE

## 2025-05-07 LAB
ALBUMIN SERPL-MCNC: 3.5 G/DL (ref 3.5–5.2)
ALBUMIN/GLOB SERPL: 1 G/DL
ALP SERPL-CCNC: 112 U/L (ref 39–117)
ALT SERPL W P-5'-P-CCNC: 33 U/L (ref 1–33)
ANION GAP SERPL CALCULATED.3IONS-SCNC: 10.6 MMOL/L (ref 5–15)
AST SERPL-CCNC: 28 U/L (ref 1–32)
BASOPHILS # BLD AUTO: 0.03 10*3/MM3 (ref 0–0.2)
BASOPHILS NFR BLD AUTO: 0.4 % (ref 0–1.5)
BILIRUB SERPL-MCNC: 0.3 MG/DL (ref 0–1.2)
BUN SERPL-MCNC: 15 MG/DL (ref 6–20)
BUN/CREAT SERPL: 20.5 (ref 7–25)
CALCIUM SPEC-SCNC: 9.4 MG/DL (ref 8.6–10.5)
CHLORIDE SERPL-SCNC: 104 MMOL/L (ref 98–107)
CO2 SERPL-SCNC: 24.4 MMOL/L (ref 22–29)
CREAT SERPL-MCNC: 0.73 MG/DL (ref 0.57–1)
CRP SERPL-MCNC: <0.3 MG/DL (ref 0–0.5)
DEPRECATED RDW RBC AUTO: 49.1 FL (ref 37–54)
EGFRCR SERPLBLD CKD-EPI 2021: 101.6 ML/MIN/1.73
EOSINOPHIL # BLD AUTO: 0.25 10*3/MM3 (ref 0–0.4)
EOSINOPHIL NFR BLD AUTO: 3.7 % (ref 0.3–6.2)
ERYTHROCYTE [DISTWIDTH] IN BLOOD BY AUTOMATED COUNT: 14.1 % (ref 12.3–15.4)
GLOBULIN UR ELPH-MCNC: 3.6 GM/DL
GLUCOSE BLDC GLUCOMTR-MCNC: 100 MG/DL (ref 70–130)
GLUCOSE SERPL-MCNC: 103 MG/DL (ref 65–99)
HCT VFR BLD AUTO: 39.4 % (ref 34–46.6)
HGB BLD-MCNC: 12.1 G/DL (ref 12–15.9)
IMM GRANULOCYTES # BLD AUTO: 0.05 10*3/MM3 (ref 0–0.05)
IMM GRANULOCYTES NFR BLD AUTO: 0.7 % (ref 0–0.5)
LYMPHOCYTES # BLD AUTO: 2.75 10*3/MM3 (ref 0.7–3.1)
LYMPHOCYTES NFR BLD AUTO: 41.2 % (ref 19.6–45.3)
MCH RBC QN AUTO: 30 PG (ref 26.6–33)
MCHC RBC AUTO-ENTMCNC: 30.7 G/DL (ref 31.5–35.7)
MCV RBC AUTO: 97.5 FL (ref 79–97)
MONOCYTES # BLD AUTO: 0.5 10*3/MM3 (ref 0.1–0.9)
MONOCYTES NFR BLD AUTO: 7.5 % (ref 5–12)
NEUTROPHILS NFR BLD AUTO: 3.09 10*3/MM3 (ref 1.7–7)
NEUTROPHILS NFR BLD AUTO: 46.5 % (ref 42.7–76)
NRBC BLD AUTO-RTO: 0 /100 WBC (ref 0–0.2)
PLATELET # BLD AUTO: 288 10*3/MM3 (ref 140–450)
PMV BLD AUTO: 10.7 FL (ref 6–12)
POTASSIUM SERPL-SCNC: 3.5 MMOL/L (ref 3.5–5.2)
PROT SERPL-MCNC: 7.1 G/DL (ref 6–8.5)
RBC # BLD AUTO: 4.04 10*6/MM3 (ref 3.77–5.28)
SODIUM SERPL-SCNC: 139 MMOL/L (ref 136–145)
WBC NRBC COR # BLD AUTO: 6.67 10*3/MM3 (ref 3.4–10.8)

## 2025-05-07 PROCEDURE — 85025 COMPLETE CBC W/AUTO DIFF WBC: CPT | Performed by: INTERNAL MEDICINE

## 2025-05-07 PROCEDURE — 80053 COMPREHEN METABOLIC PANEL: CPT | Performed by: INTERNAL MEDICINE

## 2025-05-07 PROCEDURE — 97165 OT EVAL LOW COMPLEX 30 MIN: CPT

## 2025-05-07 PROCEDURE — 36415 COLL VENOUS BLD VENIPUNCTURE: CPT | Performed by: INTERNAL MEDICINE

## 2025-05-07 PROCEDURE — 82948 REAGENT STRIP/BLOOD GLUCOSE: CPT

## 2025-05-07 PROCEDURE — OUTSIDEPOS PR OUTSIDE POS PLACEHOLDER: Performed by: INTERNAL MEDICINE

## 2025-05-07 PROCEDURE — 86140 C-REACTIVE PROTEIN: CPT | Performed by: INTERNAL MEDICINE

## 2025-05-08 ENCOUNTER — OUTSIDE FACILITY SERVICE (OUTPATIENT)
Dept: PULMONOLOGY | Facility: CLINIC | Age: 50
End: 2025-05-08
Payer: MEDICARE

## 2025-05-08 ENCOUNTER — OUTSIDE FACILITY SERVICE (OUTPATIENT)
Dept: INFECTIOUS DISEASES | Facility: CLINIC | Age: 50
End: 2025-05-08

## 2025-05-08 LAB
ALBUMIN SERPL-MCNC: 3.3 G/DL (ref 3.5–5.2)
ALBUMIN/GLOB SERPL: 0.9 G/DL
ALP SERPL-CCNC: 115 U/L (ref 39–117)
ALT SERPL W P-5'-P-CCNC: 29 U/L (ref 1–33)
ANION GAP SERPL CALCULATED.3IONS-SCNC: 12.4 MMOL/L (ref 5–15)
AST SERPL-CCNC: 23 U/L (ref 1–32)
BASOPHILS # BLD AUTO: 0.03 10*3/MM3 (ref 0–0.2)
BASOPHILS NFR BLD AUTO: 0.3 % (ref 0–1.5)
BILIRUB SERPL-MCNC: 0.2 MG/DL (ref 0–1.2)
BUN SERPL-MCNC: 14 MG/DL (ref 6–20)
BUN/CREAT SERPL: 19.2 (ref 7–25)
CALCIUM SPEC-SCNC: 9.6 MG/DL (ref 8.6–10.5)
CHLORIDE SERPL-SCNC: 106 MMOL/L (ref 98–107)
CO2 SERPL-SCNC: 22.6 MMOL/L (ref 22–29)
CREAT SERPL-MCNC: 0.73 MG/DL (ref 0.57–1)
CRP SERPL-MCNC: <0.3 MG/DL (ref 0–0.5)
DEPRECATED RDW RBC AUTO: 51.9 FL (ref 37–54)
EGFRCR SERPLBLD CKD-EPI 2021: 101.6 ML/MIN/1.73
EOSINOPHIL # BLD AUTO: 0.19 10*3/MM3 (ref 0–0.4)
EOSINOPHIL NFR BLD AUTO: 1.6 % (ref 0.3–6.2)
ERYTHROCYTE [DISTWIDTH] IN BLOOD BY AUTOMATED COUNT: 14.4 % (ref 12.3–15.4)
GLOBULIN UR ELPH-MCNC: 3.6 GM/DL
GLUCOSE SERPL-MCNC: 100 MG/DL (ref 65–99)
HCT VFR BLD AUTO: 40.7 % (ref 34–46.6)
HGB BLD-MCNC: 12.5 G/DL (ref 12–15.9)
IMM GRANULOCYTES # BLD AUTO: 0.05 10*3/MM3 (ref 0–0.05)
IMM GRANULOCYTES NFR BLD AUTO: 0.4 % (ref 0–0.5)
LEVETIRACETAM SERPL-MCNC: 48.4 UG/ML (ref 10–40)
LYMPHOCYTES # BLD AUTO: 2.77 10*3/MM3 (ref 0.7–3.1)
LYMPHOCYTES NFR BLD AUTO: 23.5 % (ref 19.6–45.3)
MCH RBC QN AUTO: 30.7 PG (ref 26.6–33)
MCHC RBC AUTO-ENTMCNC: 30.7 G/DL (ref 31.5–35.7)
MCV RBC AUTO: 100 FL (ref 79–97)
MONOCYTES # BLD AUTO: 0.87 10*3/MM3 (ref 0.1–0.9)
MONOCYTES NFR BLD AUTO: 7.4 % (ref 5–12)
NEUTROPHILS NFR BLD AUTO: 66.8 % (ref 42.7–76)
NEUTROPHILS NFR BLD AUTO: 7.89 10*3/MM3 (ref 1.7–7)
NRBC BLD AUTO-RTO: 0 /100 WBC (ref 0–0.2)
PLATELET # BLD AUTO: 229 10*3/MM3 (ref 140–450)
PMV BLD AUTO: 11 FL (ref 6–12)
POTASSIUM SERPL-SCNC: 3.8 MMOL/L (ref 3.5–5.2)
PROT SERPL-MCNC: 6.9 G/DL (ref 6–8.5)
RBC # BLD AUTO: 4.07 10*6/MM3 (ref 3.77–5.28)
SODIUM SERPL-SCNC: 141 MMOL/L (ref 136–145)
WBC NRBC COR # BLD AUTO: 11.8 10*3/MM3 (ref 3.4–10.8)

## 2025-05-08 PROCEDURE — 80053 COMPREHEN METABOLIC PANEL: CPT | Performed by: INTERNAL MEDICINE

## 2025-05-08 PROCEDURE — 36415 COLL VENOUS BLD VENIPUNCTURE: CPT | Performed by: INTERNAL MEDICINE

## 2025-05-08 PROCEDURE — 86140 C-REACTIVE PROTEIN: CPT | Performed by: INTERNAL MEDICINE

## 2025-05-08 PROCEDURE — 85025 COMPLETE CBC W/AUTO DIFF WBC: CPT | Performed by: INTERNAL MEDICINE

## 2025-05-09 ENCOUNTER — OUTSIDE FACILITY SERVICE (OUTPATIENT)
Dept: INFECTIOUS DISEASES | Facility: CLINIC | Age: 50
End: 2025-05-09
Payer: MEDICARE

## 2025-05-09 ENCOUNTER — OUTSIDE FACILITY SERVICE (OUTPATIENT)
Dept: PULMONOLOGY | Facility: CLINIC | Age: 50
End: 2025-05-09
Payer: MEDICARE

## 2025-05-09 LAB
ALBUMIN SERPL-MCNC: 3.5 G/DL (ref 3.5–5.2)
ALBUMIN/GLOB SERPL: 1.1 G/DL
ALP SERPL-CCNC: 113 U/L (ref 39–117)
ALT SERPL W P-5'-P-CCNC: 24 U/L (ref 1–33)
ANION GAP SERPL CALCULATED.3IONS-SCNC: 10.3 MMOL/L (ref 5–15)
AST SERPL-CCNC: 21 U/L (ref 1–32)
BASOPHILS # BLD AUTO: 0.01 10*3/MM3 (ref 0–0.2)
BASOPHILS NFR BLD AUTO: 0.2 % (ref 0–1.5)
BILIRUB SERPL-MCNC: <0.2 MG/DL (ref 0–1.2)
BUN SERPL-MCNC: 11 MG/DL (ref 6–20)
BUN/CREAT SERPL: 15.7 (ref 7–25)
CALCIUM SPEC-SCNC: 9 MG/DL (ref 8.6–10.5)
CHLORIDE SERPL-SCNC: 105 MMOL/L (ref 98–107)
CO2 SERPL-SCNC: 24.7 MMOL/L (ref 22–29)
CREAT SERPL-MCNC: 0.7 MG/DL (ref 0.57–1)
CRP SERPL-MCNC: 0.53 MG/DL (ref 0–0.5)
DEPRECATED RDW RBC AUTO: 49.6 FL (ref 37–54)
EGFRCR SERPLBLD CKD-EPI 2021: 106.8 ML/MIN/1.73
EOSINOPHIL # BLD AUTO: 0.16 10*3/MM3 (ref 0–0.4)
EOSINOPHIL NFR BLD AUTO: 2.8 % (ref 0.3–6.2)
ERYTHROCYTE [DISTWIDTH] IN BLOOD BY AUTOMATED COUNT: 14.3 % (ref 12.3–15.4)
GLOBULIN UR ELPH-MCNC: 3.2 GM/DL
GLUCOSE SERPL-MCNC: 131 MG/DL (ref 65–99)
HCT VFR BLD AUTO: 34.4 % (ref 34–46.6)
HGB BLD-MCNC: 10.6 G/DL (ref 12–15.9)
IMM GRANULOCYTES # BLD AUTO: 0.02 10*3/MM3 (ref 0–0.05)
IMM GRANULOCYTES NFR BLD AUTO: 0.3 % (ref 0–0.5)
LYMPHOCYTES # BLD AUTO: 2.22 10*3/MM3 (ref 0.7–3.1)
LYMPHOCYTES NFR BLD AUTO: 38.7 % (ref 19.6–45.3)
MCH RBC QN AUTO: 29.8 PG (ref 26.6–33)
MCHC RBC AUTO-ENTMCNC: 30.8 G/DL (ref 31.5–35.7)
MCV RBC AUTO: 96.6 FL (ref 79–97)
MONOCYTES # BLD AUTO: 0.42 10*3/MM3 (ref 0.1–0.9)
MONOCYTES NFR BLD AUTO: 7.3 % (ref 5–12)
NEUTROPHILS NFR BLD AUTO: 2.91 10*3/MM3 (ref 1.7–7)
NEUTROPHILS NFR BLD AUTO: 50.7 % (ref 42.7–76)
NRBC BLD AUTO-RTO: 0 /100 WBC (ref 0–0.2)
PLATELET # BLD AUTO: 244 10*3/MM3 (ref 140–450)
PMV BLD AUTO: 10.4 FL (ref 6–12)
POTASSIUM SERPL-SCNC: 3.4 MMOL/L (ref 3.5–5.2)
PROT SERPL-MCNC: 6.7 G/DL (ref 6–8.5)
RBC # BLD AUTO: 3.56 10*6/MM3 (ref 3.77–5.28)
SODIUM SERPL-SCNC: 140 MMOL/L (ref 136–145)
WBC NRBC COR # BLD AUTO: 5.74 10*3/MM3 (ref 3.4–10.8)

## 2025-05-09 PROCEDURE — 36415 COLL VENOUS BLD VENIPUNCTURE: CPT | Performed by: INTERNAL MEDICINE

## 2025-05-09 PROCEDURE — 80053 COMPREHEN METABOLIC PANEL: CPT | Performed by: INTERNAL MEDICINE

## 2025-05-09 PROCEDURE — 36410 VNPNXR 3YR/> PHY/QHP DX/THER: CPT

## 2025-05-09 PROCEDURE — 85025 COMPLETE CBC W/AUTO DIFF WBC: CPT | Performed by: INTERNAL MEDICINE

## 2025-05-09 PROCEDURE — 86140 C-REACTIVE PROTEIN: CPT | Performed by: INTERNAL MEDICINE

## 2025-05-10 ENCOUNTER — OUTSIDE FACILITY SERVICE (OUTPATIENT)
Dept: PULMONOLOGY | Facility: CLINIC | Age: 50
End: 2025-05-10
Payer: MEDICARE

## 2025-05-10 LAB — GLUCOSE BLDC GLUCOMTR-MCNC: 111 MG/DL (ref 70–130)

## 2025-05-10 PROCEDURE — 82948 REAGENT STRIP/BLOOD GLUCOSE: CPT

## 2025-05-11 ENCOUNTER — OUTSIDE FACILITY SERVICE (OUTPATIENT)
Dept: PULMONOLOGY | Facility: CLINIC | Age: 50
End: 2025-05-11
Payer: MEDICARE

## 2025-05-12 ENCOUNTER — OUTSIDE FACILITY SERVICE (OUTPATIENT)
Dept: INFECTIOUS DISEASES | Facility: CLINIC | Age: 50
End: 2025-05-12
Payer: MEDICARE

## 2025-05-12 ENCOUNTER — OUTSIDE FACILITY SERVICE (OUTPATIENT)
Dept: PULMONOLOGY | Facility: CLINIC | Age: 50
End: 2025-05-12
Payer: MEDICARE

## 2025-05-12 LAB
ALBUMIN SERPL-MCNC: 3.7 G/DL (ref 3.5–5.2)
ALBUMIN/GLOB SERPL: 1.1 G/DL
ALP SERPL-CCNC: 107 U/L (ref 39–117)
ALT SERPL W P-5'-P-CCNC: 30 U/L (ref 1–33)
ANION GAP SERPL CALCULATED.3IONS-SCNC: 10.9 MMOL/L (ref 5–15)
AST SERPL-CCNC: 29 U/L (ref 1–32)
BASOPHILS # BLD AUTO: 0.03 10*3/MM3 (ref 0–0.2)
BASOPHILS NFR BLD AUTO: 0.6 % (ref 0–1.5)
BILIRUB SERPL-MCNC: 0.2 MG/DL (ref 0–1.2)
BUN SERPL-MCNC: 18 MG/DL (ref 6–20)
BUN/CREAT SERPL: 24 (ref 7–25)
CALCIUM SPEC-SCNC: 9.4 MG/DL (ref 8.6–10.5)
CHLORIDE SERPL-SCNC: 106 MMOL/L (ref 98–107)
CO2 SERPL-SCNC: 25.1 MMOL/L (ref 22–29)
CREAT SERPL-MCNC: 0.75 MG/DL (ref 0.57–1)
CRP SERPL-MCNC: <0.3 MG/DL (ref 0–0.5)
DEPRECATED RDW RBC AUTO: 48.6 FL (ref 37–54)
EGFRCR SERPLBLD CKD-EPI 2021: 98.3 ML/MIN/1.73
EOSINOPHIL # BLD AUTO: 0.22 10*3/MM3 (ref 0–0.4)
EOSINOPHIL NFR BLD AUTO: 4.8 % (ref 0.3–6.2)
ERYTHROCYTE [DISTWIDTH] IN BLOOD BY AUTOMATED COUNT: 14.1 % (ref 12.3–15.4)
GLOBULIN UR ELPH-MCNC: 3.3 GM/DL
GLUCOSE SERPL-MCNC: 107 MG/DL (ref 65–99)
HCT VFR BLD AUTO: 36.8 % (ref 34–46.6)
HGB BLD-MCNC: 11.9 G/DL (ref 12–15.9)
HYPOCHROMIA BLD QL: NORMAL
IMM GRANULOCYTES # BLD AUTO: 0.02 10*3/MM3 (ref 0–0.05)
IMM GRANULOCYTES NFR BLD AUTO: 0.4 % (ref 0–0.5)
LYMPHOCYTES # BLD AUTO: 1.61 10*3/MM3 (ref 0.7–3.1)
LYMPHOCYTES NFR BLD AUTO: 34.8 % (ref 19.6–45.3)
MCH RBC QN AUTO: 30.7 PG (ref 26.6–33)
MCHC RBC AUTO-ENTMCNC: 32.3 G/DL (ref 31.5–35.7)
MCV RBC AUTO: 94.8 FL (ref 79–97)
MONOCYTES # BLD AUTO: 0.4 10*3/MM3 (ref 0.1–0.9)
MONOCYTES NFR BLD AUTO: 8.7 % (ref 5–12)
NEUTROPHILS NFR BLD AUTO: 2.34 10*3/MM3 (ref 1.7–7)
NEUTROPHILS NFR BLD AUTO: 50.7 % (ref 42.7–76)
NRBC BLD AUTO-RTO: 0 /100 WBC (ref 0–0.2)
PLAT MORPH BLD: NORMAL
PLATELET # BLD AUTO: 138 10*3/MM3 (ref 140–450)
PMV BLD AUTO: 10.7 FL (ref 6–12)
POTASSIUM SERPL-SCNC: 4.2 MMOL/L (ref 3.5–5.2)
PROT SERPL-MCNC: 7 G/DL (ref 6–8.5)
RBC # BLD AUTO: 3.88 10*6/MM3 (ref 3.77–5.28)
SODIUM SERPL-SCNC: 142 MMOL/L (ref 136–145)
WBC NRBC COR # BLD AUTO: 4.62 10*3/MM3 (ref 3.4–10.8)

## 2025-05-12 PROCEDURE — 85025 COMPLETE CBC W/AUTO DIFF WBC: CPT | Performed by: PHYSICIAN ASSISTANT

## 2025-05-12 PROCEDURE — 36415 COLL VENOUS BLD VENIPUNCTURE: CPT | Performed by: PHYSICIAN ASSISTANT

## 2025-05-12 PROCEDURE — 85007 BL SMEAR W/DIFF WBC COUNT: CPT | Performed by: PHYSICIAN ASSISTANT

## 2025-05-12 PROCEDURE — 80053 COMPREHEN METABOLIC PANEL: CPT | Performed by: PHYSICIAN ASSISTANT

## 2025-05-12 PROCEDURE — 86140 C-REACTIVE PROTEIN: CPT | Performed by: PHYSICIAN ASSISTANT

## 2025-05-13 ENCOUNTER — OUTSIDE FACILITY SERVICE (OUTPATIENT)
Dept: INFECTIOUS DISEASES | Facility: CLINIC | Age: 50
End: 2025-05-13
Payer: MEDICARE

## 2025-05-13 ENCOUNTER — OUTSIDE FACILITY SERVICE (OUTPATIENT)
Dept: PULMONOLOGY | Facility: CLINIC | Age: 50
End: 2025-05-13
Payer: MEDICARE

## 2025-05-15 ENCOUNTER — TELEPHONE (OUTPATIENT)
Dept: PULMONOLOGY | Facility: CLINIC | Age: 50
End: 2025-05-15
Payer: MEDICARE

## 2025-05-15 DIAGNOSIS — J96.11 CHRONIC HYPOXIC RESPIRATORY FAILURE: Primary | ICD-10-CM

## 2025-05-15 NOTE — TELEPHONE ENCOUNTER
Attempted to call patient back.  Left a message.  Switching from [facility] due to insurance. Supplies recently collected.     Advised to come in ASAP for new walk oximetry test.   This will likely have to be set up as a new qualification again for insurance approval.  This will also help me to confirm the proper liter flow.  Will go ahead and place order for 2-3 L, in case new DME can do transfer of services.     She had been able to titrate down to room air, but you supplies as needed during the last visit, but this was prior to the more recent hospitalization.    Called patient left message twice.  Also called patient's listed sister and left a message regarding this.      Has follow up on 5/22.
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
## 2025-05-18 ENCOUNTER — NURSE TRIAGE (OUTPATIENT)
Dept: CALL CENTER | Facility: HOSPITAL | Age: 50
End: 2025-05-18
Payer: MEDICARE

## 2025-05-19 NOTE — TELEPHONE ENCOUNTER
Reason for Disposition   Caller has medicine question, adult has minor symptoms, caller declines triage, AND triager answers question    Additional Information   Negative: [1] Intentional drug overdose AND [2] suicidal thoughts or ideas   Negative: Drug overdose and triager unable to answer question   Negative: Caller requesting a renewal or refill of a medicine patient is currently taking   Negative: Caller requesting information unrelated to medicine   Negative: Caller requesting information about COVID-19 Vaccine   Negative: Caller requesting information about Emergency Contraception   Negative: Caller requesting information about Combined Birth Control Pills   Negative: Caller requesting information about Progestin Birth Control Pills   Negative: Caller requesting information about Post-Op pain or medicines   Negative: Caller requesting a prescription antibiotic (such as Penicillin) for Strep throat and has a positive culture result   Negative: Caller requesting a prescription anti-viral med (such as Tamiflu) and has influenza (flu) symptoms   Negative: Immunization reaction suspected   Negative: Rash while taking a medicine or within 3 days of stopping it   Negative: [1] Asthma and [2] having symptoms of asthma (cough, wheezing, etc.)   Negative: [1] Symptom of illness (e.g., headache, abdominal pain, earache, vomiting) AND [2] more than mild   Negative: Breastfeeding questions about mother's medicines and diet   Negative: MORE THAN A DOUBLE DOSE of a prescription or over-the-counter (OTC) drug   Negative: [1] DOUBLE DOSE (an extra dose or lesser amount) of prescription drug AND [2] any symptoms (e.g., dizziness, nausea, pain, sleepiness)   Negative: [1] DOUBLE DOSE (an extra dose or lesser amount) of over-the-counter (OTC) drug AND [2] any symptoms (e.g., dizziness, nausea, pain, sleepiness)   Negative: Took another person's prescription drug   Negative: [1] DOUBLE DOSE (an extra dose or lesser amount) of  "prescription drug AND [2] NO symptoms  (Exception: A double dose of antibiotics.)   Negative: Diabetes drug error or overdose (e.g., took wrong type of insulin or took extra dose)   Negative: [1] Prescription not at pharmacy AND [2] was prescribed by PCP recently (Exception: Triager has access to EMR and prescription is recorded there. Go to Home Care and confirm for pharmacy.)   Negative: [1] Pharmacy calling with prescription question AND [2] triager unable to answer question   Negative: [1] Caller has URGENT medicine question about med that PCP or specialist prescribed AND [2] triager unable to answer question   Negative: Medicine patch causing local rash or itching   Negative: [1] Caller has medicine question about med NOT prescribed by PCP AND [2] triager unable to answer question (e.g., compatibility with other med, storage)   Negative: Prescription request for new medicine (not a refill)   Negative: [1] Caller has NON-URGENT medicine question about med that PCP prescribed AND [2] triager unable to answer question   Negative: Caller wants to use a complementary or alternative medicine   Negative: [1] Prescription prescribed recently is not at pharmacy AND [2] triager has access to patient's EMR AND [3] prescription is recorded in the EMR   Negative: [1] DOUBLE DOSE (an extra dose or lesser amount) of over-the-counter (OTC) drug AND [2] NO symptoms   Negative: [1] DOUBLE DOSE (an extra dose or lesser amount) of antibiotic drug AND [2] NO symptoms   Negative: Caller has medicine question only, adult not sick, AND triager answers question    Answer Assessment - Initial Assessment Questions  1. NAME of MEDICINE: \"What medicine(s) are you calling about?\"      She has FLOMAX and LASIX listed on her medication list  2. QUESTION: \"What is your question?\" (e.g., double dose of medicine, side effect)      Wanted to know which of her medications will help her urinate?  3. PRESCRIBER: \"Who prescribed the medicine?\" " "Reason: if prescribed by specialist, call should be referred to that group.      Jonas  4. SYMPTOMS: \"Do you have any symptoms?\" If Yes, ask: \"What symptoms are you having?\"  \"How bad are the symptoms (e.g., mild, moderate, severe)      Did not elaborate  5. PREGNANCY:  \"Is there any chance that you are pregnant?\" \"When was your last menstrual period?\"      na    Protocols used: Medication Question Call-ADULT-    "

## 2025-05-22 ENCOUNTER — TELEPHONE (OUTPATIENT)
Dept: PULMONOLOGY | Facility: CLINIC | Age: 50
End: 2025-05-22

## 2025-05-22 ENCOUNTER — OFFICE VISIT (OUTPATIENT)
Dept: INFECTIOUS DISEASES | Facility: CLINIC | Age: 50
End: 2025-05-22
Payer: MEDICARE

## 2025-05-22 ENCOUNTER — OFFICE VISIT (OUTPATIENT)
Dept: PULMONOLOGY | Facility: CLINIC | Age: 50
End: 2025-05-22
Payer: MEDICARE

## 2025-05-22 VITALS
TEMPERATURE: 98 F | RESPIRATION RATE: 18 BRPM | HEIGHT: 67 IN | BODY MASS INDEX: 28.44 KG/M2 | HEART RATE: 86 BPM | DIASTOLIC BLOOD PRESSURE: 77 MMHG | WEIGHT: 181.2 LBS | SYSTOLIC BLOOD PRESSURE: 125 MMHG | OXYGEN SATURATION: 94 %

## 2025-05-22 VITALS
BODY MASS INDEX: 28.46 KG/M2 | HEART RATE: 94 BPM | OXYGEN SATURATION: 97 % | SYSTOLIC BLOOD PRESSURE: 130 MMHG | WEIGHT: 181.3 LBS | TEMPERATURE: 98 F | DIASTOLIC BLOOD PRESSURE: 82 MMHG | HEIGHT: 67 IN

## 2025-05-22 DIAGNOSIS — Z87.01 HISTORY OF PNEUMONIA: ICD-10-CM

## 2025-05-22 DIAGNOSIS — J96.11 CHRONIC HYPOXIC RESPIRATORY FAILURE: ICD-10-CM

## 2025-05-22 DIAGNOSIS — R06.02 SHORTNESS OF BREATH: Primary | ICD-10-CM

## 2025-05-22 DIAGNOSIS — R05.8 PRODUCTIVE COUGH: ICD-10-CM

## 2025-05-22 DIAGNOSIS — B49 FUNGEMIA: Primary | ICD-10-CM

## 2025-05-22 DIAGNOSIS — F17.211 CIGARETTE NICOTINE DEPENDENCE IN REMISSION: ICD-10-CM

## 2025-05-22 DIAGNOSIS — B49 FUNGEMIA: ICD-10-CM

## 2025-05-22 DIAGNOSIS — Z86.711 HISTORY OF PULMONARY EMBOLISM: ICD-10-CM

## 2025-05-22 LAB
BASOPHILS # BLD AUTO: 0.04 10*3/MM3 (ref 0–0.2)
BASOPHILS NFR BLD AUTO: 1.3 % (ref 0–1.5)
CRP SERPL-MCNC: 0.49 MG/DL (ref 0–0.5)
DEPRECATED RDW RBC AUTO: 49.1 FL (ref 37–54)
EOSINOPHIL # BLD AUTO: 0.21 10*3/MM3 (ref 0–0.4)
EOSINOPHIL NFR BLD AUTO: 7 % (ref 0.3–6.2)
ERYTHROCYTE [DISTWIDTH] IN BLOOD BY AUTOMATED COUNT: 14 % (ref 12.3–15.4)
HCT VFR BLD AUTO: 38 % (ref 34–46.6)
HGB BLD-MCNC: 12.2 G/DL (ref 12–15.9)
IMM GRANULOCYTES # BLD AUTO: 0 10*3/MM3 (ref 0–0.05)
IMM GRANULOCYTES NFR BLD AUTO: 0 % (ref 0–0.5)
LYMPHOCYTES # BLD AUTO: 0.75 10*3/MM3 (ref 0.7–3.1)
LYMPHOCYTES NFR BLD AUTO: 25.2 % (ref 19.6–45.3)
MCH RBC QN AUTO: 30.8 PG (ref 26.6–33)
MCHC RBC AUTO-ENTMCNC: 32.1 G/DL (ref 31.5–35.7)
MCV RBC AUTO: 96 FL (ref 79–97)
MONOCYTES # BLD AUTO: 0.24 10*3/MM3 (ref 0.1–0.9)
MONOCYTES NFR BLD AUTO: 8.1 % (ref 5–12)
NEUTROPHILS NFR BLD AUTO: 1.74 10*3/MM3 (ref 1.7–7)
NEUTROPHILS NFR BLD AUTO: 58.4 % (ref 42.7–76)
NRBC BLD AUTO-RTO: 0 /100 WBC (ref 0–0.2)
PLATELET # BLD AUTO: 206 10*3/MM3 (ref 140–450)
PMV BLD AUTO: 10.9 FL (ref 6–12)
RBC # BLD AUTO: 3.96 10*6/MM3 (ref 3.77–5.28)
WBC NRBC COR # BLD AUTO: 2.98 10*3/MM3 (ref 3.4–10.8)

## 2025-05-22 PROCEDURE — 87040 BLOOD CULTURE FOR BACTERIA: CPT | Performed by: NURSE PRACTITIONER

## 2025-05-22 PROCEDURE — 1159F MED LIST DOCD IN RCRD: CPT | Performed by: NURSE PRACTITIONER

## 2025-05-22 PROCEDURE — 99213 OFFICE O/P EST LOW 20 MIN: CPT | Performed by: NURSE PRACTITIONER

## 2025-05-22 PROCEDURE — 86140 C-REACTIVE PROTEIN: CPT | Performed by: NURSE PRACTITIONER

## 2025-05-22 PROCEDURE — 80053 COMPREHEN METABOLIC PANEL: CPT | Performed by: INTERNAL MEDICINE

## 2025-05-22 PROCEDURE — 85025 COMPLETE CBC W/AUTO DIFF WBC: CPT | Performed by: NURSE PRACTITIONER

## 2025-05-22 PROCEDURE — 1160F RVW MEDS BY RX/DR IN RCRD: CPT | Performed by: NURSE PRACTITIONER

## 2025-05-22 RX ORDER — HYDROXYZINE HYDROCHLORIDE 25 MG/1
TABLET, FILM COATED ORAL
COMMUNITY
Start: 2025-05-14

## 2025-05-22 RX ORDER — MOMETASONE FUROATE AND FORMOTEROL FUMARATE DIHYDRATE 200; 5 UG/1; UG/1
AEROSOL RESPIRATORY (INHALATION)
COMMUNITY
Start: 2025-05-14

## 2025-05-22 RX ORDER — QUETIAPINE FUMARATE 25 MG/1
TABLET, FILM COATED ORAL
COMMUNITY
Start: 2025-05-14

## 2025-05-22 RX ORDER — LEVETIRACETAM 1000 MG/1
1000 TABLET ORAL
COMMUNITY
Start: 2025-05-05 | End: 2025-06-04
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
## 2025-05-22 NOTE — TELEPHONE ENCOUNTER
Caller: Alisa Holland    Relationship to patient: Self    Best call back number: [phone]     Patient is needing: PATIENT STATES SHE FORGOT TO ASK JANET AT HER APPOINTMENT TODAY: PATIENT HAS A SMALL DOG THAT SHE SAYS KNOWS WHEN SHE IS ABOUT TO HAVE SYMPTOMS. SHE WOULD LIKE JANET TO WRITE A NOTES TO MAKE HER A SERVICE DOG. PLEASE CALL TO ADVISE.
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
## 2025-05-23 LAB
ALBUMIN SERPL-MCNC: 3.7 G/DL (ref 3.5–5.2)
ALBUMIN/GLOB SERPL: 1.4 G/DL
ALP SERPL-CCNC: 103 U/L (ref 39–117)
ALT SERPL W P-5'-P-CCNC: 21 U/L (ref 1–33)
ANION GAP SERPL CALCULATED.3IONS-SCNC: 10 MMOL/L (ref 5–15)
AST SERPL-CCNC: 27 U/L (ref 1–32)
BILIRUB SERPL-MCNC: 0.2 MG/DL (ref 0–1.2)
BUN SERPL-MCNC: 3 MG/DL (ref 6–20)
BUN/CREAT SERPL: 4 (ref 7–25)
CALCIUM SPEC-SCNC: 9.5 MG/DL (ref 8.6–10.5)
CHLORIDE SERPL-SCNC: 107 MMOL/L (ref 98–107)
CO2 SERPL-SCNC: 26 MMOL/L (ref 22–29)
CREAT SERPL-MCNC: 0.75 MG/DL (ref 0.57–1)
EGFRCR SERPLBLD CKD-EPI 2021: 98.3 ML/MIN/1.73
GLOBULIN UR ELPH-MCNC: 2.6 GM/DL
GLUCOSE SERPL-MCNC: 100 MG/DL (ref 65–99)
POTASSIUM SERPL-SCNC: 4 MMOL/L (ref 3.5–5.2)
PROT SERPL-MCNC: 6.3 G/DL (ref 6–8.5)
SODIUM SERPL-SCNC: 143 MMOL/L (ref 136–145)

## 2025-05-27 LAB — BACTERIA SPEC AEROBE CULT: NORMAL

## 2025-06-03 ENCOUNTER — TELEPHONE (OUTPATIENT)
Dept: PULMONOLOGY | Facility: CLINIC | Age: 50
End: 2025-06-03
Payer: MEDICARE

## 2025-06-03 NOTE — TELEPHONE ENCOUNTER
"\"Called to let patient know that Jessica would prefer she see her PCP to help obtain the paperwork to obtain a service dog. Patient was not available, however I was able to leave the patient a voicemail. \"                 "

## 2025-06-05 PROBLEM — Z87.01 HISTORY OF PNEUMONIA: Status: ACTIVE | Noted: 2025-06-05

## 2025-06-05 PROBLEM — F17.211 CIGARETTE NICOTINE DEPENDENCE IN REMISSION: Status: ACTIVE | Noted: 2025-06-05

## 2025-06-05 PROBLEM — B49 FUNGEMIA: Status: ACTIVE | Noted: 2025-06-05

## 2025-06-05 PROBLEM — R05.8 PRODUCTIVE COUGH: Status: ACTIVE | Noted: 2025-06-05

## 2025-06-05 PROBLEM — Z86.711 HISTORY OF PULMONARY EMBOLISM: Status: ACTIVE | Noted: 2025-06-05

## 2025-06-05 PROBLEM — J96.11 CHRONIC HYPOXIC RESPIRATORY FAILURE: Status: ACTIVE | Noted: 2025-06-05

## 2025-06-29 ENCOUNTER — NURSE TRIAGE (OUTPATIENT)
Dept: CALL CENTER | Facility: HOSPITAL | Age: 50
End: 2025-06-29
Payer: MEDICARE

## 2025-06-30 NOTE — TELEPHONE ENCOUNTER
"Recent hospitalization at [facility] on respirator; states she may have someone drive her to ED at [facility].    Reason for Disposition   Respiratory distress, severe (e.g., struggling for each breath, unable to speak)    Additional Information   Negative: CARDIAC ARREST suspected   Negative: Breathing stopped   Negative: Anaphylactic reaction (life-threatening allergic reaction)   Negative: Asthma attack, severe (e.g., struggling for each breath, unable to speak)   Negative: Bleeding (severe) from skin AND can't be stopped   Negative: Bleeding (severe) from nose, mouth, vomiting, anus, vagina AND can't be stopped   Negative: Breathing difficulty, severe (e.g., struggling for each breath, unable to speak)   Negative: Burn, severe (e.g., burn area larger than 20 palms of hand [>10% BSA] with blisters)   Negative: Choking, severe (e.g., unable to breathe, talk)   Negative: Coma (e.g., unconscious, not responding to verbal or painful stimulus)   Negative: Cyanosis (bluish or gray lips or face)   Negative: Dehydration, severe (e.g., cold/pale/clammy skin, too weak to stand)   Negative: Drowning, near   Negative: Electrical shock, severe   Negative: Heart attack suspected   Negative: Homicidal threat   Negative: Hyperthermia (from heat exposure) > 105 F (40.5 C) rectally or > 104 F (40.0 C) orally   Negative: Hypothermia (from cold exposure) < 95 F (35 C) rectally or < 94 F (34.4 C) orally   Negative: Lightning strike injury   Negative: Meningococcal sepsis suspected (purple spots/dots with fever)   Negative: Mental status altered (confusion) now   Negative: Neck trauma, major (e.g., MVA, diving, trampoline, contact sports, fall > 10 feet or 3 meters, hanging)   Negative: Penetrating wound of chest or abdomen   Negative: Purpura/petechiae with fever (purple spots/dots with fever)    Answer Assessment - Initial Assessment Questions  1. SYMPTOMS: \"What is the most serious symptom?\"      Gasping for air with arms/hands " "swelling  2. AIRWAY: \"Are they breathing?\" (e.g., Yes, No)  (R/O: airway blockage, respiratory arrest)       yes  3. BREATHING: \"Is there difficulty breathing?\" (e.g., Yes, No, Unknown; severity)  (R/O: respiratory distress)      yes  4. CIRCULATION: \"Are you feeling weak?\"  (e.g., Yes, No, Unknown; severity)  (R/O: shock)      yes  5. BLEEDING: \"Is there any bleeding?\" (e.g., Yes, No) If Yes, ask: \"How bad is the bleeding?\" (e.g., actively dripping or spurting)  (R/O: hemorrhage)      na  6. CONSCIOUS: \"Are they awake and alert?\" (e.g., alert-oriented, confused, lethargic, stuporous, comatose) (R/O: altered mental status, coma)      A&O    Protocols used: 911 Symptoms-ADULT-AH    "

## 2025-07-01 ENCOUNTER — APPOINTMENT (OUTPATIENT)
Dept: GENERAL RADIOLOGY | Facility: HOSPITAL | Age: 50
End: 2025-07-01
Payer: MEDICARE

## 2025-07-01 PROCEDURE — 71046 X-RAY EXAM CHEST 2 VIEWS: CPT | Performed by: RADIOLOGY

## 2025-07-01 PROCEDURE — 99284 EMERGENCY DEPT VISIT MOD MDM: CPT

## 2025-07-01 PROCEDURE — 93005 ELECTROCARDIOGRAM TRACING: CPT

## 2025-07-01 PROCEDURE — 93005 ELECTROCARDIOGRAM TRACING: CPT | Performed by: STUDENT IN AN ORGANIZED HEALTH CARE EDUCATION/TRAINING PROGRAM

## 2025-07-01 PROCEDURE — 0202U NFCT DS 22 TRGT SARS-COV-2: CPT | Performed by: PHYSICIAN ASSISTANT

## 2025-07-01 PROCEDURE — 71046 X-RAY EXAM CHEST 2 VIEWS: CPT

## 2025-07-01 PROCEDURE — 93010 ELECTROCARDIOGRAM REPORT: CPT | Performed by: INTERNAL MEDICINE

## 2025-07-01 RX ORDER — SODIUM CHLORIDE 0.9 % (FLUSH) 0.9 %
10 SYRINGE (ML) INJECTION AS NEEDED
Status: DISCONTINUED | OUTPATIENT
Start: 2025-07-01 | End: 2025-07-02 | Stop reason: HOSPADM

## 2025-07-02 ENCOUNTER — APPOINTMENT (OUTPATIENT)
Dept: ULTRASOUND IMAGING | Facility: HOSPITAL | Age: 50
End: 2025-07-02
Payer: MEDICARE

## 2025-07-02 ENCOUNTER — HOSPITAL ENCOUNTER (EMERGENCY)
Facility: HOSPITAL | Age: 50
Discharge: HOME | End: 2025-07-02
Attending: STUDENT IN AN ORGANIZED HEALTH CARE EDUCATION/TRAINING PROGRAM | Admitting: STUDENT IN AN ORGANIZED HEALTH CARE EDUCATION/TRAINING PROGRAM
Payer: MEDICARE

## 2025-07-02 ENCOUNTER — APPOINTMENT (OUTPATIENT)
Dept: CT IMAGING | Facility: HOSPITAL | Age: 50
End: 2025-07-02
Payer: MEDICARE

## 2025-07-02 VITALS
HEART RATE: 76 BPM | BODY MASS INDEX: 27.15 KG/M2 | DIASTOLIC BLOOD PRESSURE: 81 MMHG | OXYGEN SATURATION: 98 % | WEIGHT: 173 LBS | HEIGHT: 67 IN | TEMPERATURE: 98 F | SYSTOLIC BLOOD PRESSURE: 124 MMHG | RESPIRATION RATE: 16 BRPM

## 2025-07-02 DIAGNOSIS — G89.29 CHRONIC PAIN OF RIGHT UPPER EXTREMITY: ICD-10-CM

## 2025-07-02 DIAGNOSIS — R06.09 OTHER FORM OF DYSPNEA: Primary | ICD-10-CM

## 2025-07-02 DIAGNOSIS — R91.8 PULMONARY INFILTRATE: ICD-10-CM

## 2025-07-02 DIAGNOSIS — M79.601 CHRONIC PAIN OF RIGHT UPPER EXTREMITY: ICD-10-CM

## 2025-07-02 LAB
ALBUMIN SERPL-MCNC: 3.9 G/DL (ref 3.5–5.2)
ALBUMIN/GLOB SERPL: 1.2 G/DL
ALP SERPL-CCNC: 101 U/L (ref 39–117)
ALT SERPL W P-5'-P-CCNC: 9 U/L (ref 1–33)
ANION GAP SERPL CALCULATED.3IONS-SCNC: 11.6 MMOL/L (ref 5–15)
AST SERPL-CCNC: 21 U/L (ref 1–32)
B PARAPERT DNA SPEC QL NAA+PROBE: NOT DETECTED
B PERT DNA SPEC QL NAA+PROBE: NOT DETECTED
BASOPHILS # BLD AUTO: 0.03 10*3/MM3 (ref 0–0.2)
BASOPHILS NFR BLD AUTO: 0.5 % (ref 0–1.5)
BILIRUB SERPL-MCNC: 0.2 MG/DL (ref 0–1.2)
BUN SERPL-MCNC: 10.9 MG/DL (ref 6–20)
BUN/CREAT SERPL: 14.9 (ref 7–25)
C PNEUM DNA NPH QL NAA+NON-PROBE: NOT DETECTED
CALCIUM SPEC-SCNC: 9 MG/DL (ref 8.6–10.5)
CHLORIDE SERPL-SCNC: 102 MMOL/L (ref 98–107)
CO2 SERPL-SCNC: 26.4 MMOL/L (ref 22–29)
CREAT SERPL-MCNC: 0.73 MG/DL (ref 0.57–1)
CRP SERPL-MCNC: 1.27 MG/DL (ref 0–0.5)
DEPRECATED RDW RBC AUTO: 46.5 FL (ref 37–54)
EGFRCR SERPLBLD CKD-EPI 2021: 101 ML/MIN/1.73
EOSINOPHIL # BLD AUTO: 0.66 10*3/MM3 (ref 0–0.4)
EOSINOPHIL NFR BLD AUTO: 10.7 % (ref 0.3–6.2)
ERYTHROCYTE [DISTWIDTH] IN BLOOD BY AUTOMATED COUNT: 13.2 % (ref 12.3–15.4)
FLUAV SUBTYP SPEC NAA+PROBE: NOT DETECTED
FLUBV RNA NPH QL NAA+NON-PROBE: NOT DETECTED
GEN 5 1HR TROPONIN T REFLEX: 11 NG/L
GLOBULIN UR ELPH-MCNC: 3.2 GM/DL
GLUCOSE SERPL-MCNC: 92 MG/DL (ref 65–99)
HADV DNA SPEC NAA+PROBE: NOT DETECTED
HCOV 229E RNA SPEC QL NAA+PROBE: NOT DETECTED
HCOV HKU1 RNA SPEC QL NAA+PROBE: NOT DETECTED
HCOV NL63 RNA SPEC QL NAA+PROBE: NOT DETECTED
HCOV OC43 RNA SPEC QL NAA+PROBE: NOT DETECTED
HCT VFR BLD AUTO: 38.2 % (ref 34–46.6)
HGB BLD-MCNC: 12.2 G/DL (ref 12–15.9)
HMPV RNA NPH QL NAA+NON-PROBE: NOT DETECTED
HOLD SPECIMEN: NORMAL
HOLD SPECIMEN: NORMAL
HPIV1 RNA ISLT QL NAA+PROBE: NOT DETECTED
HPIV2 RNA SPEC QL NAA+PROBE: NOT DETECTED
HPIV3 RNA NPH QL NAA+PROBE: NOT DETECTED
HPIV4 P GENE NPH QL NAA+PROBE: NOT DETECTED
IMM GRANULOCYTES # BLD AUTO: 0 10*3/MM3 (ref 0–0.05)
IMM GRANULOCYTES NFR BLD AUTO: 0 % (ref 0–0.5)
LYMPHOCYTES # BLD AUTO: 2.15 10*3/MM3 (ref 0.7–3.1)
LYMPHOCYTES NFR BLD AUTO: 35 % (ref 19.6–45.3)
M PNEUMO IGG SER IA-ACNC: NOT DETECTED
MCH RBC QN AUTO: 30.7 PG (ref 26.6–33)
MCHC RBC AUTO-ENTMCNC: 31.9 G/DL (ref 31.5–35.7)
MCV RBC AUTO: 96.2 FL (ref 79–97)
MONOCYTES # BLD AUTO: 0.45 10*3/MM3 (ref 0.1–0.9)
MONOCYTES NFR BLD AUTO: 7.3 % (ref 5–12)
NEUTROPHILS NFR BLD AUTO: 2.85 10*3/MM3 (ref 1.7–7)
NEUTROPHILS NFR BLD AUTO: 46.5 % (ref 42.7–76)
NRBC BLD AUTO-RTO: 0 /100 WBC (ref 0–0.2)
NT-PROBNP SERPL-MCNC: 214 PG/ML (ref 0–450)
PLATELET # BLD AUTO: 169 10*3/MM3 (ref 140–450)
PMV BLD AUTO: 10.7 FL (ref 6–12)
POTASSIUM SERPL-SCNC: 3.9 MMOL/L (ref 3.5–5.2)
PROT SERPL-MCNC: 7.1 G/DL (ref 6–8.5)
QT INTERVAL: 382 MS
QTC INTERVAL: 464 MS
RBC # BLD AUTO: 3.97 10*6/MM3 (ref 3.77–5.28)
RHINOVIRUS RNA SPEC NAA+PROBE: NOT DETECTED
RSV RNA NPH QL NAA+NON-PROBE: NOT DETECTED
SARS-COV-2 RNA RESP QL NAA+PROBE: NOT DETECTED
SODIUM SERPL-SCNC: 140 MMOL/L (ref 136–145)
TROPONIN T NUMERIC DELTA: -2 NG/L
TROPONIN T SERPL HS-MCNC: 13 NG/L
WBC NRBC COR # BLD AUTO: 6.14 10*3/MM3 (ref 3.4–10.8)
WHOLE BLOOD HOLD COAG: NORMAL
WHOLE BLOOD HOLD SPECIMEN: NORMAL

## 2025-07-02 PROCEDURE — 80053 COMPREHEN METABOLIC PANEL: CPT | Performed by: STUDENT IN AN ORGANIZED HEALTH CARE EDUCATION/TRAINING PROGRAM

## 2025-07-02 PROCEDURE — 86140 C-REACTIVE PROTEIN: CPT | Performed by: PHYSICIAN ASSISTANT

## 2025-07-02 PROCEDURE — 93971 EXTREMITY STUDY: CPT | Performed by: RADIOLOGY

## 2025-07-02 PROCEDURE — 71250 CT THORAX DX C-: CPT | Performed by: RADIOLOGY

## 2025-07-02 PROCEDURE — 84484 ASSAY OF TROPONIN QUANT: CPT | Performed by: STUDENT IN AN ORGANIZED HEALTH CARE EDUCATION/TRAINING PROGRAM

## 2025-07-02 PROCEDURE — 93931 UPPER EXTREMITY STUDY: CPT

## 2025-07-02 PROCEDURE — 85025 COMPLETE CBC W/AUTO DIFF WBC: CPT | Performed by: STUDENT IN AN ORGANIZED HEALTH CARE EDUCATION/TRAINING PROGRAM

## 2025-07-02 PROCEDURE — 93931 UPPER EXTREMITY STUDY: CPT | Performed by: RADIOLOGY

## 2025-07-02 PROCEDURE — 36415 COLL VENOUS BLD VENIPUNCTURE: CPT

## 2025-07-02 PROCEDURE — 71250 CT THORAX DX C-: CPT

## 2025-07-02 PROCEDURE — 83880 ASSAY OF NATRIURETIC PEPTIDE: CPT | Performed by: STUDENT IN AN ORGANIZED HEALTH CARE EDUCATION/TRAINING PROGRAM

## 2025-07-02 PROCEDURE — 93971 EXTREMITY STUDY: CPT

## 2025-07-02 RX ORDER — OXYCODONE HYDROCHLORIDE 15 MG/1
15 TABLET ORAL ONCE
Refills: 0 | Status: COMPLETED | OUTPATIENT
Start: 2025-07-02 | End: 2025-07-02

## 2025-07-02 RX ADMIN — OXYCODONE HYDROCHLORIDE 15 MG: 15 TABLET ORAL at 03:29
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
## 2025-07-02 NOTE — ED NOTES
Introduced self to patient. Patient requesting to speak with Dr. Delgado, she is aware. Patient reports that she has to be home by 0800. Aware she is waiting for her ultrasound results. Patient voices no other concerns nor complaints at this time. Patient A&Ox4. No current cp, SOA.

## 2025-07-02 NOTE — ED NOTES
Unable to get blood at this time due to patient being hard stick and asking the needle be removed from her arm when attempting to get the orders.

## 2025-07-02 NOTE — ED NOTES
MEDICAL SCREENING:    Reason for Visit: SOB     Patient initially seen in triage.  The patient was advised further evaluation and diagnostic testing will be needed, some of the treatment and testing will be initiated in the lobby in order to begin the process.  The patient will be returned to the waiting area for the time being and possibly be re-assessed by a subsequent ED provider.  The patient will be brought back to the treatment area in as timely manner as possible.      Rachel Charlton PA  07/01/25 2957

## 2025-07-08 ENCOUNTER — APPOINTMENT (OUTPATIENT)
Dept: ULTRASOUND IMAGING | Facility: HOSPITAL | Age: 50
End: 2025-07-08
Payer: MEDICARE

## 2025-07-08 ENCOUNTER — APPOINTMENT (OUTPATIENT)
Dept: GENERAL RADIOLOGY | Facility: HOSPITAL | Age: 50
End: 2025-07-08
Payer: MEDICARE

## 2025-07-08 ENCOUNTER — HOSPITAL ENCOUNTER (EMERGENCY)
Facility: HOSPITAL | Age: 50
Discharge: HOME | End: 2025-07-08
Payer: MEDICARE

## 2025-07-08 ENCOUNTER — APPOINTMENT (OUTPATIENT)
Dept: CT IMAGING | Facility: HOSPITAL | Age: 50
End: 2025-07-08
Payer: MEDICARE

## 2025-07-08 VITALS
WEIGHT: 167 LBS | HEART RATE: 70 BPM | HEIGHT: 67 IN | TEMPERATURE: 98.3 F | SYSTOLIC BLOOD PRESSURE: 126 MMHG | OXYGEN SATURATION: 99 % | RESPIRATION RATE: 16 BRPM | BODY MASS INDEX: 26.21 KG/M2 | DIASTOLIC BLOOD PRESSURE: 94 MMHG

## 2025-07-08 DIAGNOSIS — T40.604A OPIATE OVERDOSE, UNDETERMINED INTENT, INITIAL ENCOUNTER: Primary | ICD-10-CM

## 2025-07-08 LAB
ALBUMIN SERPL-MCNC: 3.9 G/DL (ref 3.5–5.2)
ALBUMIN/GLOB SERPL: 1.3 G/DL
ALP SERPL-CCNC: 92 U/L (ref 39–117)
ALT SERPL W P-5'-P-CCNC: 9 U/L (ref 1–33)
AMPHET+METHAMPHET UR QL: NEGATIVE
AMPHETAMINES UR QL: NEGATIVE
ANION GAP SERPL CALCULATED.3IONS-SCNC: 9.9 MMOL/L (ref 5–15)
AST SERPL-CCNC: 23 U/L (ref 1–32)
B PARAPERT DNA SPEC QL NAA+PROBE: NOT DETECTED
B PERT DNA SPEC QL NAA+PROBE: NOT DETECTED
BARBITURATES UR QL SCN: NEGATIVE
BASOPHILS # BLD AUTO: 0.05 10*3/MM3 (ref 0–0.2)
BASOPHILS NFR BLD AUTO: 0.7 % (ref 0–1.5)
BENZODIAZ UR QL SCN: NEGATIVE
BILIRUB SERPL-MCNC: 0.2 MG/DL (ref 0–1.2)
BILIRUB UR QL STRIP: NEGATIVE
BUN SERPL-MCNC: 12.3 MG/DL (ref 6–20)
BUN/CREAT SERPL: 12.1 (ref 7–25)
BUPRENORPHINE SERPL-MCNC: NEGATIVE NG/ML
C PNEUM DNA NPH QL NAA+NON-PROBE: NOT DETECTED
CALCIUM SPEC-SCNC: 9.2 MG/DL (ref 8.6–10.5)
CANNABINOIDS SERPL QL: NEGATIVE
CHLORIDE SERPL-SCNC: 106 MMOL/L (ref 98–107)
CLARITY UR: ABNORMAL
CO2 SERPL-SCNC: 26.1 MMOL/L (ref 22–29)
COCAINE UR QL: NEGATIVE
COLOR UR: YELLOW
CREAT SERPL-MCNC: 1.02 MG/DL (ref 0.57–1)
D DIMER PPP FEU-MCNC: 0.76 MCGFEU/ML (ref 0–0.5)
DEPRECATED RDW RBC AUTO: 46.8 FL (ref 37–54)
EGFRCR SERPLBLD CKD-EPI 2021: 67.6 ML/MIN/1.73
EOSINOPHIL # BLD AUTO: 0.72 10*3/MM3 (ref 0–0.4)
EOSINOPHIL NFR BLD AUTO: 9.8 % (ref 0.3–6.2)
ERYTHROCYTE [DISTWIDTH] IN BLOOD BY AUTOMATED COUNT: 13.3 % (ref 12.3–15.4)
FENTANYL UR-MCNC: NEGATIVE NG/ML
FLUAV SUBTYP SPEC NAA+PROBE: NOT DETECTED
FLUBV RNA NPH QL NAA+NON-PROBE: NOT DETECTED
GEN 5 1HR TROPONIN T REFLEX: 8 NG/L
GLOBULIN UR ELPH-MCNC: 2.9 GM/DL
GLUCOSE BLDC GLUCOMTR-MCNC: 88 MG/DL (ref 70–130)
GLUCOSE SERPL-MCNC: 103 MG/DL (ref 65–99)
GLUCOSE UR STRIP-MCNC: NEGATIVE MG/DL
HADV DNA SPEC NAA+PROBE: NOT DETECTED
HCOV 229E RNA SPEC QL NAA+PROBE: NOT DETECTED
HCOV HKU1 RNA SPEC QL NAA+PROBE: NOT DETECTED
HCOV NL63 RNA SPEC QL NAA+PROBE: NOT DETECTED
HCOV OC43 RNA SPEC QL NAA+PROBE: NOT DETECTED
HCT VFR BLD AUTO: 34.1 % (ref 34–46.6)
HGB BLD-MCNC: 10.9 G/DL (ref 12–15.9)
HGB UR QL STRIP.AUTO: NEGATIVE
HMPV RNA NPH QL NAA+NON-PROBE: NOT DETECTED
HOLD SPECIMEN: NORMAL
HOLD SPECIMEN: NORMAL
HPIV1 RNA ISLT QL NAA+PROBE: NOT DETECTED
HPIV2 RNA SPEC QL NAA+PROBE: NOT DETECTED
HPIV3 RNA NPH QL NAA+PROBE: NOT DETECTED
HPIV4 P GENE NPH QL NAA+PROBE: NOT DETECTED
IMM GRANULOCYTES # BLD AUTO: 0.01 10*3/MM3 (ref 0–0.05)
IMM GRANULOCYTES NFR BLD AUTO: 0.1 % (ref 0–0.5)
KETONES UR QL STRIP: NEGATIVE
LEUKOCYTE ESTERASE UR QL STRIP.AUTO: NEGATIVE
LYMPHOCYTES # BLD AUTO: 2.45 10*3/MM3 (ref 0.7–3.1)
LYMPHOCYTES NFR BLD AUTO: 33.5 % (ref 19.6–45.3)
M PNEUMO IGG SER IA-ACNC: NOT DETECTED
MAGNESIUM SERPL-MCNC: 1.8 MG/DL (ref 1.6–2.6)
MCH RBC QN AUTO: 30.8 PG (ref 26.6–33)
MCHC RBC AUTO-ENTMCNC: 32 G/DL (ref 31.5–35.7)
MCV RBC AUTO: 96.3 FL (ref 79–97)
METHADONE UR QL SCN: NEGATIVE
MONOCYTES # BLD AUTO: 0.47 10*3/MM3 (ref 0.1–0.9)
MONOCYTES NFR BLD AUTO: 6.4 % (ref 5–12)
NEUTROPHILS NFR BLD AUTO: 3.61 10*3/MM3 (ref 1.7–7)
NEUTROPHILS NFR BLD AUTO: 49.5 % (ref 42.7–76)
NITRITE UR QL STRIP: NEGATIVE
NRBC BLD AUTO-RTO: 0 /100 WBC (ref 0–0.2)
OPIATES UR QL: NEGATIVE
OXYCODONE UR QL SCN: POSITIVE
PCP UR QL SCN: NEGATIVE
PH UR STRIP.AUTO: 8 [PH] (ref 5–8)
PLATELET # BLD AUTO: 208 10*3/MM3 (ref 140–450)
PMV BLD AUTO: 11 FL (ref 6–12)
POTASSIUM SERPL-SCNC: 4.1 MMOL/L (ref 3.5–5.2)
PROT SERPL-MCNC: 6.8 G/DL (ref 6–8.5)
PROT UR QL STRIP: NEGATIVE
QT INTERVAL: 396 MS
QTC INTERVAL: 484 MS
RBC # BLD AUTO: 3.54 10*6/MM3 (ref 3.77–5.28)
RHINOVIRUS RNA SPEC NAA+PROBE: NOT DETECTED
RSV RNA NPH QL NAA+NON-PROBE: NOT DETECTED
SARS-COV-2 RNA RESP QL NAA+PROBE: NOT DETECTED
SODIUM SERPL-SCNC: 142 MMOL/L (ref 136–145)
SP GR UR STRIP: 1.01 (ref 1–1.03)
TRICYCLICS UR QL SCN: NEGATIVE
TROPONIN T NUMERIC DELTA: -1 NG/L
TROPONIN T SERPL HS-MCNC: 9 NG/L
UROBILINOGEN UR QL STRIP: ABNORMAL
WBC NRBC COR # BLD AUTO: 7.31 10*3/MM3 (ref 3.4–10.8)
WHOLE BLOOD HOLD COAG: NORMAL
WHOLE BLOOD HOLD SPECIMEN: NORMAL

## 2025-07-08 PROCEDURE — 83735 ASSAY OF MAGNESIUM: CPT

## 2025-07-08 PROCEDURE — 0202U NFCT DS 22 TRGT SARS-COV-2: CPT

## 2025-07-08 PROCEDURE — 93971 EXTREMITY STUDY: CPT

## 2025-07-08 PROCEDURE — 71046 X-RAY EXAM CHEST 2 VIEWS: CPT

## 2025-07-08 PROCEDURE — 25010000002 NALOXONE HCL 2 MG/2ML SOLUTION PREFILLED SYRINGE

## 2025-07-08 PROCEDURE — 99284 EMERGENCY DEPT VISIT MOD MDM: CPT

## 2025-07-08 PROCEDURE — 25010000002 ORPHENADRINE CITRATE PER 60 MG

## 2025-07-08 PROCEDURE — 93971 EXTREMITY STUDY: CPT | Performed by: RADIOLOGY

## 2025-07-08 PROCEDURE — 80307 DRUG TEST PRSMV CHEM ANLYZR: CPT

## 2025-07-08 PROCEDURE — 36415 COLL VENOUS BLD VENIPUNCTURE: CPT

## 2025-07-08 PROCEDURE — 25010000002 PROCHLORPERAZINE 10 MG/2ML SOLUTION

## 2025-07-08 PROCEDURE — 93005 ELECTROCARDIOGRAM TRACING: CPT

## 2025-07-08 PROCEDURE — 81003 URINALYSIS AUTO W/O SCOPE: CPT

## 2025-07-08 PROCEDURE — 82948 REAGENT STRIP/BLOOD GLUCOSE: CPT

## 2025-07-08 PROCEDURE — 71250 CT THORAX DX C-: CPT | Performed by: RADIOLOGY

## 2025-07-08 PROCEDURE — 85379 FIBRIN DEGRADATION QUANT: CPT | Performed by: EMERGENCY MEDICINE

## 2025-07-08 PROCEDURE — 96376 TX/PRO/DX INJ SAME DRUG ADON: CPT

## 2025-07-08 PROCEDURE — 25010000002 NALOXONE PER 1 MG

## 2025-07-08 PROCEDURE — 96375 TX/PRO/DX INJ NEW DRUG ADDON: CPT

## 2025-07-08 PROCEDURE — 71250 CT THORAX DX C-: CPT

## 2025-07-08 PROCEDURE — 85025 COMPLETE CBC W/AUTO DIFF WBC: CPT

## 2025-07-08 PROCEDURE — 63710000001 PROCHLORPERAZINE MALEATE PER 10 MG

## 2025-07-08 PROCEDURE — 84484 ASSAY OF TROPONIN QUANT: CPT

## 2025-07-08 PROCEDURE — 80053 COMPREHEN METABOLIC PANEL: CPT

## 2025-07-08 PROCEDURE — 71046 X-RAY EXAM CHEST 2 VIEWS: CPT | Performed by: RADIOLOGY

## 2025-07-08 PROCEDURE — 96374 THER/PROPH/DIAG INJ IV PUSH: CPT

## 2025-07-08 RX ORDER — SODIUM CHLORIDE 0.9 % (FLUSH) 0.9 %
10 SYRINGE (ML) INJECTION AS NEEDED
Status: DISCONTINUED | OUTPATIENT
Start: 2025-07-08 | End: 2025-07-08 | Stop reason: HOSPADM

## 2025-07-08 RX ORDER — NALOXONE HYDROCHLORIDE 1 MG/ML
2 INJECTION INTRAMUSCULAR; INTRAVENOUS; SUBCUTANEOUS ONCE
Status: COMPLETED | OUTPATIENT
Start: 2025-07-08 | End: 2025-07-08

## 2025-07-08 RX ORDER — NALOXONE HYDROCHLORIDE 1 MG/ML
INJECTION INTRAMUSCULAR; INTRAVENOUS; SUBCUTANEOUS
Status: COMPLETED
Start: 2025-07-08 | End: 2025-07-08

## 2025-07-08 RX ORDER — NALOXONE HCL 0.4 MG/ML
0.4 VIAL (ML) INJECTION ONCE
Status: COMPLETED | OUTPATIENT
Start: 2025-07-08 | End: 2025-07-08

## 2025-07-08 RX ORDER — NALOXONE HYDROCHLORIDE 1 MG/ML
2 INJECTION INTRAMUSCULAR; INTRAVENOUS; SUBCUTANEOUS ONCE
Status: DISCONTINUED | OUTPATIENT
Start: 2025-07-08 | End: 2025-07-08

## 2025-07-08 RX ORDER — PROCHLORPERAZINE EDISYLATE 5 MG/ML
5 INJECTION INTRAMUSCULAR; INTRAVENOUS ONCE
Status: COMPLETED | OUTPATIENT
Start: 2025-07-08 | End: 2025-07-08

## 2025-07-08 RX ORDER — HYDROCODONE BITARTRATE AND ACETAMINOPHEN 7.5; 325 MG/1; MG/1
1 TABLET ORAL ONCE
Refills: 0 | Status: DISCONTINUED | OUTPATIENT
Start: 2025-07-08 | End: 2025-07-08

## 2025-07-08 RX ORDER — PROCHLORPERAZINE MALEATE 10 MG
10 TABLET ORAL ONCE
Status: DISCONTINUED | OUTPATIENT
Start: 2025-07-08 | End: 2025-07-08

## 2025-07-08 RX ORDER — ORPHENADRINE CITRATE 30 MG/ML
60 INJECTION INTRAMUSCULAR; INTRAVENOUS ONCE
Status: COMPLETED | OUTPATIENT
Start: 2025-07-08 | End: 2025-07-08

## 2025-07-08 RX ORDER — NALOXONE HYDROCHLORIDE 0.4 MG/ML
INJECTION, SOLUTION INTRAMUSCULAR; INTRAVENOUS; SUBCUTANEOUS
Status: COMPLETED
Start: 2025-07-08 | End: 2025-07-08

## 2025-07-08 RX ADMIN — ORPHENADRINE CITRATE 60 MG: 30 INJECTION, SOLUTION INTRAMUSCULAR; INTRAVENOUS at 06:21

## 2025-07-08 RX ADMIN — NALOXONE HYDROCHLORIDE 2 MG: 1 INJECTION INTRAMUSCULAR; INTRAVENOUS; SUBCUTANEOUS at 09:12

## 2025-07-08 RX ADMIN — NALOXONE HYDROCHLORIDE 2 MG: 1 INJECTION PARENTERAL at 09:12

## 2025-07-08 RX ADMIN — NALOXONE HYDROCHLORIDE 2 MG: 1 INJECTION PARENTERAL at 09:00

## 2025-07-08 RX ADMIN — NALOXONE HYDROCHLORIDE 0.4 MG: 0.4 INJECTION, SOLUTION INTRAMUSCULAR; INTRAVENOUS; SUBCUTANEOUS at 07:36

## 2025-07-08 RX ADMIN — Medication 0.4 MG: at 07:36

## 2025-07-08 RX ADMIN — PROCHLORPERAZINE EDISYLATE 5 MG: 5 INJECTION INTRAMUSCULAR; INTRAVENOUS at 06:21

## 2025-07-08 NOTE — ED TRIAGE NOTES
MEDICAL SCREENING:    Reason for Visit: Chest Pain    Patient initially seen in triage.  The patient was advised further evaluation and diagnostic testing will be needed, some of the treatment and testing will be initiated in the lobby in order to begin the process.  The patient will be returned to the waiting area for the time being and possibly be re-assessed by a subsequent ED provider.  The patient will be brought back to the treatment area in as timely manner as possible.

## 2025-07-08 NOTE — ED NOTES
Pt more awake at this time, requesting to use the restroom. Patient provided bedside and she got up with minimal assistance.
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
## 2025-07-08 NOTE — ED NOTES
Pt refused handouts for discharge, states she wanted to leave. Pt AAOX4 at time of discharge, she is able to ambulate on her own.

## 2025-07-08 NOTE — DISCHARGE INSTRUCTIONS
Please follow-up with the [facility] to seek help for detox.  If your symptoms acutely worsen return to the ER.

## 2025-07-08 NOTE — ED NOTES
Pt noted to be more lethargic and more difficult to arouse. Narcan being ordered and administered.

## 2025-07-25 ENCOUNTER — TELEPHONE (OUTPATIENT)
Dept: PULMONOLOGY | Facility: CLINIC | Age: 50
End: 2025-07-25
Payer: MEDICARE

## 2025-07-25 DIAGNOSIS — J96.21 ACUTE ON CHRONIC RESPIRATORY FAILURE WITH HYPOXIA: ICD-10-CM

## 2025-07-25 DIAGNOSIS — R06.02 SHORTNESS OF BREATH: Primary | ICD-10-CM

## 2025-07-25 NOTE — TELEPHONE ENCOUNTER
Message requesting callback.      Called to update patient that she qualified for nocturnal oxygen use.  Will start on 2 L.

## 2025-07-25 NOTE — TELEPHONE ENCOUNTER
Provided noted results, pt expressed understanding.She advised [facility] is no longer in network with her insurance for O2, changed to [employer]. Pt c/o new sob and gasping for air. Spoke with Jessica, she recommends chest xray and labs along with O2 order.

## 2025-07-29 ENCOUNTER — APPOINTMENT (OUTPATIENT)
Dept: CT IMAGING | Facility: HOSPITAL | Age: 50
End: 2025-07-29
Payer: MEDICARE

## 2025-07-29 ENCOUNTER — APPOINTMENT (OUTPATIENT)
Dept: GENERAL RADIOLOGY | Facility: HOSPITAL | Age: 50
End: 2025-07-29
Payer: MEDICARE

## 2025-07-29 PROCEDURE — 72131 CT LUMBAR SPINE W/O DYE: CPT

## 2025-07-29 PROCEDURE — 72125 CT NECK SPINE W/O DYE: CPT

## 2025-07-29 PROCEDURE — 99284 EMERGENCY DEPT VISIT MOD MDM: CPT

## 2025-07-29 PROCEDURE — 73562 X-RAY EXAM OF KNEE 3: CPT

## 2025-07-29 PROCEDURE — 70450 CT HEAD/BRAIN W/O DYE: CPT

## 2025-07-29 PROCEDURE — 72128 CT CHEST SPINE W/O DYE: CPT

## 2025-07-30 ENCOUNTER — HOSPITAL ENCOUNTER (EMERGENCY)
Facility: HOSPITAL | Age: 50
Discharge: HOME | End: 2025-07-30
Payer: MEDICARE

## 2025-07-30 VITALS
HEIGHT: 67 IN | DIASTOLIC BLOOD PRESSURE: 81 MMHG | SYSTOLIC BLOOD PRESSURE: 126 MMHG | BODY MASS INDEX: 26.53 KG/M2 | TEMPERATURE: 98 F | HEART RATE: 88 BPM | OXYGEN SATURATION: 99 % | RESPIRATION RATE: 17 BRPM | WEIGHT: 169 LBS

## 2025-07-30 DIAGNOSIS — R41.82 ALTERED MENTAL STATUS, UNSPECIFIED ALTERED MENTAL STATUS TYPE: ICD-10-CM

## 2025-07-30 DIAGNOSIS — F15.10 METHAMPHETAMINE ABUSE: Primary | ICD-10-CM

## 2025-07-30 LAB
ALBUMIN SERPL-MCNC: 3.9 G/DL (ref 3.5–5.2)
ALBUMIN/GLOB SERPL: 1.3 G/DL
ALP SERPL-CCNC: 94 U/L (ref 39–117)
ALT SERPL W P-5'-P-CCNC: <5 U/L (ref 1–33)
AMPHET+METHAMPHET UR QL: POSITIVE
AMPHETAMINES UR QL: POSITIVE
ANION GAP SERPL CALCULATED.3IONS-SCNC: 12.3 MMOL/L (ref 5–15)
AST SERPL-CCNC: 13 U/L (ref 1–32)
B PARAPERT DNA SPEC QL NAA+PROBE: NOT DETECTED
B PERT DNA SPEC QL NAA+PROBE: NOT DETECTED
BACTERIA UR QL AUTO: ABNORMAL /HPF
BARBITURATES UR QL SCN: NEGATIVE
BASOPHILS # BLD AUTO: 0.04 10*3/MM3 (ref 0–0.2)
BASOPHILS NFR BLD AUTO: 0.5 % (ref 0–1.5)
BENZODIAZ UR QL SCN: NEGATIVE
BILIRUB SERPL-MCNC: 0.3 MG/DL (ref 0–1.2)
BILIRUB UR QL STRIP: NEGATIVE
BUN SERPL-MCNC: 14.6 MG/DL (ref 6–20)
BUN/CREAT SERPL: 22.1 (ref 7–25)
BUPRENORPHINE SERPL-MCNC: NEGATIVE NG/ML
C PNEUM DNA NPH QL NAA+NON-PROBE: NOT DETECTED
CALCIUM SPEC-SCNC: 9.1 MG/DL (ref 8.6–10.5)
CANNABINOIDS SERPL QL: POSITIVE
CHLORIDE SERPL-SCNC: 105 MMOL/L (ref 98–107)
CLARITY UR: CLEAR
CO2 SERPL-SCNC: 23.7 MMOL/L (ref 22–29)
COCAINE UR QL: NEGATIVE
COLOR UR: YELLOW
CREAT SERPL-MCNC: 0.66 MG/DL (ref 0.57–1)
CRP SERPL-MCNC: 0.84 MG/DL (ref 0–0.5)
DEPRECATED RDW RBC AUTO: 43.8 FL (ref 37–54)
EGFRCR SERPLBLD CKD-EPI 2021: 107.7 ML/MIN/1.73
EOSINOPHIL # BLD AUTO: 0.06 10*3/MM3 (ref 0–0.4)
EOSINOPHIL NFR BLD AUTO: 0.7 % (ref 0.3–6.2)
ERYTHROCYTE [DISTWIDTH] IN BLOOD BY AUTOMATED COUNT: 12.6 % (ref 12.3–15.4)
FENTANYL UR-MCNC: NEGATIVE NG/ML
FLUAV SUBTYP SPEC NAA+PROBE: NOT DETECTED
FLUBV RNA NPH QL NAA+NON-PROBE: NOT DETECTED
GLOBULIN UR ELPH-MCNC: 2.9 GM/DL
GLUCOSE SERPL-MCNC: 139 MG/DL (ref 65–99)
GLUCOSE UR STRIP-MCNC: NEGATIVE MG/DL
HADV DNA SPEC NAA+PROBE: NOT DETECTED
HCOV 229E RNA SPEC QL NAA+PROBE: NOT DETECTED
HCOV HKU1 RNA SPEC QL NAA+PROBE: NOT DETECTED
HCOV NL63 RNA SPEC QL NAA+PROBE: NOT DETECTED
HCOV OC43 RNA SPEC QL NAA+PROBE: NOT DETECTED
HCT VFR BLD AUTO: 37.2 % (ref 34–46.6)
HGB BLD-MCNC: 12 G/DL (ref 12–15.9)
HGB UR QL STRIP.AUTO: NEGATIVE
HMPV RNA NPH QL NAA+NON-PROBE: NOT DETECTED
HOLD SPECIMEN: NORMAL
HOLD SPECIMEN: NORMAL
HPIV1 RNA ISLT QL NAA+PROBE: NOT DETECTED
HPIV2 RNA SPEC QL NAA+PROBE: NOT DETECTED
HPIV3 RNA NPH QL NAA+PROBE: NOT DETECTED
HPIV4 P GENE NPH QL NAA+PROBE: NOT DETECTED
HYALINE CASTS UR QL AUTO: ABNORMAL /LPF
IMM GRANULOCYTES # BLD AUTO: 0.02 10*3/MM3 (ref 0–0.05)
IMM GRANULOCYTES NFR BLD AUTO: 0.2 % (ref 0–0.5)
KETONES UR QL STRIP: ABNORMAL
LEUKOCYTE ESTERASE UR QL STRIP.AUTO: ABNORMAL
LYMPHOCYTES # BLD AUTO: 1.1 10*3/MM3 (ref 0.7–3.1)
LYMPHOCYTES NFR BLD AUTO: 13.7 % (ref 19.6–45.3)
M PNEUMO IGG SER IA-ACNC: NOT DETECTED
MAGNESIUM SERPL-MCNC: 1.7 MG/DL (ref 1.6–2.6)
MCH RBC QN AUTO: 30.4 PG (ref 26.6–33)
MCHC RBC AUTO-ENTMCNC: 32.3 G/DL (ref 31.5–35.7)
MCV RBC AUTO: 94.2 FL (ref 79–97)
METHADONE UR QL SCN: NEGATIVE
MONOCYTES # BLD AUTO: 0.61 10*3/MM3 (ref 0.1–0.9)
MONOCYTES NFR BLD AUTO: 7.6 % (ref 5–12)
NEUTROPHILS NFR BLD AUTO: 6.22 10*3/MM3 (ref 1.7–7)
NEUTROPHILS NFR BLD AUTO: 77.3 % (ref 42.7–76)
NITRITE UR QL STRIP: NEGATIVE
NRBC BLD AUTO-RTO: 0 /100 WBC (ref 0–0.2)
OPIATES UR QL: NEGATIVE
OXYCODONE UR QL SCN: NEGATIVE
PCP UR QL SCN: NEGATIVE
PH UR STRIP.AUTO: 6.5 [PH] (ref 5–8)
PLATELET # BLD AUTO: 222 10*3/MM3 (ref 140–450)
PMV BLD AUTO: 10 FL (ref 6–12)
POTASSIUM SERPL-SCNC: 3.6 MMOL/L (ref 3.5–5.2)
PROT SERPL-MCNC: 6.8 G/DL (ref 6–8.5)
PROT UR QL STRIP: ABNORMAL
RBC # BLD AUTO: 3.95 10*6/MM3 (ref 3.77–5.28)
RBC # UR STRIP: ABNORMAL /HPF
REF LAB TEST METHOD: ABNORMAL
RHINOVIRUS RNA SPEC NAA+PROBE: NOT DETECTED
RSV RNA NPH QL NAA+NON-PROBE: NOT DETECTED
SARS-COV-2 RNA RESP QL NAA+PROBE: NOT DETECTED
SODIUM SERPL-SCNC: 141 MMOL/L (ref 136–145)
SP GR UR STRIP: 1.01 (ref 1–1.03)
SQUAMOUS #/AREA URNS HPF: ABNORMAL /HPF
TRICYCLICS UR QL SCN: POSITIVE
UROBILINOGEN UR QL STRIP: ABNORMAL
WBC # UR STRIP: ABNORMAL /HPF
WBC NRBC COR # BLD AUTO: 8.05 10*3/MM3 (ref 3.4–10.8)
WHOLE BLOOD HOLD COAG: NORMAL
WHOLE BLOOD HOLD SPECIMEN: NORMAL

## 2025-07-30 PROCEDURE — 72131 CT LUMBAR SPINE W/O DYE: CPT | Performed by: RADIOLOGY

## 2025-07-30 PROCEDURE — 70450 CT HEAD/BRAIN W/O DYE: CPT | Performed by: RADIOLOGY

## 2025-07-30 PROCEDURE — 0202U NFCT DS 22 TRGT SARS-COV-2: CPT

## 2025-07-30 PROCEDURE — 83735 ASSAY OF MAGNESIUM: CPT

## 2025-07-30 PROCEDURE — 85025 COMPLETE CBC W/AUTO DIFF WBC: CPT

## 2025-07-30 PROCEDURE — 80307 DRUG TEST PRSMV CHEM ANLYZR: CPT

## 2025-07-30 PROCEDURE — 72125 CT NECK SPINE W/O DYE: CPT | Performed by: RADIOLOGY

## 2025-07-30 PROCEDURE — 73562 X-RAY EXAM OF KNEE 3: CPT | Performed by: RADIOLOGY

## 2025-07-30 PROCEDURE — 86140 C-REACTIVE PROTEIN: CPT

## 2025-07-30 PROCEDURE — 72128 CT CHEST SPINE W/O DYE: CPT | Performed by: RADIOLOGY

## 2025-07-30 PROCEDURE — 80053 COMPREHEN METABOLIC PANEL: CPT

## 2025-07-30 PROCEDURE — 36415 COLL VENOUS BLD VENIPUNCTURE: CPT

## 2025-07-30 PROCEDURE — 81001 URINALYSIS AUTO W/SCOPE: CPT

## 2025-07-30 NOTE — ED NOTES
MEDICAL SCREENING:    Reason for Visit: [facility] today, L knee pain    Patient initially seen in triage.  The patient was advised further evaluation and diagnostic testing will be needed, some of the treatment and testing will be initiated in the lobby in order to begin the process.  The patient will be returned to the waiting area for the time being and possibly be re-assessed by a subsequent ED provider.  The patient will be brought back to the treatment area in as timely manner as possible.     Joan Garcia, APRN  07/29/25 0521
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
## 2025-07-30 NOTE — DISCHARGE INSTRUCTIONS
I recommend following up with the [facility].  If your symptoms acutely worsen return to the ER.

## 2025-08-12 ENCOUNTER — OFFICE VISIT (OUTPATIENT)
Dept: PULMONOLOGY | Facility: CLINIC | Age: 50
End: 2025-08-12
Payer: MEDICAID

## 2025-08-12 VITALS
OXYGEN SATURATION: 93 % | SYSTOLIC BLOOD PRESSURE: 138 MMHG | WEIGHT: 175 LBS | BODY MASS INDEX: 28.12 KG/M2 | TEMPERATURE: 97.9 F | HEART RATE: 77 BPM | DIASTOLIC BLOOD PRESSURE: 80 MMHG | HEIGHT: 66 IN

## 2025-08-12 DIAGNOSIS — J44.1 COPD WITH ACUTE EXACERBATION: Primary | ICD-10-CM

## 2025-08-12 DIAGNOSIS — Z86.711 HISTORY OF PULMONARY EMBOLISM: ICD-10-CM

## 2025-08-12 DIAGNOSIS — J39.9 DISORDER OF UPPER AIRWAY: ICD-10-CM

## 2025-08-12 DIAGNOSIS — G47.34 NOCTURNAL HYPOXIA: ICD-10-CM

## 2025-08-12 DIAGNOSIS — R06.02 SHORTNESS OF BREATH: ICD-10-CM

## 2025-08-12 DIAGNOSIS — B49 FUNGEMIA: ICD-10-CM

## 2025-08-12 DIAGNOSIS — Z87.01 HISTORY OF PNEUMONIA: ICD-10-CM

## 2025-08-12 DIAGNOSIS — F17.211 CIGARETTE NICOTINE DEPENDENCE IN REMISSION: ICD-10-CM

## 2025-08-12 RX ORDER — IPRATROPIUM BROMIDE AND ALBUTEROL SULFATE 2.5; .5 MG/3ML; MG/3ML
3 SOLUTION RESPIRATORY (INHALATION) 4 TIMES DAILY PRN
Qty: 360 ML | Refills: 3 | Status: SHIPPED | OUTPATIENT
Start: 2025-08-12

## 2025-08-12 RX ORDER — METHYLPREDNISOLONE 4 MG/1
TABLET ORAL
Qty: 21 TABLET | Refills: 0 | Status: SHIPPED | OUTPATIENT
Start: 2025-08-12 | End: 2025-08-18

## 2025-08-12 RX ORDER — METHYLPREDNISOLONE 4 MG/1
TABLET ORAL
Qty: 21 TABLET | Refills: 0 | Status: CANCELLED | OUTPATIENT
Start: 2025-08-12

## 2025-08-12 RX ORDER — METHYLPREDNISOLONE SODIUM SUCCINATE 125 MG/2ML
125 INJECTION, POWDER, LYOPHILIZED, FOR SOLUTION INTRAMUSCULAR; INTRAVENOUS ONCE
Status: COMPLETED | OUTPATIENT
Start: 2025-08-12 | End: 2025-08-12

## 2025-08-12 RX ADMIN — METHYLPREDNISOLONE SODIUM SUCCINATE 125 MG: 125 INJECTION, POWDER, LYOPHILIZED, FOR SOLUTION INTRAMUSCULAR; INTRAVENOUS at 17:16

## 2025-08-15 ENCOUNTER — HOSPITAL ENCOUNTER (EMERGENCY)
Facility: HOSPITAL | Age: 50
Discharge: HOME | End: 2025-08-16
Attending: STUDENT IN AN ORGANIZED HEALTH CARE EDUCATION/TRAINING PROGRAM
Payer: MEDICARE

## 2025-08-15 ENCOUNTER — APPOINTMENT (OUTPATIENT)
Dept: GENERAL RADIOLOGY | Facility: HOSPITAL | Age: 50
End: 2025-08-15
Payer: MEDICARE

## 2025-08-15 DIAGNOSIS — I26.99 BILATERAL PULMONARY EMBOLISM: ICD-10-CM

## 2025-08-15 DIAGNOSIS — J44.9 CHRONIC OBSTRUCTIVE PULMONARY DISEASE, UNSPECIFIED COPD TYPE: Primary | ICD-10-CM

## 2025-08-15 LAB
A-A DO2: 24.2 MMHG (ref 0–300)
ALBUMIN SERPL-MCNC: 3.7 G/DL (ref 3.5–5.2)
ALBUMIN/GLOB SERPL: 1.3 G/DL
ALP SERPL-CCNC: 97 U/L (ref 39–117)
ALT SERPL W P-5'-P-CCNC: 12 U/L (ref 1–33)
ANION GAP SERPL CALCULATED.3IONS-SCNC: 11.4 MMOL/L (ref 5–15)
ARTERIAL PATENCY WRIST A: ABNORMAL
AST SERPL-CCNC: 21 U/L (ref 1–32)
ATMOSPHERIC PRESS: 729 MMHG
B PARAPERT DNA SPEC QL NAA+PROBE: NOT DETECTED
B PERT DNA SPEC QL NAA+PROBE: NOT DETECTED
BASE EXCESS BLDA CALC-SCNC: 3.7 MMOL/L (ref 0–2)
BASOPHILS # BLD AUTO: 0.02 10*3/MM3 (ref 0–0.2)
BASOPHILS NFR BLD AUTO: 0.3 % (ref 0–1.5)
BDY SITE: ABNORMAL
BILIRUB SERPL-MCNC: <0.2 MG/DL (ref 0–1.2)
BUN SERPL-MCNC: 16.8 MG/DL (ref 6–20)
BUN/CREAT SERPL: 14.6 (ref 7–25)
C PNEUM DNA NPH QL NAA+NON-PROBE: NOT DETECTED
CALCIUM SPEC-SCNC: 8.4 MG/DL (ref 8.6–10.5)
CHLORIDE SERPL-SCNC: 103 MMOL/L (ref 98–107)
CO2 BLDA-SCNC: 31.5 MMOL/L (ref 22–33)
CO2 SERPL-SCNC: 26.6 MMOL/L (ref 22–29)
COHGB MFR BLD: 1.5 % (ref 0–5)
CREAT SERPL-MCNC: 1.15 MG/DL (ref 0.57–1)
DEPRECATED RDW RBC AUTO: 44.8 FL (ref 37–54)
EGFRCR SERPLBLD CKD-EPI 2021: 58.5 ML/MIN/1.73
EOSINOPHIL # BLD AUTO: 0 10*3/MM3 (ref 0–0.4)
EOSINOPHIL NFR BLD AUTO: 0 % (ref 0.3–6.2)
ERYTHROCYTE [DISTWIDTH] IN BLOOD BY AUTOMATED COUNT: 12.6 % (ref 12.3–15.4)
FLUAV SUBTYP SPEC NAA+PROBE: NOT DETECTED
FLUBV RNA NPH QL NAA+NON-PROBE: NOT DETECTED
GEN 5 1HR TROPONIN T REFLEX: <6 NG/L
GLOBULIN UR ELPH-MCNC: 2.8 GM/DL
GLUCOSE SERPL-MCNC: 131 MG/DL (ref 65–99)
HADV DNA SPEC NAA+PROBE: NOT DETECTED
HCO3 BLDA-SCNC: 29.9 MMOL/L (ref 20–26)
HCOV 229E RNA SPEC QL NAA+PROBE: NOT DETECTED
HCOV HKU1 RNA SPEC QL NAA+PROBE: NOT DETECTED
HCOV NL63 RNA SPEC QL NAA+PROBE: NOT DETECTED
HCOV OC43 RNA SPEC QL NAA+PROBE: NOT DETECTED
HCT VFR BLD AUTO: 37.4 % (ref 34–46.6)
HCT VFR BLD CALC: 37.5 % (ref 38–51)
HGB BLD-MCNC: 11.8 G/DL (ref 12–15.9)
HGB BLDA-MCNC: 12.2 G/DL (ref 13.5–17.5)
HMPV RNA NPH QL NAA+NON-PROBE: NOT DETECTED
HPIV1 RNA ISLT QL NAA+PROBE: NOT DETECTED
HPIV2 RNA SPEC QL NAA+PROBE: NOT DETECTED
HPIV3 RNA NPH QL NAA+PROBE: NOT DETECTED
HPIV4 P GENE NPH QL NAA+PROBE: NOT DETECTED
IMM GRANULOCYTES # BLD AUTO: 0.03 10*3/MM3 (ref 0–0.05)
IMM GRANULOCYTES NFR BLD AUTO: 0.4 % (ref 0–0.5)
INHALED O2 CONCENTRATION: 21 %
LYMPHOCYTES # BLD AUTO: 0.54 10*3/MM3 (ref 0.7–3.1)
LYMPHOCYTES NFR BLD AUTO: 7.1 % (ref 19.6–45.3)
Lab: ABNORMAL
M PNEUMO IGG SER IA-ACNC: NOT DETECTED
MCH RBC QN AUTO: 30.9 PG (ref 26.6–33)
MCHC RBC AUTO-ENTMCNC: 31.6 G/DL (ref 31.5–35.7)
MCV RBC AUTO: 97.9 FL (ref 79–97)
METHGB BLD QL: 0.4 % (ref 0–3)
MODALITY: ABNORMAL
MONOCYTES # BLD AUTO: 0.14 10*3/MM3 (ref 0.1–0.9)
MONOCYTES NFR BLD AUTO: 1.8 % (ref 5–12)
NEUTROPHILS NFR BLD AUTO: 6.91 10*3/MM3 (ref 1.7–7)
NEUTROPHILS NFR BLD AUTO: 90.4 % (ref 42.7–76)
NOTE: ABNORMAL
NOTIFIED BY: ABNORMAL
NOTIFIED WHO: ABNORMAL
NRBC BLD AUTO-RTO: 0 /100 WBC (ref 0–0.2)
NT-PROBNP SERPL-MCNC: 231 PG/ML (ref 0–450)
OXYHGB MFR BLDV: 89.1 % (ref 94–99)
PCO2 BLDA: 51.2 MM HG (ref 35–45)
PCO2 TEMP ADJ BLD: ABNORMAL MM[HG]
PH BLDA: 7.38 PH UNITS (ref 7.35–7.45)
PH, TEMP CORRECTED: ABNORMAL
PLATELET # BLD AUTO: 241 10*3/MM3 (ref 140–450)
PMV BLD AUTO: 10 FL (ref 6–12)
PO2 BLDA: 61.2 MM HG (ref 83–108)
PO2 TEMP ADJ BLD: ABNORMAL MM[HG]
POTASSIUM SERPL-SCNC: 4.1 MMOL/L (ref 3.5–5.2)
PROT SERPL-MCNC: 6.5 G/DL (ref 6–8.5)
RBC # BLD AUTO: 3.82 10*6/MM3 (ref 3.77–5.28)
RHINOVIRUS RNA SPEC NAA+PROBE: NOT DETECTED
RSV RNA NPH QL NAA+NON-PROBE: NOT DETECTED
SAO2 % BLDCOA: 90.8 % (ref 94–99)
SARS-COV-2 RNA RESP QL NAA+PROBE: NOT DETECTED
SODIUM SERPL-SCNC: 141 MMOL/L (ref 136–145)
TROPONIN T NUMERIC DELTA: NORMAL
TROPONIN T SERPL HS-MCNC: 7 NG/L
VENTILATOR MODE: ABNORMAL
WBC NRBC COR # BLD AUTO: 7.64 10*3/MM3 (ref 3.4–10.8)

## 2025-08-15 PROCEDURE — 85025 COMPLETE CBC W/AUTO DIFF WBC: CPT | Performed by: STUDENT IN AN ORGANIZED HEALTH CARE EDUCATION/TRAINING PROGRAM

## 2025-08-15 PROCEDURE — 80053 COMPREHEN METABOLIC PANEL: CPT | Performed by: STUDENT IN AN ORGANIZED HEALTH CARE EDUCATION/TRAINING PROGRAM

## 2025-08-15 PROCEDURE — 94799 UNLISTED PULMONARY SVC/PX: CPT

## 2025-08-15 PROCEDURE — 0202U NFCT DS 22 TRGT SARS-COV-2: CPT | Performed by: STUDENT IN AN ORGANIZED HEALTH CARE EDUCATION/TRAINING PROGRAM

## 2025-08-15 PROCEDURE — 82375 ASSAY CARBOXYHB QUANT: CPT

## 2025-08-15 PROCEDURE — 84484 ASSAY OF TROPONIN QUANT: CPT | Performed by: STUDENT IN AN ORGANIZED HEALTH CARE EDUCATION/TRAINING PROGRAM

## 2025-08-15 PROCEDURE — 71045 X-RAY EXAM CHEST 1 VIEW: CPT

## 2025-08-15 PROCEDURE — 99284 EMERGENCY DEPT VISIT MOD MDM: CPT

## 2025-08-15 PROCEDURE — 83880 ASSAY OF NATRIURETIC PEPTIDE: CPT | Performed by: STUDENT IN AN ORGANIZED HEALTH CARE EDUCATION/TRAINING PROGRAM

## 2025-08-15 PROCEDURE — 93005 ELECTROCARDIOGRAM TRACING: CPT | Performed by: STUDENT IN AN ORGANIZED HEALTH CARE EDUCATION/TRAINING PROGRAM

## 2025-08-15 PROCEDURE — 36415 COLL VENOUS BLD VENIPUNCTURE: CPT

## 2025-08-15 PROCEDURE — 36600 WITHDRAWAL OF ARTERIAL BLOOD: CPT

## 2025-08-15 PROCEDURE — 82805 BLOOD GASES W/O2 SATURATION: CPT

## 2025-08-15 PROCEDURE — 94640 AIRWAY INHALATION TREATMENT: CPT

## 2025-08-15 PROCEDURE — 83050 HGB METHEMOGLOBIN QUAN: CPT

## 2025-08-15 RX ORDER — DOXYCYCLINE 100 MG/1
100 CAPSULE ORAL 2 TIMES DAILY
Qty: 14 CAPSULE | Refills: 0 | Status: SHIPPED | OUTPATIENT
Start: 2025-08-15 | End: 2025-08-22

## 2025-08-15 RX ORDER — BUDESONIDE 0.5 MG/2ML
0.5 INHALANT ORAL ONCE
Status: COMPLETED | OUTPATIENT
Start: 2025-08-16 | End: 2025-08-15

## 2025-08-15 RX ORDER — IPRATROPIUM BROMIDE AND ALBUTEROL SULFATE 2.5; .5 MG/3ML; MG/3ML
3 SOLUTION RESPIRATORY (INHALATION) ONCE
Status: COMPLETED | OUTPATIENT
Start: 2025-08-15 | End: 2025-08-15

## 2025-08-15 RX ADMIN — IPRATROPIUM BROMIDE AND ALBUTEROL SULFATE 3 ML: .5; 3 SOLUTION RESPIRATORY (INHALATION) at 23:13

## 2025-08-15 RX ADMIN — BUDESONIDE 0.5 MG: 0.5 SUSPENSION RESPIRATORY (INHALATION) at 23:55

## 2025-08-16 VITALS
OXYGEN SATURATION: 98 % | BODY MASS INDEX: 26.53 KG/M2 | HEART RATE: 80 BPM | SYSTOLIC BLOOD PRESSURE: 102 MMHG | RESPIRATION RATE: 19 BRPM | HEIGHT: 67 IN | DIASTOLIC BLOOD PRESSURE: 65 MMHG | TEMPERATURE: 97.7 F | WEIGHT: 169 LBS

## 2025-08-16 LAB
QT INTERVAL: 358 MS
QTC INTERVAL: 464 MS

## 2025-08-16 PROCEDURE — 71045 X-RAY EXAM CHEST 1 VIEW: CPT | Performed by: RADIOLOGY

## 2025-08-18 DIAGNOSIS — R19.7 DIARRHEA, UNSPECIFIED TYPE: Primary | ICD-10-CM

## 2025-08-18 RX ORDER — PREDNISONE 10 MG/1
TABLET ORAL
Qty: 31 TABLET | Refills: 0 | Status: SHIPPED | OUTPATIENT
Start: 2025-08-18

## 2025-08-25 ENCOUNTER — TELEPHONE (OUTPATIENT)
Dept: PULMONOLOGY | Facility: CLINIC | Age: 50
End: 2025-08-25
Payer: MEDICARE

## 2025-08-26 ENCOUNTER — TELEPHONE (OUTPATIENT)
Dept: PULMONOLOGY | Facility: CLINIC | Age: 50
End: 2025-08-26
Payer: MEDICARE

## 2025-08-27 PROBLEM — J22 ACUTE LOWER RESPIRATORY INFECTION: Status: ACTIVE | Noted: 2025-08-27

## 2025-08-27 PROBLEM — J96.21 ACUTE ON CHRONIC RESPIRATORY FAILURE WITH HYPOXIA: Status: ACTIVE | Noted: 2025-08-27

## 2025-08-27 PROBLEM — J39.9 DISORDER OF UPPER AIRWAY: Status: ACTIVE | Noted: 2025-08-27

## 2025-08-27 PROBLEM — G47.34 NOCTURNAL HYPOXIA: Status: ACTIVE | Noted: 2025-08-27

## (undated) DEVICE — 3M™ STERI-STRIP™ REINFORCED ADHESIVE SKIN CLOSURES, R1547, 1/2 IN X 4 IN (12 MM X 100 MM), 6 STRIPS/ENVELOPE: Brand: 3M™ STERI-STRIP™

## (undated) DEVICE — TRY SKINPREP PVP SCRB W PAINT

## (undated) DEVICE — DRAPE,UTILTY,TAPE,15X26, 4EA/PK: Brand: MEDLINE

## (undated) DEVICE — HOLDER: Brand: DEROYAL

## (undated) DEVICE — OSCILLATING SAW BLADE, 9MM X 24.6MM X 0.64MM: Brand: MICROAIRE®

## (undated) DEVICE — TP CAST SCOTCHCAST PLS 4IN 4YD PNK

## (undated) DEVICE — ANTIBACTERIAL UNDYED BRAIDED (POLYGLACTIN 910), SYNTHETIC ABSORBABLE SUTURE: Brand: COATED VICRYL

## (undated) DEVICE — PAD GRND REM POLYHESIVE A/ DISP

## (undated) DEVICE — COR LAP CHOLE: Brand: MEDLINE INDUSTRIES, INC.

## (undated) DEVICE — STERILE COTTON BALLS LARGE 5/P: Brand: MEDLINE

## (undated) DEVICE — SUT VIC 0/0 UR6 27IN DYED J603H

## (undated) DEVICE — ENDOPATH XCEL BLADELESS TROCARS WITH STABILITY SLEEVES: Brand: ENDOPATH XCEL

## (undated) DEVICE — SUT MNCRYL 4/0 PS2 18 IN

## (undated) DEVICE — 2, DISPOSABLE SUCTION/IRRIGATOR WITH DISPOSABLE TIP: Brand: STRYKEFLOW

## (undated) DEVICE — DISPOSABLE TOURNIQUET CUFF SINGLE BLADDER, DUAL PORT AND QUICK CONNECT CONNECTOR: Brand: COLOR CUFF

## (undated) DEVICE — GLV SURG TRIUMPH ORTHO W/ALOE PF LTX 8 STRL

## (undated) DEVICE — ENDOCUT SCISSOR TIP, DISPOSABLE: Brand: RENEW

## (undated) DEVICE — KT CATH CHOLANGIOGRA PERC W/BALLO

## (undated) DEVICE — HOOK LOCK LATEX FREE ELASTIC BANDAGE D/L 6INX10YD

## (undated) DEVICE — PK EXTREM LOWR 70

## (undated) DEVICE — SUT ETHLN 3/0 PS2 18 IN 1669H

## (undated) DEVICE — DRSNG PAD ABD 8X10IN STRL

## (undated) DEVICE — DRSNG SURESITE WNDW 4X4.5

## (undated) DEVICE — ENDOPATH XCEL UNIVERSAL TROCAR STABLILITY SLEEVES: Brand: ENDOPATH XCEL

## (undated) DEVICE — ADHS LIQ MASTISOL 2/3ML

## (undated) DEVICE — HANDPIECE SET WITH COAXIAL HIGH FLOW TIP AND SUCTION TUBE: Brand: INTERPULSE

## (undated) DEVICE — UNDYED BRAIDED (POLYGLACTIN 910), SYNTHETIC ABSORBABLE SUTURE: Brand: COATED VICRYL

## (undated) DEVICE — SYR LL TP 10ML STRL

## (undated) DEVICE — GLV SURG PREMIERPRO MIC LTX PF SZ7.5 BRN

## (undated) DEVICE — SUT ETHLN 4/0 PS2 18IN 1667H

## (undated) DEVICE — ENDOPATH XCEL BLUNT TIP TROCARS WITH SMOOTH SLEEVES: Brand: ENDOPATH XCEL

## (undated) DEVICE — SKIN AFFIX SURG ADHESIVE 72/CS 0.55ML: Brand: MEDLINE

## (undated) DEVICE — SPNG GZ WOVN 4X4IN 12PLY 10/BX STRL

## (undated) DEVICE — UNDERCAST PADDING: Brand: DEROYAL

## (undated) DEVICE — APPL CHLORAPREP HI/LITE 26ML ORNG

## (undated) DEVICE — [HIGH FLOW INSUFFLATOR,  DO NOT USE IF PACKAGE IS DAMAGED,  KEEP DRY,  KEEP AWAY FROM SUNLIGHT,  PROTECT FROM HEAT AND RADIOACTIVE SOURCES.]: Brand: PNEUMOSURE